# Patient Record
Sex: FEMALE | Race: WHITE | Employment: OTHER | URBAN - NONMETROPOLITAN AREA
[De-identification: names, ages, dates, MRNs, and addresses within clinical notes are randomized per-mention and may not be internally consistent; named-entity substitution may affect disease eponyms.]

---

## 2017-01-23 ENCOUNTER — COMMUNICATION - GICH (OUTPATIENT)
Dept: INTERNAL MEDICINE | Facility: OTHER | Age: 82
End: 2017-01-23

## 2017-01-23 DIAGNOSIS — E87.6 HYPOKALEMIA: ICD-10-CM

## 2017-03-08 ENCOUNTER — OFFICE VISIT - GICH (OUTPATIENT)
Dept: FAMILY MEDICINE | Facility: OTHER | Age: 82
End: 2017-03-08

## 2017-03-08 DIAGNOSIS — Z23 ENCOUNTER FOR IMMUNIZATION: ICD-10-CM

## 2017-03-08 DIAGNOSIS — R12 HEARTBURN: ICD-10-CM

## 2017-03-08 DIAGNOSIS — M70.72 OTHER BURSITIS OF HIP, LEFT HIP: ICD-10-CM

## 2017-03-08 DIAGNOSIS — E87.6 HYPOKALEMIA: ICD-10-CM

## 2017-03-08 DIAGNOSIS — E78.2 MIXED HYPERLIPIDEMIA: ICD-10-CM

## 2017-03-08 DIAGNOSIS — C91.10 CHRONIC LYMPHOCYTIC LEUKEMIA OF B-CELL TYPE NOT HAVING ACHIEVED REMISSION (H): ICD-10-CM

## 2017-03-08 DIAGNOSIS — E55.9 VITAMIN D DEFICIENCY: ICD-10-CM

## 2017-03-08 DIAGNOSIS — M70.62 TROCHANTERIC BURSITIS OF LEFT HIP: ICD-10-CM

## 2017-03-08 DIAGNOSIS — I10 ESSENTIAL (PRIMARY) HYPERTENSION: ICD-10-CM

## 2017-03-08 LAB
A/G RATIO - HISTORICAL: 1.6 (ref 1–2)
ABSOLUTE BASOPHILS - HISTORICAL: 0.2 THOU/CU MM
ABSOLUTE EOSINOPHILS - HISTORICAL: 0.1 THOU/CU MM
ABSOLUTE LYMPHOCYTES - HISTORICAL: 5.7 THOU/CU MM (ref 0.9–2.9)
ABSOLUTE MONOCYTES - HISTORICAL: 0.8 THOU/CU MM
ABSOLUTE NEUTROPHILS - HISTORICAL: 3.9 THOU/CU MM (ref 1.7–7)
ALBUMIN SERPL-MCNC: 4.3 G/DL (ref 3.5–5.7)
ALP SERPL-CCNC: 54 IU/L (ref 34–104)
ALT (SGPT) - HISTORICAL: 15 IU/L (ref 7–52)
ANION GAP - HISTORICAL: 9 (ref 5–18)
AST SERPL-CCNC: 20 IU/L (ref 13–39)
BASOPHILS # BLD AUTO: 1.7 %
BILIRUB SERPL-MCNC: 0.3 MG/DL (ref 0.3–1)
BUN SERPL-MCNC: 26 MG/DL (ref 7–25)
BUN/CREAT RATIO - HISTORICAL: 19
CALCIUM SERPL-MCNC: 9.5 MG/DL (ref 8.6–10.3)
CHLORIDE SERPLBLD-SCNC: 102 MMOL/L (ref 98–107)
CO2 SERPL-SCNC: 27 MMOL/L (ref 21–31)
CREAT SERPL-MCNC: 1.37 MG/DL (ref 0.7–1.3)
EOSINOPHIL NFR BLD AUTO: 1 %
ERYTHROCYTE [DISTWIDTH] IN BLOOD BY AUTOMATED COUNT: 12.2 % (ref 11.5–15.5)
GFR IF NOT AFRICAN AMERICAN - HISTORICAL: 37 ML/MIN/1.73M2
GLOBULIN - HISTORICAL: 2.7 G/DL (ref 2–3.7)
GLUCOSE SERPL-MCNC: 95 MG/DL (ref 70–105)
HCT VFR BLD AUTO: 36.2 % (ref 33–51)
HEMOGLOBIN: 11.8 G/DL (ref 12–16)
LYMPHOCYTES NFR BLD AUTO: 53.2 % (ref 20–44)
MCH RBC QN AUTO: 30.9 PG (ref 26–34)
MCHC RBC AUTO-ENTMCNC: 32.6 G/DL (ref 32–36)
MCV RBC AUTO: 95 FL (ref 80–100)
MONOCYTES NFR BLD AUTO: 7.3 %
NEUTROPHILS NFR BLD AUTO: 36.8 % (ref 42–72)
PLATELET # BLD AUTO: 241 THOU/CU MM (ref 140–440)
PMV BLD: 6 FL (ref 6.5–11)
POTASSIUM SERPL-SCNC: 4.6 MMOL/L (ref 3.5–5.1)
PROT SERPL-MCNC: 7 G/DL (ref 6.4–8.9)
RED BLOOD COUNT - HISTORICAL: 3.82 MIL/CU MM (ref 4–5.2)
SODIUM SERPL-SCNC: 138 MMOL/L (ref 133–143)
VIT B12 SERPL-MCNC: 568 PG/ML (ref 180–914)
VITAMIN D TOTAL - HISTORICAL: 49.6 NG/ML
WHITE BLOOD COUNT - HISTORICAL: 10.6 THOU/CU MM (ref 4.5–11)

## 2017-03-13 ENCOUNTER — COMMUNICATION - GICH (OUTPATIENT)
Dept: INTERNAL MEDICINE | Facility: OTHER | Age: 82
End: 2017-03-13

## 2017-03-13 ENCOUNTER — COMMUNICATION - GICH (OUTPATIENT)
Dept: FAMILY MEDICINE | Facility: OTHER | Age: 82
End: 2017-03-13

## 2017-03-13 DIAGNOSIS — I10 ESSENTIAL (PRIMARY) HYPERTENSION: ICD-10-CM

## 2017-03-15 ENCOUNTER — COMMUNICATION - GICH (OUTPATIENT)
Dept: FAMILY MEDICINE | Facility: OTHER | Age: 82
End: 2017-03-15

## 2017-03-15 DIAGNOSIS — E78.2 MIXED HYPERLIPIDEMIA: ICD-10-CM

## 2017-03-28 ENCOUNTER — OFFICE VISIT - GICH (OUTPATIENT)
Dept: FAMILY MEDICINE | Facility: OTHER | Age: 82
End: 2017-03-28

## 2017-03-28 DIAGNOSIS — N10 ACUTE TUBULO-INTERSTITIAL NEPHRITIS: ICD-10-CM

## 2017-03-28 DIAGNOSIS — R30.0 DYSURIA: ICD-10-CM

## 2017-03-28 LAB
BACTERIA URINE: ABNORMAL BACTERIA/HPF
BILIRUB UR QL: NEGATIVE
CLARITY, URINE: ABNORMAL CLARITY
COLOR UR: YELLOW COLOR
EPITHELIAL CELLS: ABNORMAL EPI/HPF
GLUCOSE URINE: NEGATIVE MG/DL
KETONES UR QL: ABNORMAL MG/DL
LEUKOCYTE ESTERASE URINE: ABNORMAL
NITRITE UR QL STRIP: NEGATIVE
OCCULT BLOOD,URINE - HISTORICAL: ABNORMAL
PH UR: 5 [PH]
PROTEIN QUALITATIVE,URINE - HISTORICAL: 30 MG/DL
RBC - HISTORICAL: ABNORMAL /HPF
SP GR UR STRIP: 1.01
UROBILINOGEN,QUALITATIVE - HISTORICAL: NORMAL EU/DL
WBC - HISTORICAL: ABNORMAL /HPF

## 2017-05-15 ENCOUNTER — OFFICE VISIT - GICH (OUTPATIENT)
Dept: FAMILY MEDICINE | Facility: OTHER | Age: 82
End: 2017-05-15

## 2017-05-15 ENCOUNTER — HISTORY (OUTPATIENT)
Dept: FAMILY MEDICINE | Facility: OTHER | Age: 82
End: 2017-05-15

## 2017-05-15 DIAGNOSIS — L81.9 DISORDER OF PIGMENTATION: ICD-10-CM

## 2017-05-15 ASSESSMENT — PATIENT HEALTH QUESTIONNAIRE - PHQ9: SUM OF ALL RESPONSES TO PHQ QUESTIONS 1-9: 0

## 2017-05-16 ENCOUNTER — COMMUNICATION - GICH (OUTPATIENT)
Dept: FAMILY MEDICINE | Facility: OTHER | Age: 82
End: 2017-05-16

## 2017-05-16 DIAGNOSIS — L81.9 DISORDER OF PIGMENTATION: ICD-10-CM

## 2017-05-18 ENCOUNTER — HISTORY (OUTPATIENT)
Dept: FAMILY MEDICINE | Facility: OTHER | Age: 82
End: 2017-05-18

## 2017-05-18 ENCOUNTER — OFFICE VISIT - GICH (OUTPATIENT)
Dept: FAMILY MEDICINE | Facility: OTHER | Age: 82
End: 2017-05-18

## 2017-05-18 DIAGNOSIS — L03.031 CELLULITIS OF TOE OF RIGHT FOOT: ICD-10-CM

## 2017-05-25 ENCOUNTER — HISTORY (OUTPATIENT)
Dept: SURGERY | Facility: OTHER | Age: 82
End: 2017-05-25

## 2017-05-25 ENCOUNTER — AMBULATORY - GICH (OUTPATIENT)
Dept: SURGERY | Facility: OTHER | Age: 82
End: 2017-05-25

## 2017-05-25 DIAGNOSIS — L81.9 DISORDER OF PIGMENTATION: ICD-10-CM

## 2017-05-30 ENCOUNTER — COMMUNICATION - GICH (OUTPATIENT)
Dept: FAMILY MEDICINE | Facility: OTHER | Age: 82
End: 2017-05-30

## 2017-05-30 ENCOUNTER — COMMUNICATION - GICH (OUTPATIENT)
Dept: SURGERY | Facility: OTHER | Age: 82
End: 2017-05-30

## 2017-06-01 ENCOUNTER — OFFICE VISIT - GICH (OUTPATIENT)
Dept: SURGERY | Facility: OTHER | Age: 82
End: 2017-06-01

## 2017-06-01 ENCOUNTER — HISTORY (OUTPATIENT)
Dept: SURGERY | Facility: OTHER | Age: 82
End: 2017-06-01

## 2017-06-01 DIAGNOSIS — C43.62 MALIGNANT MELANOMA OF LEFT UPPER EXTREMITY INCLUDING SHOULDER (H): ICD-10-CM

## 2017-06-01 DIAGNOSIS — I38 ENDOCARDITIS: ICD-10-CM

## 2017-06-02 ENCOUNTER — COMMUNICATION - GICH (OUTPATIENT)
Dept: SURGERY | Facility: OTHER | Age: 82
End: 2017-06-02

## 2017-06-05 ENCOUNTER — COMMUNICATION - GICH (OUTPATIENT)
Dept: SURGERY | Facility: OTHER | Age: 82
End: 2017-06-05

## 2017-06-08 ENCOUNTER — TRANSFERRED RECORDS (OUTPATIENT)
Dept: HEALTH INFORMATION MANAGEMENT | Facility: CLINIC | Age: 82
End: 2017-06-08

## 2017-06-08 ENCOUNTER — MEDICAL CORRESPONDENCE (OUTPATIENT)
Facility: CLINIC | Age: 82
End: 2017-06-08
Payer: MEDICARE

## 2017-06-08 ENCOUNTER — HOSPITAL ENCOUNTER (OUTPATIENT)
Dept: RADIOLOGY | Facility: OTHER | Age: 82
End: 2017-06-08
Attending: SURGERY

## 2017-06-08 DIAGNOSIS — I38 ENDOCARDITIS: ICD-10-CM

## 2017-06-08 DIAGNOSIS — C43.62 MALIGNANT MELANOMA OF LEFT UPPER EXTREMITY INCLUDING SHOULDER (H): ICD-10-CM

## 2017-06-08 PROCEDURE — 93306 TTE W/DOPPLER COMPLETE: CPT | Mod: 26 | Performed by: INTERNAL MEDICINE

## 2017-06-09 ENCOUNTER — COMMUNICATION - GICH (OUTPATIENT)
Dept: FAMILY MEDICINE | Facility: OTHER | Age: 82
End: 2017-06-09

## 2017-06-13 ENCOUNTER — COMMUNICATION - GICH (OUTPATIENT)
Dept: SURGERY | Facility: OTHER | Age: 82
End: 2017-06-13

## 2017-06-15 ENCOUNTER — SURGERY (OUTPATIENT)
Dept: SURGERY | Facility: OTHER | Age: 82
End: 2017-06-15

## 2017-06-15 ENCOUNTER — HISTORY (OUTPATIENT)
Dept: SURGERY | Facility: OTHER | Age: 82
End: 2017-06-15

## 2017-06-15 ENCOUNTER — HOSPITAL ENCOUNTER (OUTPATIENT)
Dept: SURGERY | Facility: OTHER | Age: 82
Discharge: HOME | End: 2017-06-15
Attending: SURGERY | Admitting: SURGERY

## 2017-06-15 DIAGNOSIS — C43.62 MALIGNANT MELANOMA OF LEFT UPPER EXTREMITY INCLUDING SHOULDER (H): ICD-10-CM

## 2017-06-16 ENCOUNTER — HISTORY (OUTPATIENT)
Dept: SURGERY | Facility: OTHER | Age: 82
End: 2017-06-16

## 2017-06-16 ENCOUNTER — OFFICE VISIT - GICH (OUTPATIENT)
Dept: SURGERY | Facility: OTHER | Age: 82
End: 2017-06-16

## 2017-06-16 DIAGNOSIS — C43.62 MALIGNANT MELANOMA OF LEFT UPPER EXTREMITY INCLUDING SHOULDER (H): ICD-10-CM

## 2017-06-19 ENCOUNTER — HISTORY (OUTPATIENT)
Dept: SURGERY | Facility: OTHER | Age: 82
End: 2017-06-19

## 2017-06-19 ENCOUNTER — OFFICE VISIT - GICH (OUTPATIENT)
Dept: SURGERY | Facility: OTHER | Age: 82
End: 2017-06-19

## 2017-06-19 DIAGNOSIS — C43.62 MALIGNANT MELANOMA OF LEFT UPPER EXTREMITY INCLUDING SHOULDER (H): ICD-10-CM

## 2017-06-23 ENCOUNTER — HISTORY (OUTPATIENT)
Dept: SURGERY | Facility: OTHER | Age: 82
End: 2017-06-23

## 2017-06-23 ENCOUNTER — OFFICE VISIT - GICH (OUTPATIENT)
Dept: SURGERY | Facility: OTHER | Age: 82
End: 2017-06-23

## 2017-06-23 DIAGNOSIS — C43.62 MALIGNANT MELANOMA OF LEFT UPPER EXTREMITY INCLUDING SHOULDER (H): ICD-10-CM

## 2017-06-26 ENCOUNTER — COMMUNICATION - GICH (OUTPATIENT)
Dept: INTERNAL MEDICINE | Facility: OTHER | Age: 82
End: 2017-06-26

## 2017-07-06 ENCOUNTER — OFFICE VISIT - GICH (OUTPATIENT)
Dept: SURGERY | Facility: OTHER | Age: 82
End: 2017-07-06

## 2017-07-06 ENCOUNTER — HISTORY (OUTPATIENT)
Dept: SURGERY | Facility: OTHER | Age: 82
End: 2017-07-06

## 2017-07-06 DIAGNOSIS — L82.1 OTHER SEBORRHEIC KERATOSIS: ICD-10-CM

## 2017-07-06 DIAGNOSIS — L57.0 ACTINIC KERATOSIS: ICD-10-CM

## 2017-07-18 ENCOUNTER — COMMUNICATION - GICH (OUTPATIENT)
Dept: FAMILY MEDICINE | Facility: OTHER | Age: 82
End: 2017-07-18

## 2017-07-21 ENCOUNTER — OFFICE VISIT - GICH (OUTPATIENT)
Dept: SURGERY | Facility: OTHER | Age: 82
End: 2017-07-21

## 2017-07-21 ENCOUNTER — HISTORY (OUTPATIENT)
Dept: SURGERY | Facility: OTHER | Age: 82
End: 2017-07-21

## 2017-07-21 DIAGNOSIS — C43.62 MALIGNANT MELANOMA OF LEFT UPPER EXTREMITY INCLUDING SHOULDER (H): ICD-10-CM

## 2017-07-21 DIAGNOSIS — L82.1 OTHER SEBORRHEIC KERATOSIS: ICD-10-CM

## 2017-07-21 DIAGNOSIS — L57.0 ACTINIC KERATOSIS: ICD-10-CM

## 2017-07-26 ENCOUNTER — COMMUNICATION - GICH (OUTPATIENT)
Dept: FAMILY MEDICINE | Facility: OTHER | Age: 82
End: 2017-07-26

## 2017-07-26 ENCOUNTER — OFFICE VISIT - GICH (OUTPATIENT)
Dept: FAMILY MEDICINE | Facility: OTHER | Age: 82
End: 2017-07-26

## 2017-07-26 ENCOUNTER — HISTORY (OUTPATIENT)
Dept: FAMILY MEDICINE | Facility: OTHER | Age: 82
End: 2017-07-26

## 2017-07-26 DIAGNOSIS — R10.9 ABDOMINAL PAIN: ICD-10-CM

## 2017-07-26 DIAGNOSIS — I95.9 HYPOTENSION: ICD-10-CM

## 2017-07-26 DIAGNOSIS — I10 ESSENTIAL (PRIMARY) HYPERTENSION: ICD-10-CM

## 2017-07-26 DIAGNOSIS — L57.0 ACTINIC KERATOSIS: ICD-10-CM

## 2017-07-26 DIAGNOSIS — M53.3 SACROCOCCYGEAL DISORDERS, NOT ELSEWHERE CLASSIFIED: ICD-10-CM

## 2017-07-26 DIAGNOSIS — M70.62 TROCHANTERIC BURSITIS OF LEFT HIP: ICD-10-CM

## 2017-07-26 LAB
ABSOLUTE BASOPHILS - HISTORICAL: 0.1 THOU/CU MM
ABSOLUTE EOSINOPHILS - HISTORICAL: 0.1 THOU/CU MM
ABSOLUTE IMMATURE GRANULOCYTES(METAS,MYELOS,PROS) - HISTORICAL: 0.1 THOU/CU MM
ABSOLUTE LYMPHOCYTES - HISTORICAL: 9.6 THOU/CU MM (ref 0.9–2.9)
ABSOLUTE MONOCYTES - HISTORICAL: 0.7 THOU/CU MM
ABSOLUTE NEUTROPHILS - HISTORICAL: 5.7 THOU/CU MM (ref 1.7–7)
ANION GAP - HISTORICAL: 9 (ref 5–18)
BASOPHILS # BLD AUTO: 0.3 %
BUN SERPL-MCNC: 32 MG/DL (ref 7–25)
BUN/CREAT RATIO - HISTORICAL: 20
CALCIUM SERPL-MCNC: 9.1 MG/DL (ref 8.6–10.3)
CHLORIDE SERPLBLD-SCNC: 96 MMOL/L (ref 98–107)
CO2 SERPL-SCNC: 25 MMOL/L (ref 21–31)
CREAT SERPL-MCNC: 1.64 MG/DL (ref 0.7–1.3)
EOSINOPHIL NFR BLD AUTO: 0.4 %
ERYTHROCYTE [DISTWIDTH] IN BLOOD BY AUTOMATED COUNT: 12.5 % (ref 11.5–15.5)
GFR IF NOT AFRICAN AMERICAN - HISTORICAL: 30 ML/MIN/1.73M2
GLUCOSE SERPL-MCNC: 127 MG/DL (ref 70–105)
HCT VFR BLD AUTO: 34.5 % (ref 33–51)
HEMOGLOBIN: 11 G/DL (ref 12–16)
IMMATURE GRANULOCYTES(METAS,MYELOS,PROS) - HISTORICAL: 0.3 %
LYMPHOCYTES NFR BLD AUTO: 59.3 % (ref 20–44)
MCH RBC QN AUTO: 29.3 PG (ref 26–34)
MCHC RBC AUTO-ENTMCNC: 31.9 G/DL (ref 32–36)
MCV RBC AUTO: 92 FL (ref 80–100)
MONOCYTES NFR BLD AUTO: 4.5 %
NEUTROPHILS NFR BLD AUTO: 35.2 % (ref 42–72)
PLATELET # BLD AUTO: 287 THOU/CU MM (ref 140–440)
PMV BLD: 9.6 FL (ref 6.5–11)
POTASSIUM SERPL-SCNC: 3.8 MMOL/L (ref 3.5–5.1)
RED BLOOD COUNT - HISTORICAL: 3.75 MIL/CU MM (ref 4–5.2)
SODIUM SERPL-SCNC: 130 MMOL/L (ref 133–143)
WHITE BLOOD COUNT - HISTORICAL: 16.2 THOU/CU MM (ref 4.5–11)

## 2017-07-27 ENCOUNTER — AMBULATORY - GICH (OUTPATIENT)
Dept: LAB | Facility: OTHER | Age: 82
End: 2017-07-27

## 2017-07-27 DIAGNOSIS — R10.9 ABDOMINAL PAIN: ICD-10-CM

## 2017-07-27 LAB
BACTERIA URINE: ABNORMAL BACTERIA/HPF
BILIRUB UR QL: NEGATIVE
CLARITY, URINE: ABNORMAL CLARITY
COLOR UR: YELLOW COLOR
EPITHELIAL CELLS: ABNORMAL EPI/HPF
GLUCOSE URINE: NEGATIVE MG/DL
KETONES UR QL: NEGATIVE MG/DL
LEUKOCYTE ESTERASE URINE: ABNORMAL
NITRITE UR QL STRIP: NEGATIVE
OCCULT BLOOD,URINE - HISTORICAL: ABNORMAL
PH UR: 6.5 [PH]
PROTEIN QUALITATIVE,URINE - HISTORICAL: NEGATIVE MG/DL
RBC - HISTORICAL: ABNORMAL /HPF
SP GR UR STRIP: 1.01
UROBILINOGEN,QUALITATIVE - HISTORICAL: NORMAL EU/DL
WBC - HISTORICAL: ABNORMAL /HPF

## 2017-08-01 ENCOUNTER — COMMUNICATION - GICH (OUTPATIENT)
Dept: FAMILY MEDICINE | Facility: OTHER | Age: 82
End: 2017-08-01

## 2017-08-07 ENCOUNTER — COMMUNICATION - GICH (OUTPATIENT)
Dept: INTERNAL MEDICINE | Facility: OTHER | Age: 82
End: 2017-08-07

## 2017-08-07 DIAGNOSIS — Z86.73 PERSONAL HISTORY OF TRANSIENT ISCHEMIC ATTACK (TIA), AND CEREBRAL INFARCTION WITHOUT RESIDUAL DEFICITS: ICD-10-CM

## 2017-08-08 ENCOUNTER — AMBULATORY - GICH (OUTPATIENT)
Dept: RADIOLOGY | Facility: OTHER | Age: 82
End: 2017-08-08

## 2017-08-08 ENCOUNTER — HISTORY (OUTPATIENT)
Dept: FAMILY MEDICINE | Facility: OTHER | Age: 82
End: 2017-08-08

## 2017-08-08 ENCOUNTER — OFFICE VISIT - GICH (OUTPATIENT)
Dept: FAMILY MEDICINE | Facility: OTHER | Age: 82
End: 2017-08-08

## 2017-08-08 DIAGNOSIS — M46.1 SACROILIITIS, NOT ELSEWHERE CLASSIFIED (H): ICD-10-CM

## 2017-08-08 DIAGNOSIS — I10 ESSENTIAL (PRIMARY) HYPERTENSION: ICD-10-CM

## 2017-08-09 ENCOUNTER — COMMUNICATION - GICH (OUTPATIENT)
Dept: FAMILY MEDICINE | Facility: OTHER | Age: 82
End: 2017-08-09

## 2017-08-14 ENCOUNTER — COMMUNICATION - GICH (OUTPATIENT)
Dept: FAMILY MEDICINE | Facility: OTHER | Age: 82
End: 2017-08-14

## 2017-08-15 ENCOUNTER — COMMUNICATION - GICH (OUTPATIENT)
Dept: FAMILY MEDICINE | Facility: OTHER | Age: 82
End: 2017-08-15

## 2017-08-22 ENCOUNTER — COMMUNICATION - GICH (OUTPATIENT)
Dept: FAMILY MEDICINE | Facility: OTHER | Age: 82
End: 2017-08-22

## 2017-08-22 ENCOUNTER — HOSPITAL ENCOUNTER (OUTPATIENT)
Dept: RADIOLOGY | Facility: OTHER | Age: 82
End: 2017-08-22
Attending: FAMILY MEDICINE

## 2017-08-22 DIAGNOSIS — Z86.73 PERSONAL HISTORY OF TRANSIENT ISCHEMIC ATTACK (TIA), AND CEREBRAL INFARCTION WITHOUT RESIDUAL DEFICITS: ICD-10-CM

## 2017-08-22 DIAGNOSIS — M46.1 SACROILIITIS, NOT ELSEWHERE CLASSIFIED (H): ICD-10-CM

## 2017-09-14 ENCOUNTER — HISTORY (OUTPATIENT)
Dept: FAMILY MEDICINE | Facility: OTHER | Age: 82
End: 2017-09-14

## 2017-09-14 ENCOUNTER — OFFICE VISIT - GICH (OUTPATIENT)
Dept: FAMILY MEDICINE | Facility: OTHER | Age: 82
End: 2017-09-14

## 2017-09-14 DIAGNOSIS — R68.2 DRY MOUTH: ICD-10-CM

## 2017-09-25 ENCOUNTER — COMMUNICATION - GICH (OUTPATIENT)
Dept: FAMILY MEDICINE | Facility: OTHER | Age: 82
End: 2017-09-25

## 2017-09-27 ENCOUNTER — AMBULATORY - GICH (OUTPATIENT)
Dept: SURGERY | Facility: OTHER | Age: 82
End: 2017-09-27

## 2017-09-27 ENCOUNTER — HISTORY (OUTPATIENT)
Dept: SURGERY | Facility: OTHER | Age: 82
End: 2017-09-27

## 2017-09-27 DIAGNOSIS — L98.9 DISORDER OF SKIN OR SUBCUTANEOUS TISSUE: ICD-10-CM

## 2017-10-03 ENCOUNTER — HISTORY (OUTPATIENT)
Dept: FAMILY MEDICINE | Facility: OTHER | Age: 82
End: 2017-10-03

## 2017-10-03 ENCOUNTER — HISTORY (OUTPATIENT)
Dept: SURGERY | Facility: OTHER | Age: 82
End: 2017-10-03

## 2017-10-03 ENCOUNTER — OFFICE VISIT - GICH (OUTPATIENT)
Dept: FAMILY MEDICINE | Facility: OTHER | Age: 82
End: 2017-10-03

## 2017-10-03 ENCOUNTER — OFFICE VISIT - GICH (OUTPATIENT)
Dept: SURGERY | Facility: OTHER | Age: 82
End: 2017-10-03

## 2017-10-03 DIAGNOSIS — M70.62 TROCHANTERIC BURSITIS OF LEFT HIP: ICD-10-CM

## 2017-10-03 DIAGNOSIS — R00.2 PALPITATIONS: ICD-10-CM

## 2017-10-03 DIAGNOSIS — Z48.817 ENCOUNTER FOR SURGICAL AFTERCARE FOLLOWING SURGERY OF SKIN OR SUBCUTANEOUS TISSUE: ICD-10-CM

## 2017-10-03 DIAGNOSIS — Z23 ENCOUNTER FOR IMMUNIZATION: ICD-10-CM

## 2017-10-09 ENCOUNTER — AMBULATORY - GICH (OUTPATIENT)
Dept: SURGERY | Facility: OTHER | Age: 82
End: 2017-10-09

## 2017-10-10 ENCOUNTER — OFFICE VISIT - GICH (OUTPATIENT)
Dept: FAMILY MEDICINE | Facility: OTHER | Age: 82
End: 2017-10-10

## 2017-10-10 ENCOUNTER — COMMUNICATION - GICH (OUTPATIENT)
Dept: FAMILY MEDICINE | Facility: OTHER | Age: 82
End: 2017-10-10

## 2017-10-10 ENCOUNTER — HISTORY (OUTPATIENT)
Dept: FAMILY MEDICINE | Facility: OTHER | Age: 82
End: 2017-10-10

## 2017-10-10 DIAGNOSIS — M46.99 INFLAMMATORY SPONDYLOPATHY OF MULTIPLE SITES IN SPINE (H): ICD-10-CM

## 2017-10-10 DIAGNOSIS — M47.815 SPONDYLOSIS OF THORACOLUMBAR REGION WITHOUT MYELOPATHY OR RADICULOPATHY: ICD-10-CM

## 2017-10-10 DIAGNOSIS — L57.0 ACTINIC KERATOSIS: ICD-10-CM

## 2017-10-10 DIAGNOSIS — L60.0 INGROWING NAIL: ICD-10-CM

## 2017-10-13 ENCOUNTER — COMMUNICATION - GICH (OUTPATIENT)
Dept: FAMILY MEDICINE | Facility: OTHER | Age: 82
End: 2017-10-13

## 2017-10-16 ENCOUNTER — COMMUNICATION - GICH (OUTPATIENT)
Dept: FAMILY MEDICINE | Facility: OTHER | Age: 82
End: 2017-10-16

## 2017-10-16 DIAGNOSIS — R00.2 PALPITATIONS: ICD-10-CM

## 2017-10-19 ENCOUNTER — AMBULATORY - GICH (OUTPATIENT)
Dept: FAMILY MEDICINE | Facility: OTHER | Age: 82
End: 2017-10-19

## 2017-10-24 ENCOUNTER — AMBULATORY - GICH (OUTPATIENT)
Dept: FAMILY MEDICINE | Facility: OTHER | Age: 82
End: 2017-10-24

## 2017-10-24 ENCOUNTER — HOSPITAL ENCOUNTER (OUTPATIENT)
Dept: RESPIRATORY THERAPY | Facility: OTHER | Age: 82
End: 2017-10-24
Attending: FAMILY MEDICINE

## 2017-10-24 DIAGNOSIS — R00.2 PALPITATIONS: ICD-10-CM

## 2017-11-01 ENCOUNTER — AMBULATORY - GICH (OUTPATIENT)
Dept: FAMILY MEDICINE | Facility: OTHER | Age: 82
End: 2017-11-01

## 2017-11-01 ENCOUNTER — COMMUNICATION - GICH (OUTPATIENT)
Dept: CARDIOLOGY | Facility: OTHER | Age: 82
End: 2017-11-01

## 2017-11-07 ENCOUNTER — AMBULATORY - GICH (OUTPATIENT)
Dept: FAMILY MEDICINE | Facility: OTHER | Age: 82
End: 2017-11-07

## 2017-11-07 ENCOUNTER — HOSPITAL ENCOUNTER (OUTPATIENT)
Dept: RADIOLOGY | Facility: OTHER | Age: 82
End: 2017-11-07
Attending: FAMILY MEDICINE

## 2017-11-07 DIAGNOSIS — M54.50 LOW BACK PAIN: ICD-10-CM

## 2017-11-07 DIAGNOSIS — M47.815 SPONDYLOSIS OF THORACOLUMBAR REGION WITHOUT MYELOPATHY OR RADICULOPATHY: ICD-10-CM

## 2017-11-07 DIAGNOSIS — M46.99 INFLAMMATORY SPONDYLOPATHY OF MULTIPLE SITES IN SPINE (H): ICD-10-CM

## 2017-12-27 NOTE — PROGRESS NOTES
Patient Information     Patient Name MRN Tenisha Hope [id number] Female 1933      Progress Notes by Juliette Sanchez R.T. (ARRT) at 2017  2:04 PM     Author:  Juliette Sanchez R.T. (ARRT) Service:  (none) Author Type:  [facility] - Registered Radiologic Technologist     Filed:  2017  2:04 PM Date of Service:  2017  2:04 PM Status:  Signed     :  Juliette Sanchez R.T. (ARRT) ([facility] - Registered Radiologic Technologist)            RECOVERY TIME  You may experience numbness and/or relief of your pain for up to 4-6 hours after the injection.  Your usual symptoms may return the night of the procedure and may possible be more severe than usual a day or two following.  Please keep track of your pain over the next several days and report how long the relief lasts to the doctor who referred you for this procedure.    The beneficial effects of the steroids usually require 2 to 3 days to take effect, buy may take as long as 5 to 7 days.  If there is no change in the pain, then investigation can be focused on other possible sources of your pain.  In either case, the information is useful to the doctor who referred you for this procedure.    POSSIBLE SIDE EFFECTS  Facial flushing (redness), occasional low grade fevers of 99.5F or less, hiccups, insomnia, headaches, increased heart rate, abdominal cramping, and/or a bloating feeling are side effects of the steroid medications and will go away 3 to 4 days after the injection.    Diabetic Patients  The steroids you have received may significantly increase your blood sugar levels.  Monitor your blood sugar level closely (4-6 times per day) for a period of 4 days or until your blood sugar level normalizes.  If your blood sugar level elevates significantly or you experience confusion, dizziness, sweating, please notify our primary physician and make him/her aware that you have received steroids.

## 2017-12-27 NOTE — PROGRESS NOTES
Patient Information     Patient Name MRN Sex Tenisha Pérez [id number] Female 1933      Progress Notes by Deepti Morelos at 2017 10:35 AM     Author:  Deepti Morelos Service:  (none) Author Type:  (none)     Filed:  2017 10:35 AM Date of Service:  2017 10:35 AM Status:  Signed     :  Deepti Morelos            Falls Risk Criteria:    Age 65 and older or under age 4        Sensory deficits    Poor vision    Use of ambulatory aides    Impaired judgment    Unable to walk independently    Meets High Risk criteria for falls:  Yes             1.  Do you have dizziness or vertigo?    no                    2.  Do you need help standing or walking?   yes             3.  Have you fallen within the last 6 months?    no           4.  Has the patient been fasting?      no       If any risks are marked Yes, the following interventions are utilized:    Do not leave patient unattended     Assist patient in the dressing room and bathroom    Have ambulatory aides available throughout procedure    Involve patient s family if available

## 2017-12-27 NOTE — PROGRESS NOTES
Patient Information     Patient Name MRN Sex Tenisha Pérez [id number] Female 1933      Progress Notes by Angelica Sheppard DO at 2017  7:00 AM     Author:  Angelica Sheppard DO Service:  (none) Author Type:  PHYS- Osteopathic     Filed:  2017  5:06 PM Encounter Date:  2017 Status:  Signed     :  Angelica Sheppard DO (PHYS- Osteopathic)            Patient is doing well post-op from there recent excision of melanoma and axillary node sampling.   She has been having no nausea or vomiting; no chest pain, shortness of breath or coughing up anything. Patient has been urinating without any difficulties and moving bowel w/ no straining or bleeding. Patient has no calf pain swelling, tenderness, or redness. Patient has been sleeping at night with no problems. Patient has been ambulating without difficulty. Patient has been able to tolerate a regular diet. Patient has had good relief with previously prescribed pain meds.  Patient c/o some pain in the arm..and in the axillae.  Patient forgot she had the lymph nodes sampled and that is why she has pan there.  No output out of drain and this is removed today.      /42  Pulse 64  Wt 59.6 kg (131 lb 6 oz)  BMI 24.49 kg/m2    HEENT: all negative  No jaundice.  No eye redness or pain.  No throat pain.  L: CTA b/l  Abdom: + BS. no distensions.  LOWER EXTREMITY: no swelling or calf pain  The incision(s) is clean dry and intact without redness, drainage or swelling or bleeding.  Some brusing   ?  Pathology: see Epic  ?   Incision(s): Clean/dry/intact and healing nicely.       Pt should keep the area clean and dry: wash with plain soap and water. Keep covered. Does not need to put anything on the lesions. May use abx ointment if desires. Avoid lifting Over 5 #'s x1 week. No strenuous activity or hot tubs/sauna's/pool/lake x1 week. Ice and NSAID's for pain. Monitor for redness/drainage/swelling/increase in pain/temp > 101. May have serous  drainage/should not be purulent. Call Clinic if these occur.  As far as heeling: may be red and firm for about 1-3 weeks. This is the normal heeling process and will smooth out to a silvery/white line.       Patient Active Problem List     Diagnosis  Code     LEUKEMIA, LYMPHOCYTIC, CHRONIC C91.10     HYPERTENSION I10     CEREBRAL ANEURYSM, NONRUPTURED I67.1     Diverticulosis of colon K57.30     Hiatal hernia K44.9     Pancreatic mass K86.9     Cystocele N81.10     ACP (advance care planning) Z71.89     Right knee DJD M17.11     Paresthesia of right leg R20.2     Baker's cyst of knee M71.20     Sacroiliac joint pain M53.3     Basal cell carcinoma of right cheek C44.319     Numbness and tingling of right leg R20.0, R20.2     Radicular leg pain M54.10     History of TIA (transient ischemic attack) Z86.73     Osteoarthritis of spine with radiculopathy, lumbar region - SEVERE - Noted on Xrays 2/2/2016 M47.26     Abnormal weight loss R63.4     Visual changes H53.9     Greater trochanteric bursitis of left hip M70.62     Anemia, unspecified D64.9     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  F/u with PCP for routine medical care.  does not require refill on pain medications   Will F/U :friday               Angelica Sheppard, DO

## 2017-12-27 NOTE — PROGRESS NOTES
"Patient Information     Patient Name MRN Sex Tenisha Pérez [id number] Female 1933      Progress Notes by Angelica Sheppard DO at 2017  1:20 PM     Author:  Angelica Sheppard DO Service:  (none) Author Type:  PHYS- Osteopathic     Filed:  2017  4:34 PM Encounter Date:  2017 Status:  Signed     :  Angelica Sheppard DO (PHYS- Osteopathic)            Patient is doing well post-op from there recent  Melanoma removal and sentinel lymph node.  .   She has been having no nausea or vomiting; no chest pain, shortness of breath or coughing up anything. Patient has been urinating without any difficulties and moving bowel w/ no straining or bleeding. Patient has no calf pain swelling, tenderness, or redness. Patient has been sleeping at night with no problems. Patient has been ambulating without difficulty. Patient has been able to tolerate a regular diet. Patient has had good relief with previously prescribed pain meds.  She says she has minimal pain. C/o it feels \"hot\"       /62  Pulse 68  Temp 97.3  F (36.3  C) (Tympanic)  Wt 59 kg (130 lb)  Breastfeeding? No  BMI 24.23 kg/m2    HEENT: all negative  No jaundice.  No eye redness or pain.  No throat pain.  L: CTA b/l  Abdom: + BS. no distensions.  LOWER EXTREMITY: no swelling or calf pain  The incision(s) is clean dry and intact without redness, drainage or swelling or bleeding.   + echymosis.  Small seroma at distal aspect of incision- does not require drainage.    ?  Pathology: see Epic  ?      Pt should keep the area clean and dry: wash with plain soap and water. Keep covered. Does not need to put anything on the lesions. May use abx ointment if desires. Avoid lifting Over 5 #'s x1 week. No strenuous activity or hot tubs/sauna's/pool/lake x1 week. Ice and NSAID's for pain. Monitor for redness/drainage/swelling/increase in pain/temp > 101. May have serous drainage/should not be purulent. Call Clinic if these occur.    The area we " cryo'ed are healing nicely.        Patient Active Problem List     Diagnosis  Code     LEUKEMIA, LYMPHOCYTIC, CHRONIC C91.10     HYPERTENSION I10     CEREBRAL ANEURYSM, NONRUPTURED I67.1     Diverticulosis of colon K57.30     Hiatal hernia K44.9     Pancreatic mass K86.9     Cystocele N81.10     ACP (advance care planning) Z71.89     Right knee DJD M17.11     Paresthesia of right leg R20.2     Baker's cyst of knee M71.20     Sacroiliac joint pain M53.3     Basal cell carcinoma of right cheek C44.319     Numbness and tingling of right leg R20.0, R20.2     Radicular leg pain M54.10     History of TIA (transient ischemic attack) Z86.73     Osteoarthritis of spine with radiculopathy, lumbar region - SEVERE - Noted on Xrays 2/2/2016 M47.26     Abnormal weight loss R63.4     Visual changes H53.9     Greater trochanteric bursitis of left hip M70.62     Anemia, unspecified D64.9     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  F/u with PCP for routine medical care.    Will F/U :July 6th and will take sutures out then   Wash w/ soap and water daily   OK to shower          Angelica Sheppard, DO

## 2017-12-27 NOTE — PROGRESS NOTES
"Patient Information     Patient Name MRN Sex Tenisha Pérez [id number] Female 1933      Progress Notes by Omer Steele MD at 2017 12:00 PM     Author:  Omer Steele MD  Service:  (none) Author Type:  Physician     Filed:  2017  2:34 PM  Encounter Date:  2017 Status:  Addendum     :  Omer Steele MD (Physician)        Related Notes: Original Note by Omer Steele MD (Physician) filed at 2017  2:30 PM            SUBJECTIVE:  Tenisha Cagle is a 83 y.o. Female.  She comes in today for evaluation of several issues. First issue is orthostatic hypotension. Reports feeling intermittently dizzy with standing. This dizziness is not vertiginous. She has not had any hearing issues. She is currently taking Norvasc, atenolol, Hyzaar. She has not had any palpitations, chest pain or shortness of breath.    She is also here for evaluation of left hip pain. She reports symptoms have been present for the past 3-4 weeks. Worse if she lays on that side. Also although has quite a bit of stiffness in her lower back that seems to better if she walks around. Has not had any bowel or bladder dysfunction beyond intermittent constipation and some urinary frequency. She reports when she has a bowel movement the lower abdominal pain resolves. She has been on hydrocodone intermittently over the past month due to the hip and low back pain.    She denies any dysuria. Has not had any fevers or chills.     New pink and scaled lesion on forehead, this is a new lesion occurring over the past two weeks.  They do not think this area has been treated with cryotherapy recently or remotely.      OBJECTIVE:  BP (!) 98/38  Pulse 58  Ht 1.549 m (5' 1\")  Wt 56.5 kg (124 lb 9.6 oz)  Breastfeeding? No  BMI 23.54 kg/m2  EXAM:  General Appearance: Pleasant, alert, appropriate appearance for age. No acute distress  Chest/Respiratory Exam: Normal chest wall and respirations. Clear to " auscultation.  Cardiovascular Exam: Regular rate and rhythm. S1, S2, no murmur, click, gallop, or rubs.  Musculoskeletal Exam: Patient is tender with palpation of left greater trochanter. This reproduces a significant amount of her pain. She also however has quite a bit of tenderness over the left SI joint and some bursal swelling. Range of motion of the hip is otherwise normal. Back range of motion slightly decreased with noted kyphosis.  Skin:  Patient has a 2mm AK on right forehead.  NO ulceration or vascular changes.    Results for orders placed or performed in visit on 07/26/17      BASIC METABOLIC PANEL      Result  Value Ref Range    SODIUM 130 (L) 133 - 143 mmol/L    POTASSIUM 3.8 3.5 - 5.1 mmol/L    CHLORIDE 96 (L) 98 - 107 mmol/L    CO2,TOTAL 25 21 - 31 mmol/L    ANION GAP 9 5 - 18                    GLUCOSE 127 (H) 70 - 105 mg/dL    CALCIUM 9.1 8.6 - 10.3 mg/dL    BUN 32 (H) 7 - 25 mg/dL    CREATININE 1.64 (H) 0.70 - 1.30 mg/dL    BUN/CREAT RATIO           20                    GFR if African American 36 (L) >60 ml/min/1.73m2    GFR if not African American 30 (L) >60 ml/min/1.73m2   CBC WITH AUTO DIFFERENTIAL      Result  Value Ref Range    WHITE BLOOD COUNT         16.2 (H) 4.5 - 11.0 thou/cu mm    RED BLOOD COUNT           3.75 (L) 4.00 - 5.20 mil/cu mm    HEMOGLOBIN                11.0 (L) 12.0 - 16.0 g/dL    HEMATOCRIT                34.5 33.0 - 51.0 %    MCV                       92 80 - 100 fL    MCH                       29.3 26.0 - 34.0 pg    MCHC                      31.9 (L) 32.0 - 36.0 g/dL    RDW                       12.5 11.5 - 15.5 %    PLATELET COUNT            287 140 - 440 thou/cu mm    MPV                       9.6 6.5 - 11.0 fL    NEUTROPHILS               35.2 (L) 42.0 - 72.0 %    LYMPHOCYTES               59.3 (H) 20.0 - 44.0 %    MONOCYTES                 4.5 <12.0 %    EOSINOPHILS               0.4 <8.0 %    BASOPHILS                 0.3 <3.0 %    IMMATURE  GRANULOCYTES(METAS,MYELOS,PROS) 0.3 %    ABSOLUTE NEUTROPHILS      5.7 1.7 - 7.0 thou/cu mm    ABSOLUTE LYMPHOCYTES      9.6 (H) 0.9 - 2.9 thou/cu mm    ABSOLUTE MONOCYTES        0.7 <0.9 thou/cu mm    ABSOLUTE EOSINOPHILS      0.1 <0.5 thou/cu mm    ABSOLUTE BASOPHILS        0.1 <0.3 thou/cu mm    ABSOLUTE IMMATURE GRANULOCYTES(METAS,MYELOS,PROS) 0.1 <=0.3 thou/cu mm       ASSESSMENT/Plan :      Tenisha was seen today for back pain.    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 We'll reduce her atenolol to 25 mg and also cut her Hyzaar down in half. Follow-up for blood pressure recheck in 1 week, sooner if symptoms do not resolve  -     BASIC METABOLIC PANEL; Future  -     BASIC METABOLIC PANEL    Trochanteric bursitis of left hip  Risks, benefits and alternatives discussed with patient.  They voiced good understanding and had the opportunity to ask questions.  Patient gave consent and proceed with procedure.     Informed consent obtained.  Area was sterilized with chlorhexadine prep and 60mg of kenalog mixed with 3ml of 1% lidocaine without epinephrine and 1ml of 0.25% bupivicaine was injected into the greater trochanteric bursa using sterile procedure.  Patient tolerated procedure well without any complications.  Patient will continue with icing.  Return if pain persists or new concerns    -     DE DRAIN/INJECT LARGE JOINT/BURSA (hip, knee,shoulder) - single joint  [58524.0]  -     triamcinolone acetonide 60 mg injection (KENALOG); Inject 1.5 mL intra-articular one time.    Sacroiliac joint dysfunction of left side  Risks, benefits and alternatives discussed with patient.  They voiced good understanding and had the opportunity to ask questions.  Patient gave consent and proceed with procedure.     Area was sterilezed with chlorhexadine prep.  Using sterile procedure 60mg of kenalog mixed with 3ml of 1% lidocaine without epinephrine and 1 ml of 0.25% bupivicane  was injected into the area overlying the SI joint.  Patient tolerated procedure well without complication and reported signficant improvement in symptoms following injection.      -     FL DRAIN/INJECT INTERMEDIATE JOINT/BURSA (wrist, elbow) - single joint [92972.0]  -     triamcinolone acetonide 60 mg injection (KENALOG); Inject 1.5 mL intra-articular one time.    HYPERTENSION  -     losartan-hydrochlorothiazide (HYZAAR) 100-25 mg tablet; Take 0.5 tablets by mouth once daily.    Abdominal pain, unspecified location  Patient's history is most suggestive of intermittent constipation. We'll have her discontinue narcotics. We did check urinalysis which was equivocal and subsequently CBC returned showing leukocytosis which was unexpected. We will culture urine and treat presumptively with antibiotic. Please see phone note. Discussed with patient that this could be UTI although could also be diverticulitis so she develops any worsening abdominal pain or fever seemed to come in urgently for additional workup and change in antibiotic regimen especially given the steroid injections she is given today.  -     URINALYSIS W REFLEX MICROSCOPIC IF POSITIVE; Future  -     CBC WITH DIFFERENTIAL; Future  -     CBC WITH DIFFERENTIAL  -     CBC WITH AUTO DIFFERENTIAL    AK (actinic keratosis)  Patient requests cryotherapy destruction.  Risks, benefits and alternatives discussed with patient.  They voiced good understanding and had the opportunity to ask questions.  Patient gave consent and proceed with procedure. Three cycles of cryotherapy performed on the single lesion.  Follow up for excisional biopsy if does not resolve, or if recurs.   -     FL DESTROY PREMALIGNANT 1ST LESION        Patient Instructions   Take 1/2 pill of the atenolol and 1/2 pill of the lozartan/HCTZ (Hyzaar).  We will do two shots to see if that helps.  I will also check your urine.      Omer Steele MD    This document was created using computer  generated templates and voice activated software.

## 2017-12-27 NOTE — PROGRESS NOTES
Patient Information     Patient Name MRN Sex Tenisha Pérez [id number] Female 1933      Progress Notes by Angelica Sheppard DO at 2017  3:20 PM     Author:  Angelica Sheppard DO Service:  (none) Author Type:  PHYS- Osteopathic     Filed:  2017  2:49 PM Encounter Date:  2017 Status:  Signed     :  Angelica Sheppard DO (PHYS- Osteopathic)            Patient is doing well post-op from there recent surgery yesterday.  Daughter was confused and concerned about how to do postOp care.  We reviewed dressing changes and how to do them ..  Also reviewed how to measure and empty JAVIER.  Only has had 20cc sang drainage since surgery.   Patient reports min pain.     She has been having no nausea or vomiting; no chest pain, shortness of breath or coughing up anything. Patient has been urinating without any difficulties and moving bowel w/ no straining or bleeding. Patient has no calf pain swelling, tenderness, or redness. Patient has been sleeping at night with no problems. Patient has been ambulating without difficulty. Patient has been able to tolerate a regular diet. Patient has had good relief with previously prescribed pain meds.      /60  Pulse 60  Wt 59.6 kg (131 lb 6.4 oz)  Breastfeeding? No  BMI 24.49 kg/m2    HEENT: all negative  No jaundice.  No eye redness or pain.  No throat pain.  L: CTA b/l  Abdom: + BS. no distensions.  LOWER EXTREMITY: no swelling or calf pain  The incision(s) is clean dry and intact without redness, drainage or swelling or bleeding.   + bruising   ?  Pathology:  pd  ?      Pt should keep the area clean and dry: wash with plain soap and water. Keep covered. Does not need to put anything on the lesions. May use abx ointment if desires. Avoid lifting Over 5 #'s x1 week. No strenuous activity or hot tubs/sauna's/pool/lake x1 week. Ice and NSAID's for pain. Monitor for redness/drainage/swelling/increase in pain/temp > 101. May have serous drainage/should not be  purulent. Call Clinic if these occur.      reviewed wound care and arranged for supplies    Will follow up Monday to remove drain.        Patient Active Problem List     Diagnosis  Code     LEUKEMIA, LYMPHOCYTIC, CHRONIC C91.10     HYPERTENSION I10     CEREBRAL ANEURYSM, NONRUPTURED I67.1     Diverticulosis of colon K57.30     Hiatal hernia K44.9     Pancreatic mass K86.9     Cystocele N81.10     ACP (advance care planning) Z71.89     Right knee DJD M17.11     Paresthesia of right leg R20.2     Baker's cyst of knee M71.20     Sacroiliac joint pain M53.3     Basal cell carcinoma of right cheek C44.319     Numbness and tingling of right leg R20.0, R20.2     Radicular leg pain M54.10     History of TIA (transient ischemic attack) Z86.73     Osteoarthritis of spine with radiculopathy, lumbar region - SEVERE - Noted on Xrays 2/2/2016 M47.26     Abnormal weight loss R63.4     Visual changes H53.9     Greater trochanteric bursitis of left hip M70.62     Anemia, unspecified D64.9     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    Will F/U :monday           Angelica Sheppard, DO

## 2017-12-27 NOTE — PROGRESS NOTES
Patient Information     Patient Name MRN Sex Tenisha Pérez [id number] Female 1933      Progress Notes by Angelica Sheppard DO at 2017  3:20 PM     Author:  Angelica Sheppard DO  Service:  (none) Author Type:  PHYS- Osteopathic     Filed:  2017  6:20 PM  Encounter Date:  2017 Status:  Addendum     :  Angelica Sheppard DO (PHYS- Osteopathic)        Related Notes: Original Note by Angelica Sheppard DO (PHYS- Osteopathic) filed at 2017  2:37 PM            CONSULTATION NOTE  Tenisha Cagle   [address] [facility] [address]  83 y.o.  female  Admission Date/Time: No admission date for patient encounter.  Primary Care Provider:  Omer Steele MD        HPI:     Patient is here today for follow up from recent biopsy.  Biopsy did show that it was a melanoma.    The incision today is c/d/i.   I did leave the sutures in place.  We do need to do a wide local incision and sentinel lymph node biopsy.   We need to coorridinate w/ radiology and pathology.  I discussed w/ th patient and her daughter the treatment for melanoma wide local excision w/ 2cm margins and lymph node testing.  If there nodes are + than will do axillary dissection.  If nodes are positive than na PET scan and we will discuss adjuvant testing.   We will need to do this under general endotracheal anesthesia.  Will be done as outpt.    Risks: Informed consent is obtained for the procedural (explained in simple layman's terms that the pt and/or family could understand) explaining risks vs benefits and alternatives to the procedure and consequences if we do not do the procedure.  Risks include but are not limited to:bleeding,infections, pneumonia, blood clots/DVT/PE, anesthesia(aspiration, damage to teeth/airway/MI/CVA/death/prolonged mechanical ventilation/PTX/IV infections). Wound infections requirng further surgery. Loss of function of limb/ext. (motor or sensory) . Scarring and disfigurement. possible ALND- full  and arm numbness and swelling.    The patient and daughter understand this will be a large incision to completely remove the lesion.     Patient also has heart murmer/valular heart disease in PMHx, so we will get echo preOp.     Patient Active Problem List       Diagnosis  Date Noted     Abnormal weight loss  04/18/2016     Visual changes  04/18/2016     Greater trochanteric bursitis of left hip  04/18/2016     Anemia, unspecified  04/18/2016     Osteoarthritis of spine with radiculopathy, lumbar region - SEVERE - Noted on Xrays 2/2/2016 02/03/2016     Numbness and tingling of right leg  02/02/2016     Radicular leg pain  02/02/2016     History of TIA (transient ischemic attack)  02/02/2016     Basal cell carcinoma of right cheek  11/18/2015     Sacroiliac joint pain  12/10/2014     Right knee DJD  02/20/2014     Paresthesia of right leg  02/20/2014     Baker's cyst of knee  02/20/2014     ACP (advance care planning)  01/02/2014     Cystocele  08/23/2013     Pancreatic mass  09/18/2012     Possible, abnormal CT         Hiatal hernia       large, mostly intrathoracic        CEREBRAL ANEURYSM, NONRUPTURED  12/20/2011     Diverticulosis of colon  05/13/2011     LEUKEMIA, LYMPHOCYTIC, CHRONIC  04/14/2011     WBC stable in the 30,000        HYPERTENSION         Past Medical History:     Diagnosis  Date     BASAL CELL CARCINOMA, NOSE 10/11/2011     Blood poisoning (HC) child    Hospitalized as a child for blood poisoning      BURSITIS, HIP, LEFT      CARDIAC MURMUR, SYSTOLIC 4/14/2011    TTE 11/21/11 mild MR and TR      CATARACTS 6/19/2012     Cerebral aneurysm, nonruptured 12/20/2011     CLL (chronic lymphoblastic leukemia)     CLL, stable WBC 30,000      DEPRESSION/ANXIETY, ACUTE SITUATIONAL      DIVERTICULOSIS, COLON 5/13/2011     Gastritis, Helicobacter pylori 5/9/03    Treated with PrevPac      H. pylori infection 05/09/2003     H. pylori gastritis treated with Prevpac      Hiatal hernia     large, mostly  intrathoracic      History of pregnancy     , vaginal deliveries      HYPERCHOLESTEROLEMIA      HYPERTENSION      LEUKEMIA, LYMPHOCYTIC, CHRONIC 2011     Loose stools     Recurrent loose stool      MELENA, HX OF 2011     Menopausal state     Menopausal age 56      NEOPLASM, MALIGNANT, SKIN, EAR, RIGHT 10/11/2011     OSTEOARTHRITIS      PEPTIC ULCER DISEASE      SKIN CANCER, RECURRENT      SLEEP DISORDER 2011     TRANSIENT ISCHEMIC ATTACK 2011       Past Surgical History:      Procedure  Laterality Date     BIOPSY BREAST  1986    Right       CHOLECYSTECTOMY       COLON EGD  11    Hiatal hernia, gastric gland hyperplasia in antrum           COLONOSCOPY DIAGNOSTIC      Sigmoid diverticulosis       PREMALIG/BENIGN SKIN LESION EXCISION      R side of nose, face and chest wall/Basal Cell Cancer Excision        TONSIL AND ADENOIDECTOMY       TUBAL LIGATION         Family History       Problem   Relation Age of Onset     Stroke  Mother      Had a CVA age 85       Cancer  Father      Brain tumor, age 56       Blood Disease  Sister      Leukemia and       Cancer  Sister       kidney cancer       Blood Disease  Sister      Chronic lymphocytic leukemia          Social History Narrative    Nonsmoker. .    Lives alone in a house.     Continues to drive.     2 grown kids, one in [place]        Social History     Social History Main Topics       Smoking status: Never Smoker     Smokeless tobacco: Never Used     Alcohol use No     Drug use: No     Sexual activity: Not Currently       Current Outpatient Prescriptions       Medication  Sig Dispense Refill     acetaminophen (TYLENOL EXTRA STRGTH) 500 mg tablet Take 2 tablets by mouth every 6 hours if needed for Pain. Max acetaminophen dose: 4000mg in 24 hrs.  0     amLODIPine (NORVASC) 10 mg tablet Take 1 tablet by mouth once daily. 90 tablet 4     aspirin-dipyridamole (AGGRENOX)  mg capsule Take 1 capsule by mouth 2 times daily. 180  capsule 4     atenolol (TENORMIN) 50 mg tablet Take 1 tablet by mouth once daily. 90 tablet 4     CALCIUM CARBONATE/VITAMIN D3 (CALCIUM 500 + D, D3, ORAL) Take 1 tablet by mouth once daily.       famotidine (PEPCID) 40 mg tablet Take 1 tablet by mouth at bedtime. 90 tablet 3     losartan-hydrochlorothiazide (HYZAAR) 100-25 mg tablet Take 1 tablet by mouth once daily. 90 tablet 4     multivitamin (MVI) tablet Take 1 tablet by mouth once daily.       simvastatin (ZOCOR) 20 mg tablet Take 0.5 tablets by mouth once daily with evening meal. 45 tablet 3     Vit C-Vit E-Lutein-Min-OM-3 (OCUVITE) 097-27-4-150 mg-unit-mg-mg cap Take 1 capsule by mouth once daily.  0     No current facility-administered medications for this visit.      Medications have been reviewed by me and are current to the best of my knowledge and ability.      ALLERGIES/SENSITIVITIES:   Allergies      Allergen   Reactions     Lisinopril  Cough     Prevacid [Lansoprazole]  *Unknown     Patient states that medication didn't work for her.          REVIEW OF SYSTEMS  GENERAL: No fevers or chills. Denies fatigue, recent weight loss.  HEENT: No sinus drainage. No changes with vision or hearing. No difficulty swallowing.   LYMPHATICS:  No   CARDIOVASCULAR: Denies chest pain, palpitations and dyspnea on exertion.  History of TIA  PULMONARY: No shortness of breath or cough. No increase in sputum production.  GI: Denies melena, bright red blood in stools. No hematemesis. No constipation or diarrhea.  : No dysuria or hematuria.  SKIN:  See HPI   Patient also c/o toe pain- no redness or drainage or swelling   HEMATOLOGY:  No history of easy bruising or bleeding.  + on blood thinners   ENDOCRINE:  No history of diabetes or thyroid problems.  Severe osteoporosis/'penia  NEUROLOGY:  No history of seizures or headaches. No motor or sensory changes.      PHYSICAL EXAM:         /48  Pulse 60  Wt 59.4 kg (131 lb)  Breastfeeding? No  BMI 24.42 kg/m2  Body mass  index is 24.42 kg/(m^2).                    PHYSICAL EXAM  GENERAL APPEARANCE: Alert, healthy appearance, oriented, in no acute distress  SKIN: skin lesion 4x4 cm.   Sutures in place.  Site c/d/i.  HYDRATION: Well hydrated  HEAD, EYES, EARS, NECK, AND THROAT: Head is normocephalic, pupils equal, round, reactive to light and accommodation, ocular movement intact, sclera clear and no jaundice. Dentition intact.  NECK: Supple, no lymphadenopathy. Trachea midline.  LUNGS: normal respiration, clear to auscultation  HEART: Regular rate and rhythm,   EXTREMITY: No edema or cyanosis  OA and varicose veins.  No redness or swelling on toe.   ABDOMEN:  non tender to palpation,   NEURO: no focal neuro deficits.               Prior to Admission medications          Medication Sig Start Date End Date Taking? Last Dose Authorizing Provider   acetaminophen (TYLENOL EXTRA STRGTH) 500 mg tablet Take 2 tablets by mouth every 6 hours if needed for Pain. Max acetaminophen dose: 4000mg in 24 hrs. 9/24/12  Yes  Oliver Vasquez MD   amLODIPine (NORVASC) 10 mg tablet Take 1 tablet by mouth once daily. 3/13/17  Yes  Omer Steele MD   aspirin-dipyridamole (AGGRENOX)  mg capsule Take 1 capsule by mouth 2 times daily. 7/19/16  Yes  Luis Ortez MD   atenolol (TENORMIN) 50 mg tablet Take 1 tablet by mouth once daily. 3/13/17  Yes  Omer Steele MD   CALCIUM CARBONATE/VITAMIN D3 (CALCIUM 500 + D, D3, ORAL) Take 1 tablet by mouth once daily.   Yes  Reported, Patient   famotidine (PEPCID) 40 mg tablet Take 1 tablet by mouth at bedtime. 3/13/17  Yes  Omer Steele MD   losartan-hydrochlorothiazide (HYZAAR) 100-25 mg tablet Take 1 tablet by mouth once daily. 3/13/17  Yes  Omer Steele MD   multivitamin (MVI) tablet Take 1 tablet by mouth once daily.   Yes  Reported, Patient   simvastatin (ZOCOR) 20 mg tablet Take 0.5 tablets by mouth once daily with evening meal. 3/17/17  Yes  Omer Steele  MD   Vit C-Vit E-Lutein-Min-OM-3 (OCUVITE) 875-06-2-150 mg-unit-mg-mg cap Take 1 capsule by mouth once daily. 3/1/13  Yes  Cheli Keita MD         No results found for this visit on 06/01/17.                CONSULTATION ASSESSMENT AND PLAN:      Patient Active Problem List     Diagnosis  Code     LEUKEMIA, LYMPHOCYTIC, CHRONIC C91.10     HYPERTENSION I10     CEREBRAL ANEURYSM, NONRUPTURED I67.1     Diverticulosis of colon K57.30     Hiatal hernia K44.9     Pancreatic mass K86.9     Cystocele N81.10     ACP (advance care planning) Z71.89     Right knee DJD M17.11     Paresthesia of right leg R20.2     Baker's cyst of knee M71.20     Sacroiliac joint pain M53.3     Basal cell carcinoma of right cheek C44.319     Numbness and tingling of right leg R20.0, R20.2     Radicular leg pain M54.10     History of TIA (transient ischemic attack) Z86.73     Osteoarthritis of spine with radiculopathy, lumbar region - SEVERE - Noted on Xrays 2/2/2016 M47.26     Abnormal weight loss R63.4     Visual changes H53.9     Greater trochanteric bursitis of left hip M70.62     Anemia, unspecified D64.9         Melanoma  -preOp echo  -stop aggrenox 10 days before surgery (does put her at higher risk for MI/CVA)  -will need nuclear tracer injected the day of surgery and lymphoscintigraphy scan.   Wide local incision and lymph testing.  Coordinated w/ pathology and w/ radiology.  She be able to go home same day.      SEBORRHEIC KERATOSIS/AK  Treated in office lesions on face and back   Repeat treatment of back lesions today.  Will probably require further treatment in future b/c of number/size and thickness.   Local wound care      We discussed cryo therapy of the lesion. We specifically discussed the risks of infection, discoloration and the possible need for further treatments. The patient expressed understanding and the patient wishes to proceed. Informed consent paperwork was completed.     Procedure:  The area of the skin lesion  "face and back was treated with liquid nitrogen for 2 freeze thaw cycles.   We treated about 10 lesions today ranging in in size from 0.5-2cm.  These are actinic keratosis on the abck and x2 seborrheic keratosis on the face.   The patient tolerated the procedure with no immediately apparent complications. We reviewed discharge instructions. The patient will call for any concerns. The patient will follow up in 3-4 weeks for a recheck of the area. The patient denies questions at this time.        R great  Toe pain  Apply bacitracin bid  Stop soaking  No redness/drainage/swelling  No open areas or \"hot spots\"    No sings of ischemia/insufficiency           30 Minutes is spent today in consultation with the patient.  > 50% of the time is spent in discussing the patient diagnosis, treatment plan, medications and or surgery.           "

## 2017-12-27 NOTE — PROGRESS NOTES
"Patient Information     Patient Name MRN Sex Tenisha Pérez [id number] Female 1933      Progress Notes by Godwin Kapoor MD at 10/3/2017  9:15 AM     Author:  Godwin Kapoor MD Service:  (none) Author Type:  Physician     Filed:  10/3/2017 11:34 AM Encounter Date:  10/3/2017 Status:  Signed     :  Godwin Kapoor MD (Physician)            SUBJECTIVE:    Tenisha Cagle is a 84 y.o. female who presents for left hip pain and palpitations     HPI    Here with her daughter.  She has been getting hip injections about every 4 months.  Last was in May.  Pain getting worse in the last few weeks.  Wants another today.      Also, says last week she had some heart \"bouncing around and pounding\".  This was her 2nd spell.  First was 4 weeks ago.  Lasted about 20 minutes.  When she was younger she was on medications for \"palpitations\".  Had no chest pain or shortness of breath with spell and not dizzy.  Really does not use caffeine.    Daughter says she had been on nexium and changed to pepcid.  Feels the pepcid caused her taste buds to change.  Cannot drink her own well water now.  They feel the palpitations were related to the pepcid.  She has no GERD at all.    Allergies      Allergen   Reactions     Lisinopril  Cough     Prevacid [Lansoprazole]  Other - Describe In Comment Field     Patient states that medication didn't work for her.    ,   Current Outpatient Prescriptions on File Prior to Visit       Medication  Sig Dispense Refill     acetaminophen (TYLENOL EXTRA STRGTH) 500 mg tablet Take 2 tablets by mouth every 6 hours if needed for Pain. Max acetaminophen dose: 4000mg in 24 hrs.  0     aspirin-dipyridamole (AGGRENOX)  mg capsule Take 1 capsule by mouth 2 times daily. 180 capsule 3     CALCIUM CARBONATE/VITAMIN D3 (CALCIUM 500 + D, D3, ORAL) Take 1 tablet by mouth once daily.       fluorouracil 5% topical (EFUDEX) 5 % cream Apply  topically to affected area(s) 2 times daily. For one month 40 g 0 "     losartan-hydrochlorothiazide (HYZAAR) 100-25 mg tablet Take 0.5 tablets by mouth once daily. 90 tablet 4     milk of magnesia (MILK OF MAGNESIA) 400 mg/5 mL suspension Take 15 mL by mouth at bedtime if needed.       multivitamin (MVI) tablet Take 1 tablet by mouth once daily.       simvastatin (ZOCOR) 20 mg tablet Take 0.5 tablets by mouth once daily with evening meal. 45 tablet 3     Vit C-Vit E-Lutein-Min-OM-3 (OCUVITE) 339-06-7-150 mg-unit-mg-mg cap Take 1 capsule by mouth once daily.  0     No current facility-administered medications on file prior to visit.    ,   Past Medical History:     Diagnosis  Date     BASAL CELL CARCINOMA, NOSE 10/11/2011     Blood poisoning (HC) child    Hospitalized as a child for blood poisoning      BURSITIS, HIP, LEFT      CARDIAC MURMUR, SYSTOLIC 2011    TTE 11 mild MR and TR      CATARACTS 2012     Cerebral aneurysm, nonruptured 2011     CLL (chronic lymphoblastic leukemia)     CLL, stable WBC 30,000      DEPRESSION/ANXIETY, ACUTE SITUATIONAL      DIVERTICULOSIS, COLON 2011     Gastritis, Helicobacter pylori 03    Treated with PrevPac      H. pylori infection 2003     H. pylori gastritis treated with Prevpac      Hiatal hernia     large, mostly intrathoracic      History of pregnancy     , vaginal deliveries      HYPERCHOLESTEROLEMIA      HYPERTENSION      LEUKEMIA, LYMPHOCYTIC, CHRONIC 2011     Loose stools     Recurrent loose stool      MELENA, HX OF 2011     Menopausal state     Menopausal age 56      NEOPLASM, MALIGNANT, SKIN, EAR, RIGHT 10/11/2011     OSTEOARTHRITIS      PEPTIC ULCER DISEASE      SKIN CANCER, RECURRENT      SLEEP DISORDER 2011     TRANSIENT ISCHEMIC ATTACK 2011    and   Past Surgical History:      Procedure  Laterality Date     BIOPSY BREAST  1986    Right       CHOLECYSTECTOMY       COLON EGD  11    Hiatal hernia, gastric gland hyperplasia in antrum           COLONOSCOPY DIAGNOSTIC       Sigmoid diverticulosis       PREMALIG/BENIGN SKIN LESION EXCISION      R side of nose, face and chest wall/Basal Cell Cancer Excision        TONSIL AND ADENOIDECTOMY  1954     TUBAL LIGATION         REVIEW OF SYSTEMS:  Review of Systems   Constitutional: Negative for chills and fever.   Respiratory: Negative for shortness of breath.    Cardiovascular: Positive for palpitations. Negative for chest pain.   Gastrointestinal: Negative for abdominal pain and heartburn.   Musculoskeletal: Positive for joint pain.   Neurological: Negative for dizziness.       OBJECTIVE:  /64  Resp 16  Wt 55.2 kg (121 lb 12.8 oz)  BMI 23.01 kg/m2    EXAM:   Physical Exam   Constitutional: She is oriented to person, place, and time and well-developed, well-nourished, and in no distress. No distress.   Cardiovascular: Normal rate, regular rhythm and normal heart sounds.    Pulmonary/Chest: Effort normal and breath sounds normal. No respiratory distress. She has no wheezes. She has no rales.   Musculoskeletal:   Moderate pain on palpation over left greater trochanter.  Discussed with her risks and she consented to an injection.  Prepped with chloraprep and infiltrated with 3 mL 1% lidocaine and 80 mg depotmedrol.   Neurological: She is alert and oriented to person, place, and time.   Skin: She is not diaphoretic.   Psychiatric: Memory, affect and judgment normal.       ASSESSMENT/PLAN:    ICD-10-CM    1. Intermittent palpitations R00.2 FLU VACCINE => 65 YRS HIGH DOSE TRIVALENT IIV3 IM   2. Trochanteric bursitis of left hip M70.62 DE DRAIN/INJECT LARGE JOINT/BURSA (hip, knee,shoulder) - single joint  [57369.0]      methylPREDNISolone acetate 80 mg injection (DEPO-MEDROL)   3. Encounter for immunization  Z23 FLU VACCINE => 65 YRS HIGH DOSE TRIVALENT IIV3 IM        Plan:  I suspect she had a run of atrial fibrillation.  Discussed with her the work up, including EKG, labs and a Zios patch. BMP was nearly normal in July.  For now she  wants to just observe, but can contact me if she changes her mind.    Regarding the hip, ice to the hip four times daily or so for 20 minutes.    Godwin Kapoor MD ....................  10/3/2017   9:52 AM

## 2017-12-27 NOTE — PROGRESS NOTES
Patient Information     Patient Name MRN Sex Tenisha Pérez [id number] Female 1933      Progress Notes by Juliet[facility] at 2017  2:04 PM     Author:  Juliette [facility] Service:  (none) Author Type:  [facility] - Registered Radiologic Technologist     Filed:  2017  2:04 PM Date of Service:  2017  2:04 PM Status:  Signed     :  Juliet[facility] ([facility] - Registered Radiologic Technologist)            O'Brien Protocol    A. Pre-procedure verification complete yes  1-relevant information / documentation available, reviewed and properly matched to the patient; 2-consent accurate and complete, 3-equipment and supplies available    B. Site marking complete Yes  Site marked if not in continuous attendance with patient    C. TIME OUT completed yes  Time Out was conducted just prior to starting procedure to verify the eight required elements: 1-patient identity, 2-consent accurate and complete, 3-position, 4-correct side/site marked (if applicable), 5-procedure, 6-relevant images / results properly labeled and displayed (if applicable), 7-antibiotics / irrigation fluids (if applicable), 8-safety precautions.

## 2017-12-27 NOTE — PROGRESS NOTES
Patient Information     Patient Name MRN Sex Tenisha Pérez [id number] Female 1933      Progress Notes by Omer Steele MD at 10/10/2017  3:30 PM     Author:  Omer Steele MD  Service:  (none) Author Type:  Physician     Filed:  10/23/2017 12:08 PM  Encounter Date:  10/10/2017 Status:  Addendum     :  Omer Steele MD (Physician)        Related Notes: Original Note by Omer Steele MD (Physician) filed at 10/23/2017  7:13 AM            Nursing Notes:   Lizeth Man  10/10/2017  4:36 PM  Signed  Patient presents to the clinic today to follow up after back injections and to evaluate a sore toe.      SUBJECTIVE:  Tenisha Cagle is a 84 y.o. Female.  She comes in today as a walk-in appointment at the request of her daughter. She was under the understanding that her daughter had scheduled an appointment but in any event she is here to discuss multiple chronic issues. She has had a sore right great toe. This has been present for months. Has been trying to cut her nails straight across. Has had some redness but no purulence. No fevers or chills.    Next issue is chronic back pain. Patient had back injection. She thinks this helped quite a bit but her daughter tells me that it did not help. She has been doing some exercises. She does report continuing to have some pain in her low back typically on the right side but is difficult for her to describe.    She has some skin lesions on her face that she would like looked at. They're pink and scaled.      Social History     Substance Use Topics       Smoking status: Never Smoker     Smokeless tobacco: Never Used     Alcohol use No       I have personally reviewed and updated above noted social, family and/or past medical history.    A comprehensive review of systems was negative except for items noted in HPI/Subjective.      OBJECTIVE:  /84  Pulse 64  Wt 55.8 kg (123 lb)  BMI 23.24 kg/m2  EXAM:  General Appearance:  Pleasant, alert, appropriate appearance for age. No acute distress  Chest/Respiratory Exam: Normal chest wall and respirations. Clear to auscultation.  Cardiovascular Exam: Regular rate and rhythm. S1, S2, no murmur, click, gallop, or rubs.  Musculoskeletal Exam: No synovitis.  Muscle strength age and body habitus appropriate as well as equal and even. Tender with palpation of lumbar spine. Minimal tenderness over SI joints. Today no tenderness over greater trochanter and hip. That has improved.  Skin: 2 mm AK, 3 mm AK, 4 mm AK on face.  She has ingrown lateral nail fold on right great toe  Neurologic Exam: Nonfocal; symmetric DTRs, normal gross motor movement, tone, and coordination. No tremor.    ASSESSMENT/Plan :          Tenisha was seen today for follow up and toe pain/problem.    Diagnoses and all orders for this visit:    Multilevel facet arthritis (HC)  daughter is really requesting that she go on pain medications. I explained her significant wrist pain medications specialty rage. Patient does not want them. I discussed potential benefit from Lidoderm patch. Could also try Aspercreme with lidocaine over-the-counter. I reviewed her MRI with her and she may benefit from facet injection. Did not have much benefit from epidural injection. That was placed today.  -     XR INJ FACET JOINT LUMBAR 2 LEVEL BILATERAL; Future  -     lidocaine 5% (LIDODERM) 5 % patch; Apply 1 patch to painful area of skin for up to 12 hours within a 24-hour period.    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    Ingrown right big toenail   discussed with patient soaking and elevating the lateral nail fold.  Currently no signs of infection      AK (actinic keratosis)   Patient requests cryotherapy destruction.  Risks, benefits  and alternatives discussed with patient.  They voiced good understanding and had the opportunity to ask questions.  Patient gave consent and proceed with procedure. Three cycles of cryotherapy performed.  Follow up for excisional biopsy if does not resolve, or if recurs.         Patient Instructions   Continue the effudex if possible.  For your toe soak it for 10-15 mins and try to elevate the ingrown toenail with dental floss.  Cut your nails straight across, do not cut into corner.      Omer Steele MD    This document was created using computer generated templates and voice activated software.

## 2017-12-28 NOTE — OR ANESTHESIA
Patient Information     Patient Name MRN Sex Tenisha Botello [id number] Female 1933      OR Anesthesia by Kev Warren DO at 6/15/2017 10:59 AM     Author:  Kev Warren DO Service:  (none) Author Type:  PHYS- Anesthesiologist     Filed:  6/15/2017 11:00 AM Date of Service:  6/15/2017 10:59 AM Status:  Signed     :  Kev Warren DO (PHYS- Anesthesiologist)            ANESTHESIAPREOP    PREANESTHETIC EXAM    Tenisha Cagle is a 83 y.o. female    /71  Pulse 50  Temp 97.7  F (36.5  C)  Resp 16  LMP Comment: s/p tubal  SpO2 97%  There is no height or weight on file to calculate BMI.    ALLERGIES    Lisinopril and Prevacid [lansoprazole]    PAST MEDICAL HISTORY    Past Medical History:     Diagnosis  Date     BASAL CELL CARCINOMA, NOSE 10/11/2011     Blood poisoning (HC) child    Hospitalized as a child for blood poisoning      BURSITIS, HIP, LEFT      CARDIAC MURMUR, SYSTOLIC 2011    TTE 11 mild MR and TR      CATARACTS 2012     Cerebral aneurysm, nonruptured 2011     CLL (chronic lymphoblastic leukemia)     CLL, stable WBC 30,000      DEPRESSION/ANXIETY, ACUTE SITUATIONAL      DIVERTICULOSIS, COLON 2011     Gastritis, Helicobacter pylori 03    Treated with PrevPac      H. pylori infection 2003     H. pylori gastritis treated with Prevpac      Hiatal hernia     large, mostly intrathoracic      History of pregnancy     , vaginal deliveries      HYPERCHOLESTEROLEMIA      HYPERTENSION      LEUKEMIA, LYMPHOCYTIC, CHRONIC 2011     Loose stools     Recurrent loose stool      MELENA, HX OF 2011     Menopausal state     Menopausal age 56      NEOPLASM, MALIGNANT, SKIN, EAR, RIGHT 10/11/2011     OSTEOARTHRITIS      PEPTIC ULCER DISEASE      SKIN CANCER, RECURRENT      SLEEP DISORDER 2011     TRANSIENT ISCHEMIC ATTACK 2011       Patient Active Problem List     Diagnosis  Code     LEUKEMIA, LYMPHOCYTIC, CHRONIC C91.10      HYPERTENSION I10     CEREBRAL ANEURYSM, NONRUPTURED I67.1     Diverticulosis of colon K57.30     Hiatal hernia K44.9     Pancreatic mass K86.9     Cystocele N81.10     ACP (advance care planning) Z71.89     Right knee DJD M17.11     Paresthesia of right leg R20.2     Baker's cyst of knee M71.20     Sacroiliac joint pain M53.3     Basal cell carcinoma of right cheek C44.319     Numbness and tingling of right leg R20.0, R20.2     Radicular leg pain M54.10     History of TIA (transient ischemic attack) Z86.73     Osteoarthritis of spine with radiculopathy, lumbar region - SEVERE - Noted on Xrays 2/2/2016 M47.26     Abnormal weight loss R63.4     Visual changes H53.9     Greater trochanteric bursitis of left hip M70.62     Anemia, unspecified D64.9       Family History       Problem   Relation Age of Onset     Stroke  Mother      Had a CVA age 85       Cancer  Father      Brain tumor, age 56       Blood Disease  Sister      Leukemia and       Cancer  Sister       kidney cancer       Blood Disease  Sister      Chronic lymphocytic leukemia          Past Surgical History:      Procedure  Laterality Date     BIOPSY BREAST  1986    Right       CHOLECYSTECTOMY       COLON EGD  5/5/11    Hiatal hernia, gastric gland hyperplasia in antrum           COLONOSCOPY DIAGNOSTIC  2007    Sigmoid diverticulosis       PREMALIG/BENIGN SKIN LESION EXCISION      R side of nose, face and chest wall/Basal Cell Cancer Excision        TONSIL AND ADENOIDECTOMY  1954     TUBAL LIGATION         Major Anesthetic Reactions: none    PMH/PSH Reviewed    History    Smoking Status      Never Smoker   Smokeless Tobacco      Never Used     History    Alcohol Use No       Medications have been reviewed in coordination with proposed intra-procedure medications.    Prescriptions Prior to Admission       Medication  Sig Dispense Refill     acetaminophen (TYLENOL EXTRA STRGTH) 500 mg tablet Take 2 tablets by mouth every 6 hours if needed for Pain. Max  acetaminophen dose: 4000mg in 24 hrs.  0     amLODIPine (NORVASC) 10 mg tablet Take 1 tablet by mouth once daily. 90 tablet 4     aspirin-dipyridamole (AGGRENOX)  mg capsule Take 1 capsule by mouth 2 times daily. 180 capsule 4     atenolol (TENORMIN) 50 mg tablet Take 1 tablet by mouth once daily. 90 tablet 4     CALCIUM CARBONATE/VITAMIN D3 (CALCIUM 500 + D, D3, ORAL) Take 1 tablet by mouth once daily.       famotidine (PEPCID) 40 mg tablet Take 1 tablet by mouth at bedtime. 90 tablet 3     losartan-hydrochlorothiazide (HYZAAR) 100-25 mg tablet Take 1 tablet by mouth once daily. 90 tablet 4     milk of magnesia (MILK OF MAGNESIA) 400 mg/5 mL suspension Take 15 mL by mouth at bedtime if needed.       multivitamin (MVI) tablet Take 1 tablet by mouth once daily.       simvastatin (ZOCOR) 20 mg tablet Take 0.5 tablets by mouth once daily with evening meal. 45 tablet 3     Vit C-Vit E-Lutein-Min-OM-3 (OCUVITE) 835-19-1-150 mg-unit-mg-mg cap Take 1 capsule by mouth once daily.  0       Recent Labs  No results found for this visit on 06/15/17.    NPO Status Noted:  Yes    Airway Class:  2    ASA Physical Status: 3    Anesthetic Plan: GA/ LMA    The risks, benefits, and alternatives of the procedure were discussed.    PHYSICIAN ELECTRONIC SIGNATURE  Familia Warren DO

## 2017-12-28 NOTE — TELEPHONE ENCOUNTER
Patient Information     Patient Name MRN Tenisha Hope [id number] Female 1933      Telephone Encounter by Adwoa Ross at 2017 10:53 AM     Author:  Adwoa Ross Service:  (none) Author Type:  (none)     Filed:  2017 10:56 AM Encounter Date:  2017 Status:  Signed     :  Adwoa Ross            Hand written note received from patient's daughter Nona. She says Tenisha is wondering if she should still be taking Aggrenox, stating it had been stopped by Dr Sheppard prior to surgery but never added back on to her list.  Dr Steele is out of office until 17 and Dr Ortez is the provider who prescribed it, so I will forward this question to him.  Adwoa Ross CMA(AAMA)  ..................2017   10:54 AM

## 2017-12-28 NOTE — TELEPHONE ENCOUNTER
Patient Information     Patient Name MRN Sex Tenisha Pérez [id number] Female 1933      Telephone Encounter by Yg Jimenez LPN at 2017  8:35 AM     Author:  Yg Jimenez LPN Service:  (none) Author Type:  NURS- Licensed Practical Nurse     Filed:  2017  8:35 AM Encounter Date:  2017 Status:  Signed     :  Yg Jimenez LPN (NURS- Licensed Practical Nurse)            Called and talked to patient's daughter and she stated patient has an appointment with Dr. Steele already.  Yg Jimenez LPN ..............2017 8:35 AM

## 2017-12-28 NOTE — PROGRESS NOTES
Patient Information     Patient Name MRN Tenisha Hope [id number] Female 1933      Progress Notes by Deepti Morelos at 2017 10:35 AM     Author:  Deepti Morelos Service:  (none) Author Type:  (none)     Filed:  2017 10:35 AM Date of Service:  2017 10:35 AM Status:  Signed     :  Deepti Morelos            RECOVERY TIME  You may experience numbness and/or relief of your pain for up to 4-6 hours after the injection.  Your usual symptoms may return the night of the procedure and may possible be more severe than usual a day or two following.  Please keep track of your pain over the next several days and report how long the relief lasts to the doctor who referred you for this procedure.    The beneficial effects of the steroids usually require 2 to 3 days to take effect, buy may take as long as 5 to 7 days.  If there is no change in the pain, then investigation can be focused on other possible sources of your pain.  In either case, the information is useful to the doctor who referred you for this procedure.    POSSIBLE SIDE EFFECTS  Facial flushing (redness), occasional low grade fevers of 99.5F or less, hiccups, insomnia, headaches, increased heart rate, abdominal cramping, and/or a bloating feeling are side effects of the steroid medications and will go away 3 to 4 days after the injection.    Diabetic Patients  The steroids you have received may significantly increase your blood sugar levels.  Monitor your blood sugar level closely (4-6 times per day) for a period of 4 days or until your blood sugar level normalizes.  If your blood sugar level elevates significantly or you experience confusion, dizziness, sweating, please notify our primary physician and make him/her aware that you have received steroids.

## 2017-12-28 NOTE — TELEPHONE ENCOUNTER
Patient Information     Patient Name MRN Sex Tenisha Pérez [id number] Female 1933      Telephone Encounter by Omer Steele MD at 10/19/2017  9:33 AM     Author:  Omer Steele MD Service:  (none) Author Type:  Physician     Filed:  10/19/2017  9:33 AM Encounter Date:  10/10/2017 Status:  Signed     :  Omer Steele MD (Physician)            I would do left L2/L3 and right L4/L5 but radiologist can adjust as they see fit.

## 2017-12-28 NOTE — TELEPHONE ENCOUNTER
"Patient Information     Patient Name MRN Tenisha Hope [id number] Female 1933      Telephone Encounter by Adwoa Ross at 8/15/2017  1:37 PM     Author:  Adwoa Ross Service:  (none) Author Type:  (none)     Filed:  8/15/2017  1:55 PM Encounter Date:  8/15/2017 Status:  Signed     :  Adwoa Ross            Nona stopped in today to show us the list where Aggrenox is indeed on Tenisha's medication regimen. I reviewed history and this medication was discontinued on her chart here on 6/15/17 before or during her surgery and then never put back on until last week. It was added back on 8/10/17 and sent in but Nona didn't know that is what was sent, and I didn't either until after she left. She had said that whatever was sent \"wasn't covered by insurance\" but that \"insurance was covering Aggrenox\". Upon checking the medication, it does appear to require a PA.   PA initiated.   I will let Nona know this.  Adwoa Ross CMA(AAMA)  ..................8/15/2017   1:41 PM           "

## 2017-12-28 NOTE — TELEPHONE ENCOUNTER
Patient Information     Patient Name MRN Tenisha Hope [id number] Female 1933      Telephone Encounter by Steve Garces LPN Student at 2017  2:28 PM     Author:  Steve Garces LPN Student Service:  (none) Author Type:  NURS- Student Practical Nurse     Filed:  2017  2:29 PM Encounter Date:  2017 Status:  Signed     :  Steve Garces LPN Student (NURS- Student Practical Nurse)            Pt states [facility] did not receive one of her medications that she asked Dr. Steele to refill for her at her appointment. Please call pt back.    Steve Garces, ... 2017  [id number]

## 2017-12-28 NOTE — TELEPHONE ENCOUNTER
Patient Information     Patient Name MRN Tenisha Hope [id number] Female 1933      Telephone Encounter by Adwoa Ross at 2017  3:37 PM     Author:  Adwoa Ross Service:  (none) Author Type:  (none)     Filed:  2017  3:45 PM Encounter Date:  2017 Status:  Signed     :  Adwoa Ross            Nona stopped in to ask about the Aggrenox prescription again. She did not check around with other pharmacies because she thought we were doing a PA for the medication that she believed was a different prescription, but I told her that it was the same medication.   Since Aggrenox was canceled at [facility] by Tenisha, a new prescription will need to be sent in.  I explained to Nona that the cost per [facility] would be $45 for a 90 day supply, but she says Tenisha thought she was told it would be around $300.  I spoke to [facility] and was told that cash price for this medication is very high (I was quoted over $600 for cash price for this quantity).  Please resend the prescription back to [facility]. Nona will explain to Tenisha about the cost.  Adwoa Ross CMA(AAMA)  ..................2017   3:40 PM

## 2017-12-28 NOTE — TELEPHONE ENCOUNTER
Patient Information     Patient Name MRN Sex Tenisha Pérez [id number] Female 1933      Telephone Encounter by Frankie Cooper at 2017  3:32 PM     Author:  Frankie Cooper Service:  (none) Author Type:  NURS- Student Nurse     Filed:  2017  3:34 PM Encounter Date:  2017 Status:  Signed     :  Frankie Cooper            Phone busy.  Frankie Cooper ....................  2017   3:33 PM

## 2017-12-28 NOTE — TELEPHONE ENCOUNTER
Patient Information     Patient Name MRN Sex Tenisha Pérez [id number] Female 1933      Telephone Encounter by Isamar Walter at 10/10/2017  5:57 PM     Author:  Isamar Walter Service:  (none) Author Type:  (none)     Filed:  10/10/2017  5:58 PM Encounter Date:  10/10/2017 Status:  Signed     :  Isamar Walter            What levels would you like for this facet inj?    Thank you   Isamar

## 2017-12-28 NOTE — TELEPHONE ENCOUNTER
Patient Information     Patient Name MRN Sex Tenisha Pérez [id number] Female 1933      Telephone Encounter by Yg Jimenez LPN at 2017  3:15 PM     Author:  Yg Jimenez LPN Service:  (none) Author Type:  NURS- Licensed Practical Nurse     Filed:  2017  3:16 PM Encounter Date:  2017 Status:  Signed     :  Yg Jimenez LPN (NURS- Licensed Practical Nurse)            Called patient with results after giving last name and date of birth. Patient stated she picked up Rx last week and has been taking it.  Yg Jimenez LPN ..............2017 3:15 PM

## 2017-12-28 NOTE — TELEPHONE ENCOUNTER
"Patient Information     Patient Name MRN Tenisha Hope [id number] Female 1933      Telephone Encounter by Adwoa Ross at 2017  9:28 AM     Author:  Adwoa Ross  Service:  (none) Author Type:  (none)     Filed:  2017  9:59 AM  Encounter Date:  8/15/2017 Status:  Addendum     :  Adwoa Ross        Related Notes: Original Note by Adwoa Ross filed at 2017  9:36 AM            PA returned with \"PA not required\" for this medication. When I called [facility] they said that the patient had canceled this prescription due to cost. It would have been $45 for a 90 day supply. See note from SOO Stanley on 17. Nona said she was going to call around to local pharmacies for a cost comparison.  Left message to call back.  Adwoa Ross CMA(AAMA) ....................  2017   9:59 AM         "

## 2017-12-28 NOTE — TELEPHONE ENCOUNTER
Patient Information     Patient Name MRN Tenisha Hope [id number] Female 1933      Telephone Encounter by Eliza Ha at 2017 12:20 PM     Author:  Eliza Ha Service:  (none) Author Type:  (none)     Filed:  2017 12:22 PM Encounter Date:  2017 Status:  Signed     :  Eliza Ha            Please call patient's daughter, Nona, regarding Pepcid RX issues.

## 2017-12-28 NOTE — TELEPHONE ENCOUNTER
Patient Information     Patient Name MRN Tenisha Hope [id number] Female 1933      Telephone Encounter by Luis Ortez MD at 2017  7:57 AM     Author:  Luis Ortez MD Service:  (none) Author Type:  Physician     Filed:  2017  7:59 AM Encounter Date:  2017 Status:  Signed     :  Luis Ortez MD (Physician)            Patient likely should restart Aggrenox, however I have not seen her in a great deal of time and I'm not certain what kind of ongoing health conditions she has at this time, or if any other surgical interventions are being planned.  I forwarded this note to Dr. Steele.    Luis Ortez MD

## 2017-12-28 NOTE — ADDENDUM NOTE
Patient Information     Patient Name MRN Tenisha Hope [id number] Female 1933      Addendum Note by Omer Damon MD at 10/23/2017 12:08 PM     Author:  Omer Damon MD Service:  (none) Author Type:  Physician     Filed:  10/23/2017 12:08 PM Encounter Date:  10/10/2017 Status:  Signed     :  Omer Damon MD (Physician)       Addended by: OMER DAMON on: 10/23/2017 12:08 PM        Modules accepted: Orders

## 2017-12-28 NOTE — TELEPHONE ENCOUNTER
Patient Information     Patient Name MRN Sex Tenisha Pérez [id number] Female 1933      Telephone Encounter by Connie Kaur DO at 10/13/2017  4:12 PM     Author:  Connie Kaur DO Service:  (none) Author Type:  PHYS- Osteopathic     Filed:  10/13/2017  4:13 PM Encounter Date:  10/13/2017 Status:  Signed     :  Connie Kaur DO (PHYS- Osteopathic)            Common reaction of the Efudex; continue as tolerated, but if too painful - would need to stop and consider alternative treatments to skin lesions with PCP/Surgery/Dermatology.  CONNIE KAUR DO

## 2017-12-28 NOTE — TELEPHONE ENCOUNTER
Patient Information     Patient Name MRN Sex Tenisha Pérez [id number] Female 1933      Telephone Encounter by Gail Griggs at 2017  2:47 PM     Author:  Gail Griggs Service:  (none) Author Type:  (none)     Filed:  2017  2:48 PM Encounter Date:  2017 Status:  Signed     :  Gail Griggs            Will stop her aggrenox 10 days prior to surgery.  Echo ordered and she will get a call to get this scheduled.  Gail Griggs LPN..........2017  2:48 PM

## 2017-12-28 NOTE — OR POSTOP
Patient Information     Patient Name MRN Sex Tenisha Pérez [id number] Female 1933      OR PostOp by Amalia Silva RN at 6/15/2017  3:23 PM     Author:  Amalia Silva RN Service:  (none) Author Type:  NURS- Registered Nurse     Filed:  6/15/2017  3:28 PM Date of Service:  6/15/2017  3:23 PM Status:  Signed     :  Amalia Silva RN (NURS- Registered Nurse)            PACU Respiratory Event Documentation     1) Episodes of Apnea greater than or equal to 10 seconds: no    2) Bradypnea - less than 8 breaths per minute: no    3) Pain score on 0 to 10 scale: 0    4) Pain-sedation mismatch (yes or no): no    5) Repeated 02 desaturation less than 90% (yes or no): no    Anesthesia notified? (yes or no): no    Any of the above events occuring repeatedly in separate 30 minute intervals may be considered recurrent PACU respiratory events.    PACU Transfer Note    Tenisha Cagle transferred to DSU via cart.  Equipment used for transport:  None.  Accompanied by:  Registered Nurse    Patient stable and meets phase 1 discharge criteria for transport from PACU.    Handoff report given to Lizeth RODRIGUEZ.    JENNIFER Murphy RN

## 2017-12-28 NOTE — TELEPHONE ENCOUNTER
"Patient Information     Patient Name MRN Tenisha Hope [id number] Female 1933      Telephone Encounter by Lizeth Man at 2017 11:30 AM     Author:  Lizeth Man Service:  (none) Author Type:  (none)     Filed:  2017 11:32 AM Encounter Date:  2017 Status:  Signed     :  Lizeth Man            Patient states she has been taking this prescription \"for years\" and needs a refill at this time. She will have her daughter call to schedule a follow up appointment.        "

## 2017-12-28 NOTE — PROGRESS NOTES
Patient Information     Patient Name MRN Sex Tenisha Pérez [id number] Female 1933      Progress Notes by Deepti Morelos at 2017 10:35 AM     Author:  Deepti Morelos Service:  (none) Author Type:  (none)     Filed:  2017 10:35 AM Date of Service:  2017 10:35 AM Status:  Signed     :  Deepti Morelos            [place] Protocol    A. Pre-procedure verification complete yes  1-relevant information / documentation available, reviewed and properly matched to the patient; 2-consent accurate and complete, 3-equipment and supplies available    B. Site marking complete Yes  Site marked if not in continuous attendance with patient    C. TIME OUT completed yes  Time Out was conducted just prior to starting procedure to verify the eight required elements: 1-patient identity, 2-consent accurate and complete, 3-position, 4-correct side/site marked (if applicable), 5-procedure, 6-relevant images / results properly labeled and displayed (if applicable), 7-antibiotics / irrigation fluids (if applicable), 8-safety precautions.

## 2017-12-28 NOTE — TELEPHONE ENCOUNTER
Patient Information     Patient Name MRN Tenisha Hope [id number] Female 1933      Telephone Encounter by Omer Steele MD at 2017  7:32 AM     Author:  Omer Steele MD Service:  (none) Author Type:  Physician     Filed:  2017  7:39 AM Encounter Date:  2017 Status:  Signed     :  Omer Steele MD (Physician)            I am not aware of her being on this medication.  Can you find out if she has had it filled recently and/or is currently taking it.  I will be seeing her back in a week or two and can discuss it with her at that point if she has not been taking it.

## 2017-12-28 NOTE — TELEPHONE ENCOUNTER
Patient Information     Patient Name MRN Sex Tenisha Pérez [id number] Female 1933      Telephone Encounter by Yg Jimenez LPN at 2017  3:12 PM     Author:  Yg Jimenez LPN Service:  (none) Author Type:  NURS- Licensed Practical Nurse     Filed:  2017  3:12 PM Encounter Date:  2017 Status:  Signed     :  Yg Jimenez LPN (NURS- Licensed Practical Nurse)            ----- Message from Omer Steele MD sent at 2017 11:20 AM CDT -----  Urine is equivocal.  Lets have her start on an antibiotic as he WBC is high and we can culture the urine.  Any fevers or worse symptoms and she needs to be reseen.  Onofre if increasing abdominal pain in her lower belly (potential for diverticulitis).

## 2017-12-28 NOTE — TELEPHONE ENCOUNTER
Patient Information     Patient Name MRN Sex Tenisha Pérez [id number] Female 1933      Telephone Encounter by Sabra Townsend RN at 2017  3:49 PM     Author:  Sabra Townsend RN Service:  (none) Author Type:  NURS- Registered Nurse     Filed:  2017  4:04 PM Encounter Date:  2017 Status:  Signed     :  Sabra Townsend RN (NURS- Registered Nurse)            [facility] is requesting a refill of Aggrenox which is not on current medication list per chart. Was last filled on 6-29-15 with no reason listed for discontinuation after.     Last office visit with Omer Steele MD was on 17. No Aggrenox orders mentioned in dictation. Routed to Omer Steele MD for consideration of medication request.   Unable to complete prescription refill per RN Medication Refill Policy.................... Sabra Townsend RN ....................  2017   4:02 PM      .

## 2017-12-28 NOTE — TELEPHONE ENCOUNTER
Patient Information     Patient Name MRN Tenisha Hope [id number] Female 1933      Telephone Encounter by Lizeth Man at 2017  4:21 PM     Author:  Lizeth Man Service:  (none) Author Type:  (none)     Filed:  2017  4:24 PM Encounter Date:  2017 Status:  Signed     :  Lizeth Man            Patient called to let us know that her mail order pharmacy has increased the price on her Aggrenox prescription. She is going to call both [facility] and [facility] to check their pricing on it.

## 2017-12-28 NOTE — TELEPHONE ENCOUNTER
Patient Information     Patient Name MRN Tenisha Hope [id number] Female 1933      Telephone Encounter by Coby Rodrigez at 10/13/2017  4:06 PM     Author:  Coby Rodrigez Service:  (none) Author Type:  (none)     Filed:  10/13/2017  4:08 PM Encounter Date:  10/13/2017 Status:  Signed     :  Coby Rodrigez            Patient calling regarding that the Fluorouracil cream is causing her face to be rough and painful. Wondering what to do.   Coby Rodrigez LPN ..........10/13/2017 4:08 PM

## 2017-12-28 NOTE — TELEPHONE ENCOUNTER
"Patient Information     Patient Name MRN Tenisha Hope [id number] Female 1933      Telephone Encounter by Lula Terry at 2017 10:28 AM     Author:  Lula Terry Service:  (none) Author Type:  (none)     Filed:  2017 10:30 AM Encounter Date:  2017 Status:  Signed     :  Lula Terry            Patient got a letter from [employer] stating her \"Lidocaine Patch\" is not covered, not her ZIO patch.    She knows that she can get the lidocaine OTC and will get it if she feels she needs it.    Lula Terry LPN....................2017 10:29 AM          "

## 2017-12-28 NOTE — TELEPHONE ENCOUNTER
Patient Information     Patient Name MRN Sex Tenisha Pérez [id number] Female 1933      Telephone Encounter by Donovan Vizcarra RN at 2017  8:45 AM     Author:  Donovan Vizcarra RN Service:  (none) Author Type:  NURS- Registered Nurse     Filed:  2017  8:46 AM Encounter Date:  2017 Status:  Signed     :  Donovan Vizcarra RN (NURS- Registered Nurse)            Pt called and informed she did not need the appointment  for an EKG , per Dr Sheppard only needs and ECHO which is ordered for 6/15/17. DONOVAN VIZCARRA RN ....................  2017   8:46 AM

## 2017-12-28 NOTE — TELEPHONE ENCOUNTER
Patient Information     Patient Name MRN Tenisha Hope [id number] Female 1933      Telephone Encounter by Adwoa Ross at 10/16/2017  1:36 PM     Author:  Adwoa Ross  Service:  (none) Author Type:  (none)     Filed:  10/16/2017  1:43 PM  Encounter Date:  10/13/2017 Status:  Addendum     :  Adwoa Ross        Related Notes: Original Note by Adwoa Ross filed at 10/16/2017  1:37 PM            Copied from Comment section of Reason for Call on 10/13/17:    Patient has an appointment for a pain injection and was told by Radiology scheduler that she would need to stop taking her Aggrenox medication. Please advise.     Patient was seen by Dr. Kapoor and they discussed the using a heart monitor and due to her recent heart palpitations she is interested in trying it out. Who sets this up and where does she go to pick one up?    Patient was prescribed a chemo cream for her face and it is causing cratoring of her face. Please advise. (See subsequent phone call - answered).

## 2017-12-28 NOTE — TELEPHONE ENCOUNTER
Patient Information     Patient Name MRN Tenisha Hope [id number] Female 1933      Telephone Encounter by Cindy Saha at 2017  8:06 AM     Author:  Cindy Saha Service:  (none) Author Type:  (none)     Filed:  2017  8:06 AM Encounter Date:  2017 Status:  Signed     :  Cindy Saha            Patient had surgery yesterday.     Cindy Saha LPN.......................... 2017  8:06 AM

## 2017-12-28 NOTE — TELEPHONE ENCOUNTER
Patient Information     Patient Name MRN Sex Tenisha Pérez [id number] Female 1933      Telephone Encounter by Lizeth Man at 2017  8:31 AM     Author:  Lizeth Man Service:  (none) Author Type:  (none)     Filed:  2017  8:32 AM Encounter Date:  2017 Status:  Signed     :  Lizeth Man            This message was copied from a medical message sent by patient's daughter on her behalf. It was sent in the wrong chart.

## 2017-12-28 NOTE — PATIENT INSTRUCTIONS
Patient Information     Patient Name MRN Sex Tenisha Pérez [id number] Female 1933      Patient Instructions by Angelica Sheppard DO at 2017  1:20 PM     Author:  Angelica Sheppard DO  Service:  (none) Author Type:  PHYS- Osteopathic     Filed:  2017  4:31 PM  Encounter Date:  2017 Status:  Addendum     :  Angelica Sheppard DO (PHYS- Osteopathic)        Related Notes: Original Note by Angelica Sheppard DO (PHYS- Osteopathic) filed at 2017  4:31 PM              Call the office if have any questions or concern's.  Pain pills as needed     Will F/U : and will take sutures out then   Wash w/ soap and water daily   OK to shower

## 2017-12-28 NOTE — TELEPHONE ENCOUNTER
Patient Information     Patient Name MRN Sex Tenisha Pérez [id number] Female 1933      Telephone Encounter by Adwoa Ross at 2017 11:26 AM     Author:  Adwoa Ross Service:  (none) Author Type:  (none)     Filed:  2017 11:26 AM Encounter Date:  2017 Status:  Signed     :  Adwoa Ross            Left message to call back.  Adwoa Ross [facility] ....................  2017   11:26 AM

## 2017-12-28 NOTE — TELEPHONE ENCOUNTER
Patient Information     Patient Name MRN Tenisha Hope [id number] Female 1933      Telephone Encounter by Lizeth Man at 2017  5:20 PM     Author:  Lizeth Man Service:  (none) Author Type:  (none)     Filed:  2017  5:25 PM Encounter Date:  2017 Status:  Signed     :  Lizeth Man            Patient notified that Dr Steele will be sending in a 30 day prescription for her Aggrenox but he would like her to make an appointment to follow up with him in the next week or two.

## 2017-12-28 NOTE — TELEPHONE ENCOUNTER
Patient Information     Patient Name MRN Tenisha Hope [id number] Female 1933      Telephone Encounter by Omer Steele MD at 2017  9:55 AM     Author:  Omer Steele MD Service:  (none) Author Type:  Physician     Filed:  2017  9:57 AM Encounter Date:  2017 Status:  Signed     :  Omer Steele MD (Physician)            Noted.  Please let her know I am not involved in the PAC information and really have not been involved at all in her care since her initial visit, as she has been seeing my partners and the surgery department.

## 2017-12-28 NOTE — TELEPHONE ENCOUNTER
Patient Information     Patient Name MRN Sex Tenisha Pérez [id number] Female 1933      Telephone Encounter by Sabra Warner RN at 2017  4:00 PM     Author:  Sabra Warner RN Service:  (none) Author Type:  NURS- Registered Nurse     Filed:  2017  4:07 PM Encounter Date:  2017 Status:  Signed     :  Sabra Warner RN (NURS- Registered Nurse)            Pt has questions about a letter she received stating the Zio patch she is wearing is not going to be covered.  Initial phone call was routed to cardiology, however we have not seen this pt.  Zio patch was ordered by Dr Kapoor.  Routed to [facility].    Sabra Warner RN 2017 4:05 PM

## 2017-12-28 NOTE — TELEPHONE ENCOUNTER
Patient Information     Patient Name MRN Sex Tenisha Pérez [id number] Female 1933      Telephone Encounter by Angelica Sheppard DO at 2017  1:43 PM     Author:  Angelica Sheppard DO Service:  (none) Author Type:  PHYS- Osteopathic     Filed:  2017  1:44 PM Encounter Date:  2017 Status:  Signed     :  Angelica Sheppard DO (PHYS- Osteopathic)            D/w case w/ Dr. Booth  Patient needs complete excision and sentinel node.  Will d/w daughter how she wishes to proceed

## 2017-12-28 NOTE — PROGRESS NOTES
Patient Information     Patient Name MRN Sex Tenisha Pérez [id number] Female 1933      Progress Notes by Angelica Sheppard DO at 2017  9:20 AM     Author:  Angelica Sheppard DO Service:  (none) Author Type:  PHYS- Osteopathic     Filed:  7/10/2017 12:54 PM Encounter Date:  2017 Status:  Signed     :  Angelica Sheppard DO (PHYS- Osteopathic)            Patient is doing well post-op from there recent  Surgery for melanoma.   She has been having no nausea or vomiting; no chest pain, shortness of breath or coughing up anything. Patient has been urinating without any difficulties and moving bowel w/ no straining or bleeding. Patient has no calf pain swelling, tenderness, or redness. Patient has been sleeping at night with no problems. Patient has been ambulating without difficulty. Patient has been able to tolerate a regular diet. Patient has had good relief with previously prescribed pain meds.  Her arm feels good- she is more sore where the lymph node was removed.      Still also has many actinic keratosis/sk of face.  We treated these again today w/ liquid N.        /60  Pulse 72  Wt 58.2 kg (128 lb 6.4 oz)  Breastfeeding? No  BMI 23.93 kg/m2    HEENT: all negative  No jaundice.  No eye redness or pain.  No throat pain.  L: CTA b/l  Abdom: + BS. no distensions.  LOWER EXTREMITY: no swelling or calf pain  The incision(s) is clean dry and intact without redness, drainage or swelling or bleeding.   ?  Pathology: see Epic  ?  Pt doing well; with no complaints. Reviewed path report. Incision(s): Clean/dry/intact and healing nicely. Removed sutures and placed steri's.      We discussed cryo therapy of the lesion. We specifically discussed the risks of infection, discoloration and the possible need for further treatments. The patient expressed understanding and the patient wishes to proceed. Informed consent paperwork was completed.     Procedure:  The area of the skin lesion face was  treated with liquid nitrogen for 2 freeze thaw cycles. The patient tolerated the procedure with no immediately apparent complications. We reviewed discharge instructions. The patient will call for any concerns. The patient will follow up in 2-3 weeks for a recheck of the area. The patient denies questions at this time.  Lesions range in size from 0.5-1cm.  We treated about 12 lesions.      Pt should keep the area clean and dry: wash with plain soap and water. Keep covered. Does not need to put anything on the lesions. May use abx ointment if desires. Avoid lifting Over 5 #'s x1 week. No strenuous activity or hot tubs/sauna's/pool/lake x1 week. Ice and NSAID's for pain. Monitor for redness/drainage/swelling/increase in pain/temp > 101. May have serous drainage/should not be purulent. Call Clinic if these occur.  As far as heeling: may be red and firm for about 1-3 weeks. This is the normal heeling process and will smooth out to a silvery/white line. Avoid sun exposure X1 year. May take months for redness to dissipate. If is sun burnt, will stay red. Can use vit E oil.  Today we discussed wound care, activities, return to work, warnings signs. Pt is doing well.    Patient is not a candidate for adjuvant chemotherapy.  She barely tolerated the surgery.        Patient Active Problem List     Diagnosis  Code     LEUKEMIA, LYMPHOCYTIC, CHRONIC C91.10     HYPERTENSION I10     CEREBRAL ANEURYSM, NONRUPTURED I67.1     Diverticulosis of colon K57.30     Hiatal hernia K44.9     Pancreatic mass K86.9     Cystocele N81.10     ACP (advance care planning) Z71.89     Right knee DJD M17.11     Paresthesia of right leg R20.2     Baker's cyst of knee M71.20     Sacroiliac joint pain M53.3     Basal cell carcinoma of right cheek C44.319     Numbness and tingling of right leg R20.0, R20.2     Radicular leg pain M54.10     History of TIA (transient ischemic attack) Z86.73     Osteoarthritis of spine with radiculopathy, lumbar region -  SEVERE - Noted on Xrays 2/2/2016 M47.26     Abnormal weight loss R63.4     Visual changes H53.9     Greater trochanteric bursitis of left hip M70.62     Anemia, unspecified D64.9     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  F/u with PCP for routine medical care.  Herrmann not require further pain med's.  Will F/U : 2-3 weeks           Angelica Sheppard DO

## 2017-12-28 NOTE — PROCEDURES
Patient Information     Patient Name MRN Sex Tenisha Pérez [id number] Female 1933      Procedures by Logan Tovar DO at 6/15/2017  2:53 PM     Author:  Logan Tovar DO  Service:  (none) Author Type:  PHYS- Osteopathic     Filed:  7/10/2017 12:34 PM  Date of Service:  6/15/2017  2:53 PM Status:  Addendum     :  Logan Tovar DO (PHYS- Osteopathic)        Related Notes: Original Note by Logan Tovar DO (PHYS- Osteopathic) filed at 6/15/2017  2:58 PM        Post-procedure Diagnoses:    1. 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C) [C43.62]           Procedures:    1. SENTINEL LYMPH NODE BIOPSY [LCX0671 (Custom)]    2. SKIN CANCER EXCISION [NWM567 (Custom)]               POSTOPERATIVE / POSTPROCEDURE NOTE - IMMEDIATE :    Surgeon(s)/Proceduralist(s) and Assistants (if any):  Surgeon(s):  Logan Tovar DO  Assistant: Yulisa Garsia  Circulator: Shell Odom, JENNIFER; Ken Weber RN  PACU Nurse: Amalia Silva RN  Pre Op Nurse: Sandee Gallegos RN  Phase II Nurse: Lizeth Gan RN  Scrub: Kalina Marquez, CST  Scrub Set Up: Edgard Romo    Pre-operative/Pre-procedural Diagnosis:  Melanoma  Left forearm     Procedure(s):  Procedure(s):  Brandon Lymph node injection & Brandon Lymph Node w/frozen section,  Axillary lymph node (Arm)  (Left)  Excision of Melanoma with w / 2cm Margins Left Arm (Left)    Procedure(s) findings:   Lymph nodes are negative  Negative margins     Specimen(s) removed: yes  Brandon lymph nodes- counts are 1500 at the site and 536 for the node  Specimen- clear margins       (EBL) Estimated blood loss (ml):  25cc    Postoperative/Postprocedure Diagnosis:   Same    Procedures      -  DATE OF SERVICE:  2017     SURGEON  LOGAN TOVAR DO     PREOPERATIVE DIAGNOSIS   Melanoma left forearm.      POSTOPERATIVE DIAGNOSIS  Melanoma left forearm.      PROCEDURE  Brandon lymph node injection and sentinel lymph node  with frozen section and excision of melanoma left forearm 2 cm wide local excision of melanoma with 2 cm margins left arm.       ANESTHESIA  MAC.      ESTIMATED BLOOD LOSS  25 cc.      PROCEDURAL FINDINGS  Lymph nodes are negative and margins are negative, per pathology. [place] lymph node counts are 1500 at the site and 536 for the node. Postop is the same. The patient tolerated the procedure well without complication.      DESCRIPTION OF PROCEDURE   Ms. Cagle is an 83-year-old female with a biopsy-proven melanoma and is here today for excision. She underwent radiotracer injection per anesthesia with preop lymphoscintigraphy that did show the lymph node was in the left axilla and not the groin. Informed consent was obtained, explaining the risks and benefits of the procedure including but not limited to bleeding, infection, pneumonia, blood clots, chronic pain, chronic numbness, loss of sensory or motor portions of the arm, recurrence, poor cosmesis, seroma, chronic numbness, lymphedema, and other unforetold complications. The patient was marked in preop. Again, she had lymphoscintigraphy that was done. She was brought back to the operative room suite. I did review the lymphoscintigraphy scan prior to coming back to the OR. [place] node was attended to first. Anesthesia was administered per the department of anesthesia. The arm was abducted with all bony surfaces padded.  The lesion site was injected w/ 5cc lymphozeren blue at the 12/3/6/9 oclock positions. and massage in for 2 minutes  The patient was prepped and draped in the usual sterile fashion using a Hibiclens scrub solution. The area was infiltrated with 10 cc of 0.25% Marcaine with epi prior to beginning the procedure. Again, a Neoprobe was used to obtain counts, they were 1500 at the injection site and 200 to 300 in the axilla. A 15 blade was used to make a 1-inch skin incision and blunt dissection was used to dissect down to the axillary node fat  packet. This was grasped with Allises. Blunt dissection was used to find the node. Hemoclips were placed on the lymphatic chains. The cutaneous nerve is identified and this is dissected off the packet. There were 1 or 2 nodes that were sent down for permanent, that will be called axillary contents. Again, there was one node with a count of 536, this was sent down to pathology and again pathology did not feel that it contained any tumor. Further results based on their final pathology report. The site is irrigated. There is no bleeding. Deep tissue was approximated with 4-0 Monocryl and skin was approximated with 4-0 Monocryl in a subcuticular fashion. Steri-Strips and sterile dressings were applied. Towels placed on the site, the arm was then placed in the supine position and it was reprepped and draped using a Hibiclens scrub solution. The area was marked giving 2 cm margins.  The original lesion is 4cm wide and 2cm long.  The specimen is 8cm wide and about 7cm long.  The original sutures from the biopsy site are left in situ. It is infiltrated with 20 cc of 0.25% Marcaine with epi. It is excised through the fat, down to and including the fascia for the muscle. A stitch is placed at the 12 o'clock position and marked. It is sent, again pathology concludes that we have clear margins. Electrocautery was used to provide hemostasis and to create skin flaps. A branch of the basilic vein is doubly clipped and ligated. Deep tissues approximated with 2-0 Vicryl in an interrupted fashion. Skin was closed with alternating 2-0 and 3-0 Prolene. JAVIER drain was brought out through separate stab incision and left in the inferior portion of the incision and is sewn with 3-0 Prolene. Sterile compression dressings were applied. The patient tolerated the procedure well without complication. Transferred to recovery room in stable condition. Family was apprised of findings.               DO JERRY WHITLOCK/penelope   D:  06/16/2017  17:52:22  T:  06/16/2017 18:28:57  Voice Job ID:  [id number]  Text Job ID:  [id number]  cc:SASHA DAMON MD, PRIMARY PHYSICIAN          Angelica Sheppard, DO

## 2017-12-28 NOTE — PROGRESS NOTES
"Patient Information     Patient Name MRN Sex Tenisha Pérez [id number] Female 1933      Progress Notes by Angelica Sheppard DO at 2017  2:00 PM     Author:  Angelica Sheppard DO Service:  (none) Author Type:  PHYS- Osteopathic     Filed:  2017 12:11 PM Encounter Date:  2017 Status:  Signed     :  Angelica Sheppard DO (PHYS- Osteopathic)            Patient is doing well post-op from there recent  Melanoma sx.   She has been having no nausea or vomiting; no chest pain, shortness of breath or coughing up anything. Patient has been urinating without any difficulties and moving bowel w/ no straining or bleeding. Patient has no calf pain swelling, tenderness, or redness. Patient has been sleeping at night with no problems. Patient has been ambulating without difficulty. Patient has been able to tolerate a regular diet. Patient has had good relief with previously prescribed pain meds.  Patient has very poor memory.  Has multiple actinic keratosis/sk.  We have treated in the past w/ liquid \"N\" these are kali haling up.  I will see her back in a months time  To see if these require any further treatment.  Patient does not require any adjuvant treatment and is too frail to tolerate his.        /70  Pulse 60  Temp 97.6  F (36.4  C) (Tympanic)  Ht 1.56 m (5' 1.4\")  Wt 57.2 kg (126 lb)  BMI 23.5 kg/m2    HEENT: all negative  No jaundice.  No eye redness or pain.  No throat pain.  L: CTA b/l  Abdom: + BS. no distensions.  LOWER EXTREMITY: no swelling or calf pain  The incision(s) is clean dry and intact without redness, drainage or swelling or bleeding.   Well healed.  No pulling at elbow.    ?  Pathology: see Epic  ?  Pt doing well; with no complaints.   Ice and NSAID's for pain.  As far as heeling: may be red and firm for about 1-3 weeks. This is the normal heeling process and will smooth out to a silvery/white line. Avoid sun exposure X1 year. May take months for redness to " dissipate. If is sun burnt, will stay red. Can use vit E oil.  Today we discussed wound care, activities, return to work, warnings signs. Pt is doing well.      Patient Active Problem List     Diagnosis  Code     LEUKEMIA, LYMPHOCYTIC, CHRONIC C91.10     HYPERTENSION I10     CEREBRAL ANEURYSM, NONRUPTURED I67.1     Diverticulosis of colon K57.30     Hiatal hernia K44.9     Pancreatic mass K86.9     Cystocele N81.10     ACP (advance care planning) Z71.89     Right knee DJD M17.11     Paresthesia of right leg R20.2     Baker's cyst of knee M71.20     Sacroiliac joint pain M53.3     Basal cell carcinoma of right cheek C44.319     Numbness and tingling of right leg R20.0, R20.2     Radicular leg pain M54.10     History of TIA (transient ischemic attack) Z86.73     Osteoarthritis of spine with radiculopathy, lumbar region - SEVERE - Noted on Xrays 2/2/2016 M47.26     Abnormal weight loss R63.4     Visual changes H53.9     Greater trochanteric bursitis of left hip M70.62     Anemia, unspecified D64.9     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C) C43.62     AK (actinic keratosis) L57.0     SK (seborrheic keratosis) L82.1         Call the office if have any questions or concern's.  F/u with PCP for routine medical care.  Herrmann not require further pain med's.  Will F/U :1 months eileen e          15  Minutes is spent today in consultation with the patient.  > 50% of the time is spent in discussing the patient diagnosis, treatment plan, medications and or surgery.       Angelica Sheppard, DO

## 2017-12-28 NOTE — PATIENT INSTRUCTIONS
Patient Information     Patient Name MRN Tenisha Hope [id number] Female 1933      Patient Instructions by Angelica Sheppard DO at 2017  3:20 PM     Author:  Angelica Sheppard DO  Service:  (none) Author Type:  PHYS- Osteopathic     Filed:  2017  2:37 PM  Encounter Date:  2017 Status:  Addendum     :  Angelica Sheppard DO (PHYS- Osteopathic)        Related Notes: Original Note by Angelica Sheppard DO (PHYS- Osteopathic) filed at 2017  2:36 PM             What to Expect Following Cryosurgery (Liquid Nitrogen)   What is Cryosurgery?   Cryosurgery is a technique for removing skin lesions that primarily involve the surface of the skin, such as warts, seborrheic keratosis, or actinic keratosis. It is a quick method of removing the lesion with minimal scarring.   The liquid nitrogen needs to be applied long enough to freeze the affected skin. By freezing the skin, a blister is created underneath the lesion. Ideally, as the new skin forms underneath the blister, the abnormal skin on the roof of the blister peels off. Occasionally, if the lesion is very thick (such as a large wart), only the surface is blistered off. The base or residual lesion may need to be frozen at another visit.   It takes about one to two weeks for the scab to fall off, which is when the new layer of skin has formed under the blister. Areas of thinner skin, such as the face, may heal a little faster.     What to Expect Over the Next Few Weeks     During Treatment - Area being treated will sting, burn, and then possibly itch.     Immediately After Treatment - Area will be red, sore, and swollen.     Next Day - Blister or blood blister has formed, tenderness starts to subside. Apply a Band-Aid if   necessary.     7 Days - Surface is dark red/brown and scab-like. Apply Vaseline  or an antibacterial ointment, such as Polysporin , if necessary.     2 to 4 Weeks - The surface starts to peel off. This may be encouraged  gently during bathing, when the scab is softened.     No makeup should be applied until area is fully healed.     How to Take Care of the Skin after Cryosurgery     A Band-Aid can be used for larger blisters or blisters in areas that are more likely to be traumatized -such as fingers and toes. If the area becomes dry or crusted, an ointment (Vaseline , Bacitracin , or Polysporin ) can also be applied.     Cleanse area with a mild cleanser  and cool water.     Pat the area dry with a lint-free cloth and apply an ointment (Vaseline , Bacitracin , or Polysporin ).     Avoid glycolic acids, Vitamin C, scrubs, Tretinoin (Retin-A), and Retinol creams for 7 to 10 days.     If approved by your Provider, you may bathe, swim, exercise, and otherwise follow all of your normal activities. How to Take Care of the Skin after Cryosurgery - Continued     The area may get wet while bathing, but swimming or hot tub use should be avoided for one week following a treatment or while the skin is open.     Within 24 hours, you can expect the area to be swollen and/or blistered. The blister may not be visible to the naked eye.     Within one week, the swelling goes down. The top becomes dark red and scab-like. The scab will loosen over the next weeks, and should fall off within one month.     Adverse Effects     The most common adverse effects are pain, swelling/blistering, potential for infection, and pale discoloration of the skin after it heals.         Blisters       Anytime a blister surfaces, whether from ill-fitting shoes, an oven burn, or liquid nitrogen cryosurgery, it will be a bit painful. For most patients, the pain is a temporary sting with some discomfort periodically over the next day as the blister forms.     The goal is to achieve a blister. This means, most commonly, patients will have a blister form following treatment. Sometimes, the blister is so thin that it can't be seen and may have minimal swelling. Occasionally, a  blood blister forms that can be quite dramatic but is harmless.     Rarely, the blister may become infected. When this happens, the blister becomes unusually tender, the fluid becomes cloudy, and the redness around it becomes more extensive (and may even form streaks). If this happens, contact our office.     Some lesions, especially those on the face, may leave a slight pale discoloration.     True scarring, involving deeper layers of the skin is unlikely.     If you have any questions or concerns, please contact our office at [phone]        -stop aggrenox 10 days before surgery  -echo preOp

## 2017-12-28 NOTE — PROGRESS NOTES
Patient Information     Patient Name MRN Sex     Tenisha Cagle [id number] Female 1933      Progress Notes by Franki Aldrich MD at 2017  2:45 PM     Author:  Franki Aldrich MD  Service:  (none) Author Type:  Physician     Filed:  2017  3:25 PM  Encounter Date:  2017 Status:  Addendum     :  Franki Aldrich MD (Physician)        Related Notes: Original Note by Franki Aldrich MD (Physician) filed at 2017  3:23 PM            Nursing Notes:   Lula Terry  2017  3:17 PM  Signed  Patient presents to the clinic with a dry mouth, she thinks her water tastes funny.  Lula Terry LPN....................2017 2:43 PM    Tenisha Cagle is a 84 y.o. female who presents for   Chief Complaint     Patient presents with       Mouth Problem      Dry mouth     HPI: Ms. Cagle has noted xerostomia over the past 2 weeks and she had just started pepcid prior to symptoms.  ehas noted a change in taste also; she had been on nexium but she felt it was too expensive  Past Medical History:     Diagnosis  Date     BASAL CELL CARCINOMA, NOSE 10/11/2011     Blood poisoning (HC) child    Hospitalized as a child for blood poisoning      BURSITIS, HIP, LEFT      CARDIAC MURMUR, SYSTOLIC 2011    TTE 11 mild MR and TR      CATARACTS 2012     Cerebral aneurysm, nonruptured 2011     CLL (chronic lymphoblastic leukemia)     CLL, stable WBC 30,000      DEPRESSION/ANXIETY, ACUTE SITUATIONAL      DIVERTICULOSIS, COLON 2011     Gastritis, Helicobacter pylori 03    Treated with PrevPac      H. pylori infection 2003     H. pylori gastritis treated with Prevpac      Hiatal hernia     large, mostly intrathoracic      History of pregnancy     , vaginal deliveries      HYPERCHOLESTEROLEMIA      HYPERTENSION      LEUKEMIA, LYMPHOCYTIC, CHRONIC 2011     Loose stools     Recurrent loose stool      MELENA, HX OF 2011     Menopausal state     Menopausal age  56      NEOPLASM, MALIGNANT, SKIN, EAR, RIGHT 10/11/2011     OSTEOARTHRITIS      PEPTIC ULCER DISEASE      SKIN CANCER, RECURRENT      SLEEP DISORDER 5/13/2011     TRANSIENT ISCHEMIC ATTACK 11/17/2011     Past Surgical History:      Procedure  Laterality Date     BIOPSY BREAST  1986    Right       CHOLECYSTECTOMY       COLON EGD  5/5/11    Hiatal hernia, gastric gland hyperplasia in antrum           COLONOSCOPY DIAGNOSTIC  2007    Sigmoid diverticulosis       PREMALIG/BENIGN SKIN LESION EXCISION      R side of nose, face and chest wall/Basal Cell Cancer Excision        TONSIL AND ADENOIDECTOMY  1954     TUBAL LIGATION       Family History       Problem   Relation Age of Onset     Stroke  Mother      Had a CVA age 85       Cancer  Father      Brain tumor, age 56       Blood Disease  Sister      Leukemia and       Cancer  Sister       kidney cancer       Blood Disease  Sister      Chronic lymphocytic leukemia        Current Outpatient Prescriptions       Medication  Sig Dispense Refill     acetaminophen (TYLENOL EXTRA STRGTH) 500 mg tablet Take 2 tablets by mouth every 6 hours if needed for Pain. Max acetaminophen dose: 4000mg in 24 hrs.  0     aspirin-dipyridamole (AGGRENOX)  mg capsule Take 1 capsule by mouth 2 times daily. 180 capsule 3     CALCIUM CARBONATE/VITAMIN D3 (CALCIUM 500 + D, D3, ORAL) Take 1 tablet by mouth once daily.       famotidine (PEPCID) 40 mg tablet Take 1 tablet by mouth at bedtime. 90 tablet 3     losartan-hydrochlorothiazide (HYZAAR) 100-25 mg tablet Take 0.5 tablets by mouth once daily. 90 tablet 4     milk of magnesia (MILK OF MAGNESIA) 400 mg/5 mL suspension Take 15 mL by mouth at bedtime if needed.       multivitamin (MVI) tablet Take 1 tablet by mouth once daily.       simvastatin (ZOCOR) 20 mg tablet Take 0.5 tablets by mouth once daily with evening meal. 45 tablet 3     Vit C-Vit E-Lutein-Min-OM-3 (OCUVITE) 684-99-8-150 mg-unit-mg-mg cap Take 1 capsule by mouth once daily.  0  "    No current facility-administered medications for this visit.      Medications have been reviewed by me and are current to the best of my knowledge and ability.    Allergies      Allergen   Reactions     Lisinopril  Cough     Prevacid [Lansoprazole]  Other - Describe In Comment Field     Patient states that medication didn't work for her.          EXAM:   Vitals:     09/14/17 1443   BP: 120/58   Temp: 98.6  F (37  C)   TempSrc: Temporal   Weight: 56.6 kg (124 lb 12.8 oz)   Height: 1.55 m (5' 1.02\")     General Appearance: Pleasant, alert, appropriate appearance for age. No acute distress  OroPharynx Exam: Mucosa / Tongue: normal tongue and buccal mucosa  ASSESSMENT AND PLAN:  1. Dry mouth  Stop pepcid/ if stomach symptoms recurr go back to nexium     2 her arm feels warm to her in area on melanoma excision; afeb and not red/ watch for now            "

## 2017-12-28 NOTE — TELEPHONE ENCOUNTER
Patient Information     Patient Name MRN Tenisha Hope [id number] Female 1933      Telephone Encounter by Prema Odom at 10/16/2017  2:43 PM     Author:  Prema Odom Service:  (none) Author Type:  (none)     Filed:  10/16/2017  2:45 PM Encounter Date:  10/16/2017 Status:  Signed     :  Prema Odom            Patient's daughter states that she saw Godwin Kapoor MD and that they discussed a heart monitor.  At the time patient had declined this testing but has now decided that she would like to do it.  Patient requesting order/referral for this.    Please advise    Prema Odom LPN........................10/16/2017  2:45 PM

## 2017-12-28 NOTE — TELEPHONE ENCOUNTER
Patient Information     Patient Name MRN Tenisha Hope [id number] Female 1933      Telephone Encounter by Omer Steele MD at 2017  8:14 AM     Author:  Omer Steele MD Service:  (none) Author Type:  Physician     Filed:  2017  8:15 AM Encounter Date:  2017 Status:  Signed     :  Omer Steele MD (Physician)            I think it would be best for me to evaluate her prior to ordering back shots as she has had some significant medical issues since I last saw her.

## 2017-12-28 NOTE — TELEPHONE ENCOUNTER
Patient Information     Patient Name MRN Tenisha Hope [id number] Female 1933      Telephone Encounter by Cindy Saha at 2017  2:28 PM     Author:  Cindy Saha Service:  (none) Author Type:  (none)     Filed:  2017  2:29 PM Encounter Date:  2017 Status:  Signed     :  Cindy Saha            Left message to call back  ....................  2017   2:28 PM  Cindy Saha LPN.......................... 2017  2:28 PM

## 2017-12-28 NOTE — TELEPHONE ENCOUNTER
Patient Information     Patient Name MRN Tenisha Hope [id number] Female 1933      Telephone Encounter by Coby Rodrigez at 10/13/2017  4:21 PM     Author:  Coby Rodrigez Service:  (none) Author Type:  (none)     Filed:  10/13/2017  4:22 PM Encounter Date:  10/13/2017 Status:  Signed     :  Coby Rodrigez            Patient notified   Coby Rodrigez LPN ..........10/13/2017 4:21 PM

## 2017-12-28 NOTE — TELEPHONE ENCOUNTER
Patient Information     Patient Name MRN Sex Tenisha Pérez [id number] Female 1933      Telephone Encounter by Gail Griggs at 2017  2:27 PM     Author:  Gail Griggs Service:  (none) Author Type:  (none)     Filed:  2017  2:29 PM Encounter Date:  2017 Status:  Signed     :  Gail Griggs            Left message to call back .  Patient needs to have Echo prior to surgery.  Gail Griggs LPN..........2017  2:28 PM

## 2017-12-28 NOTE — TELEPHONE ENCOUNTER
"Patient Information     Patient Name MRN Tenisha Hope [id number] Female 1933      Telephone Encounter by Destiny Contreras at 2017  1:54 PM     Author:  Destiny Contreras Service:  (none) Author Type:  (none)     Filed:  2017  1:57 PM Encounter Date:  2017 Status:  Signed     :  Destiny Contreras            Patient's daughter calling regarding her mom not being able to remember \"anything\". PAC nurses are supposed to call Monday to let her know what not to take prior to her surgery and daughter is concerned that she will mix it all up. Wanting them to call her instead at this number.  Destiny Contreras LPN..............................2017  1:57 PM         "

## 2017-12-28 NOTE — PROGRESS NOTES
Patient Information     Patient Name MRN Sex Tenisha Pérez [id number] Female 1933      Progress Notes by Deniz Donohue MD at 2017  8:50 AM     Author:  Deniz Donohue MD Service:  (none) Author Type:  Physician     Filed:  2017  9:34 AM Encounter Date:  2017 Status:  Signed     :  Deniz Donohue MD (Physician)            Procedure Note  Pre/Post Operative Diagnosis:   1 cm ulcerated skin lesion right cheek    Procedure:    Excision    Surgeon: Deniz Donohue MD FACS    Local Anesthesia: 1% lidocaine with 0.25%Marcaine    Indication for the procedure:    This is a 84 y.o. female patient referred by Omer Steele MD  With a 1 cm ulcerated skin lesion right cheek . Previous BCCA near there. Patient also has a flat lesion on nose that has not resolved with freezing.  After explaining the risks to include bleeding, infection, recurrence or need for reexcision,and scarring the patient wished to proceed.    Procedure:   The area was prepped and draped in usual sterile fashion with ChloraPrep . After, adequate local anesthesia,an elliptical skin incision was made to encompass the lesion.  The skin was closed with 5-0 Nylon.  A clean dry dressing was applied.    Plan:  The patient will followup in one week for suture removal and to review the pathology. Patient will follow up if there any problems with the wound including redness or drainage.  Efudex to nasal lesion

## 2017-12-28 NOTE — TELEPHONE ENCOUNTER
Patient Information     Patient Name MRN Tenisha Hope [id number] Female 1933      Telephone Encounter by Jennifer Simpson at 10/13/2017  9:53 AM     Author:  Jennifer Simpson Service:  (none) Author Type:  (none)     Filed:  10/13/2017  9:57 AM Encounter Date:  10/13/2017 Status:  Signed     :  Jennifer Simpson            Left message to call back.   Jennifer Simpson ....................  10/13/2017   9:57 AM

## 2017-12-28 NOTE — TELEPHONE ENCOUNTER
Patient Information     Patient Name MRN Tenisha Hope [id number] Female 1933      Telephone Encounter by Adwoa Ross at 2017 11:37 AM     Author:  Adwoa Ross Service:  (none) Author Type:  (none)     Filed:  2017 11:38 AM Encounter Date:  2017 Status:  Signed     :  Adwoa Rossine was called by PAC yesterday and she doesn't really understand why this message went to Dr Steele, either.  Adwoa Ross [facility]  ..................2017   11:38 AM

## 2017-12-28 NOTE — TELEPHONE ENCOUNTER
Patient Information     Patient Name MRN Tenisha Hope [id number] Female 1933      Telephone Encounter by Lizeth Man at 2017  3:53 PM     Author:  Lizeth Man Service:  (none) Author Type:  (none)     Filed:  2017  3:55 PM Encounter Date:  2017 Status:  Signed     :  Lizeth Man            Patient called to let Dr Steele know that she is already taking 1/2 tab of Atenolol. Per Dr Steele, I advised her to continue taking 1/2 tab of the Atenolol along with 1/2 tab of the Hyzaar.

## 2017-12-28 NOTE — PROGRESS NOTES
Patient Information     Patient Name MRN Sex Tenisha Pérez [id number] Female 1933      Progress Notes by Omer Steele MD at 2017 12:00 PM     Author:  Omer Steele MD  Service:  (none) Author Type:  Physician     Filed:  2017  9:05 AM  Encounter Date:  2017 Status:  Addendum     :  Omer Steele MD (Physician)        Related Notes: Original Note by Omer Steele MD (Physician) filed at 2017  2:54 PM            SUBJECTIVE:  Tenisha Cagle is a 83 y.o. Female.  Comes in today with daughter to follow up on HTN and low back pain.  She stopped her atenolol and has cut down lisinopril/HCTZ to 1/2 pill daily.  Felt a bit dizzy on and off the past week still with position changes but overall improved. Hip feels better but now pain in her left SI joint more prominent.        OBJECTIVE:  /56  Pulse 76  Wt 55.8 kg (123 lb)  BMI 23.24 kg/m2  EXAM:  General Appearance: Pleasant, alert, appropriate appearance for age. No acute distress  Chest/Respiratory Exam: Normal chest wall and respirations. Clear to auscultation.  Cardiovascular Exam: Regular rate and rhythm. S1, S2, no murmur, click, gallop, or rubs.  Musculoskeletal Exam: Left greater trochanter no longer tender.   over left SI joint.        ASSESSMENT/Plan :      Tenisha was seen today for follow up.    Diagnoses and all orders for this visit:    SI (sacroiliac) joint inflammation (HC)  -     AMB CONSULT TO INTERVENTIONAL RADIOLOGY; Future    HYPERTENSION      Patient Instructions   Stop the amlodipine.   Come back in a week for BP recheck.  I will put in a referral for the radiologists to look at your back.      Omer Steele MD    This document was created using computer generated templates and voice activated software.

## 2017-12-28 NOTE — ADDENDUM NOTE
Patient Information     Patient Name MRN Tenisha Hope [id number] Female 1933      Addendum Note by Franki Aldrich MD at 2017  3:25 PM     Author:  Franki Aldrich MD Service:  (none) Author Type:  Physician     Filed:  2017  3:25 PM Encounter Date:  2017 Status:  Signed     :  Franki Aldrich MD (Physician)       Addended by: FRANKI ALDRICH on: 2017 03:25 PM        Modules accepted: Orders

## 2017-12-28 NOTE — TELEPHONE ENCOUNTER
Patient Information     Patient Name MRN Sex Tenisha Pérez [id number] Female 1933      Telephone Encounter by Adwoa Ross at 2017  3:35 PM     Author:  Adwoa Ross Service:  (none) Author Type:  (none)     Filed:  2017  3:36 PM Encounter Date:  2017 Status:  Signed     :  Adwoa Ross            Left message to call back.  Adwoa Ross [facility] ....................  2017   3:36 PM

## 2017-12-28 NOTE — TELEPHONE ENCOUNTER
Patient Information     Patient Name MRN Tenisha Hope [id number] Female 1933      Telephone Encounter by Donovan Vizcarra RN at 2017 11:01 AM     Author:  Donovan Vizcarra RN Service:  (none) Author Type:  NURS- Registered Nurse     Filed:  2017 11:03 AM Encounter Date:  2017 Status:  Signed     :  Donovan Vizcarra RN (NURS- Registered Nurse)            Pt is in NP schedule for a EKG but see only Echo ordered . Please clarify . I will need order for and EKG if that is what is needed. Thank You. DONOVAN VIZCARRA RN ....................  2017   11:03 AM

## 2017-12-28 NOTE — PROCEDURES
Patient Information     Patient Name MRN Sex Tenisha Pérez [id number] Female 1933      Procedures signed by Angelica Tovar DO at 2017  3:54 PM      Author:  Angelica Tovar DO Service:  (none) Author Type:  PHYS- Osteopathic     Filed:  2017  3:54 PM Encounter Date:  2017 Status:  Signed     :  Angelica Tovar DO (PHYS- Osteopathic)            -  DATE OF SERVICE:  2017    SURGEON  ANGELICA TOVAR DO    PREOPERATIVE DIAGNOSIS   Melanoma left forearm.     POSTOPERATIVE DIAGNOSIS  Melanoma left forearm.     PROCEDURE  [place] lymph node injection and sentinel lymph node with frozen section and excision of melanoma left forearm 2 cm wide local excision of melanoma with 2 cm margins left arm.      ANESTHESIA  MAC.     ESTIMATED BLOOD LOSS  25 cc.     PROCEDURAL FINDINGS  Lymph nodes are negative and margins are negative, per pathology. [place] lymph node counts are 1500 at the site and 536 for the node. Postop is the same. The patient tolerated the procedure well without complication.     DESCRIPTION OF PROCEDURE   Ms. Cagle is an 83-year-old female with a biopsy-proven melanoma and is here today for excision. She underwent radiotracer injection per anesthesia with preop lymphoscintigraphy that did show the lymph node was in the left axilla and not the groin. Informed consent was obtained, explaining the risks and benefits of the procedure including but not limited to bleeding, infection, pneumonia, blood clots, chronic pain, chronic numbness, loss of sensory or motor portions of the arm, recurrence, poor cosmesis, seroma, chronic numbness, lymphedema, and other unforetold complications. The patient was marked in preop. Again, she had lymphoscintigraphy that was done. She was brought back to the operative room suite. I did review the lymphoscintigraphy scan prior to coming back to the OR. [place] node was attended to first. Anesthesia was administered per the department  of anesthesia. The arm was abducted with all bony surfaces padded. The patient was prepped and draped in the usual sterile fashion using a Hibiclens scrub solution. The area was infiltrated with 10 cc of 0.25% Marcaine with epi prior to beginning the procedure. Again, a Neoprobe was used to obtain counts, they were 1500 at the injection site and 200 to 300 in the axilla. A 15 blade was used to make a 1-inch skin incision and blunt dissection was used to dissect down to the axillary node fat packet. This was grasped with Allises. Blunt dissection was used to find the node. Hemoclips were placed on the lymphatic chains. The cutaneous nerve is identified and this is dissected off the packet. There were 1 or 2 nodes that were sent down for permanent, that will be called axillary contents. Again, there was one node with a count of 536, this was sent down to pathology and again pathology did not feel that it contained any tumor. Further results based on their final pathology report. The site is irrigated. There is no bleeding. Deep tissue was approximated with 4-0 Monocryl and skin was approximated with 4-0 Monocryl in a subcuticular fashion. Steri-Strips and sterile dressings were applied. Towels placed on the site, the arm was then placed in the supine position and it was reprepped and draped using a Hibiclens scrub solution. The area was marked giving 2 cm margins.  The original lesion is 4cm wide and 2cm long.  The specimen is 8cm wide and about 7cm long.  The original sutures from the biopsy site are left in situ. It is infiltrated with 20 cc of 0.25% Marcaine with epi. It is excised through the fat, down to and including the fascia for the muscle. A stitch is placed at the 12 o'clock position and marked. It is sent, again pathology concludes that we have clear margins. Electrocautery was used to provide hemostasis and to create skin flaps. A branch of the basilic vein is doubly clipped and ligated. Deep tissues  approximated with 2-0 Vicryl in an interrupted fashion. Skin was closed with alternating 2-0 and 3-0 Prolene. JAVIER drain was brought out through separate stab incision and left in the inferior portion of the incision and is sewn with 3-0 Prolene. Sterile compression dressings were applied. The patient tolerated the procedure well without complication. Transferred to recovery room in stable condition. Family was apprised of findings.           DO JERRY WHITLOCK/penelope   D:  2017 17:52:22  T:  2017 18:28:57  Voice Job ID:  [id number]  Text Job ID:  [id number]  cc:SASHA DAMON MD, PRIMARY PHYSICIAN         [facility] [place], [place]NAME:  MANJIT HERNANDES  MR#:  [id number]  :  1933  DATE:  2017  LOCATION:  [facility]  ROOM:    TYPE:  CLIOPERATIVE / PROCEDUREPage

## 2017-12-28 NOTE — INTERVAL H&P NOTE
Patient Information     Patient Name MRN Sex Tenisha Pérez [id number] Female 1933      Interval H&P Note by Angelica Sheppard DO at 6/15/2017 12:34 PM     Author:  Angelica Sheppard DO Service:  (none) Author Type:  PHYS- Osteopathic     Filed:  6/15/2017 12:35 PM Date of Service:  6/15/2017 12:34 PM Status:  Signed     :  Angelica Sheppard DO (PHYS- Osteopathic)            Patient marked in pre-op  Scan reviewed and the hot node is in the axillae    Patient stable for procedure today  Echo - stable     History and Physical Update    The history and physical has been reviewed and the patient has been examined.  There are no interim changes to the patient's history or physical condition.    Angelica Sheppard DO          Source Note     Author:  Angelica Sheppard DO Service:  (none) Author Type:  PHYS- Osteopathic    Filed:  2017  1:44 PM Date of Service:  2017  1:43 PM Status:  Signed    :  Angelica Sheppard DO (PHYS- Osteopathic)              CONSULTATION NOTE  Tenisha Cagle   [address] [facility] [address]  83 y.o.  female  Admission Date/Time: No admission date for patient encounter.  Primary Care Provider:  Omer Steele MD       HPI:    Patient is here today for follow up from recent biopsy.  Biopsy did show that it was a melanoma.    The incision today is c/d/i.   I did leave the sutures in place.  We do need to do a wide local incision and sentinel lymph node biopsy.   We need to coorridinate w/ radiology and pathology.  I discussed w/  patient and her daughter the treatment for melanoma wide local excision w/ 2cm margins and lymph node testing.  If there nodes are + than will do axillary dissection.  If nodes are positive than na PET scan and we will discuss adjuvant testing.   We will need to do this under general endotracheal anesthesia.  Will be done as outpt.    Risks: Informed consent is obtained for the procedural (explained in simple layman's terms that  the pt and/or family could understand) explaining risks vs benefits and alternatives to the procedure and consequences if we do not do the procedure.  Risks include but are not limited to:bleeding,infections, pneumonia, blood clots/DVT/PE, anesthesia(aspiration, damage to teeth/airway/MI/CVA/death/prolonged mechanical ventilation/PTX/IV infections). Wound infections requirng further surgery. Loss of function of limb/ext. (motor or sensory) . Scarring and disfigurement. possible ALND- full and arm numbness and swelling.    The patient and daughter understand this will be a large incision to completely remove the lesion.      Patient also has heart murmer/valular heart disease in PMHx, so we will get echo preOp.            Patient Active Problem List        Diagnosis  Date Noted     Abnormal weight loss  04/18/2016     Visual changes  04/18/2016     Greater trochanteric bursitis of left hip  04/18/2016     Anemia, unspecified  04/18/2016     Osteoarthritis of spine with radiculopathy, lumbar region - SEVERE - Noted on Xrays 2/2/2016 02/03/2016     Numbness and tingling of right leg  02/02/2016     Radicular leg pain  02/02/2016     History of TIA (transient ischemic attack)  02/02/2016     Basal cell carcinoma of right cheek  11/18/2015     Sacroiliac joint pain  12/10/2014     Right knee DJD  02/20/2014     Paresthesia of right leg  02/20/2014     Baker's cyst of knee  02/20/2014     ACP (advance care planning)  01/02/2014     Cystocele  08/23/2013     Pancreatic mass  09/18/2012       Possible, abnormal CT       Hiatal hernia          large, mostly intrathoracic      CEREBRAL ANEURYSM, NONRUPTURED  12/20/2011     Diverticulosis of colon  05/13/2011     LEUKEMIA, LYMPHOCYTIC, CHRONIC  04/14/2011       WBC stable in the 30,000      HYPERTENSION                 Past Medical History:     Diagnosis  Date     BASAL CELL CARCINOMA, NOSE 10/11/2011     Blood poisoning (HC) child     Hospitalized as a child for blood  poisoning      BURSITIS, HIP, LEFT       CARDIAC MURMUR, SYSTOLIC 2011     TTE 11 mild MR and TR      CATARACTS 2012     Cerebral aneurysm, nonruptured 2011     CLL (chronic lymphoblastic leukemia)       CLL, stable WBC 30,000      DEPRESSION/ANXIETY, ACUTE SITUATIONAL       DIVERTICULOSIS, COLON 2011     Gastritis, Helicobacter pylori 03     Treated with PrevPac      H. pylori infection 2003      H. pylori gastritis treated with Prevpac      Hiatal hernia       large, mostly intrathoracic      History of pregnancy       , vaginal deliveries      HYPERCHOLESTEROLEMIA       HYPERTENSION       LEUKEMIA, LYMPHOCYTIC, CHRONIC 2011     Loose stools       Recurrent loose stool      MELENA, HX OF 2011     Menopausal state       Menopausal age 56      NEOPLASM, MALIGNANT, SKIN, EAR, RIGHT 10/11/2011     OSTEOARTHRITIS       PEPTIC ULCER DISEASE       SKIN CANCER, RECURRENT       SLEEP DISORDER 2011     TRANSIENT ISCHEMIC ATTACK 2011               Past Surgical History:      Procedure  Laterality Date     BIOPSY BREAST   1986     Right       CHOLECYSTECTOMY         COLON EGD   11     Hiatal hernia, gastric gland hyperplasia in antrum           COLONOSCOPY DIAGNOSTIC        Sigmoid diverticulosis       PREMALIG/BENIGN SKIN LESION EXCISION         R side of nose, face and chest wall/Basal Cell Cancer Excision        TONSIL AND ADENOIDECTOMY        TUBAL LIGATION                    Family History       Problem   Relation Age of Onset     Stroke  Mother         Had a CVA age 85       Cancer  Father         Brain tumor, age 56       Blood Disease  Sister         Leukemia and       Cancer  Sister          kidney cancer       Blood Disease  Sister         Chronic lymphocytic leukemia            Social History Narrative    Nonsmoker. .    Lives alone in a house.     Continues to drive.     2 grown kids, one in [place]           Social History            Social History Main Topics       Smoking status: Never Smoker     Smokeless tobacco: Never Used     Alcohol use No     Drug use: No     Sexual activity: Not Currently                Current Outpatient Prescriptions       Medication  Sig Dispense Refill     acetaminophen (TYLENOL EXTRA STRGTH) 500 mg tablet Take 2 tablets by mouth every 6 hours if needed for Pain. Max acetaminophen dose: 4000mg in 24 hrs.   0     amLODIPine (NORVASC) 10 mg tablet Take 1 tablet by mouth once daily. 90 tablet 4     aspirin-dipyridamole (AGGRENOX)  mg capsule Take 1 capsule by mouth 2 times daily. 180 capsule 4     atenolol (TENORMIN) 50 mg tablet Take 1 tablet by mouth once daily. 90 tablet 4     CALCIUM CARBONATE/VITAMIN D3 (CALCIUM 500 + D, D3, ORAL) Take 1 tablet by mouth once daily.         famotidine (PEPCID) 40 mg tablet Take 1 tablet by mouth at bedtime. 90 tablet 3     losartan-hydrochlorothiazide (HYZAAR) 100-25 mg tablet Take 1 tablet by mouth once daily. 90 tablet 4     multivitamin (MVI) tablet Take 1 tablet by mouth once daily.         simvastatin (ZOCOR) 20 mg tablet Take 0.5 tablets by mouth once daily with evening meal. 45 tablet 3     Vit C-Vit E-Lutein-Min-OM-3 (OCUVITE) 286-40-2-150 mg-unit-mg-mg cap Take 1 capsule by mouth once daily.   0      No current facility-administered medications for this visit.       Medications have been reviewed by me and are current to the best of my knowledge and ability.        ALLERGIES/SENSITIVITIES:         Allergies      Allergen   Reactions     Lisinopril  Cough     Prevacid [Lansoprazole]  *Unknown       Patient states that medication didn't work for her.             REVIEW OF SYSTEMS  GENERAL: No fevers or chills. Denies fatigue, recent weight loss.  HEENT: No sinus drainage. No changes with vision or hearing. No difficulty swallowing.   LYMPHATICS:  No   CARDIOVASCULAR: Denies chest pain, palpitations and dyspnea on exertion.  History of TIA  PULMONARY: No shortness of  breath or cough. No increase in sputum production.  GI: Denies melena, bright red blood in stools. No hematemesis. No constipation or diarrhea.  : No dysuria or hematuria.  SKIN:  See HPI   Patient also c/o toe pain- no redness or drainage or swelling   HEMATOLOGY:  No history of easy bruising or bleeding.  + on blood thinners   ENDOCRINE:  No history of diabetes or thyroid problems.  Severe osteoporosis/'penia  NEUROLOGY:  No history of seizures or headaches. No motor or sensory changes.        PHYSICAL EXAM:         /48  Pulse 60  Wt 59.4 kg (131 lb)  Breastfeeding? No  BMI 24.42 kg/m2  Body mass index is 24.42 kg/(m^2).                              PHYSICAL EXAM  GENERAL APPEARANCE: Alert, healthy appearance, oriented, in no acute distress  SKIN: skin lesion 4x4 cm.   Sutures in place.  Site c/d/i.  HYDRATION: Well hydrated  HEAD, EYES, EARS, NECK, AND THROAT: Head is normocephalic, pupils equal, round, reactive to light and accommodation, ocular movement intact, sclera clear and no jaundice. Dentition intact.  NECK: Supple, no lymphadenopathy. Trachea midline.  LUNGS: normal respiration, clear to auscultation  HEART: Regular rate and rhythm,   EXTREMITY: No edema or cyanosis  OA and varicose veins.  No redness or swelling on toe.   ABDOMEN:  non tender to palpation,   NEURO: no focal neuro deficits.                             Prior to Admission medications          Medication Sig Start Date End Date Taking? Last Dose Authorizing Provider   acetaminophen (TYLENOL EXTRA STRGTH) 500 mg tablet Take 2 tablets by mouth every 6 hours if needed for Pain. Max acetaminophen dose: 4000mg in 24 hrs. 9/24/12   Yes   Oliver Vasquez MD   amLODIPine (NORVASC) 10 mg tablet Take 1 tablet by mouth once daily. 3/13/17   Yes   Omer Steele MD   aspirin-dipyridamole (AGGRENOX)  mg capsule Take 1 capsule by mouth 2 times daily. 7/19/16   Yes   Luis Ortez MD   atenolol (TENORMIN) 50 mg tablet  Take 1 tablet by mouth once daily. 3/13/17   Yes   Omer Steele MD   CALCIUM CARBONATE/VITAMIN D3 (CALCIUM 500 + D, D3, ORAL) Take 1 tablet by mouth once daily.     Yes   Reported, Patient   famotidine (PEPCID) 40 mg tablet Take 1 tablet by mouth at bedtime. 3/13/17   Yes   Omer Steele MD   losartan-hydrochlorothiazide (HYZAAR) 100-25 mg tablet Take 1 tablet by mouth once daily. 3/13/17   Yes   Omer Steele MD   multivitamin (MVI) tablet Take 1 tablet by mouth once daily.     Yes   Reported, Patient   simvastatin (ZOCOR) 20 mg tablet Take 0.5 tablets by mouth once daily with evening meal. 3/17/17   Yes   Omer Steele MD   Vit C-Vit E-Lutein-Min-OM-3 (OCUVITE) 525-06-3-150 mg-unit-mg-mg cap Take 1 capsule by mouth once daily. 3/1/13   Yes   Cheli Keita MD            No results found for this visit on 06/01/17.                       CONSULTATION ASSESSMENT AND PLAN:           Patient Active Problem List     Diagnosis  Code     LEUKEMIA, LYMPHOCYTIC, CHRONIC C91.10     HYPERTENSION I10     CEREBRAL ANEURYSM, NONRUPTURED I67.1     Diverticulosis of colon K57.30     Hiatal hernia K44.9     Pancreatic mass K86.9     Cystocele N81.10     ACP (advance care planning) Z71.89     Right knee DJD M17.11     Paresthesia of right leg R20.2     Baker's cyst of knee M71.20     Sacroiliac joint pain M53.3     Basal cell carcinoma of right cheek C44.319     Numbness and tingling of right leg R20.0, R20.2     Radicular leg pain M54.10     History of TIA (transient ischemic attack) Z86.73     Osteoarthritis of spine with radiculopathy, lumbar region - SEVERE - Noted on Xrays 2/2/2016 M47.26     Abnormal weight loss R63.4     Visual changes H53.9     Greater trochanteric bursitis of left hip M70.62     Anemia, unspecified D64.9            Melanoma  -preOp echo  -stop aggrenox 10 days before surgery (does put her at higher risk for MI/CVA)  -will need nuclear tracer injected the day of  "surgery and lymphoscintigraphy scan.   Wide local incision and lymph testing.  Coordinated w/ pathology and w/ radiology.  She be able to go home same day.       SEBORRHEIC KERATOSIS/AK  Treated in office lesions on face and back   Repeat treatment of back lesions today.  Will probably require further treatment in future b/c of number/size and thickness.   Local wound care     R great  Toe pain  Apply bacitracin bid  Stop soaking  No redness/drainage/swelling  No open areas or \"hot spots\"    No sings of ischemia/insufficiency           30 Minutes is spent today in consultation with the patient.  > 50% of the time is spent in discussing the patient diagnosis, treatment plan, medications and or surgery.                     "

## 2017-12-28 NOTE — PROGRESS NOTES
"Patient Information     Patient Name MRN Sex Tenisha Pérez [id number] Female 1933      Progress Notes by Yazmin Montemayor MD at 10/3/2017  8:40 AM     Author:  Yazmin Montemayor MD Service:  (none) Author Type:  Physician     Filed:  10/3/2017  9:21 AM Encounter Date:  10/3/2017 Status:  Signed     :  Yazmin Montemayor MD (Physician)            Patient presents for post procedure visit after excision of a squamous cell cancer from her right cheek on . Patient has done well. No problems with incision.    /60  Ht 1.549 m (5' 1\")  Wt 55.3 kg (122 lb)  BMI 23.05 kg/m2    General: NAD, pleasant and cooperative with exam and interview.  Skin: healing incision right cheek. No sign of infection. No pain with palpation. Sutures removed without difficulty.  Psychiatry: awake, alert and oriented. Appropriate affect.  Pathology results: invasive squamous cell cancer-clear margins  Assessment/Plan:  Discussed procedure and pathology results. Patient can return to normal activities. Continue to monitor for new or changing lesions. Encouraged sunscreen use, SPF 30 or greater. Patient will call with questions or concerns.            "

## 2017-12-28 NOTE — TELEPHONE ENCOUNTER
Patient Information     Patient Name MRN Tenisha Hope [id number] Female 1933      Telephone Encounter by Lizeth Man at 2017  4:23 PM     Author:  Lizeth Man Service:  (none) Author Type:  (none)     Filed:  2017  4:24 PM Encounter Date:  2017 Status:  Signed     :  Lizeth Man            Left message to call back.

## 2017-12-28 NOTE — TELEPHONE ENCOUNTER
Patient Information     Patient Name MRN Sex Tenisha Pérez [id number] Female 1933      Telephone Encounter by Angelica Sheppard DO at 2017 11:15 AM     Author:  Angelica Sheppard DO Service:  (none) Author Type:  PHYS- Osteopathic     Filed:  2017 11:15 AM Encounter Date:  2017 Status:  Signed     :  Angelica Sheppard DO (PHYS- Osteopathic)            echo

## 2017-12-28 NOTE — TELEPHONE ENCOUNTER
Patient Information     Patient Name MRN Tenisha Hope [id number] Female 1933      Telephone Encounter by Cindy Saha at 2017  2:28 PM     Author:  Cindy Saha Service:  (none) Author Type:  (none)     Filed:  2017  2:28 PM Encounter Date:  2017 Status:  Signed     :  Cindy Saha            ----- Message from Angelica Sheppard DO sent at 2017  1:19 PM CDT -----  Can we call and remind her daughter to stop the aggranox?

## 2017-12-28 NOTE — TELEPHONE ENCOUNTER
Patient Information     Patient Name MRN Tenisha Hope [id number] Female 1933      Telephone Encounter by Lizeth Man at 2017  8:29 AM     Author:  Lizeth Man Service:  (none) Author Type:  (none)     Filed:  2017  8:32 AM Encounter Date:  2017 Status:  Signed     :  Lizeth Man Maxine L   Female, 62 y.o., 1955  Weight:   82.6 kg (182 lb)  Phone:   [phone]  PCP:   Omer Steele MD FYI  Implanted Device  Registration-Scheduling Notes  MRN:   [id number]  MyChart:   Active  Next Appt:   2017    RE: Visit Follow-Up Question  Received: 4 days ago        Nona Cagle   [facility]         Phone Number: [phone]                         okay but will he get this if we leave here?              Previous Messages       ----- Message -----   From: Office of Omer Steele MD   Sent: 2017  1:27 PM CDT   To: Nona Cagle   Subject: RE: Visit Follow-Up Question     Dr Steele is out of the office until . If you want something addressed earlier please call. I will route this message to him for his return.   Florecita/ Dr Steele     ----- Message -----      From: Nona Cagle      Sent: 2017 12:32 PM CDT        To: Omer Steele MD   Subject: Visit Follow-Up Question     Hi Doctor Ivette, my mom Tenisha Cagle is having terrible back pain and she doesn't shop for groceries with me anymore or go for walks, I have gotten her an apt. for  at 12:00 but I was wondering can you get her in for back shots with radiology before that.  Then after that she will need a shot in her bursa on thigh like before.  I think because of the melanoma surgery she just didn't get to move around much.  please let me know.  Nonamarsha Cagle                           Visit Follow-Up Question              Nona Steele MD 4 days ago                        okay but will he get this if we  leave here?                                   Florecita Holloway routed conversation to Omer Steele MD 5 days ago                       Florecita Cagle 5 days ago                          Dr Steele is out of the office until July 18 th. If you want something addressed earlier please call. I will route this message to him for his return.   Florecita/ Dr Ivette Steele MD 5 days ago                          Hi Doctor Ivette, my mom Tenisha Cagle is having terrible back pain and she doesn't shop for groceries with me anymore or go for walks, I have gotten her an apt. for July 26th at 12:00 but I was wondering can you get her in for back shots with radiology before that.  Then after that she will need a shot in her bursa on thigh like before.  I think because of the melanoma surgery she just didn't get to move around much.  please let me know.  Nona Cagle

## 2017-12-28 NOTE — OR NURSING
Patient Information     Patient Name MRN Tenisha Hope [id number] Female 1933      OR Nursing by Ken Weber RN at 6/15/2017  1:00 PM     Author:  Ken Weber RN Service:  (none) Author Type:  NURS- Registered Nurse     Filed:  6/15/2017  1:39 PM Date of Service:  6/15/2017  1:00 PM Status:  Signed     :  Ken Weber RN (NURS- Registered Nurse)            Dr. Sheppard did a pause before injecting lymphazurin.

## 2017-12-28 NOTE — TELEPHONE ENCOUNTER
Patient Information     Patient Name MRN Tenisha Hope [id number] Female 1933      Telephone Encounter by Lizeth Man at 2017 10:59 AM     Author:  Lizeth Man Service:  (none) Author Type:  (none)     Filed:  2017 10:59 AM Encounter Date:  2017 Status:  Signed     :  Lizeth Man            Several messages left to call back. No return call from patient or her daughter.

## 2017-12-28 NOTE — OR POSTOP
Patient Information     Patient Name MRN Sex Tneisha Pérez [id number] Female 1933      OR PostOp by Lizeth Gan RN at 6/15/2017  4:52 PM     Author:  Lizeth Gan RN Service:  (none) Author Type:  NURS- Registered Nurse     Filed:  6/15/2017  4:55 PM Date of Service:  6/15/2017  4:52 PM Status:  Signed     :  Lizeth Gan RN (NURS- Registered Nurse)            Discharge Note    Data:  Tenisha Cagle has been discharged home at [facility] via wheelchair accompanied by Registered Nurse and Family.      Action:  Written discharge/follow-up instructions were provided to patient and Daughter(s). Prescriptions were filled and sent with patient.  Belongings sent with patient and Daughter(s). Medications from home sent with patient/family: Not Applicable  Equipment sling, applied to left arm..     Response:  Daughter(s) verbalized understanding of discharge instructions, reason for discharge, and necessary follow-up appointments.

## 2017-12-28 NOTE — TELEPHONE ENCOUNTER
Patient Information     Patient Name MRN Tenisha Hope [id number] Female 1933      Telephone Encounter by Angelica Sheppard DO at 2017 11:59 AM     Author:  Angelica Sheppard DO Service:  (none) Author Type:  PHYS- Osteopathic     Filed:  2017 11:59 AM Encounter Date:  2017 Status:  Signed     :  Angelica Sheppard DO (PHYS- Osteopathic)            She is/should be back on the aggrenox    lori

## 2017-12-28 NOTE — TELEPHONE ENCOUNTER
"Patient Information     Patient Name MRN Tenisha Hope [id number] Female 1933      Telephone Encounter by Adwoa Ross at 2017  8:42 AM     Author:  Adwoa Ross Service:  (none) Author Type:  (none)     Filed:  2017  8:49 AM Encounter Date:  2017 Status:  Signed     :  Adwoa Ross            She has been having close follow up with Dr Sheppard, and hasn't seen Dr Zarate since her \"establish care visit\" in March.     Patient states she has already restarted her Aggrenox, stating \"someone has already told me I could\". She believes she started taking it again , 17. She does have another follow up appointment with Dr Sheppard on 17, she will discuss this with her at that time.  Adwoa Ross [facility]  ..................2017   8:47 AM         "

## 2017-12-28 NOTE — PROGRESS NOTES
Patient Information     Patient Name MRN Sex Tenisha Pérez [id number] Female 1933      Progress Notes by Juliet[facility] at 2017  2:04 PM     Author:  Juliette [facility]. [facility] Service:  (none) Author Type:  [facility] - Registered Radiologic Technologist     Filed:  2017  2:05 PM Date of Service:  2017  2:04 PM Status:  Signed     :  Juliette Sanchez R.T. ([facility]) ([facility] - Registered Radiologic Technologist)            Falls Risk Criteria:    Age 65 and older or under age 4        Sensory deficits    Poor vision    Use of ambulatory aides    Impaired judgment    Unable to walk independently    Meets High Risk criteria for falls:  Yes             1.  Do you have dizziness or vertigo?    no                    2.  Do you need help standing or walking?   no                 3.  Have you fallen within the last 6 months?    no           4.  Has the patient been fasting?      no       If any risks are marked Yes, the following interventions are utilized:    Do not leave patient unattended     Assist patient in the dressing room and bathroom    Have ambulatory aides available throughout procedure    Involve patient s family if available

## 2017-12-28 NOTE — OR ANESTHESIA
Patient Information     Patient Name MRN Sex     Tenisha Cagle [id number] Female 1933      OR Anesthesia by Kev Warren DO at 6/15/2017  3:13 PM     Author:  Kev Warren DO Service:  (none) Author Type:  PHYS- Anesthesiologist     Filed:  6/15/2017  3:13 PM Date of Service:  6/15/2017  3:13 PM Status:  Signed     :  Kev Warren DO (PHYS- Anesthesiologist)            Anesthesia Post Operative Care Note    Name: Tenisha Cagle  MRN:   [id number]  :    1933       Procedure Done:  See Surgeon Note        Anesthesia Technique    Anesthetic Type:  General     Airway Management:  LMA     Oral Trauma:  No    Intraoperative Course   Hemodynamics:  Stable    Ventilation Normal:  Yes Lung Sounds:  Normal      PACU Course    Airway Status:  Extubated     Nondepolarizer Used:       Reversed: N/A   Hemodynamics:  Stable      Hydration: Euvolemic   Temperature:  36.1 - 38.3      Mental Status:  Awake, alert, follows commands   Pain Management:  Adequate   Regional Block:  No   Anesthesia Complications:  None      Vital Signs:  Temp: 97.3  F (36.3  C)  Pulse: 60  BP: 146/64  Resp: 16  SpO2: 100 %    O2 Device: Simple Face Mask         Level of Nausea: None        Active Lines:  Patient Lines/Drains/Airways Status    Active Line     Name: Placement date: Placement time: Site: Days:    PERIPHERAL VAD Right;Forearm 12   0025      1727    PERIPHERAL VAD Right;Hand 12   1843      1726    PERIPHERAL VAD Right Antecubital 20 06/15/17   1040   Antecubital   less than 1                Intake & Output:  Date  17 - 06/15/17 0659(Not Admitted)   06/15/17 0700 - 17 0659      Shift  3758-2971 0287-0145 24 Hour Total 1437-1772 4876-6013 5043-2648 24 Hour Total   I  N  T  A  K  E   Intravenous    1000   1000       +I/O+  Maint IV (lactated Ringers infusion)    1000   1000    Shift Total    1000   1000   O  U  T  P  U  T   Shift Total          NET     1000   1000   Weight (kg)                  Labs:  No results for input(s): QP4ASHNZKZL, BPK4XWQDPOAI, PHARTERIAL, RCJ1BDTIQGOQ, W8NEYJEJWOGH in the last 24 hours.    No results for input(s): MAGNESIUM in the last 24 hours.    No results for input(s): GLUCOSEMETER in the last 720 hours.        Kev Warren DO ....................  6/15/2017   3:13 PM

## 2017-12-29 NOTE — PATIENT INSTRUCTIONS
Patient Information     Patient Name MRN Sex Tenisha Pérez [id number] Female 1933      Patient Instructions by Omer Steele MD at 10/10/2017  3:30 PM     Author:  Omer Steele MD Service:  (none) Author Type:  Physician     Filed:  10/10/2017  4:30 PM Encounter Date:  10/10/2017 Status:  Signed     :  Omer Steele MD (Physician)            Continue the effudex if possible.  For your toe soak it for 10-15 mins and try to elevate the ingrown toenail with dental floss.  Cut your nails straight across, do not cut into corner.

## 2017-12-29 NOTE — PATIENT INSTRUCTIONS
Patient Information     Patient Name MRN Sex Tenisha Pérez [id number] Female 1933      Patient Instructions by Angelica Sheppard DO at 2017  3:20 PM     Author:  Angelica Sheppard DO  Service:  (none) Author Type:  PHYS- Osteopathic     Filed:  2017  4:20 PM  Encounter Date:  2017 Status:  Addendum     :  Angelica Sheppard DO (PHYS- Osteopathic)        Related Notes: Original Note by Angelica Sheppard DO (PHYS- Osteopathic) filed at 2017  4:20 PM            Caring for Your Incision      You ll need to help care for your incision after surgery and certain medical procedures. To close an incision, your healthcare provider used stitches (sutures), special strips of surgical tape called Steri-Strips, surgical staples, or surgical skin glue. Follow the tips on this sheet to help stop bleeding, speed healing, and prevent infection of your incision.      Pain Control    Use ice!  Ice keeps the swelling down and swelling is what causes pain.  Never apply ice directly to the skin.  Wrap it in a towel or cloth.  Apply ice 20 minutes on and 20 minutes off for pain control.  Use as needed.    Take tylenol 325 mg by mouth with food every 4 hours as needed for pain. Or ibuprofen 400 mg by mouth with food every 4 hours as needed for pain.  Do not take tylenol if you have a history of heavy drinking , hepatits C or liver problems.  Do not take ibuprofen if you have a history of stomach ulcers/problems, bleeding problem or kidney issues.     Types of incision closures    Surgical stitches (sutures) are placed by sewing the edges of an incision together with surgical thread. Sutures are either absorbable or non-absorbable. Absorbable sutures break down in the body over time. Non-absorbable sutures need to be removed.    Home care  Always wash your hands before touching your incision.  Keep the incision clean, dry, and out of water, keep the incision out of water.  Do not to pick at the scabs.  Scabs help protect the wound.  You can take a shower in 24 hours and wash the incision with soap and water. Pat dry/don t scrub. It s OK to wash around the incision. But don t spray water directly on it.  Pat stitches dry if they get wet. Don't rub.  Check the incision site daily for pain, redness, drainage, swelling, or separation of the incision edges.  If there is a bandage (dressing) over the incision, change this every 24 hours as instructed by your provider. Using clean hands change the dressing as directed by your healthcare provider. Always wash your hands before changing your dressing.  Make sure any clothing that touches the incision is loose-fitting. This will prevent rubbing. If the incision is on the head, keep your child from wearing caps or other head coverings. These may rub against the incision.  Try to avoid from rough play, contact sports, or physical activities for two weeks. This can put you at risk of opening the incision.  Make sure you avoid doing things that could cause dirt or sweat to get in or on the incision.  As your incision heals, the skin may appear pink or red. It may also feel slightly bumpy or raised. This is called a healing ridge. Over time, the color should fade and the raised skin will become less noticeable.    Care for specific closures  :  Sutures or staples. Once you no longer need to keep these dry, clean the incision or wound daily, generally after the first 24 hours.  First remove the bandage using clean hands. Then wash the area gently with soap and warm water. Use a wet cotton swab to loosen and remove any blood or crust that forms. After cleaning, put a thin layer of antibiotic ointment on. Then put on a new bandage.        When to seek medical care  Call your healthcare provider right away if you have any of these:  More pain, redness, swelling, bleeding, or foul-smelling discharge around the incision area  Fever of 101 F (38.3 C) or higher, or as directed by your  child's healthcare provider  Shaking chills  Vomiting or nausea that doesn t go away  Numbness, coldness, or tingling around the incision area, or changes in skin color  Opening of the sutures or wound  Stitches or staples come apart or fall out or surgical tape falls off before 7 days, or as directed by your healthcare provider

## 2017-12-29 NOTE — PATIENT INSTRUCTIONS
Patient Information     Patient Name MRN Tenisha Hope [id number] Female 1933      Patient Instructions by Angelica Sheppard DO at 2017  9:20 AM     Author:  Angelica Sheppard DO Service:  (none) Author Type:  PHYS- Osteopathic     Filed:  7/10/2017 12:54 PM Encounter Date:  2017 Status:  Signed     :  Angelica Sheppard DO (PHYS- Osteopathic)             What to Expect Following Cryosurgery (Liquid Nitrogen)   What is Cryosurgery?   Cryosurgery is a technique for removing skin lesions that primarily involve the surface of the skin, such as warts, seborrheic keratosis, or actinic keratosis. It is a quick method of removing the lesion with minimal scarring.   The liquid nitrogen needs to be applied long enough to freeze the affected skin. By freezing the skin, a blister is created underneath the lesion. Ideally, as the new skin forms underneath the blister, the abnormal skin on the roof of the blister peels off. Occasionally, if the lesion is very thick (such as a large wart), only the surface is blistered off. The base or residual lesion may need to be frozen at another visit.   It takes about one to two weeks for the scab to fall off, which is when the new layer of skin has formed under the blister. Areas of thinner skin, such as the face, may heal a little faster.     What to Expect Over the Next Few Weeks     During Treatment - Area being treated will sting, burn, and then possibly itch.     Immediately After Treatment - Area will be red, sore, and swollen.     Next Day - Blister or blood blister has formed, tenderness starts to subside. Apply a Band-Aid if   necessary.     7 Days - Surface is dark red/brown and scab-like. Apply Vaseline  or an antibacterial ointment, such as Polysporin , if necessary.     2 to 4 Weeks - The surface starts to peel off. This may be encouraged gently during bathing, when the scab is softened.     No makeup should be applied until area is fully healed.      How to Take Care of the Skin after Cryosurgery     A Band-Aid can be used for larger blisters or blisters in areas that are more likely to be traumatized -such as fingers and toes. If the area becomes dry or crusted, an ointment (Vaseline , Bacitracin , or Polysporin ) can also be applied.     Cleanse area with a mild cleanser  and cool water.     Pat the area dry with a lint-free cloth and apply an ointment (Vaseline , Bacitracin , or Polysporin ).     Avoid glycolic acids, Vitamin C, scrubs, Tretinoin (Retin-A), and Retinol creams for 7 to 10 days.     If approved by your Provider, you may bathe, swim, exercise, and otherwise follow all of your normal activities. How to Take Care of the Skin after Cryosurgery - Continued     The area may get wet while bathing, but swimming or hot tub use should be avoided for one week following a treatment or while the skin is open.     Within 24 hours, you can expect the area to be swollen and/or blistered. The blister may not be visible to the naked eye.     Within one week, the swelling goes down. The top becomes dark red and scab-like. The scab will loosen over the next weeks, and should fall off within one month.     Adverse Effects     The most common adverse effects are pain, swelling/blistering, potential for infection, and pale discoloration of the skin after it heals.         Blisters       Anytime a blister surfaces, whether from ill-fitting shoes, an oven burn, or liquid nitrogen cryosurgery, it will be a bit painful. For most patients, the pain is a temporary sting with some discomfort periodically over the next day as the blister forms.     The goal is to achieve a blister. This means, most commonly, patients will have a blister form following treatment. Sometimes, the blister is so thin that it can't be seen and may have minimal swelling. Occasionally, a blood blister forms that can be quite dramatic but is harmless.     Rarely, the blister may become infected.  When this happens, the blister becomes unusually tender, the fluid becomes cloudy, and the redness around it becomes more extensive (and may even form streaks). If this happens, contact our office.     Some lesions, especially those on the face, may leave a slight pale discoloration.     True scarring, involving deeper layers of the skin is unlikely.     If you have any questions or concerns, please contact our office at [phone]

## 2017-12-29 NOTE — PATIENT INSTRUCTIONS
Patient Information     Patient Name MRN Sex Tenisha Pérez [id number] Female 1933      Patient Instructions by Omer Steele MD at 2017 12:00 PM     Author:  Omer Steele MD Service:  (none) Author Type:  Physician     Filed:  2017 12:33 PM Encounter Date:  2017 Status:  Signed     :  Omer Steele MD (Physician)            Stop the amlodipine.   Come back in a week for BP recheck.  I will put in a referral for the radiologists to look at your back.

## 2017-12-29 NOTE — PATIENT INSTRUCTIONS
Patient Information     Patient Name MRN Sex Tenisha Pérez [id number] Female 1933      Patient Instructions by Angelica Sheppard DO at 2017  7:00 AM     Author:  Angelica Sheppard DO  Service:  (none) Author Type:  PHYS- Osteopathic     Filed:  2017  3:56 PM  Encounter Date:  2017 Status:  Addendum     :  Angelica Sheppard DO (PHYS- Osteopathic)        Related Notes: Original Note by Angelica Sheppard DO (PHYS- Osteopathic) filed at 2017  3:55 PM            Caring for Your Incision      You ll need to help care for your incision after surgery and certain medical procedures. To close an incision, your healthcare provider used stitches (sutures), special strips of surgical tape called Steri-Strips, surgical staples, or surgical skin glue. Follow the tips on this sheet to help stop bleeding, speed healing, and prevent infection of your incision.      Pain Control    Use ice!  Ice keeps the swelling down and swelling is what causes pain.  Never apply ice directly to the skin.  Wrap it in a towel or cloth.  Apply ice 20 minutes on and 20 minutes off for pain control.  Use as needed.    Take tylenol 325 mg by mouth with food every 4 hours as needed for pain. Or ibuprofen 400 mg by mouth with food every 4 hours as needed for pain.  Do not take tylenol if you have a history of heavy drinking , hepatits C or liver problems.  Do not take ibuprofen if you have a history of stomach ulcers/problems, bleeding problem or kidney issues.     Do not remove white steri strips    Home care  Always wash your hands before touching your incision.  Keep the incision clean, dry, and out of water, keep the incision out of water.  Do not to pick at the scabs. Scabs help protect the wound.  You can take a shower in 24 hours and wash the incision with soap and water. Pat dry/don t scrub. It s OK to wash around the incision. But don t spray water directly on it.  Pat stitches dry if they get wet. Don't  rub.  Check the incision site daily for pain, redness, drainage, swelling, or separation of the incision edges.  If there is a bandage (dressing) over the incision, change this every 24 hours as instructed by your provider. Using clean hands change the dressing as directed by your healthcare provider. Always wash your hands before changing your dressing.  Make sure any clothing that touches the incision is loose-fitting. This will prevent rubbing. If the incision is on the head, keep your child from wearing caps or other head coverings. These may rub against the incision.  Try to avoid from rough play, contact sports, or physical activities for two weeks. This can put you at risk of opening the incision.  Make sure you avoid doing things that could cause dirt or sweat to get in or on the incision.  As your incision heals, the skin may appear pink or red. It may also feel slightly bumpy or raised. This is called a healing ridge. Over time, the color should fade and the raised skin will become less noticeable.    Care for specific closures  :  Sutures or staples.    clean the incision or wound daily,   First remove the bandage using clean hands. Then wash the area gently with soap and warm water. Use a wet cotton swab to loosen and remove any blood or crust that forms. Then put on a new bandage.      Follow-up care  Follow up June 23 and July 6th     When to seek medical care  Call your healthcare provider right away if you have any of these:  More pain, redness, swelling, bleeding, or foul-smelling discharge around the incision area  Fever of 101 F (38.3 C) or higher, or as directed by your child's healthcare provider  Shaking chills  Vomiting or nausea that doesn t go away  Numbness, coldness, or tingling around the incision area, or changes in skin color  Opening of the sutures or wound  Stitches or staples come apart or fall out or surgical tape falls off before 7 days, or as directed by your healthcare  provider

## 2017-12-29 NOTE — PATIENT INSTRUCTIONS
Patient Information     Patient Name MRN Tenisha Hope [id number] Female 1933      Patient Instructions by Omer Steele MD at 2017 12:00 PM     Author:  Omer Steele MD Service:  (none) Author Type:  Physician     Filed:  2017 12:41 PM Encounter Date:  2017 Status:  Signed     :  Omer Steele MD (Physician)            Take 1/2 pill of the atenolol and 1/2 pill of the lozartan/HCTZ (Hyzaar).  We will do two shots to see if that helps.  I will also check your urine.

## 2017-12-29 NOTE — H&P
Patient Information     Patient Name MRN Sex Tenisha Pérez [id number] Female 1933      H&P by Angelica Sheppard DO at 2017  1:43 PM     Author:  Angelica Sheppard DO Service:  (none) Author Type:  PHYS- Osteopathic     Filed:  2017  1:44 PM Date of Service:  2017  1:43 PM Status:  Signed     :  Angelica Sheppard DO (PHYS- Osteopathic)            CONSULTATION NOTE  Tenisha Cagle   [address] [facility] [address]  83 y.o.  female  Admission Date/Time: No admission date for patient encounter.  Primary Care Provider:  Omer Steele MD       HPI:    Patient is here today for follow up from recent biopsy.  Biopsy did show that it was a melanoma.    The incision today is c/d/i.   I did leave the sutures in place.  We do need to do a wide local incision and sentinel lymph node biopsy.   We need to coorridinate w/ radiology and pathology.  I discussed w/  patient and her daughter the treatment for melanoma wide local excision w/ 2cm margins and lymph node testing.  If there nodes are + than will do axillary dissection.  If nodes are positive than na PET scan and we will discuss adjuvant testing.   We will need to do this under general endotracheal anesthesia.  Will be done as outpt.    Risks: Informed consent is obtained for the procedural (explained in simple layman's terms that the pt and/or family could understand) explaining risks vs benefits and alternatives to the procedure and consequences if we do not do the procedure.  Risks include but are not limited to:bleeding,infections, pneumonia, blood clots/DVT/PE, anesthesia(aspiration, damage to teeth/airway/MI/CVA/death/prolonged mechanical ventilation/PTX/IV infections). Wound infections requirng further surgery. Loss of function of limb/ext. (motor or sensory) . Scarring and disfigurement. possible ALND- full and arm numbness and swelling.    The patient and daughter understand this will be a large incision to completely  remove the lesion.      Patient also has heart murmer/valular heart disease in PMHx, so we will get echo preOp.            Patient Active Problem List        Diagnosis  Date Noted     Abnormal weight loss  2016     Visual changes  2016     Greater trochanteric bursitis of left hip  2016     Anemia, unspecified  2016     Osteoarthritis of spine with radiculopathy, lumbar region - SEVERE - Noted on Xrays 2016     Numbness and tingling of right leg  2016     Radicular leg pain  2016     History of TIA (transient ischemic attack)  2016     Basal cell carcinoma of right cheek  2015     Sacroiliac joint pain  12/10/2014     Right knee DJD  2014     Paresthesia of right leg  2014     Baker's cyst of knee  2014     ACP (advance care planning)  2014     Cystocele  2013     Pancreatic mass  2012       Possible, abnormal CT       Hiatal hernia          large, mostly intrathoracic      CEREBRAL ANEURYSM, NONRUPTURED  2011     Diverticulosis of colon  2011     LEUKEMIA, LYMPHOCYTIC, CHRONIC  2011       WBC stable in the 30,000      HYPERTENSION                 Past Medical History:     Diagnosis  Date     BASAL CELL CARCINOMA, NOSE 10/11/2011     Blood poisoning (HC) child     Hospitalized as a child for blood poisoning      BURSITIS, HIP, LEFT       CARDIAC MURMUR, SYSTOLIC 2011     TTE 11 mild MR and TR      CATARACTS 2012     Cerebral aneurysm, nonruptured 2011     CLL (chronic lymphoblastic leukemia)       CLL, stable WBC 30,000      DEPRESSION/ANXIETY, ACUTE SITUATIONAL       DIVERTICULOSIS, COLON 2011     Gastritis, Helicobacter pylori 03     Treated with PrevPac      H. pylori infection 2003      H. pylori gastritis treated with Prevpac      Hiatal hernia       large, mostly intrathoracic      History of pregnancy       , vaginal deliveries      HYPERCHOLESTEROLEMIA        HYPERTENSION       LEUKEMIA, LYMPHOCYTIC, CHRONIC 4/14/2011     Loose stools       Recurrent loose stool      MELENA, HX OF 4/14/2011     Menopausal state       Menopausal age 56      NEOPLASM, MALIGNANT, SKIN, EAR, RIGHT 10/11/2011     OSTEOARTHRITIS       PEPTIC ULCER DISEASE       SKIN CANCER, RECURRENT       SLEEP DISORDER 5/13/2011     TRANSIENT ISCHEMIC ATTACK 11/17/2011               Past Surgical History:      Procedure  Laterality Date     BIOPSY BREAST   1986     Right       CHOLECYSTECTOMY         COLON EGD   5/5/11     Hiatal hernia, gastric gland hyperplasia in antrum           COLONOSCOPY DIAGNOSTIC   2007     Sigmoid diverticulosis       PREMALIG/BENIGN SKIN LESION EXCISION         R side of nose, face and chest wall/Basal Cell Cancer Excision        TONSIL AND ADENOIDECTOMY   1954     TUBAL LIGATION                    Family History       Problem   Relation Age of Onset     Stroke  Mother         Had a CVA age 85       Cancer  Father         Brain tumor, age 56       Blood Disease  Sister         Leukemia and       Cancer  Sister          kidney cancer       Blood Disease  Sister         Chronic lymphocytic leukemia            Social History Narrative    Nonsmoker. .    Lives alone in a house.     Continues to drive.     2 grown kids, one in [place]           Social History           Social History Main Topics       Smoking status: Never Smoker     Smokeless tobacco: Never Used     Alcohol use No     Drug use: No     Sexual activity: Not Currently                Current Outpatient Prescriptions       Medication  Sig Dispense Refill     acetaminophen (TYLENOL EXTRA STRGTH) 500 mg tablet Take 2 tablets by mouth every 6 hours if needed for Pain. Max acetaminophen dose: 4000mg in 24 hrs.   0     amLODIPine (NORVASC) 10 mg tablet Take 1 tablet by mouth once daily. 90 tablet 4     aspirin-dipyridamole (AGGRENOX)  mg capsule Take 1 capsule by mouth 2 times daily. 180 capsule 4     atenolol  (TENORMIN) 50 mg tablet Take 1 tablet by mouth once daily. 90 tablet 4     CALCIUM CARBONATE/VITAMIN D3 (CALCIUM 500 + D, D3, ORAL) Take 1 tablet by mouth once daily.         famotidine (PEPCID) 40 mg tablet Take 1 tablet by mouth at bedtime. 90 tablet 3     losartan-hydrochlorothiazide (HYZAAR) 100-25 mg tablet Take 1 tablet by mouth once daily. 90 tablet 4     multivitamin (MVI) tablet Take 1 tablet by mouth once daily.         simvastatin (ZOCOR) 20 mg tablet Take 0.5 tablets by mouth once daily with evening meal. 45 tablet 3     Vit C-Vit E-Lutein-Min-OM-3 (OCUVITE) 560-59-1-150 mg-unit-mg-mg cap Take 1 capsule by mouth once daily.   0      No current facility-administered medications for this visit.       Medications have been reviewed by me and are current to the best of my knowledge and ability.        ALLERGIES/SENSITIVITIES:         Allergies      Allergen   Reactions     Lisinopril  Cough     Prevacid [Lansoprazole]  *Unknown       Patient states that medication didn't work for her.             REVIEW OF SYSTEMS  GENERAL: No fevers or chills. Denies fatigue, recent weight loss.  HEENT: No sinus drainage. No changes with vision or hearing. No difficulty swallowing.   LYMPHATICS:  No   CARDIOVASCULAR: Denies chest pain, palpitations and dyspnea on exertion.  History of TIA  PULMONARY: No shortness of breath or cough. No increase in sputum production.  GI: Denies melena, bright red blood in stools. No hematemesis. No constipation or diarrhea.  : No dysuria or hematuria.  SKIN:  See HPI   Patient also c/o toe pain- no redness or drainage or swelling   HEMATOLOGY:  No history of easy bruising or bleeding.  + on blood thinners   ENDOCRINE:  No history of diabetes or thyroid problems.  Severe osteoporosis/'penia  NEUROLOGY:  No history of seizures or headaches. No motor or sensory changes.        PHYSICAL EXAM:         /48  Pulse 60  Wt 59.4 kg (131 lb)  Breastfeeding? No  BMI 24.42 kg/m2  Body mass  index is 24.42 kg/(m^2).                              PHYSICAL EXAM  GENERAL APPEARANCE: Alert, healthy appearance, oriented, in no acute distress  SKIN: skin lesion 4x4 cm.   Sutures in place.  Site c/d/i.  HYDRATION: Well hydrated  HEAD, EYES, EARS, NECK, AND THROAT: Head is normocephalic, pupils equal, round, reactive to light and accommodation, ocular movement intact, sclera clear and no jaundice. Dentition intact.  NECK: Supple, no lymphadenopathy. Trachea midline.  LUNGS: normal respiration, clear to auscultation  HEART: Regular rate and rhythm,   EXTREMITY: No edema or cyanosis  OA and varicose veins.  No redness or swelling on toe.   ABDOMEN:  non tender to palpation,   NEURO: no focal neuro deficits.                             Prior to Admission medications          Medication Sig Start Date End Date Taking? Last Dose Authorizing Provider   acetaminophen (TYLENOL EXTRA STRGTH) 500 mg tablet Take 2 tablets by mouth every 6 hours if needed for Pain. Max acetaminophen dose: 4000mg in 24 hrs. 9/24/12   Yes   Oliver Vasquez MD   amLODIPine (NORVASC) 10 mg tablet Take 1 tablet by mouth once daily. 3/13/17   Yes   Omer Steele MD   aspirin-dipyridamole (AGGRENOX)  mg capsule Take 1 capsule by mouth 2 times daily. 7/19/16   Yes   Luis Ortez MD   atenolol (TENORMIN) 50 mg tablet Take 1 tablet by mouth once daily. 3/13/17   Yes   Omer Steele MD   CALCIUM CARBONATE/VITAMIN D3 (CALCIUM 500 + D, D3, ORAL) Take 1 tablet by mouth once daily.     Yes   Reported, Patient   famotidine (PEPCID) 40 mg tablet Take 1 tablet by mouth at bedtime. 3/13/17   Yes   Omer Steele MD   losartan-hydrochlorothiazide (HYZAAR) 100-25 mg tablet Take 1 tablet by mouth once daily. 3/13/17   Yes   Omer Steele MD   multivitamin (MVI) tablet Take 1 tablet by mouth once daily.     Yes   Reported, Patient   simvastatin (ZOCOR) 20 mg tablet Take 0.5 tablets by mouth once daily with evening  "meal. 3/17/17   Yes   Omer Steele MD   Vit C-Vit E-Lutein-Min-OM-3 (OCUVITE) 796-43-5-150 mg-unit-mg-mg cap Take 1 capsule by mouth once daily. 3/1/13   Yes   Cheli Keita MD            No results found for this visit on 06/01/17.                       CONSULTATION ASSESSMENT AND PLAN:           Patient Active Problem List     Diagnosis  Code     LEUKEMIA, LYMPHOCYTIC, CHRONIC C91.10     HYPERTENSION I10     CEREBRAL ANEURYSM, NONRUPTURED I67.1     Diverticulosis of colon K57.30     Hiatal hernia K44.9     Pancreatic mass K86.9     Cystocele N81.10     ACP (advance care planning) Z71.89     Right knee DJD M17.11     Paresthesia of right leg R20.2     Baker's cyst of knee M71.20     Sacroiliac joint pain M53.3     Basal cell carcinoma of right cheek C44.319     Numbness and tingling of right leg R20.0, R20.2     Radicular leg pain M54.10     History of TIA (transient ischemic attack) Z86.73     Osteoarthritis of spine with radiculopathy, lumbar region - SEVERE - Noted on Xrays 2/2/2016 M47.26     Abnormal weight loss R63.4     Visual changes H53.9     Greater trochanteric bursitis of left hip M70.62     Anemia, unspecified D64.9            Melanoma  -preOp echo  -stop aggrenox 10 days before surgery (does put her at higher risk for MI/CVA)  -will need nuclear tracer injected the day of surgery and lymphoscintigraphy scan.   Wide local incision and lymph testing.  Coordinated w/ pathology and w/ radiology.  She be able to go home same day.       SEBORRHEIC KERATOSIS/AK  Treated in office lesions on face and back   Repeat treatment of back lesions today.  Will probably require further treatment in future b/c of number/size and thickness.   Local wound care     R great  Toe pain  Apply bacitracin bid  Stop soaking  No redness/drainage/swelling  No open areas or \"hot spots\"    No sings of ischemia/insufficiency           30 Minutes is spent today in consultation with the patient.  > 50% of the time is " spent in discussing the patient diagnosis, treatment plan, medications and or surgery.

## 2017-12-30 NOTE — NURSING NOTE
Patient Information     Patient Name MRN Tenisha Hope [id number] Female 1933      Nursing Note by Monisha Gamboa at 10/3/2017  9:15 AM     Author:  Monisha Gamboa Service:  (none) Author Type:  (none)     Filed:  10/3/2017  9:35 AM Encounter Date:  10/3/2017 Status:  Signed     :  Monisha Gamboa            Coming in for an injection in the Lt hip, phlegm and heart skipping off and on  Monisha Gamboa ....................  10/3/2017   9:31 AM

## 2017-12-30 NOTE — NURSING NOTE
Patient Information     Patient Name MRN Tenisha Hope [id number] Female 1933      Nursing Note by Lula Terry at 2017  2:45 PM     Author:  Lula Terry Service:  (none) Author Type:  (none)     Filed:  2017  3:17 PM Encounter Date:  2017 Status:  Signed     :  Lula Terry            Patient presents to the clinic with a dry mouth, she thinks her water tastes funny.  Lula Terry LPN....................2017 2:43 PM

## 2017-12-30 NOTE — NURSING NOTE
Patient Information     Patient Name MRN Tenisha Hope [id number] Female 1933      Nursing Note by Zabrina Silvestre LPN at 2017  1:20 PM     Author:  Zabrina Silvestre LPN Service:  (none) Author Type:  NURS- Licensed Practical Nurse     Filed:  2017  3:30 PM Encounter Date:  2017 Status:  Signed     :  Zabrina Silvestre LPN (NURS- Licensed Practical Nurse)            The patient is here today to be seen for a post op visit.  Zabrina Silvestre LPN......2017  1:25 PM

## 2017-12-30 NOTE — NURSING NOTE
Patient Information     Patient Name MRN Tenisha Hope [id number] Female 1933      Nursing Note by Monisha Gamboa at 10/3/2017  9:15 AM     Author:  Monisha Gamboa Service:  (none) Author Type:  (none)     Filed:  10/3/2017 10:37 AM Encounter Date:  10/3/2017 Status:  Signed     :  Monisha Gamboa            Time out done with date of birth, what is being injected and where    Lidocaine 1% NDC 6823-7818-72 Lot number -DK    Monisha Gamboa ....................  10/3/2017   10:37 AM

## 2017-12-30 NOTE — NURSING NOTE
Patient Information     Patient Name MRN Tenisha Hope [id number] Female 1933      Nursing Note by Frankie Cooper at 2017 12:00 PM     Author:  Frankie Cooper Service:  (none) Author Type:  NURS- Student Nurse     Filed:  2017 12:33 PM Encounter Date:  2017 Status:  Signed     :  Frankie Cooper            Patient presents for back pain and for a hip injection today. Patient also states that she has not been feeling well since yesterday 17. She states that her stomach hurts, no fever or chills.   Frankie Cooper ....................  2017   12:08 PM

## 2017-12-30 NOTE — NURSING NOTE
Patient Information     Patient Name MRN Sex Tenisha Pérez [id number] Female 1933      Nursing Note by Gail Griggs at 10/3/2017  8:40 AM     Author:  Gail Griggs Service:  (none) Author Type:  (none)     Filed:  10/3/2017  8:43 AM Encounter Date:  10/3/2017 Status:  Signed     :  Gail Griggs            Here today to follow up for suture removal.  Gail Griggs LPN..........10/3/2017  8:40 AM

## 2017-12-30 NOTE — NURSING NOTE
Patient Information     Patient Name MRN Sex Tenisha Pérez [id number] Female 1933      Nursing Note by Gail Griggs at 2017  8:50 AM     Author:  Gail Griggs  Service:  (none) Author Type:  (none)     Filed:  2017  9:11 AM  Encounter Date:  2017 Status:  Addendum     :  Gail Griggs        Related Notes: Original Note by Gail Griggs filed at 2017  8:58 AM            Here today to follow up with some lesions.  Gail Griggs LPN..........2017  8:56 AM    Universal Protocol    A. Pre-procedure verification complete yes  1-relevant information / documentation available, reviewed and properly matched to the patient; 2-consent accurate and complete, 3-equipment and supplies available    B. Site marking complete Yes  Site marked if not in continuous attendance with patient    C. TIME OUT completed yes  Time Out was conducted just prior to starting procedure to verify the eight required elements: 1-patient identity, 2-consent accurate and complete, 3-position, 4-correct side/site marked (if applicable), 5-procedure, 6-relevant images / results properly labeled and displayed (if applicable), 7-antibiotics / irrigation fluids (if applicable), 8-safety precautions.    Gail Griggs LPN..........2017  9:11 AM

## 2017-12-30 NOTE — NURSING NOTE
Patient Information     Patient Name MRN Tenisha Hope [id number] Female 1933      Nursing Note by Cindy Saha at 2017  9:20 AM     Author:  Cindy Saha Service:  (none) Author Type:  (none)     Filed:  2017  9:28 AM Encounter Date:  2017 Status:  Signed     :  Cindy Saha            Patient is here today for a follow up.    Cindy Saha LPN.......................... 2017  9:25 AM

## 2017-12-30 NOTE — NURSING NOTE
Patient Information     Patient Name MRN Tenisha Hope [id number] Female 1933      Nursing Note by Cindy Saha at 2017  3:20 PM     Author:  Cindy Saha Service:  (none) Author Type:  (none)     Filed:  2017  3:20 PM Encounter Date:  2017 Status:  Signed     :  Cindy Saha            Patient's daughter states that it isn't bleeding anymore and they are wondering if the drain can be taken out then.    Cindy Saha LPN.......................... 2017  3:18 PM

## 2017-12-30 NOTE — NURSING NOTE
Patient Information     Patient Name MRN Tenisha Hope [id number] Female 1933      Nursing Note by Cindy Saha at 2017  3:20 PM     Author:  Cindy Saha Service:  (none) Author Type:  (none)     Filed:  2017  3:38 PM Encounter Date:  2017 Status:  Signed     :  Cindy Saha            Patient is here today for her suture removal. She is also wanting her big toe looked at today.    Cindy Saha LPN.......................... 2017  3:35 PM

## 2017-12-30 NOTE — NURSING NOTE
Patient Information     Patient Name MRN Sex Tenisha Pérez [id number] Female 1933      Nursing Note by Lizeth Man at 10/10/2017  3:30 PM     Author:  Lizeth Man Service:  (none) Author Type:  (none)     Filed:  10/10/2017  4:36 PM Encounter Date:  10/10/2017 Status:  Signed     :  Lizeth Man            Patient presents to the clinic today to follow up after back injections and to evaluate a sore toe.

## 2018-01-03 NOTE — TELEPHONE ENCOUNTER
Patient Information     Patient Name MRN Sex Tenisha Pérez [id number] Female 1933      Telephone Encounter by Nura Hoskins RN at 3/13/2017  3:45 PM     Author:  Nura Hoskins RN Service:  (none) Author Type:  NURS- Registered Nurse     Filed:  3/13/2017  3:52 PM Encounter Date:  3/13/2017 Status:  Signed     :  Nura Hoskins RN (NURS- Registered Nurse)            Redundant Refill Request for hyzaar refused;    Prescribing Provider: Omer Damon MD           Order Date: 2017  Ordered by: OMER DAMON  Medication:losartan-hydrochlorothiazide (HYZAAR) 100-25 mg tablet    Qty:90 tablet   Ref:4  Start:3/13/2017   End:              Route:Oral                  VADIM:No   Class:eRx    Sig:Take 1 tablet by mouth once daily.    Pharmacy:[facility] [place] [facility] - [address]    Unable to complete prescription refill per RN Medication Refill Policy.................... Nura Hoskins RN ....................  3/13/2017   3:46 PM

## 2018-01-03 NOTE — TELEPHONE ENCOUNTER
Patient Information     Patient Name MRN Tenisha Hope [id number] Female 1933      Telephone Encounter by Nura Hoskins RN at 3/13/2017  3:48 PM     Author:  Nura Hoskins RN Service:  (none) Author Type:  NURS- Registered Nurse     Filed:  3/13/2017  3:52 PM Encounter Date:  3/13/2017 Status:  Signed     :  Nura Hoskins RN (NURS- Registered Nurse)            Rx request received from [facility] for nexium for patient. Chart review shows that patient was most recently seen on 3/8/17 by PCP. Plan with regards to Heartburn as follows:    Heartburn  we'll again try to transition down to Pepcid.  - Discontinue: esomeprazole (NEXIUM) 40 mg capsule; Take 1 capsule by mouth once daily before a meal.  - famotidine (PEPCID) 40 mg tablet; Take 1 tablet by mouth at bedtime.  - famotidine (PEPCID) 40 mg tablet; Take 1 tablet by mouth at bedtime.    Writer will refuse rx request for nexium at this time.    Unable to complete prescription refill per RN Medication Refill Policy.................... Nura Hoskins RN ....................  3/13/2017   3:50 PM

## 2018-01-03 NOTE — TELEPHONE ENCOUNTER
Patient Information     Patient Name MRN Sex Tenisha Pérez [id number] Female 1933      Telephone Encounter by Nura Hoskins RN at 3/15/2017  4:37 PM     Author:  Nura Hoskins RN Service:  (none) Author Type:  NURS- Registered Nurse     Filed:  3/15/2017  4:44 PM Encounter Date:  3/15/2017 Status:  Signed     :  Nura Hoskins RN (NURS- Registered Nurse)            Faxed clarification request received from pharmacy with regards to rx for simvastatin sent in on 3/13/17. Rx with two sets of directions per pharmacy. Chart review shows that rx was indeed filled on 3/13/17 with two sets of directions as listed below.    Prescribing Provider: Omer Damon MD           Order Date: 2017  Ordered by: OMER DAMON  Medication:simvastatin (ZOCOR) 20 mg tablet    Qty:90 tablet   Ref:3  Start:3/13/2017   End:              Route:Oral                  VADIM:No   Class:eRx    Sig:Take 0.5 tablets by mouth once daily with evening meal. bid    Pharmacy:[facility] [address]    Writer unable to clarify directions as per review of office visit notes from 3/8/17 office visit with PCP. Did contact patient who states she takes half of a 20 mg tab of simvastatin daily. Will update sig of requested rx and pend rx request to PCP for his consideration/approval.    Unable to complete prescription refill per RN Medication Refill Policy.................... Nura Hoskins RN ....................  3/15/2017   4:40 PM

## 2018-01-03 NOTE — TELEPHONE ENCOUNTER
Patient Information     Patient Name MRN Tenisha Hope [id number] Female 1933      Telephone Encounter by Nura Hoskins RN at 3/13/2017  3:34 PM     Author:  Nura Hoskins RN Service:  (none) Author Type:  NURS- Registered Nurse     Filed:  3/13/2017  3:44 PM Encounter Date:  3/13/2017 Status:  Signed     :  Nura Hoskins RN (NURS- Registered Nurse)            Writer returned patient's call as requested. Patient is concerned as she was seen on the  by PCP. PCP advised her that he would call in new rxs for her medications to [facility] on that date. Patient reports that she had called [facility] this morning, and they hadn't received new rxs as of yet. Chart review shows that PCP did indeed call in rxs as per patient's request, but rxs were called in today. Rxs called in as follows: Simvastatin, Hyzaar, Pepcid, Norvasc, and atenolol. Writer advised patient of this, who stated understanding. Patient reports that these were the medications she was hoping to have filled. Is not in need of them at this time, but will need them. Patient happy with plan of care. No further concerns at this time.    Nura Hoskins RN ....................  3/13/2017   3:43 PM

## 2018-01-03 NOTE — PROGRESS NOTES
"Patient Information     Patient Name MRN Sex Tenisha Pérez [id number] Female 1933      Progress Notes by Omer Steele MD at 3/8/2017  2:00 PM     Author:  Omer Steele MD Service:  (none) Author Type:  Physician     Filed:  3/13/2017  2:10 PM Encounter Date:  3/8/2017 Status:  Signed     :  Omer Steele MD (Physician)            SUBJECTIVE:  Tenisha Cagle is a 83 y.o. Female.  Patient comes in with her daughter for evaluation of multiple chronic issues. She has a history of hypertension, hypokalemia, acid reflux, hyperlipidemia. She reports that she feels well and \"just needs refills\". She denies any side effects from medications. She reports blood pressure seems to well controlled. She is not had chest pain or shortness of breath. Denies any symptoms that would constitute anginal once.    She also has a history of CLL. It is unclear where she is at with workup. She did see the oncologist who suggested further laboratory study but she never seemed to follow up or get a follow-up result letter. Pathology suggests CLL. Patient has felt well. Does have a little fatigued. Her daughter seems to think that the patient has a lot more complaints than the patient voices.    Patient does report quite a bit of pain over the left hip. Also has some pain in the lower spine. The hip pain is much worse. She cannot sleep on the left side. Previously had trochanteric bursal injection which worked very well. This was about a year ago. SI injections also were helpful and she thinks that that pain is very mild compared with the lateral hip pain.    She is due for Prevnar.    Daughter reports the patient has had long-standing issues with acid reflux but symptoms have been well controlled with Nexium. Have not tried to step down to H2 blocker therapy per patient's daughter.    Patient's daughter also reports that she thinks her mother is vitamin D deficient.      Social History     Substance " "Use Topics       Smoking status: Never Smoker     Smokeless tobacco: Never Used     Alcohol use No       I have personally reviewed and updated above noted social, family and/or past medical history.    A comprehensive review of systems was negative except for items noted in HPI/Subjective.      OBJECTIVE:  /72  Pulse 76  Ht 1.562 m (5' 1.5\")  Wt 60.3 kg (133 lb)  BMI 24.72 kg/m2  EXAM:  General Appearance: Pleasant, alert, appropriate appearance for age. No acute distress  Thyroid Exam: No nodules or enlargement.  Chest/Respiratory Exam: Normal chest wall and respirations. Clear to auscultation.  Cardiovascular Exam: Regular rate and rhythm. S1, S2, no murmur, click, gallop, or rubs.  Gastrointestinal Exam: Soft, nontender, no abnormal masses or organomegaly.  Musculoskeletal Exam: No synovitis.  Muscle strength age and body habitus appropriate as well as equal and even. Tender with palpation of left trochanteric bursa. Minimal tenderness with palpation of SI joints bilaterally  Neurologic Exam: Nonfocal; symmetric DTRs, normal gross motor movement, tone, and coordination. No tremor.  Psychiatric Exam: Alert and oriented, appropriate affect.    ASSESSMENT/Plan :    I personally reviewed the patients' labs    Results for orders placed or performed in visit on 03/08/17      COMP METABOLIC PANEL      Result  Value Ref Range    SODIUM 138 133 - 143 mmol/L    POTASSIUM 4.6 3.5 - 5.1 mmol/L    CHLORIDE 102 98 - 107 mmol/L    CO2,TOTAL 27 21 - 31 mmol/L    ANION GAP 9 5 - 18                    GLUCOSE 95 70 - 105 mg/dL    CALCIUM 9.5 8.6 - 10.3 mg/dL    BUN 26 (H) 7 - 25 mg/dL    CREATININE 1.37 (H) 0.70 - 1.30 mg/dL    BUN/CREAT RATIO           19                    GFR if African American 45 (L) >60 ml/min/1.73m2    GFR if not African American 37 (L) >60 ml/min/1.73m2    ALBUMIN 4.3 3.5 - 5.7 g/dL    PROTEIN,TOTAL 7.0 6.4 - 8.9 g/dL    GLOBULIN                  2.7 2.0 - 3.7 g/dL    A/G RATIO 1.6 1.0 - 2.0         "            BILIRUBIN,TOTAL 0.3 0.3 - 1.0 mg/dL    ALK PHOSPHATASE 54 34 - 104 IU/L    ALT (SGPT) 15 7 - 52 IU/L    AST (SGOT) 20 13 - 39 IU/L   VITAMIN D 25 (DEFICIENCY)      Result  Value Ref Range    VITAMIN D TOTAL AFL 49.6   ng/mL   VITAMIN B12      Result  Value Ref Range    VITAMIN B12 568 180 - 914 pg/mL   CBC WITH AUTO DIFFERENTIAL      Result  Value Ref Range    WHITE BLOOD COUNT         10.6 4.5 - 11.0 thou/cu mm    RED BLOOD COUNT           3.82 (L) 4.00 - 5.20 mil/cu mm    HEMOGLOBIN                11.8 (L) 12.0 - 16.0 g/dL    HEMATOCRIT                36.2 33.0 - 51.0 %    MCV                       95 80 - 100 fL    MCH                       30.9 26.0 - 34.0 pg    MCHC                      32.6 32.0 - 36.0 g/dL    RDW                       12.2 11.5 - 15.5 %    PLATELET COUNT            241 140 - 440 thou/cu mm    MPV                       6.0 (L) 6.5 - 11.0 fL    NEUTROPHILS               36.8 (L) 42.0 - 72.0 %    LYMPHOCYTES               53.2 (H) 20.0 - 44.0 %    MONOCYTES                 7.3 <12.0 %    EOSINOPHILS               1.0 <8.0 %    BASOPHILS                 1.7 <3.0 %    ABSOLUTE NEUTROPHILS      3.9 1.7 - 7.0 thou/cu mm    ABSOLUTE LYMPHOCYTES      5.7 (H) 0.9 - 2.9 thou/cu mm    ABSOLUTE MONOCYTES        0.8 <0.9 thou/cu mm    ABSOLUTE EOSINOPHILS      0.1 <0.5 thou/cu mm    ABSOLUTE BASOPHILS        0.2 <0.3 thou/cu mm       Tenisha was seen today for medication management and establish care.    Diagnoses and all orders for this visit:    CLL (chronic lymphocytic leukemia) (HC)  Will recheck CBC with differential. Currently no symptoms concerning her CLL  -     CBC WITH DIFFERENTIAL; Future  -     CBC WITH DIFFERENTIAL  -     CBC WITH AUTO DIFFERENTIAL    HYPERTENSION  blood pressure well controlled. Continue current treatment regimen. BMP normal.  -     amLODIPine (NORVASC) 10 mg tablet; Take 1 tablet by mouth once daily.  -     atenolol (TENORMIN) 50 mg tablet; Take 1 tablet by mouth  once daily.  -     losartan-hydrochlorothiazide (HYZAAR) 100-25 mg tablet; Take 1 tablet by mouth once daily.  -     COMPLETE METABOLIC PANEL; Future  -     amLODIPine (NORVASC) 10 mg tablet; Take 1 tablet by mouth once daily.  -     atenolol (TENORMIN) 50 mg tablet; Take 1 tablet by mouth once daily.  -     losartan-hydrochlorothiazide (HYZAAR) 100-25 mg tablet; Take 1 tablet by mouth once daily.  -     COMPLETE METABOLIC PANEL    Heartburn  we'll again try to transition down to Pepcid.  -     Discontinue: esomeprazole (NEXIUM) 40 mg capsule; Take 1 capsule by mouth once daily before a meal.  -     famotidine (PEPCID) 40 mg tablet; Take 1 tablet by mouth at bedtime.  -     famotidine (PEPCID) 40 mg tablet; Take 1 tablet by mouth at bedtime.    Hypokalemia  potassium normal. May reduce or discontinue potassium and recheck  -     potassium chloride (KLOR-CON 10; K-TAB) 10 mEq Controlled-Release tablet; Take 1 tablet by mouth once daily with a meal.  -     VITAMIN B12; Future  -     potassium chloride (KLOR-CON 10; K-TAB) 10 mEq Controlled-Release tablet; Take 1 tablet by mouth once daily with a meal.  -     VITAMIN B12    Mixed hyperlipidemia  continue statin for now. Discussed risks benefits and alternatives. Patient like to continue  -     simvastatin (ZOCOR) 20 mg tablet; Take 0.5 tablets by mouth once daily with evening meal. bid  -     simvastatin (ZOCOR) 20 mg tablet; Take 0.5 tablets by mouth once daily with evening meal. bid    Vitamin D deficiency  vitamin D level normal  -     VITAMIN D 25 (DEFICIENCY); Future  -     VITAMIN D 25 (DEFICIENCY)    Trochanteric bursitis of left hip    Need for Streptococcus pneumoniae and influenza vaccination  -     OMNI PREVNAR 13 (AKA PNEUMOCOCCAL VACCINE 13-VALENT IM)  -     DC ADMIN VACC INITIAL    Bursitis, hip, left  Risks, benefits and alternatives discussed with patient.  They voiced good understanding and had the opportunity to ask questions.  Patient gave consent  and proceed with procedure.     Informed consent obtained.  Area was sterilized with chlorhexadine prep and 60mg of kenalog mixed with 3ml of 1% lidocaine without epinephrine and 1ml of 0.25% bupivicaine was injected into the L greater trochanteric bursa using sterile procedure.  Patient tolerated procedure well without any complications.  Patient will continue with icing.  Return if pain persists or new concerns    -     PA DRAIN/INJECT LARGE JOINT/BURSA (hip, knee,shoulder) - single joint  [17402.0]  -     triamcinolone acetonide 60 mg injection (KENALOG); Inject 1.5 mL intra-articular one time.          There are no Patient Instructions on file for this visit.    Omer Steele MD    This document was created using computer generated templates and voice activated software.

## 2018-01-03 NOTE — TELEPHONE ENCOUNTER
Patient Information     Patient Name MRN Tenisha Hope [id number] Female 1933      Telephone Encounter by Franklin Rosales RN at 2017 11:05 AM     Author:  Franklin Rosales RN Service:  (none) Author Type:  NURS- Registered Nurse     Filed:  2017 11:06 AM Encounter Date:  2017 Status:  Signed     :  Franklin Rosales RN (NURS- Registered Nurse)            Office visit in the past 12 months or per provider note.    Last visit with MUMTAZ CALL was on: 2016 in [facility]  Next visit with MUMTAZ CALL is on: No future appointment listed with this provider  Next visit with Internal Medicine is on: No future appointment listed in this department    Lab test requirements:  Annual potassium level.  POTASSIUM (mmol/L)    Date Value   2016 4.1       Max refill for 12 months from last office visit or per provider note.  Prescription refilled per RN Medication Refill Policy.................... FRANKLIN ROSALES RN ....................  2017   11:05 AM

## 2018-01-04 ENCOUNTER — OFFICE VISIT - GICH (OUTPATIENT)
Dept: FAMILY MEDICINE | Facility: OTHER | Age: 83
End: 2018-01-04

## 2018-01-04 ENCOUNTER — HISTORY (OUTPATIENT)
Dept: FAMILY MEDICINE | Facility: OTHER | Age: 83
End: 2018-01-04

## 2018-01-04 DIAGNOSIS — Z86.73 PERSONAL HISTORY OF TRANSIENT ISCHEMIC ATTACK (TIA), AND CEREBRAL INFARCTION WITHOUT RESIDUAL DEFICITS: ICD-10-CM

## 2018-01-04 DIAGNOSIS — Z23 ENCOUNTER FOR IMMUNIZATION: ICD-10-CM

## 2018-01-04 DIAGNOSIS — M54.50 LOW BACK PAIN: ICD-10-CM

## 2018-01-04 DIAGNOSIS — I10 ESSENTIAL (PRIMARY) HYPERTENSION: ICD-10-CM

## 2018-01-04 LAB
ABSOLUTE BASOPHILS - HISTORICAL: 0.1 THOU/CU MM
ABSOLUTE EOSINOPHILS - HISTORICAL: 0 THOU/CU MM
ABSOLUTE IMMATURE GRANULOCYTES(METAS,MYELOS,PROS) - HISTORICAL: 0 THOU/CU MM
ABSOLUTE LYMPHOCYTES - HISTORICAL: 4.5 THOU/CU MM (ref 0.9–2.9)
ABSOLUTE MONOCYTES - HISTORICAL: 0.8 THOU/CU MM
ABSOLUTE NEUTROPHILS - HISTORICAL: 4.7 THOU/CU MM (ref 1.7–7)
ANION GAP - HISTORICAL: 8 (ref 5–18)
BASOPHILS # BLD AUTO: 0.6 %
BUN SERPL-MCNC: 29 MG/DL (ref 7–25)
BUN/CREAT RATIO - HISTORICAL: 22
CALCIUM SERPL-MCNC: 8.7 MG/DL (ref 8.6–10.3)
CHLORIDE SERPLBLD-SCNC: 100 MMOL/L (ref 98–107)
CHOL/HDL RATIO - HISTORICAL: 3.29
CHOLESTEROL TOTAL: 191 MG/DL
CO2 SERPL-SCNC: 26 MMOL/L (ref 21–31)
CREAT SERPL-MCNC: 1.32 MG/DL (ref 0.7–1.3)
EOSINOPHIL NFR BLD AUTO: 0.4 %
ERYTHROCYTE [DISTWIDTH] IN BLOOD BY AUTOMATED COUNT: 12.8 % (ref 11.5–15.5)
GFR IF NOT AFRICAN AMERICAN - HISTORICAL: 38 ML/MIN/1.73M2
GLUCOSE SERPL-MCNC: 107 MG/DL (ref 70–105)
HCT VFR BLD AUTO: 33.9 % (ref 33–51)
HDLC SERPL-MCNC: 58 MG/DL (ref 23–92)
HEMOGLOBIN: 11.1 G/DL (ref 12–16)
IMMATURE GRANULOCYTES(METAS,MYELOS,PROS) - HISTORICAL: 0.4 %
LDLC SERPL CALC-MCNC: 111 MG/DL
LYMPHOCYTES NFR BLD AUTO: 44.2 % (ref 20–44)
MCH RBC QN AUTO: 30.8 PG (ref 26–34)
MCHC RBC AUTO-ENTMCNC: 32.7 G/DL (ref 32–36)
MCV RBC AUTO: 94 FL (ref 80–100)
MONOCYTES NFR BLD AUTO: 7.4 %
NEUTROPHILS NFR BLD AUTO: 47 % (ref 42–72)
NON-HDL CHOLESTEROL - HISTORICAL: 133 MG/DL
PLATELET # BLD AUTO: 341 THOU/CU MM (ref 140–440)
PMV BLD: 8.7 FL (ref 6.5–11)
POTASSIUM SERPL-SCNC: 4.2 MMOL/L (ref 3.5–5.1)
PROVIDER ORDERDED STATUS - HISTORICAL: ABNORMAL
RED BLOOD COUNT - HISTORICAL: 3.6 MIL/CU MM (ref 4–5.2)
SODIUM SERPL-SCNC: 134 MMOL/L (ref 133–143)
TRIGL SERPL-MCNC: 112 MG/DL
WHITE BLOOD COUNT - HISTORICAL: 10.1 THOU/CU MM (ref 4.5–11)

## 2018-01-04 NOTE — PROGRESS NOTES
Patient Information     Patient Name MRN Sex Tenisha Pérez [id number] Female 1933      Progress Notes by Thomas Adams MD at 3/28/2017 10:00 AM     Author:  Thomas Adams MD Service:  (none) Author Type:  Physician     Filed:  3/29/2017  9:55 AM Encounter Date:  3/28/2017 Status:  Signed     :  Thomas Adams MD (Physician)            SUBJECTIVE:    Tenisha Cagle is a 83 y.o. female who presents for dysuria     UTI    This is a new problem. Episode onset: 3 days  The problem has been gradually worsening. The quality of the pain is described as burning. The pain is mild. There has been no fever. She is not sexually active. Associated symptoms include frequency and urgency. Pertinent negatives include no chills or hematuria. Associated symptoms comments: Worse at night . Treatments tried: crannberry  The treatment provided no relief. There is no history of catheterization.       Allergies      Allergen   Reactions     Lisinopril  Cough     Prevacid [Lansoprazole]  *Unknown     Patient states that medication didn't work for her.    ,   Family History       Problem   Relation Age of Onset     Stroke  Mother      Had a CVA age 85       Cancer  Father      Brain tumor, age 56       Blood Disease  Sister      Leukemia and       Cancer  Sister       kidney cancer       Blood Disease  Sister      Chronic lymphocytic leukemia      ,   Current Outpatient Prescriptions on File Prior to Visit       Medication  Sig Dispense Refill     acetaminophen (TYLENOL EXTRA STRGTH) 500 mg tablet Take 2 tablets by mouth every 6 hours if needed for Pain. Max acetaminophen dose: 4000mg in 24 hrs.  0     amLODIPine (NORVASC) 10 mg tablet Take 1 tablet by mouth once daily. 90 tablet 4     aspirin-dipyridamole (AGGRENOX)  mg capsule Take 1 capsule by mouth 2 times daily. 180 capsule 4     atenolol (TENORMIN) 50 mg tablet Take 1 tablet by mouth once daily. 90 tablet 4     CALCIUM CARBONATE/VITAMIN D3 (CALCIUM 500  + D, D3, ORAL) Take 1 tablet by mouth once daily.       famotidine (PEPCID) 40 mg tablet Take 1 tablet by mouth at bedtime. 90 tablet 3     losartan-hydrochlorothiazide (HYZAAR) 100-25 mg tablet Take 1 tablet by mouth once daily. 90 tablet 4     multivitamin (MVI) tablet Take 1 tablet by mouth once daily.       simvastatin (ZOCOR) 20 mg tablet Take 0.5 tablets by mouth once daily with evening meal. 45 tablet 3     Vit C-Vit E-Lutein-Min-OM-3 (OCUVITE) 364-21-7-150 mg-unit-mg-mg cap Take 1 capsule by mouth once daily.  0     No current facility-administered medications on file prior to visit.    ,   Past Surgical History       Procedure   Laterality Date     Biopsy breast   1986     Right       Tonsil and adenoidectomy   1954     Premalig/benign skin lesion excision        R side of nose, face and chest wall/Basal Cell Cancer Excision        Colonoscopy diagnostic   2007     Sigmoid diverticulosis       Colon egd   5/5/11     Hiatal hernia, gastric gland hyperplasia in antrum           Tubal ligation        Cholecystectomy       and   Social History     Substance Use Topics       Smoking status: Never Smoker     Smokeless tobacco: Never Used     Alcohol use No       REVIEW OF SYSTEMS:  Review of Systems   Constitutional: Negative for chills.   Genitourinary: Positive for frequency and urgency. Negative for hematuria.       OBJECTIVE:  /60  Pulse 60  Temp 97  F (36.1  C) (Temporal)  Wt 57.2 kg (126 lb)  BMI 23.42 kg/m2    EXAM:   Physical Exam   Constitutional: She is well-developed, well-nourished, and in no distress.   Musculoskeletal: Normal range of motion. She exhibits tenderness (patient does have CVA tenderness on percussion to the right side).   Neurological: She is alert. Gait normal.     Results for orders placed or performed in visit on 03/28/17       URINALYSIS W REFLEX MICROSCOPIC IF POSITIVE       Result  Value Ref Range Status    COLOR                     Yellow Yellow Color Final    CLARITY                    Turbid (A) Clear Clarity Final    SPECIFIC GRAVITY,URINE    1.015 1.010, 1.015, 1.020, 1.025                 Final    PH,URINE                  5.0 (A) 6.0, 7.0, 8.0, 5.5, 6.5, 7.5, 8.5                 Final    UROBILINOGEN,QUALITATIVE  Normal Normal EU/dl Final    PROTEIN, URINE 30 (A) Negative mg/dL Final    GLUCOSE, URINE Negative Negative mg/dL Final    KETONES,URINE             Trace (A) Negative mg/dL Final    BILIRUBIN,URINE           Negative Negative                 Final    OCCULT BLOOD,URINE        Large (A) Negative                 Final    NITRITE                   Negative Negative                 Final    LEUKOCYTE ESTERASE        Moderate (A) Negative                 Final   URINALYSIS MICROSCOPIC       Result  Value Ref Range Status    RBC 6-10 (A) 0-2, None Seen /HPF Final    WBC  (A) 0-2, 3-5, None Seen /HPF Final    BACTERIA                  Few None Seen, Rare, Occasional, Few Bacteria/HPF Final    EPITHELIAL CELLS          Few None Seen, Few Epi/HPF Final       ASSESSMENT/PLAN:    ICD-10-CM    1. Acute pyelonephritis N10 trimethoprim-sulfamethoxazole, 160-800 mg, (BACTRIM DS, SEPTRA DS) tablet   2. Dysuria R30.0 URINALYSIS W REFLEX MICROSCOPIC IF POSITIVE      URINALYSIS W REFLEX MICROSCOPIC IF POSITIVE      URINALYSIS MICROSCOPIC      URINALYSIS MICROSCOPIC        Plan:  Ten-day course of Bactrim. Follow-up if not improving.

## 2018-01-04 NOTE — NURSING NOTE
Patient Information     Patient Name MRN Tenisha Hope [id number] Female 1933      Nursing Note by Kelli Mcgarry at 3/28/2017 10:00 AM     Author:  Kelli Mcgarry Service:  (none) Author Type:  (none)     Filed:  3/28/2017 10:19 AM Encounter Date:  3/28/2017 Status:  Signed     :  Kelli Mcgarry            Patient here with daughter for UTI complains of burning for the last 3 days. Kelli Mcgarry LPN .......................3/28/2017  10:16 AM

## 2018-01-05 NOTE — PATIENT INSTRUCTIONS
Patient Information     Patient Name MRN Sex Tenisha Pérez [id number] Female 1933      Patient Instructions by Angelica Sheppard DO at 2017 12:45 PM     Author:  Angelica Sheppard DO Service:  (none) Author Type:  PHYS- Osteopathic     Filed:  2017  1:11 PM Encounter Date:  2017 Status:  Signed     :  Angelica Sheppard DO (PHYS- Osteopathic)            Caring for Your Incision      You ll need to help care for your incision after surgery and certain medical procedures. To close an incision, your healthcare provider used stitches (sutures), special strips of surgical tape called Steri-Strips, surgical staples, or surgical skin glue. Follow the tips on this sheet to help stop bleeding, speed healing, and prevent infection of your incision.      Pain Control    Use ice!  Ice keeps the swelling down and swelling is what causes pain.  Never apply ice directly to the skin.  Wrap it in a towel or cloth.  Apply ice 20 minutes on and 20 minutes off for pain control.  Use as needed.    Take tylenol 325 mg by mouth with food every 4 hours as needed for pain. Or ibuprofen 400 mg by mouth with food every 4 hours as needed for pain.  Do not take tylenol if you have a history of heavy drinking , hepatits C or liver problems.  Do not take ibuprofen if you have a history of stomach ulcers/problems, bleeding problem or kidney issues.     Types of incision closures    Surgical stitches (sutures) are placed by sewing the edges of an incision together with surgical thread. Sutures are either absorbable or non-absorbable. Absorbable sutures break down in the body over time. Non-absorbable sutures need to be removed.    Home care  Always wash your hands before touching your incision.  Keep the incision clean, dry, and out of water, keep the incision out of water.  Do not to pick at the scabs. Scabs help protect the wound.  You can take a shower in 24 hours and wash the incision with soap and water. Pat  dry/don t scrub. It s OK to wash around the incision. But don t spray water directly on it.  Pat stitches dry if they get wet. Don't rub.  Check the incision site daily for pain, redness, drainage, swelling, or separation of the incision edges.  If there is a bandage (dressing) over the incision, change this every 24 hours as instructed by your provider. Using clean hands change the dressing as directed by your healthcare provider. Always wash your hands before changing your dressing.  Make sure any clothing that touches the incision is loose-fitting. This will prevent rubbing. If the incision is on the head, keep your child from wearing caps or other head coverings. These may rub against the incision.  Try to avoid from rough play, contact sports, or physical activities for two weeks. This can put you at risk of opening the incision.  Make sure you avoid doing things that could cause dirt or sweat to get in or on the incision.  As your incision heals, the skin may appear pink or red. It may also feel slightly bumpy or raised. This is called a healing ridge. Over time, the color should fade and the raised skin will become less noticeable.    Care for specific closures  :  Sutures or staples. Once you no longer need to keep these dry, clean the incision or wound daily, generally after the first 24 hours.  First remove the bandage using clean hands. Then wash the area gently with soap and warm water. Use a wet cotton swab to loosen and remove any blood or crust that forms. After cleaning, put a thin layer of antibiotic ointment on. Then put on a new bandage.      Follow-up care  Staples or sutures generally need to be removed in 7 days.     Be sure to return for suture or staple removal as directed. If dissolving stitches were used in your mouth, these will not need to be removed. They should fall out or dissolve on their own.  If tape closures were used, remove them yourself when your healthcare provider tells you to  if they have not fallen off on their own.     When to seek medical care  Call your healthcare provider right away if you have any of these:  More pain, redness, swelling, bleeding, or foul-smelling discharge around the incision area  Fever of 101 F (38.3 C) or higher, or as directed by your child's healthcare provider  Shaking chills  Vomiting or nausea that doesn t go away  Numbness, coldness, or tingling around the incision area, or changes in skin color  Opening of the sutures or wound  Stitches or staples come apart or fall out or surgical tape falls off before 7 days, or as directed by your healthcare provider         What to Expect Following Cryosurgery (Liquid Nitrogen)   What is Cryosurgery?   Cryosurgery is a technique for removing skin lesions that primarily involve the surface of the skin, such as warts, seborrheic keratosis, or actinic keratosis. It is a quick method of removing the lesion with minimal scarring.   The liquid nitrogen needs to be applied long enough to freeze the affected skin. By freezing the skin, a blister is created underneath the lesion. Ideally, as the new skin forms underneath the blister, the abnormal skin on the roof of the blister peels off. Occasionally, if the lesion is very thick (such as a large wart), only the surface is blistered off. The base or residual lesion may need to be frozen at another visit.   It takes about one to two weeks for the scab to fall off, which is when the new layer of skin has formed under the blister. Areas of thinner skin, such as the face, may heal a little faster.     What to Expect Over the Next Few Weeks     During Treatment - Area being treated will sting, burn, and then possibly itch.     Immediately After Treatment - Area will be red, sore, and swollen.     Next Day - Blister or blood blister has formed, tenderness starts to subside. Apply a Band-Aid if   necessary.     7 Days - Surface is dark red/brown and scab-like. Apply Vaseline  or an  antibacterial ointment, such as Polysporin , if necessary.     2 to 4 Weeks - The surface starts to peel off. This may be encouraged gently during bathing, when the scab is softened.     No makeup should be applied until area is fully healed.     How to Take Care of the Skin after Cryosurgery     A Band-Aid can be used for larger blisters or blisters in areas that are more likely to be traumatized -such as fingers and toes. If the area becomes dry or crusted, an ointment (Vaseline , Bacitracin , or Polysporin ) can also be applied.     Cleanse area with a mild cleanser  and cool water.     Pat the area dry with a lint-free cloth and apply an ointment (Vaseline , Bacitracin , or Polysporin ).     Avoid glycolic acids, Vitamin C, scrubs, Tretinoin (Retin-A), and Retinol creams for 7 to 10 days.     If approved by your Provider, you may bathe, swim, exercise, and otherwise follow all of your normal activities. How to Take Care of the Skin after Cryosurgery - Continued     The area may get wet while bathing, but swimming or hot tub use should be avoided for one week following a treatment or while the skin is open.     Within 24 hours, you can expect the area to be swollen and/or blistered. The blister may not be visible to the naked eye.     Within one week, the swelling goes down. The top becomes dark red and scab-like. The scab will loosen over the next weeks, and should fall off within one month.     Adverse Effects     The most common adverse effects are pain, swelling/blistering, potential for infection, and pale discoloration of the skin after it heals.         Blisters       Anytime a blister surfaces, whether from ill-fitting shoes, an oven burn, or liquid nitrogen cryosurgery, it will be a bit painful. For most patients, the pain is a temporary sting with some discomfort periodically over the next day as the blister forms.     The goal is to achieve a blister. This means, most commonly, patients will have a  blister form following treatment. Sometimes, the blister is so thin that it can't be seen and may have minimal swelling. Occasionally, a blood blister forms that can be quite dramatic but is harmless.     Rarely, the blister may become infected. When this happens, the blister becomes unusually tender, the fluid becomes cloudy, and the redness around it becomes more extensive (and may even form streaks). If this happens, contact our office.     Some lesions, especially those on the face, may leave a slight pale discoloration.     True scarring, involving deeper layers of the skin is unlikely.     If you have any questions or concerns, please contact our office at [phone]

## 2018-01-05 NOTE — PROGRESS NOTES
"Patient Information     Patient Name MRN Sex Tenisha Botello [id number] Female 1933      Progress Notes by Steffanie Lamb MD at 2017  2:30 PM     Author:  Steffanie Lamb MD Service:  (none) Author Type:  Physician     Filed:  2017  3:59 PM Encounter Date:  2017 Status:  Signed     :  Steffanie Lamb MD (Physician)            SUBJECTIVE:  Tenisha Cagle is a 83 y.o. female here with her daughter with complaint of painful right great toe for at least 2-3 days although the patient states it's been a little longer. She recalls no trauma to the toe but it sore to the touch and sometimes with her shoes on. Her shoes are getting to be fairly worn out and she can't recall how old they are. She's had no redness or warmth of the toe but it is tender. Nothing has been done to improve it.  Social History     Social History        Marital status:       Spouse name: N/A     Number of children:  N/A     Years of education:  N/A     Occupational History      Not on file.     Social History Main Topics       Smoking status: Never Smoker     Smokeless tobacco: Never Used     Alcohol use No     Drug use: No     Sexual activity: Not Currently     Other Topics  Concern     Not on file      Social History Narrative     Nonsmoker. .    Lives alone in a house.     Continues to drive.     2 grown kids, one in [place]       OBJECTIVE:  /62  Pulse 56  Ht 1.56 m (5' 1.42\")  Wt 58.5 kg (129 lb)  BMI 24.04 kg/m2    Appearance: in no apparent distress.  Right great toe with tenderness at lateral aspect of nail bed and no ingrown nail at corner but a small fissure in tissue next to the nail bed from and deeply curled edge of nail.No significant redness or purulent discharge.    ASSESSMENT:  1. Paronychia of great toe, right          PLAN:  Soak least once a day in warm water with Epsom salts.   Would recommend new shoes with a wider forefoot to avoid pressure on this area of the toes. " Trim toenails so they're a bit shorter next to the great toe.   Consider podiatry visit if not improving.   Return if this is getting more red and warm or looks infected any drainage.   Steffanie Lamb MD  3:57 PM 5/18/2017

## 2018-01-05 NOTE — NURSING NOTE
Patient Information     Patient Name MRN Tenisha Hope [id number] Female 1933      Nursing Note by Cindy Saha at 2017 12:45 PM     Author:  Cindy Saha Service:  (none) Author Type:  (none)     Filed:  2017 12:49 PM Encounter Date:  2017 Status:  Signed     :  Cindy Saha            Patient is here today for a skin lesion on her face and her right arm.    Cindy Saha LPN.......................... 2017  12:46 PM

## 2018-01-05 NOTE — PROGRESS NOTES
Patient Information     Patient Name MRN Sex Tenisha Pérez [id number] Female 1933      Progress Notes by Thomas Adams MD at 5/15/2017  9:30 AM     Author:  Thomas Adams MD Service:  (none) Author Type:  Physician     Filed:  5/15/2017  2:20 PM Encounter Date:  5/15/2017 Status:  Signed     :  Thomas Adams MD (Physician)            SUBJECTIVE:    Tenisha Cagle is a 83 y.o. female who presents for skin pigmentation    HPI Comments: Patient arrives here for 2 skin lesions. One on her right cheek. Another one that's changing on her left forearm. The left forearm one has gotten larger deeper in color. Also become slightly painful. She has had surgery on the one on the right cheek.      Allergies      Allergen   Reactions     Lisinopril  Cough     Prevacid [Lansoprazole]  *Unknown     Patient states that medication didn't work for her.    ,   Family History       Problem   Relation Age of Onset     Stroke  Mother      Had a CVA age 85       Cancer  Father      Brain tumor, age 56       Blood Disease  Sister      Leukemia and       Cancer  Sister       kidney cancer       Blood Disease  Sister      Chronic lymphocytic leukemia       and   Current Outpatient Prescriptions on File Prior to Visit       Medication  Sig Dispense Refill     acetaminophen (TYLENOL EXTRA STRGTH) 500 mg tablet Take 2 tablets by mouth every 6 hours if needed for Pain. Max acetaminophen dose: 4000mg in 24 hrs.  0     amLODIPine (NORVASC) 10 mg tablet Take 1 tablet by mouth once daily. 90 tablet 4     aspirin-dipyridamole (AGGRENOX)  mg capsule Take 1 capsule by mouth 2 times daily. 180 capsule 4     atenolol (TENORMIN) 50 mg tablet Take 1 tablet by mouth once daily. 90 tablet 4     CALCIUM CARBONATE/VITAMIN D3 (CALCIUM 500 + D, D3, ORAL) Take 1 tablet by mouth once daily.       famotidine (PEPCID) 40 mg tablet Take 1 tablet by mouth at bedtime. 90 tablet 3     losartan-hydrochlorothiazide (HYZAAR) 100-25 mg  tablet Take 1 tablet by mouth once daily. 90 tablet 4     multivitamin (MVI) tablet Take 1 tablet by mouth once daily.       simvastatin (ZOCOR) 20 mg tablet Take 0.5 tablets by mouth once daily with evening meal. 45 tablet 3     trimethoprim-sulfamethoxazole, 160-800 mg, (BACTRIM DS, SEPTRA DS) tablet Take 1 tablet by mouth 2 times daily. 20 tablet 0     Vit C-Vit E-Lutein-Min-OM-3 (OCUVITE) 775-50-3-150 mg-unit-mg-mg cap Take 1 capsule by mouth once daily.  0     No current facility-administered medications on file prior to visit.        REVIEW OF SYSTEMS:  ROS    OBJECTIVE:  /64  Pulse 56  Wt 58.9 kg (129 lb 12.8 oz)  BMI 24.13 kg/m2    EXAM:   Physical Exam   Skin:   The skin lesion on the right cheek. Possibly a squamous cell carcinoma or actinic. The left forea nodule is black. Tender. Measures approximate pigmentation peripherally.       ASSESSMENT/PLAN:    ICD-10-CM    1. Unusual skin lesion with pigment L81.9 AMB CONSULT TO GENERAL SURGEON        Plan:  I'm highly concerned about a nodular melanoma involving her right forearm lesion. General surgery consult.

## 2018-01-05 NOTE — TELEPHONE ENCOUNTER
Patient Information     Patient Name MRN Sex Tenisha Pérez [id number] Female 1933      Telephone Encounter by Dorothy Whitehead at 2017 11:51 AM     Author:  Dorothy Whitehead Service:  (none) Author Type:  (none)     Filed:  2017 11:59 AM Encounter Date:  2017 Status:  Signed     :  Dorothy Whitehead Dr. -the Pathologist from [facility]- called regarding patient. She could not be transferred to your voicemail as it is not set up. She wanted to alert you that the biopsy from left arm showed malignant melanoma. She stated the report should be coming soon and that they like to notify the surgeon when it comes to this so there isn't any communication issues. She is aware Dr. Sheppard is out til Thursday.   Dr. Booth's cellphone number is [phone] if you have any questions.   Dorothy BRAY CMA.......2017..11:57 AM

## 2018-01-05 NOTE — NURSING NOTE
Patient Information     Patient Name MRN Tenisha Hope [id number] Female 1933      Nursing Note by Kelli Mcgarry at 5/15/2017  9:30 AM     Author:  Kelli Mcgarry Service:  (none) Author Type:  (none)     Filed:  5/15/2017  9:37 AM Encounter Date:  5/15/2017 Status:  Signed     :  Kelli Mcgarry            Patient scheduled today for referral for derm spots on face and left arm, she went on requesting a cortisone injection and back pain. Kelli Mcgarry LPN .......................5/15/2017  9:33 AM

## 2018-01-05 NOTE — PROGRESS NOTES
Patient Information     Patient Name MRN Sex Tenisha Pérez [id number] Female 1933      Progress Notes by Angelica Sheppard DO at 2017 12:45 PM     Author:  Angelica Sheppard DO  Service:  (none) Author Type:  PHYS- Osteopathic     Filed:  2017 12:02 PM  Encounter Date:  2017 Status:  Addendum     :  Angelica Sheppard DO (PHYS- Osteopathic)        Related Notes: Original Note by Angelica Sheppard DO (PHYS- Osteopathic) filed at 2017 11:49 AM            Clinic Procedure Note      PMx, PSx, and social Hx are reviewed and updated in EPIC      Past Medical History:     Diagnosis  Date     BASAL CELL CARCINOMA, NOSE 10/11/2011     Blood poisoning (HC) child    Hospitalized as a child for blood poisoning      BURSITIS, HIP, LEFT      CARDIAC MURMUR, SYSTOLIC 2011    TTE 11 mild MR and TR      CATARACTS 2012     Cerebral aneurysm, nonruptured 2011     CLL (chronic lymphoblastic leukemia)     CLL, stable WBC 30,000      DEPRESSION/ANXIETY, ACUTE SITUATIONAL      DIVERTICULOSIS, COLON 2011     Gastritis, Helicobacter pylori 03    Treated with PrevPac      H. pylori infection 2003     H. pylori gastritis treated with Prevpac      Hiatal hernia     large, mostly intrathoracic      History of pregnancy     , vaginal deliveries      HYPERCHOLESTEROLEMIA      HYPERTENSION      LEUKEMIA, LYMPHOCYTIC, CHRONIC 2011     Loose stools     Recurrent loose stool      MELENA, HX OF 2011     Menopausal state     Menopausal age 56      NEOPLASM, MALIGNANT, SKIN, EAR, RIGHT 10/11/2011     OSTEOARTHRITIS      PEPTIC ULCER DISEASE      SKIN CANCER, RECURRENT      SLEEP DISORDER 2011     TRANSIENT ISCHEMIC ATTACK 2011         Current Outpatient Prescriptions on File Prior to Visit       Medication  Sig Dispense Refill     acetaminophen (TYLENOL EXTRA STRGTH) 500 mg tablet Take 2 tablets by mouth every 6 hours if needed for Pain. Max acetaminophen  dose: 4000mg in 24 hrs.  0     amLODIPine (NORVASC) 10 mg tablet Take 1 tablet by mouth once daily. 90 tablet 4     aspirin-dipyridamole (AGGRENOX)  mg capsule Take 1 capsule by mouth 2 times daily. 180 capsule 4     atenolol (TENORMIN) 50 mg tablet Take 1 tablet by mouth once daily. 90 tablet 4     CALCIUM CARBONATE/VITAMIN D3 (CALCIUM 500 + D, D3, ORAL) Take 1 tablet by mouth once daily.       famotidine (PEPCID) 40 mg tablet Take 1 tablet by mouth at bedtime. 90 tablet 3     losartan-hydrochlorothiazide (HYZAAR) 100-25 mg tablet Take 1 tablet by mouth once daily. 90 tablet 4     multivitamin (MVI) tablet Take 1 tablet by mouth once daily.       simvastatin (ZOCOR) 20 mg tablet Take 0.5 tablets by mouth once daily with evening meal. 45 tablet 3     Vit C-Vit E-Lutein-Min-OM-3 (OCUVITE) 131-28-6-150 mg-unit-mg-mg cap Take 1 capsule by mouth once daily.  0     No current facility-administered medications on file prior to visit.        Allergies      Allergen   Reactions     Lisinopril  Cough     Prevacid [Lansoprazole]  *Unknown     Patient states that medication didn't work for her.          Office Visit on 03/28/2017      Component  Date Value     COLOR                     03/28/2017 Yellow      CLARITY                   03/28/2017 Turbid*     SPECIFIC GRAVITY,URINE    03/28/2017 1.015      PH,URINE                  03/28/2017 5.0*     UROBILINOGEN,QUALITATIVE  03/28/2017 Normal      PROTEIN, URINE 03/28/2017 30*     GLUCOSE, URINE 03/28/2017 Negative      KETONES,URINE             03/28/2017 Trace*     BILIRUBIN,URINE           03/28/2017 Negative      OCCULT BLOOD,URINE        03/28/2017 Large*     NITRITE                   03/28/2017 Negative      LEUKOCYTE ESTERASE        03/28/2017 Moderate*     RBC 03/28/2017 6-10*     WBC 03/28/2017 *     BACTERIA                  03/28/2017 Few      EPITHELIAL CELLS          03/28/2017 Few    Office Visit on 03/08/2017      Component  Date Value     SODIUM  03/08/2017 138      POTASSIUM 03/08/2017 4.6      CHLORIDE 03/08/2017 102      CO2,TOTAL 03/08/2017 27      ANION GAP 03/08/2017 9      GLUCOSE 03/08/2017 95      CALCIUM 03/08/2017 9.5      BUN 03/08/2017 26*     CREATININE 03/08/2017 1.37*     BUN/CREAT RATIO           03/08/2017 19      GFR if  03/08/2017 45*     GFR if not  [employer]* 03/08/2017 37*     ALBUMIN 03/08/2017 4.3      PROTEIN,TOTAL 03/08/2017 7.0      GLOBULIN                  03/08/2017 2.7      A/G RATIO 03/08/2017 1.6      BILIRUBIN,TOTAL 03/08/2017 0.3      ALK PHOSPHATASE 03/08/2017 54      ALT (SGPT) 03/08/2017 15      AST (SGOT) 03/08/2017 20      VITAMIN D TOTAL AFL 03/08/2017 49.6      VITAMIN B12 03/08/2017 568      WHITE BLOOD COUNT         03/08/2017 10.6      RED BLOOD COUNT           03/08/2017 3.82*     HEMOGLOBIN                03/08/2017 11.8*     HEMATOCRIT                03/08/2017 36.2      MCV                       03/08/2017 95      MCH                       03/08/2017 30.9      MCHC                      03/08/2017 32.6      RDW                       03/08/2017 12.2      PLATELET COUNT            03/08/2017 241      MPV                       03/08/2017 6.0*     NEUTROPHILS               03/08/2017 36.8*     LYMPHOCYTES               03/08/2017 53.2*     MONOCYTES                 03/08/2017 7.3      EOSINOPHILS               03/08/2017 1.0      BASOPHILS                 03/08/2017 1.7      ABSOLUTE NEUTROPHILS      03/08/2017 3.9      ABSOLUTE LYMPHOCYTES      03/08/2017 5.7*     ABSOLUTE MONOCYTES        03/08/2017 0.8      ABSOLUTE EOSINOPHILS      03/08/2017 0.1      ABSOLUTE BASOPHILS        03/08/2017 0.2          Any imagining associated with this vist was reviewed.      Indication  Tenisha L Stone is seen at the request of the Omer Steele MD.  Tenisha L Stone  presents for lesion removal. We have discussed this procedure, including option  of not performing surgery, technique of surgery and  potential for  bleeding/infection/scarring/need for removal of more tissue and post-procedural care.  Patient is able to do post procedural dressing changes and understands what is  expected.    OBJECTIVE:    Tenisha Cagle appears well. Vitals are normal. The patient has no allergies to lidocaine or  suture material. The patient not on any blood thinners.  Informed consent was obtained prior to beginning the procedure. The area was marked  and doubled checked/ID ed with the pt. PAUSE for the CAUSE completed. The risks of  lesion removal include but are not limited to: bleeding, infection, scarring, reoccurrence,  and the need for removal of more tissue, and complications of anesthesia.    Location  ASSESSMENT: The lesion is 3x3Cm in size. Color:very irreg    Borders: brown and black   Differential diagnosis includes:skn cancer possible melanoma.  No bleeding.  orrig. lesion was the black nodule and present for some time.  Recent increase in growth of brown patchy area around the original nodule.  This spreads out to a radius of 2 cm from the original lesion.      Location: LEFT antecubital fossa     Procedural Sedation  1% lidocaine w/ Epinephrine  2cc    Technique  Patient appears well. Vitals are normal. The patient has no allergies to lidocaine or  suture material. The patient not on any blood thinners.  Tenisha Cagle has no history of keloids or problems with healing in the past.    Skin: see HPI    Informed consent was obtained prior to beginning the procedure. The area was marked  and doubled checked/ID ed with the pt. PAUSE for the CAUSE completed. The risks of  lesion removal include but are not limited to: bleeding, infection, scarring, reoccurrence,  and the need for removal of more tissue, and complications of anesthesia.  After informed consent was obtained, using Chloroprep for cleansing and 1%   Lidocaine w/ epi for anesthetic; using sterile technique, PROCEDURE:biopsy  was performed.    The lesion is  1cm in size. The incision was 1Cm long.  Closure:4-0 prolene of  simple single interrupted stitches.     An Antibiotic salve and steriledressing is applied, and wound care instructions provided. The procedure was well tolerated without complications.        We discussed cryo therapy of the lesion. We specifically discussed the risks of infection, discoloration and the possible need for further treatments. The patient expressed understanding and the patient wishes to proceed. Informed consent paperwork was completed.       We treated the area  On the nose which was 1cm wide and 0.25cm long.  This was an SEBORRHEIC KERATOSIS.     Procedure:  The area of the skin lesion nose was treated with liquid nitrogen for 2 freeze thaw cycles. The patient tolerated the procedure with no immediately apparent complications. We reviewed discharge instructions. The patient will call for any concerns. The patient will follow up in 1 weeks for a recheck of the area. The patient denies questions at this time.                Plan:  The patient will follow up in 7  days for suture removal and review of pathology. If thereis any bleeding/redness/drainage/swelling/pain or tenderness- call the clinic. Patient  was given instructions in wound care, acivity, warning signs, appointment for follow up.  If the patient has any questions or concerns, should call our clinic or go to ER/urgent  care after hours. Patient expressed understanding in directions for care, and was given a  written copy of instructions.      -pathology will determine the surgery required for this lesion.   Regardless, removal of the entire lesion will need to be done in the OR.  If it is a melanoma- than we need wider and deeper margins and possible lymph node sampling.      Rx=see EPIC    Pt tolerated the procedure well without complications  Patient was given instruction in activity restrictions, wound care and dressing changes. Medication usage, diet, warning signs to  look for (and what to do if they occur), and an appointment for follow-up.      Caring for Your Incision      You ll need to help care for your incision after surgery and certain medical procedures. To close an incision, your healthcare provider used stitches (sutures), special strips of surgical tape called Steri-Strips, surgical staples, or surgical skin glue. Follow the tips on this sheet to help stop bleeding, speed healing, and prevent infection of your incision.      Types of incision closures    Surgical stitches (sutures) are placed by sewing the edges of an incision together with surgical thread. Sutures are either absorbable or non-absorbable. Absorbable sutures break down in the body over time. Non-absorbable sutures need to be removed.    Home care  Always wash your hands before touching your incision.  Keep the incision clean, dry, and out of water, keep the incision out of water.  Do not to pick at the scabs. Scabs help protect the wound.  You can take a shower in 24 hours and wash the incision with soap and water. Pat dry/don t scrub. It s OK to wash around the incision. But don t spray water directly on it.  Pat stitches dry if they get wet. Don't rub.  Check the incision site daily for pain, redness, drainage, swelling, or separation of the incision edges.  If there is a bandage (dressing) over the incision, leave the dressing in place until you are told to remove it or change it. Using clean hands change the dressing as directed by your healthcare provider. Always wash your hands before changing your dressing.  Make sure any clothing that touches the incision is loose-fitting. This will prevent rubbing.   Try to avoid from rough play, contact sports, or physical activities. This can put you at risk of opening the incision.  Make sure you avoid doing things that could cause dirt or sweat to get in or on the incision.  As your incision heals, the skin may appear pink or red. It may also feel slightly  bumpy or raised. This is called a healing ridge. Over time, the color should fade and the raised skin will become less noticeable.    Care for specific closures  Follow these guidelines unless your healthcare provider tells you otherwise:  Sutures or staples. Once you no longer need to keep these dry, clean the incision or wound daily. First remove the bandage using clean hands. Then wash the area gently with soap and warm water. Use a wet cotton swab to loosen and remove any blood or crust that forms. After cleaning, put a thin layer of antibiotic ointment on. Then put on a new bandage.      Pain Control    Use ice!  Ice keeps the swelling down and swelling is what causes pain.  Never apply ice directly to the skin.  Wrap it in a towel or cloth.  Apply ice 20 minutes on and 20 minutes off for pain control.  Use as needed.    Take tylenol 325 mg by mouth with food every 4 hours as needed for pain. Or ibuprofen 400 mg by mouth with food every 4 hours as needed for pain.  Do not take tylenol if you have a history of heavy drinking , hepatits C or liver problems.  Do not take ibuprofen if you have a history of stomach ulcers/problems, bleeding problem or kidney issues.           Follow-up care      Follow up with your healthcare provider to ask how long sutures or staples should be left in place. Be sure to return for suture or staple removal as directed. If dissolving stitches were used in your mouth, these will not need to be removed. They should fall out or dissolve on their own.  If tape closures were used, remove them yourself when your healthcare provider tells you to if they have not fallen off on their own. If skin glue was used to close your incision, the glue will wear off by itself.    When to seek medical care  Call your healthcare provider right away if you has any of these:  More pain, redness, swelling, bleeding, or foul-smelling discharge around the incision area  Fever of 101 F (38.3 C) or higher, or  as directed by your  healthcare provider  Shaking chills  Vomiting or nausea that doesn t go away  Numbness, coldness, or tingling around the incision area, or changes in skin color  Opening of the sutures or wound  Stitches or staples come apart or fall out or surgical tape falls off before 7 days, or as directed by your healthcare provider

## 2018-01-05 NOTE — PATIENT INSTRUCTIONS
Patient Information     Patient Name MRN Tenisha Hope [id number] Female 1933      Patient Instructions by Steffanie Lamb MD at 2017  2:30 PM     Author:  Steffanie Lamb MD Service:  (none) Author Type:  Physician     Filed:  2017  2:59 PM Encounter Date:  2017 Status:  Signed     :  Steffanie Lamb MD (Physician)            Soak foot in epsom salt at least once a day.  Apply neosporin pain ointment to area twice a day.   Get new shoes that fit better.

## 2018-01-05 NOTE — TELEPHONE ENCOUNTER
Patient Information     Patient Name MRN Tenisah Hope [id number] Female 1933      Telephone Encounter by Coby Rodrigez at 2017  8:37 AM     Author:  Coby Rodrigez Service:  (none) Author Type:  (none)     Filed:  2017  8:41 AM Encounter Date:  2017 Status:  Signed     :  Wourms, Shannon            Calling regarding an appt with Dermatology for possible Melanoma wants to go to [place]. Please call Jocelynn Castellano regarding the appt.   Coby Rodrigez LPN ..........2017 8:41 AM

## 2018-01-05 NOTE — TELEPHONE ENCOUNTER
Patient Information     Patient Name MRN Sex Tenisha Pérez [id number] Female 1933      Telephone Encounter by Sabra Hale at 2017  3:12 PM     Author:  Sabra Hale Service:  (none) Author Type:  (none)     Filed:  2017  3:13 PM Encounter Date:  2017 Status:  Signed     :  Sabra Hale            Patient requesting refill request be disregarded.  She did find out this medication had been discontinued.  No further assistance needed at this time.  Sabra Hale LPN..................2017  3:12 PM

## 2018-01-05 NOTE — NURSING NOTE
Patient Information     Patient Name MRN Sex Tenisha Pérez [id number] Female 1933      Nursing Note by Cindy Saha at 2017 12:45 PM     Author:  Cindy Saha Service:  (none) Author Type:  (none)     Filed:  2017  1:03 PM Encounter Date:  2017 Status:  Signed     :  Cindy Saha            Universal Protocol    A. Pre-procedure verification complete yes  1-relevant information / documentation available, reviewed and properly matched to the patient; 2-consent accurate and complete, 3-equipment and supplies available    B. Site marking complete No  Site marked if not in continuous attendance with patient    C. TIME OUT completed yes  Time Out was conducted just prior to starting procedure to verify the eight required elements: 1-patient identity, 2-consent accurate and complete, 3-position, 4-correct side/site marked (if applicable), 5-procedure, 6-relevant images / results properly labeled and displayed (if applicable), 7-antibiotics / irrigation fluids (if applicable), 8-safety precautions.  Cindy Saha LPN.......................... 2017  1:01 PM

## 2018-01-05 NOTE — TELEPHONE ENCOUNTER
Patient Information     Patient Name MRN Sex Tenisha Pérez [id number] Female 1933      Telephone Encounter by Omer Steele MD at 2017  1:14 PM     Author:  Omer Steele MD Service:  (none) Author Type:  Physician     Filed:  2017  1:15 PM Encounter Date:  2017 Status:  Signed     :  Omer Steele MD (Physician)            At the establish care visit, we discussed stopping the potassium and rechecking labs as she may no longer need this.  Has she been off the potassium?

## 2018-01-05 NOTE — TELEPHONE ENCOUNTER
"Patient Information     Patient Name MRN Tenisha Hope [id number] Female 1933      Telephone Encounter by Betty Vega RN at 2017 12:08 PM     Author:  Betty Vega RN Service:  (none) Author Type:  NURS- Registered Nurse     Filed:  2017 12:29 PM Encounter Date:  2017 Status:  Signed     :  Betty Vega RN (NURS- Registered Nurse)            Patient calls requesting, \"a refill for my potassium\". Patient verified dose as 10 mEq daily. Medication was located in history with a discontinued date of 3/13/17. Unable to locate documentation supporting discontinuation of this medication. Patient states she has been taking this medication and has a few weeks before she is out.  Will route to physician for consideration.    3/8/17 -K+ level 4.6    Pt requests physician consideration and a callback today please    Betty Vega RN ....................  2017   12:29 PM          "

## 2018-01-08 ENCOUNTER — AMBULATORY - GICH (OUTPATIENT)
Dept: FAMILY MEDICINE | Facility: OTHER | Age: 83
End: 2018-01-08

## 2018-01-17 PROBLEM — K44.9 HIATAL HERNIA: Status: ACTIVE | Noted: 2018-01-17

## 2018-01-17 PROBLEM — C43.62 MALIGNANT MELANOMA OF LEFT UPPER EXTREMITY INCLUDING SHOULDER (H): Status: ACTIVE | Noted: 2017-06-15

## 2018-01-17 PROBLEM — I10 HYPERTENSION: Status: ACTIVE | Noted: 2018-01-17

## 2018-01-17 PROBLEM — L82.1 SK (SEBORRHEIC KERATOSIS): Status: ACTIVE | Noted: 2017-07-10

## 2018-01-17 PROBLEM — C44.320 SQUAMOUS CELL CARCINOMA OF FACE: Status: ACTIVE | Noted: 2017-09-27

## 2018-01-17 PROBLEM — L57.0 AK (ACTINIC KERATOSIS): Status: ACTIVE | Noted: 2017-07-10

## 2018-01-17 PROBLEM — L98.9 BENIGN SKIN LESION OF NOSE: Status: ACTIVE | Noted: 2017-09-27

## 2018-01-17 RX ORDER — LOSARTAN POTASSIUM AND HYDROCHLOROTHIAZIDE 25; 100 MG/1; MG/1
0.5 TABLET ORAL DAILY
COMMUNITY
Start: 2017-07-26 | End: 2018-09-10

## 2018-01-17 RX ORDER — ASPIRIN AND DIPYRIDAMOLE 25; 200 MG/1; MG/1
1 CAPSULE, EXTENDED RELEASE ORAL 2 TIMES DAILY
COMMUNITY
Start: 2018-01-04 | End: 2018-09-10

## 2018-01-17 RX ORDER — DIPHENOXYLATE HYDROCHLORIDE AND ATROPINE SULFATE 2.5; .025 MG/1; MG/1
1 TABLET ORAL DAILY
Status: ON HOLD | COMMUNITY
End: 2020-01-01

## 2018-01-17 RX ORDER — MV-MN/OM3/DHA/EPA/FISH/LUT/ZEA 250-5-1 MG
1 CAPSULE ORAL DAILY
COMMUNITY
Start: 2013-03-01 | End: 2019-01-16

## 2018-01-17 RX ORDER — ACETAMINOPHEN 500 MG
1000 TABLET ORAL EVERY 6 HOURS PRN
Status: ON HOLD | COMMUNITY
Start: 2012-09-24 | End: 2020-01-01

## 2018-01-25 ENCOUNTER — HISTORY (OUTPATIENT)
Dept: MEDSURG UNIT | Facility: OTHER | Age: 83
End: 2018-01-25

## 2018-01-27 VITALS
HEART RATE: 60 BPM | TEMPERATURE: 97.6 F | WEIGHT: 126 LBS | SYSTOLIC BLOOD PRESSURE: 122 MMHG | DIASTOLIC BLOOD PRESSURE: 70 MMHG | BODY MASS INDEX: 23.79 KG/M2 | HEIGHT: 61 IN

## 2018-01-27 VITALS
HEART RATE: 72 BPM | HEIGHT: 61 IN | DIASTOLIC BLOOD PRESSURE: 72 MMHG | BODY MASS INDEX: 23.53 KG/M2 | DIASTOLIC BLOOD PRESSURE: 48 MMHG | WEIGHT: 131 LBS | HEART RATE: 58 BPM | SYSTOLIC BLOOD PRESSURE: 128 MMHG | SYSTOLIC BLOOD PRESSURE: 98 MMHG | DIASTOLIC BLOOD PRESSURE: 62 MMHG | HEIGHT: 61 IN | WEIGHT: 121.8 LBS | SYSTOLIC BLOOD PRESSURE: 122 MMHG | DIASTOLIC BLOOD PRESSURE: 64 MMHG | HEART RATE: 60 BPM | HEART RATE: 60 BPM | BODY MASS INDEX: 22.86 KG/M2 | WEIGHT: 122.38 LBS | HEART RATE: 60 BPM | BODY MASS INDEX: 23.3 KG/M2 | SYSTOLIC BLOOD PRESSURE: 154 MMHG | WEIGHT: 124.6 LBS | TEMPERATURE: 97 F | SYSTOLIC BLOOD PRESSURE: 110 MMHG | BODY MASS INDEX: 23.77 KG/M2 | WEIGHT: 128.4 LBS | DIASTOLIC BLOOD PRESSURE: 60 MMHG | DIASTOLIC BLOOD PRESSURE: 38 MMHG | RESPIRATION RATE: 16 BRPM | BODY MASS INDEX: 23.01 KG/M2 | SYSTOLIC BLOOD PRESSURE: 130 MMHG | DIASTOLIC BLOOD PRESSURE: 60 MMHG | BODY MASS INDEX: 23.11 KG/M2 | WEIGHT: 126 LBS | SYSTOLIC BLOOD PRESSURE: 122 MMHG

## 2018-01-27 VITALS
SYSTOLIC BLOOD PRESSURE: 138 MMHG | HEART RATE: 64 BPM | DIASTOLIC BLOOD PRESSURE: 84 MMHG | WEIGHT: 123 LBS | BODY MASS INDEX: 23.24 KG/M2

## 2018-01-27 VITALS
SYSTOLIC BLOOD PRESSURE: 122 MMHG | DIASTOLIC BLOOD PRESSURE: 62 MMHG | WEIGHT: 129 LBS | BODY MASS INDEX: 24.35 KG/M2 | HEART RATE: 56 BPM | HEIGHT: 61 IN

## 2018-01-27 VITALS
BODY MASS INDEX: 24.13 KG/M2 | HEIGHT: 61 IN | SYSTOLIC BLOOD PRESSURE: 120 MMHG | WEIGHT: 129.8 LBS | TEMPERATURE: 98.6 F | BODY MASS INDEX: 23.56 KG/M2 | DIASTOLIC BLOOD PRESSURE: 64 MMHG | HEART RATE: 56 BPM | DIASTOLIC BLOOD PRESSURE: 58 MMHG | WEIGHT: 124.8 LBS | SYSTOLIC BLOOD PRESSURE: 102 MMHG

## 2018-01-27 VITALS
HEART RATE: 64 BPM | DIASTOLIC BLOOD PRESSURE: 62 MMHG | WEIGHT: 131.38 LBS | SYSTOLIC BLOOD PRESSURE: 116 MMHG | TEMPERATURE: 97.3 F | SYSTOLIC BLOOD PRESSURE: 118 MMHG | HEART RATE: 68 BPM | DIASTOLIC BLOOD PRESSURE: 42 MMHG | WEIGHT: 130 LBS

## 2018-01-27 VITALS
HEIGHT: 62 IN | HEART RATE: 76 BPM | DIASTOLIC BLOOD PRESSURE: 56 MMHG | SYSTOLIC BLOOD PRESSURE: 134 MMHG | BODY MASS INDEX: 23.24 KG/M2 | BODY MASS INDEX: 24.43 KG/M2 | BODY MASS INDEX: 24.48 KG/M2 | SYSTOLIC BLOOD PRESSURE: 130 MMHG | DIASTOLIC BLOOD PRESSURE: 72 MMHG | HEART RATE: 76 BPM | SYSTOLIC BLOOD PRESSURE: 100 MMHG | WEIGHT: 123 LBS | HEART RATE: 60 BPM | WEIGHT: 131.4 LBS | WEIGHT: 133 LBS | DIASTOLIC BLOOD PRESSURE: 60 MMHG

## 2018-01-27 VITALS
SYSTOLIC BLOOD PRESSURE: 144 MMHG | BODY MASS INDEX: 23.03 KG/M2 | WEIGHT: 122 LBS | DIASTOLIC BLOOD PRESSURE: 60 MMHG | HEIGHT: 61 IN

## 2018-01-30 ASSESSMENT — PATIENT HEALTH QUESTIONNAIRE - PHQ9: SUM OF ALL RESPONSES TO PHQ QUESTIONS 1-9: 0

## 2018-02-09 VITALS
DIASTOLIC BLOOD PRESSURE: 66 MMHG | HEART RATE: 72 BPM | HEIGHT: 62 IN | BODY MASS INDEX: 22.78 KG/M2 | WEIGHT: 123.8 LBS | SYSTOLIC BLOOD PRESSURE: 118 MMHG

## 2018-02-12 NOTE — PATIENT INSTRUCTIONS
"Patient Information     Patient Name MRN Tenisha Hope [id number] Female 1933      Patient Instructions by Danna Torres PA-C at 2018  1:00 PM     Author:  Danna Torres PA-C Service:  (none) Author Type:  PHYS- Physician Assistant     Filed:  2018  2:00 PM Encounter Date:  2018 Status:  Signed     :  Danna Torres PA-C (PHYS- Physician Assistant)            Toe:   Continue soaking toe daily.   Use vaseline daily for comfort.   Encouraged to get roomy shoes.     Healthy Strategies  1. Eat at least 3 meals a day and never skip breakfast.  2. Eat more slowly.  3. Decrease portion size.  4. Provide structure by using meal replacement bars or shakes, and/or low calorie frozen meals.  5. For good nutrition incorporate fruit, vegetables, whole grains, lean protein, and low-fat dairy.  6. Remove trigger foods from your environment to avoid impulse eating.  7. Increase physical activity: get a pedometer and aim for 10,000 steps a day or 30-35 minutes of activity 5 days per week.  8. Weigh yourself daily or at least weekly.  9. Keep a record of what you eat and your activity.  10. Establish a support system such as a friend, group or program.  11. Read Christopher Magaña's \"Eat to Live\". Remember it is important to have a minimum of 1200 calories a day, okay to use olive oil, 40 grams of fiber daily. No more than two servings ( the size of your palm) of red meat a week.     Please consider the following general health recommendations:    Eat a quality diet (generally, low in simple sugars, starches, cholesterol and saturated fat.)    Please get 1500 mg of calcium in divided doses with 1500 units vitamin D in your diet daily.     Stay physically active. Regular walking or other exercise is one of the best ways to minimize pain of arthritis; maintain independence and mobility; maintain bone strength; maintain conditioning of your heart. Find something you enjoy and a friend to do it with " you.    Maintain ideal weight. Your Body mass index is Body mass index is 22.64 kg/(m^2).. Generally a BMI of 20-25 is considered ideal. Overweight is defined as 25-30, Obese is 30-35 and markedly obese is greater than 35.    Apply sun block (SPF 25 or greater) on exposed skin anytime you are out in the sun to prevent skin cancer.     Wear a seatbelt whenever you are in a car.    Schedule a mammogram annually starting at the age of 40 years old unless recommended earlier by your primary care provider. Come for a general exam once yearly.    Consider a bone density study every 2-5 years to see if you are at increased risk for fracture. If you have osteoporosis, medicine to strengthen your bones can significantly reduce your risk of fracture, back pain, and loss of height.    You should have a tetanus booster at least once every 10 years.    Obtain a flu shot every fall.

## 2018-02-12 NOTE — PROGRESS NOTES
Patient Information     Patient Name MRN Sex Tenisha Pérez [id number] Female 1933      Progress Notes by Danna Torres PA-C at 2018  1:00 PM     Author:  Danna Torres PA-C Service:  (none) Author Type:  PHYS- Physician Assistant     Filed:  2018  4:08 PM Encounter Date:  2018 Status:  Signed     :  Danna Torres PA-C (PHYS- Physician Assistant)            Nursing Notes:   Prema Odom  2018  1:07 PM  Signed  Patient presents to the clinic for medication management.  She also has a toe on right foot that is bothering her.  Prema Odom LPN........................2018  1:07 PM        HPI: Tenisha Cagle is a 84 y.o. female who presents for a medication management exam.  Concerns include: She also has a toe on right foot that is bothering her.   Went to DR for toe trim.  Her toenails had fungus.  She had this done a few days ago. She states that the redness and pain are starting to calm down. She would like them rechecked today. She was told to put Vaseline on all of her toes.    Low back pain. No leg radiation. No back trauma. No injury. No bruising or swelling. No bowel or bladder incontinence.  Tx ice on back. Helps at night.  Tx tylenol.     No LMP recorded. Patient is postmenopausal.   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)  06/15/2017     Abnormal weight loss   2016     Visual changes  2016     Greater trochanteric bursitis of left hip  2016     Anemia, unspecified  2016     Osteoarthritis of spine with radiculopathy, lumbar region - SEVERE - Noted on Xrays 2016     Numbness and tingling of right leg  2016     Radicular leg pain  2016     History of TIA (transient ischemic attack)  2016     Basal cell carcinoma of right cheek  2015     Sacroiliac joint pain  12/10/2014     Right knee DJD  2014     Paresthesia of right leg  2014     Baker's cyst of knee  2014     ACP (advance care planning)  2014     Cystocele  2013     Pancreatic mass  2012     Possible, abnormal CT         Hiatal hernia       large, mostly intrathoracic        CEREBRAL ANEURYSM, NONRUPTURED  2011     Diverticulosis of colon  2011     LEUKEMIA, LYMPHOCYTIC, CHRONIC  2011     WBC stable in the 30,000        HYPERTENSION         Past Medical History:     Diagnosis  Date     BASAL CELL CARCINOMA, NOSE 10/11/2011     Blood poisoning (HC) child    Hospitalized as a child for blood poisoning      BURSITIS, HIP, LEFT      CARDIAC MURMUR, SYSTOLIC 2011    TTE 11 mild MR and TR      CATARACTS 2012     Cerebral aneurysm, nonruptured 2011     CLL (chronic lymphoblastic leukemia)     CLL, stable WBC 30,000      DEPRESSION/ANXIETY, ACUTE SITUATIONAL      DIVERTICULOSIS, COLON 2011     Gastritis, Helicobacter pylori 03    Treated with PrevPac      H. pylori infection 2003     H. pylori gastritis treated with Prevpac      Hiatal hernia     large, mostly intrathoracic      History of pregnancy     , vaginal deliveries      HYPERCHOLESTEROLEMIA      HYPERTENSION      LEUKEMIA, LYMPHOCYTIC, CHRONIC 2011     Loose stools     Recurrent loose stool      MELENA, HX OF 2011     Menopausal state     Menopausal age 56      NEOPLASM, MALIGNANT, SKIN, EAR, RIGHT  10/11/2011     OSTEOARTHRITIS      PEPTIC ULCER DISEASE      SKIN CANCER, RECURRENT      SLEEP DISORDER 5/13/2011     TRANSIENT ISCHEMIC ATTACK 11/17/2011       Past Surgical History:      Procedure  Laterality Date     BIOPSY BREAST  1986    Right       CHOLECYSTECTOMY       COLON EGD  5/5/11    Hiatal hernia, gastric gland hyperplasia in antrum           COLONOSCOPY DIAGNOSTIC  2007    Sigmoid diverticulosis       PREMALIG/BENIGN SKIN LESION EXCISION      R side of nose, face and chest wall/Basal Cell Cancer Excision        TONSIL AND ADENOIDECTOMY  1954     TUBAL LIGATION         Social History     Social History        Marital status:       Spouse name: N/A     Number of children:  N/A     Years of education:  N/A     Occupational History      Not on file.     Social History Main Topics       Smoking status: Never Smoker     Smokeless tobacco: Never Used     Alcohol use No     Drug use: No     Sexual activity: Not Currently     Other Topics  Concern     Not on file      Social History Narrative     Nonsmoker. .    Lives alone in a house.     Continues to drive.     2 grown kids, one in [place]       Family History       Problem   Relation Age of Onset     Stroke  Mother      Had a CVA age 85       Cancer  Father      Brain tumor, age 56       Blood Disease  Sister      Leukemia and       Cancer  Sister       kidney cancer       Blood Disease  Sister      Chronic lymphocytic leukemia          Current Outpatient Prescriptions       Medication  Sig Dispense Refill     acetaminophen (TYLENOL EXTRA STRGTH) 500 mg tablet Take 2 tablets by mouth every 6 hours if needed for Pain. Max acetaminophen dose: 4000mg in 24 hrs.  0     aspirin-dipyridamole (AGGRENOX)  mg capsule Take 1 capsule by mouth 2 times daily. 180 capsule 3     CALCIUM CARBONATE/VITAMIN D3 (CALCIUM 500 + D, D3, ORAL) Take 1 tablet by mouth once daily.       losartan-hydrochlorothiazide (HYZAAR) 100-25 mg tablet Take 0.5 tablets by mouth  "once daily. 90 tablet 4     milk of magnesia (MILK OF MAGNESIA) 400 mg/5 mL suspension Take 15 mL by mouth at bedtime if needed.       multivitamin (MVI) tablet Take 1 tablet by mouth once daily.       Vit C-Vit E-Lutein-Min-OM-3 (OCUVITE) 222-76-9-150 mg-unit-mg-mg cap Take 1 capsule by mouth once daily.  0     No current facility-administered medications for this visit.      Medications have been reviewed by me and are current to the best of my knowledge and ability.       REVIEW OF SYSTEMS:  Refer to HPI.    PHYSICAL EXAM:  /66 (Cuff Site: Right Arm, Position: Sitting, Cuff Size: Adult Regular)  Pulse 72  Ht 1.575 m (5' 2\")  Wt 56.2 kg (123 lb 12.8 oz)  Breastfeeding? No  BMI 22.64 kg/m2  CONSTITUTIONAL:  Alert, cooperative, NAD.  EYES: No scleral icterus.  PERRLA.  Conjunctiva clear.  ENT/MOUTH: External ears and nose normal.  TMs normal.  Moist mucous membranes. Oropharynx clear.    ENDO: No thyromegaly or thyroid nodules.  LYMPH:  No cervical or supraclavicular LA.    BREASTS: declined  CARDIOVASCULAR: Regular, S1, S2.  No S3 or S4.  No murmur/gallop/rub.  No peripheral edema.  RESPIRATORY: CTA bilaterally, no wheezes, rhonchi or rales.  GI: Bowel sounds wnl.  Soft, nontender, nondistended.  No masses or HSM.  No rebound or guarding.  :declined  Pap smear obtained: no  MSKEL: Grossly normal ROM.  No clubbing.  INTEGUMENTARY:  Warm, dry.  No rash noted on exposed skin. Right great toenail is trimmed. No increased erythema, open wound, pus, drainage. Minimal tenderness on the lateral nail border.  NEUROLOGIC: Facies symmetric.  Grossly normal movement and tone.  No tremor.  PSYCHIATRIC: Affect normal.  Speech fluent.      PHQ Depression Screen  Date of PHQ exam: 01/04/18  Over the last 2 weeks, how often have you been bothered by any of the following problems?  1. Little interest or pleasure in doing things: 0 - Not at all  2. Feeling down, depressed, or hopeless: 0 - Not at all      "     ASSESSMENT/PLAN:    ICD-10-CM    1. HYPERTENSION I10 CBC WITH DIFFERENTIAL      BASIC METABOLIC PANEL      LIPID PANEL      CBC WITH DIFFERENTIAL      BASIC METABOLIC PANEL      LIPID PANEL      CBC WITH AUTO DIFFERENTIAL   2. Need for Tdap vaccination Z23 diph,pertus,acel,,tet vac-PF (ADACEL, TDAP,) 2 Lf-(2.5-5-3-5 mcg)-5Lf/0.5 mL syrg   3. History of TIA (transient ischemic attack) Z86.73 LIPID PANEL      aspirin-dipyridamole (AGGRENOX)  mg capsule      LIPID PANEL   4. Acute bilateral low back pain without sciatica M54.5        Hypertension: Completed CBC and BMP for monitoring. No acute concerns at this time.  Completed lipid panel for cholesterol screening.    Gave paper prescription for tetanus vaccination that she can bring to the pharmacy of her choice.    No toenail infection concerns at this time.  Continue soaking toe daily.   Use vaseline daily for comfort.   Encouraged to get roomy shoes.     Return as needed for recheck.     Encouraged to take tylenol for relief up to 4 times per day.  Encouraged rest and elevation.  Encouraged to use ice or heat 15 minutes at a time several times per day to decrease pain. Return to clinic in 1-2 weeks as necessary for persistent pain. Return to clinic with any change or worsening of symptoms.      Patient Instructions   Toe:   Continue soaking toe daily.   Use vaseline daily for comfort.   Encouraged to get roomy shoes.     Healthy Strategies  1. Eat at least 3 meals a day and never skip breakfast.  2. Eat more slowly.  3. Decrease portion size.  4. Provide structure by using meal replacement bars or shakes, and/or low calorie frozen meals.  5. For good nutrition incorporate fruit, vegetables, whole grains, lean protein, and low-fat dairy.  6. Remove trigger foods from your environment to avoid impulse eating.  7. Increase physical activity: get a pedometer and aim for 10,000 steps a day or 30-35 minutes of activity 5 days per week.  8. Weigh yourself daily  "or at least weekly.  9. Keep a record of what you eat and your activity.  10. Establish a support system such as a friend, group or program.  11. Read Christopher Magaña's \"Eat to Live\". Remember it is important to have a minimum of 1200 calories a day, okay to use olive oil, 40 grams of fiber daily. No more than two servings ( the size of your palm) of red meat a week.     Please consider the following general health recommendations:    Eat a quality diet (generally, low in simple sugars, starches, cholesterol and saturated fat.)    Please get 1500 mg of calcium in divided doses with 1500 units vitamin D in your diet daily.     Stay physically active. Regular walking or other exercise is one of the best ways to minimize pain of arthritis; maintain independence and mobility; maintain bone strength; maintain conditioning of your heart. Find something you enjoy and a friend to do it with you.    Maintain ideal weight. Your Body mass index is Body mass index is 22.64 kg/(m^2).. Generally a BMI of 20-25 is considered ideal. Overweight is defined as 25-30, Obese is 30-35 and markedly obese is greater than 35.    Apply sun block (SPF 25 or greater) on exposed skin anytime you are out in the sun to prevent skin cancer.     Wear a seatbelt whenever you are in a car.    Schedule a mammogram annually starting at the age of 40 years old unless recommended earlier by your primary care provider. Come for a general exam once yearly.    Consider a bone density study every 2-5 years to see if you are at increased risk for fracture. If you have osteoporosis, medicine to strengthen your bones can significantly reduce your risk of fracture, back pain, and loss of height.    You should have a tetanus booster at least once every 10 years.    Obtain a flu shot every fall.            Relevant cancer screening discussed.    Counseled on healthy diet, Calcium and vitamin D intake, and exercise.    Danna Torres PA-C ....................  " 1/4/2018   1:19 PM

## 2018-02-12 NOTE — NURSING NOTE
Patient Information     Patient Name MRN Tenisha Hope [id number] Female 1933      Nursing Note by Prema Odom at 2018  1:00 PM     Author:  Prema Odom Service:  (none) Author Type:  (none)     Filed:  2018  1:24 PM Encounter Date:  2018 Status:  Signed     :  Danna Torres PA-C (PHYS- Physician Assistant)            Patient presents to the clinic for medication management.  She also has a toe on right foot that is bothering her.  Prema Odom LPN........................2018  1:07 PM

## 2018-02-13 NOTE — TELEPHONE ENCOUNTER
Patient Information     Patient Name MRN Sex Tenisha Pérez [id number] Female 1933      Telephone Encounter by Omer Steele MD at 2018 12:22 PM     Author:  Omer Steele MD Service:  (none) Author Type:  Physician     Filed:  2018 12:22 PM Encounter Date:  2018 Status:  Signed     :  Omer Steele MD (Physician)            Needs follow up appnt.

## 2018-02-20 ENCOUNTER — DOCUMENTATION ONLY (OUTPATIENT)
Dept: FAMILY MEDICINE | Facility: OTHER | Age: 83
End: 2018-02-20

## 2018-02-22 ENCOUNTER — OFFICE VISIT (OUTPATIENT)
Dept: SURGERY | Facility: OTHER | Age: 83
End: 2018-02-22
Attending: FAMILY MEDICINE
Payer: MEDICARE

## 2018-02-22 ENCOUNTER — OFFICE VISIT (OUTPATIENT)
Dept: FAMILY MEDICINE | Facility: OTHER | Age: 83
End: 2018-02-22
Attending: FAMILY MEDICINE
Payer: MEDICARE

## 2018-02-22 VITALS
WEIGHT: 123.2 LBS | HEIGHT: 62 IN | BODY MASS INDEX: 22.67 KG/M2 | DIASTOLIC BLOOD PRESSURE: 50 MMHG | TEMPERATURE: 96.8 F | SYSTOLIC BLOOD PRESSURE: 110 MMHG

## 2018-02-22 VITALS
WEIGHT: 123.2 LBS | HEART RATE: 64 BPM | TEMPERATURE: 96.8 F | HEIGHT: 62 IN | BODY MASS INDEX: 22.67 KG/M2 | DIASTOLIC BLOOD PRESSURE: 50 MMHG | SYSTOLIC BLOOD PRESSURE: 110 MMHG

## 2018-02-22 DIAGNOSIS — D63.8 ANEMIA IN OTHER CHRONIC DISEASES CLASSIFIED ELSEWHERE: ICD-10-CM

## 2018-02-22 DIAGNOSIS — M70.62 TROCHANTERIC BURSITIS OF LEFT HIP: ICD-10-CM

## 2018-02-22 DIAGNOSIS — M47.896 OTHER OSTEOARTHRITIS OF SPINE, LUMBAR REGION: Primary | ICD-10-CM

## 2018-02-22 DIAGNOSIS — Z53.9 DIAGNOSIS NOT YET DEFINED: ICD-10-CM

## 2018-02-22 DIAGNOSIS — H61.91 SKIN LESION OF RIGHT EAR: Primary | ICD-10-CM

## 2018-02-22 DIAGNOSIS — C44.212 BASAL CELL CARCINOMA, EAR, RIGHT: ICD-10-CM

## 2018-02-22 PROCEDURE — 88305 TISSUE EXAM BY PATHOLOGIST: CPT | Performed by: SURGERY

## 2018-02-22 PROCEDURE — 99215 OFFICE O/P EST HI 40 MIN: CPT | Performed by: FAMILY MEDICINE

## 2018-02-22 PROCEDURE — 99207 ZZC NO CHARGE LOS: CPT | Performed by: SURGERY

## 2018-02-22 PROCEDURE — G0463 HOSPITAL OUTPT CLINIC VISIT: HCPCS | Mod: 25

## 2018-02-22 PROCEDURE — 11441 EXC FACE-MM B9+MARG 0.6-1 CM: CPT | Performed by: SURGERY

## 2018-02-22 PROCEDURE — 11442 EXC FACE-MM B9+MARG 1.1-2 CM: CPT | Performed by: SURGERY

## 2018-02-22 PROCEDURE — G0463 HOSPITAL OUTPT CLINIC VISIT: HCPCS

## 2018-02-22 RX ORDER — FERROUS SULFATE 325(65) MG
325 TABLET ORAL
Qty: 90 TABLET | Refills: 3 | Status: SHIPPED | OUTPATIENT
Start: 2018-02-22 | End: 2018-06-26

## 2018-02-22 ASSESSMENT — ENCOUNTER SYMPTOMS
BACK PAIN: 1
DIZZINESS: 0
ABDOMINAL PAIN: 0
HEMATOCHEZIA: 0
WEAKNESS: 1
COLOR CHANGE: 0
BRUISES/BLEEDS EASILY: 0
COUGH: 0
FATIGUE: 1
SLEEP DISTURBANCE: 0
CONSTIPATION: 0

## 2018-02-22 ASSESSMENT — PAIN SCALES - GENERAL: PAINLEVEL: MILD PAIN (2)

## 2018-02-22 NOTE — NURSING NOTE
Patient presents in the clinic to establish care. Patient would also like to discuss her back pain, right ear pain, left hip pain,  and have her medications refilled.  Estrellita Jay LPN 2/22/2018 11:31 AM

## 2018-02-22 NOTE — NURSING NOTE
The following medication was given:     MEDICATION: lidocaine  ROUTE: intradermal  SITE: right  ear  DOSE:0.5   ml  LOT #: -EV  : [facility]  EXPIRATION DATE: 2/1/19   NDC#: 2380-2548-99    The following medication was given:     MEDICATION: {Bupivicaine  ROUTEintradermal  SITE: {right ear  DOSE:0.5 ml  LOT #:-[id number]  : [facility]  EXPIRATION DATE: 5/1/19  NDC#:81780554-55    Gail Griggs LPN..........2/22/2018  4:51 PM

## 2018-02-22 NOTE — PROGRESS NOTES
Subjective: Tenisha Cagle a 84 year old female who presents today for lesion removal. The lesion(s) is/are located on the face, number 1 and measures 0.5 cm.  She patient reports the lesion is painfull and denies other significant symptoms on ROS. Medications reviewed.    Pause for the cause has been completed prior to the prceedure.   1. Tenisha was identified by both name and date of birth   2. The correct site was identified   3. Site was marked by provider    4. Written informed consent correct and signed or verbal authorization  to proceed was obtained   5. Verifed necessary supplies, equipment, and diagnostics are available    6. Time out was performed immediately prior to procedure    Objective: The lesion(s) is/are of the above mentioned size and location and is/are typical. The area was prepped and appropriately anesthetized. Using the usual technique, a full  thickness 0.6 CM X 1.2 CM elliptical excision in total was performed. An appropriate dressing was applied. The procedure was well tolerated and witout complications.     Assessment: rule out basal cell carcinoma    Plan: Follow up: The specimen is labelled and sent to pathology for evaluation. Wound care instructions provided.  Patient was instructed to be alert for any signs of cutaneous infection.     Ignacio Cooper MD on 2/22/2018 at 4:49 PM

## 2018-02-22 NOTE — NURSING NOTE
TIMEOUT  Universal Protocol    A. Pre-procedure verification complete yes  1-relevant information / documentation available, reviewed and properly matched to the patient; 2-consent accurate and complete, 3-equipment and supplies available    B. Site marking complete yes  Site marked if not in continuous attendance with patient    C. TIME OUT completed yes  Time Out was conducted just prior to starting procedure to verify the eight required elements: 1-patient identity, 2-consent accurate and complete, 3-position, 4-correct side/site marked (if applicable), 5-procedure, 6-relevant images / results properly labeled and displayed (if applicable), 7-antibiotics / irrigation fluids (if applicable), 8-safety precautions.                                                                                                                                                                                                                                                                                                                                                                                                                                                                                                                                                                                                        TIMEOUT  Universal Protocol    A. Pre-procedure verification complete yes  1-relevant information / documentation available, reviewed and properly matched to the patient; 2-consent accurate and complete, 3-equipment and supplies available    B. Site marking complete yes  Site marked if not in continuous attendance with patient    C. TIME OUT completed yes    Gail Griggs LPN..........2/22/2018  4:23 PM    Time Out was conducted just prior to starting procedure to verify the eight required elements: 1-patient identity, 2-consent accurate and complete, 3-position, 4-correct side/site marked (if applicable), 5-procedure, 6-relevant images  / results properly labeled and displayed (if applicable), 7-antibiotics / irrigation fluids (if applicable), 8-safety precautions.

## 2018-02-22 NOTE — MR AVS SNAPSHOT
"              After Visit Summary   2018    Tenisha Cagle    MRN: [id number]           Patient Information     Date Of Birth          1933        Visit Information        Provider Department      2018 3:50 PM Ignacio Cooper MD; [facility] 536 SURGERY [facility]        Today's Diagnoses     Skin lesion of right ear    -  1    Basal cell carcinoma, ear, right        DIAGNOSIS NOT YET DEFINED           Follow-ups after your visit        Who to contact     If you have questions or need follow up information about today's clinic visit or your schedule please contact [facility] AND [facility] directly at [phone].  Normal or non-critical lab and imaging results will be communicated to you by Evermindhart, letter or phone within 4 business days after the clinic has received the results. If you do not hear from us within 7 days, please contact the clinic through Evermindhart or phone. If you have a critical or abnormal lab result, we will notify you by phone as soon as possible.  Submit refill requests through Traxer or call your pharmacy and they will forward the refill request to us. Please allow 3 business days for your refill to be completed.          Additional Information About Your Visit        MyChart Information     Traxer lets you send messages to your doctor, view your test results, renew your prescriptions, schedule appointments and more. To sign up, go to [email or web address] . Click on \"Log in\" on the left side of the screen, which will take you to the Welcome page. Then click on \"Sign up Now\" on the right side of the page.     You will be asked to enter the access code listed below, as well as some personal information. Please follow the directions to create your username and password.     Your access code is: [email or web address]  Expires: 2018 12:18 PM     Your access code will  in 90 days. If you need help or a new code, please call your [place] clinic or " "[phone].        Care EveryWhere ID     This is your Care EveryWhere ID. This could be used by other organizations to access your [place] medical records  [id number]        Your Vitals Were     Temperature Height BMI (Body Mass Index)             96.8  F (36  C) (Temporal) 5' 2\" (1.575 m) 22.53 kg/m2          Blood Pressure from Last 3 Encounters:   02/22/18 110/50   02/22/18 110/50   01/04/18 118/66    Weight from Last 3 Encounters:   02/22/18 123 lb 3.2 oz (55.9 kg)   02/22/18 123 lb 3.2 oz (55.9 kg)   01/04/18 123 lb 12.8 oz (56.2 kg)              We Performed the Following     0.6-1CM FACE,FACIAL     SURGICAL PATHOLOGY EXAM          Today's Medication Changes          These changes are accurate as of 2/22/18  5:06 PM.  If you have any questions, ask your nurse or doctor.               Start taking these medicines.        Dose/Directions    ferrous sulfate 325 (65 FE) MG tablet   Commonly known as:  IRON   Used for:  Anemia in other chronic diseases classified elsewhere   Started by:  Godwin Kapoor MD        Dose:  325 mg   Take 1 tablet (325 mg) by mouth daily (with breakfast)   Quantity:  90 tablet   Refills:  3            Where to get your medicines      These medications were sent to [id number] - [place], [place] - [address]  [address], [facility] [address]     Phone:  [phone]     ferrous sulfate 325 (65 FE) MG tablet                Primary Care Provider Office Phone # Fax #    Godwin Kapoor [phone] [address] [facility] [address]        Equal Access to Services     RODDY JOHNSON AH: Hadii sheila Smith, wameredithda lutrippadaha, qaybta kaalmada kevin, fabián bowman. So [facility] [phone].    ATENCIÓN: Si habla español, tiene a torres disposición servicios gratuitos de asistencia lingüística. Llame al [phone].    We comply with applicable federal civil rights laws and " [place] laws. We do not discriminate on the basis of race, color, national origin, age, disability, sex, sexual orientation, or gender identity.            Thank you!     Thank you for choosing [facility] AND [facility]  for your care. Our goal is always to provide you with excellent care. Hearing back from our patients is one way we can continue to improve our services. Please take a few minutes to complete the written survey that you may receive in the mail after your visit with us. Thank you!             Your Updated Medication List - Protect others around you: Learn how to safely use, store and throw away your medicines at www.disposemymeds.org.          This list is accurate as of 2/22/18  5:06 PM.  Always use your most recent med list.                   Brand Name Dispense Instructions for use Diagnosis    acetaminophen 500 MG tablet    TYLENOL     Take 1,000 mg by mouth every 6 hours as needed        aspirin-dipyridamole  MG per 12 hr capsule    AGGRENOX     Take 1 capsule by mouth 2 times daily        CALCIUM 500 + D 500-125 MG-UNIT Tabs   Generic drug:  Calcium Carbonate-Vitamin D      Take 1 tablet by mouth daily        ferrous sulfate 325 (65 FE) MG tablet    IRON    90 tablet    Take 1 tablet (325 mg) by mouth daily (with breakfast)    Anemia in other chronic diseases classified elsewhere       losartan-hydrochlorothiazide 100-25 MG per tablet    HYZAAR     Take 0.5 tablets by mouth daily        magnesium hydroxide 400 MG/5ML suspension    MILK OF MAGNESIA     Take 15 mLs by mouth At Bedtime        MULTI-VITAMINS Tabs      Take 1 tablet by mouth daily        OCUVITE ADULT 50+ Caps      Take 1 capsule by mouth daily

## 2018-02-22 NOTE — PROGRESS NOTES
SUBJECTIVE:   Tenisha Cagle is a 84 year old female who presents to clinic today for the following health issues:    HPI Comments: Here to establish care with me.  I had seen her in October.  She is with her daughter who helps her, but the patient lives alone and still shovels snow.  Stopped driving however.  Currently has pains in right ear, left hip area and left lumbar area.  No recent falls.  Pains improve with ice packs and regular tylenol.  Has had lumbar injections, no help.  Also has had trochanteric injections, seems to only help a few weeks.  In years past, would get much better results from them.  Cannot sleep on the left hip due to it.  Has not had recent physical therapy, and would be interested in trying it.  Lumbar MRI 9/16 showing significant degenerative changes.    Right ear pain for a few days to a week or so.  Hearing is not affected.  Has a tenderness in the external ear, worse with laying on the ear.  Has had basal cell cancer on her face in the past.  Had a left arm melanoma removed last summer.    Her daughter noted her hgb was low 1 month ago.  Has no known blood loss.  Is fatigued.  Daughter bought her B 12, but the patient would not take it.  She says eh has a history of leukemia, but is not sure what kind.          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) 06/15/2017     Priority: Medium     Abnormal weight loss 04/18/2016     Priority: Medium     Anemia 04/18/2016     Priority: Medium     Greater trochanteric bursitis  of left hip 04/18/2016     Priority: Medium     Visual changes 04/18/2016     Priority: Medium     Osteoarthritis of spine with radiculopathy, lumbar region 02/03/2016     Priority: Medium     History of TIA (transient ischemic attack) 02/02/2016     Priority: Medium     Numbness and tingling of right leg 02/02/2016     Priority: Medium     Radicular leg pain 02/02/2016     Priority: Medium     Basal cell carcinoma of right cheek 11/18/2015     Priority: Medium     Sacroiliac joint pain 12/10/2014     Priority: Medium     Alvarado's cyst of knee 02/20/2014     Priority: Medium     Paresthesia of right leg 02/20/2014     Priority: Medium     Right knee DJD 02/20/2014     Priority: Medium     ACP (advance care planning) 01/02/2014     Priority: Medium     Cystocele 08/23/2013     Priority: Medium     Pancreatic mass 09/18/2012     Priority: Medium     Overview:   Possible, abnormal CT        Cerebral aneurysm, nonruptured 12/20/2011     Priority: Medium     Diverticulosis of colon 05/13/2011     Priority: Medium     Chronic lymphocytic leukemia (H) 04/14/2011     Priority: Medium     Overview:   WBC stable in the 30,000       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)     4/14/2011     Diaphragmatic hernia without obstruction or gangrene     large, mostly intrathoracic     Diverticulosis of large intestine without perforation or abscess without bleeding     5/13/2011     Dysthymic disorder     No Comments Provided     Essential (primary) hypertension     No Comments Provided     Female climacteric state     Menopausal age 56     Gastritis without bleeding     5/9/03,Treated with PrevPac     Nonruptured cerebral aneurysm     12/20/2011     Osteoarthritis     No Comments Provided     Other fecal abnormalities     Recurrent loose stool     Other lymphoid  leukemia not having achieved remission (H)     CLL, stable WBC 30,000     Other specified bacterial intestinal infections     2003, H. pylori gastritis treated with Prevpac     Other specified enthesopathies of unspecified lower limb, excluding foot     No Comments Provided     Peptic ulcer without hemorrhage or perforation     No Comments Provided     Personal history of other diseases of the digestive system (CODE)     2011     Personal history of other medical treatment (CODE)     , vaginal deliveries     Pure hypercholesterolemia     No Comments Provided     Sepsis (H)     child,Hospitalized as a child for blood poisoning     Sleep disorder     2011     Transient cerebral ischemic attack     2011      Past Surgical History:   Procedure Laterality Date     CHOLECYSTECTOMY      No Comments Provided     COLONOSCOPY      ,Sigmoid diverticulosis     ENDOSCOPY UPPER, COLONOSCOPY, COMBINED      11,Hiatal hernia, gastric gland hyperplasia in antrum     LAPAROSCOPIC TUBAL LIGATION      No Comments Provided     OTHER SURGICAL HISTORY      ,2050,BIOPSY BREAST,Right     OTHER SURGICAL HISTORY      GRA698,PREMALIG/BENIGN SKIN LESION EXCISION,R side of nose, face and chest wall/Basal Cell Cancer Excision     TONSILLECTOMY, ADENOIDECTOMY, COMBINED           Current Outpatient Prescriptions   Medication Sig Dispense Refill     acetaminophen (TYLENOL) 500 MG tablet Take 1,000 mg by mouth every 6 hours as needed       aspirin-dipyridamole (AGGRENOX)  MG per 12 hr capsule Take 1 capsule by mouth 2 times daily       Calcium Carbonate-Vitamin D (CALCIUM 500 + D) 500-125 MG-UNIT TABS Take 1 tablet by mouth daily       losartan-hydrochlorothiazide (HYZAAR) 100-25 MG per tablet Take 0.5 tablets by mouth daily       magnesium hydroxide (MILK OF MAGNESIA) 400 MG/5ML suspension Take 15 mLs by mouth At Bedtime       Multiple Vitamin (MULTI-VITAMINS) TABS Take 1 tablet by mouth daily        "Multiple Vitamins-Minerals (OCUVITE ADULT 50+) CAPS Take 1 capsule by mouth daily       Allergies   Allergen Reactions     Lansoprazole      Other reaction(s): Other - Describe In Comment Field  Patient states that medication didn't work for her.     Lisinopril Cough       Review of Systems   Constitutional: Positive for fatigue.   HENT: Positive for ear pain.    Respiratory: Negative for cough.    Cardiovascular: Negative for chest pain.   Gastrointestinal: Negative for abdominal pain, constipation and hematochezia.   Endocrine: Negative for cold intolerance.   Musculoskeletal: Positive for back pain.   Skin: Negative for color change and pallor.   Allergic/Immunologic: Negative for immunocompromised state.   Neurological: Positive for weakness. Negative for dizziness.   Hematological: Does not bruise/bleed easily.   Psychiatric/Behavioral: Negative for sleep disturbance.        OBJECTIVE:     /50 (BP Location: Right arm, Patient Position: Sitting, Cuff Size: Adult Regular)  Pulse 64  Temp 96.8  F (36  C) (Temporal)  Ht 5' 2\" (1.575 m)  Wt 123 lb 3.2 oz (55.9 kg)  BMI 22.53 kg/m2  Body mass index is 22.53 kg/(m^2).  Physical Exam   Constitutional: She is oriented to person, place, and time. She appears well-developed. No distress.   HENT:   Head: Normocephalic and atraumatic.   Mouth/Throat: Oropharynx is clear and moist.   Neck: No thyromegaly present.   Cardiovascular: Normal rate, regular rhythm and normal heart sounds.  Exam reveals no gallop and no friction rub.    No murmur heard.  Pulmonary/Chest: No respiratory distress. She has no wheezes. She has no rales.   Abdominal: Soft. She exhibits no distension. There is no tenderness. There is no rebound and no guarding.   Musculoskeletal:   Kyphotic.  Moderate pains on palpation along the lumbar perispinous muscles.  No midline pain.  Left greater trochanter has moderate to significant pain when palpated.   Neurological: She is alert and oriented to " person, place, and time. Coordination normal.   Skin: Skin is warm and dry. No rash noted. She is not diaphoretic. No erythema.   Right pinna with a red ulcer, about 4-5 mm in diameter, raised and rolled borders, consistent with a basal call cancer.   Psychiatric: She has a normal mood and affect. Her behavior is normal. Thought content normal.       Diagnostic Test Results:  none     ASSESSMENT/PLAN:         1. Other osteoarthritis of spine, lumbar region  Moderate to severe.  She has basically failed oral meds and injections.  Is interested in a trial of therapy.  Will do this next.  - PHYSICAL THERAPY REFERRAL    2. Trochanteric bursitis of left hip  See above.  Was hoping for monthly injections, but this is not possible.  No closer than every 3 months.  - PHYSICAL THERAPY REFERRAL    3. Basal cell carcinoma, ear, right  Clinical diagnosis at this point.  We talked about how far to intervene and ultimately, given she has some pain, would like to talk to a surgeon next.  exesion would be challenging, however, quality of life is important to her.  - GENERAL SURG ADULT REFERRAL    4. Anemia in other chronic diseases classified elsewhere  Stable.  The MCV is normal, therefore I assume this is a chronic disease issue.  Daughter wants to trial Fe, so will do this and repeat labs, including a B 12 and iron studies, in about 2 months or so.    - ferrous sulfate (IRON) 325 (65 FE) MG tablet; Take 1 tablet (325 mg) by mouth daily (with breakfast)  Dispense: 90 tablet; Refill: 3      Godwin Kapoor MD  [facility] AND [facility]

## 2018-02-22 NOTE — MR AVS SNAPSHOT
After Visit Summary   2/22/2018    Tenisha Cagle    MRN: [id number]           Patient Information     Date Of Birth          9/7/1933        Visit Information        Provider Department      2/22/2018 12:15 PM Godwin Kapoor MD [facility]        Today's Diagnoses     Other osteoarthritis of spine, lumbar region    -  1    Trochanteric bursitis of left hip        Basal cell carcinoma, ear, right        Anemia in other chronic diseases classified elsewhere           Follow-ups after your visit        Additional Services     GENERAL SURG ADULT REFERRAL       Your provider has referred you to: Dr. Cooper    Please be aware that coverage of these services is subject to the terms and limitations of your health insurance plan.  Call member services at your health plan with any benefit or coverage questions.      Please bring the following with you to your appointment:    (1) Any X-Rays, CTs or MRIs which have been performed.  Contact the facility where they were done to arrange for  prior to your scheduled appointment.   (2) List of current medications   (3) This referral request   (4) Any documents/labs given to you for this referral            PHYSICAL THERAPY REFERRAL       *This therapy referral will be filtered to a centralized scheduling office at [facility] and the patient will receive a call to schedule an appointment at a [place] location most convenient for them. *     [facility] provides Physical Therapy evaluation and treatment and many specialty services across the [place] system.  If requesting a specialty program, please choose from the list below.    If you have not heard from the scheduling office within 2 business days, please call [phone] for all locations, with the exception of [place], please call [phone] and [facility], please call [phone]  Treatment: Evaluation & Treatment  Special  "Instructions/Modalities: none  Special Programs: None    Please be aware that coverage of these services is subject to the terms and limitations of your health insurance plan.  Call member services at your health plan with any benefit or coverage questions.      **Note to Provider:  If you are referring outside of [place] for the therapy appointment, please list the name of the location in the \"special instructions\" above, print the referral and give to the patient to schedule the appointment.                  Follow-up notes from your care team     Return in about 3 months (around 5/22/2018), or if symptoms worsen or fail to improve.      Who to contact     If you have questions or need follow up information about today's clinic visit or your schedule please contact [facility] AND [facility] directly at [phone].  Normal or non-critical lab and imaging results will be communicated to you by ZOCKOhart, letter or phone within 4 business days after the clinic has received the results. If you do not hear from us within 7 days, please contact the clinic through ZOCKOhart or phone. If you have a critical or abnormal lab result, we will notify you by phone as soon as possible.  Submit refill requests through happin! or call your pharmacy and they will forward the refill request to us. Please allow 3 business days for your refill to be completed.          Additional Information About Your Visit        happin! Information     happin! lets you send messages to your doctor, view your test results, renew your prescriptions, schedule appointments and more. To sign up, go to [email or web address] . Click on \"Log in\" on the left side of the screen, which will take you to the Welcome page. Then click on \"Sign up Now\" on the right side of the page.     You will be asked to enter the access code listed below, as well as some personal information. Please follow the directions to create your username and password.     Your " "access code is: [email or web address]  Expires: 2018 12:18 PM     Your access code will  in 90 days. If you need help or a new code, please call your [place] clinic or [phone].        Care EveryWhere ID     This is your Care EveryWhere ID. This could be used by other organizations to access your [place] medical records  [id number]        Your Vitals Were     Pulse Temperature Height BMI (Body Mass Index)          64 96.8  F (36  C) (Temporal) 5' 2\" (1.575 m) 22.53 kg/m2         Blood Pressure from Last 3 Encounters:   18 110/50   18 118/66   10/10/17 138/84    Weight from Last 3 Encounters:   18 123 lb 3.2 oz (55.9 kg)   18 123 lb 12.8 oz (56.2 kg)   10/10/17 123 lb (55.8 kg)              We Performed the Following     GENERAL SURG ADULT REFERRAL     PHYSICAL THERAPY REFERRAL          Today's Medication Changes          These changes are accurate as of 18 12:18 PM.  If you have any questions, ask your nurse or doctor.               Start taking these medicines.        Dose/Directions    ferrous sulfate 325 (65 FE) MG tablet   Commonly known as:  IRON   Used for:  Anemia in other chronic diseases classified elsewhere   Started by:  Godwin Kapoor MD        Dose:  325 mg   Take 1 tablet (325 mg) by mouth daily (with breakfast)   Quantity:  90 tablet   Refills:  3            Where to get your medicines      These medications were sent to [facility] [address] [place], [place] -  [address], [facility] [address]     Phone:  [phone]     ferrous sulfate 325 (65 FE) MG tablet                Primary Care Provider Office Phone # Fax #    Godwin Kapoor [phone] [address] [facility] [address]        Equal Access to Services     RENE JOHNSON AH: Hadii sheila palacios hadasho Soomaali, waaxda luqadaha, qaybta kaalmada adeegyada, fabián remy ah. So wa " [phone].    ATENCIÓN: Si boo munguia, tiene a torres disposición servicios gratuitos de asistencia lingüística. Hosea gutierres [phone].    We comply with applicable federal civil rights laws and [place] laws. We do not discriminate on the basis of race, color, national origin, age, disability, sex, sexual orientation, or gender identity.            Thank you!     Thank you for choosing [facility] AND [facility]  for your care. Our goal is always to provide you with excellent care. Hearing back from our patients is one way we can continue to improve our services. Please take a few minutes to complete the written survey that you may receive in the mail after your visit with us. Thank you!             Your Updated Medication List - Protect others around you: Learn how to safely use, store and throw away your medicines at www.disposemymeds.org.          This list is accurate as of 2/22/18 12:18 PM.  Always use your most recent med list.                   Brand Name Dispense Instructions for use Diagnosis    acetaminophen 500 MG tablet    TYLENOL     Take 1,000 mg by mouth every 6 hours as needed        aspirin-dipyridamole  MG per 12 hr capsule    AGGRENOX     Take 1 capsule by mouth 2 times daily        CALCIUM 500 + D 500-125 MG-UNIT Tabs   Generic drug:  Calcium Carbonate-Vitamin D      Take 1 tablet by mouth daily        ferrous sulfate 325 (65 FE) MG tablet    IRON    90 tablet    Take 1 tablet (325 mg) by mouth daily (with breakfast)    Anemia in other chronic diseases classified elsewhere       losartan-hydrochlorothiazide 100-25 MG per tablet    HYZAAR     Take 0.5 tablets by mouth daily        magnesium hydroxide 400 MG/5ML suspension    MILK OF MAGNESIA     Take 15 mLs by mouth At Bedtime        MULTI-VITAMINS Tabs      Take 1 tablet by mouth daily        OCUVITE ADULT 50+ Caps      Take 1 capsule by mouth daily

## 2018-04-26 ENCOUNTER — OFFICE VISIT (OUTPATIENT)
Dept: FAMILY MEDICINE | Facility: OTHER | Age: 83
End: 2018-04-26
Attending: FAMILY MEDICINE
Payer: MEDICARE

## 2018-04-26 VITALS
SYSTOLIC BLOOD PRESSURE: 136 MMHG | WEIGHT: 124.2 LBS | RESPIRATION RATE: 16 BRPM | DIASTOLIC BLOOD PRESSURE: 62 MMHG | BODY MASS INDEX: 22.72 KG/M2

## 2018-04-26 DIAGNOSIS — M70.62 GREATER TROCHANTERIC BURSITIS OF LEFT HIP: Primary | ICD-10-CM

## 2018-04-26 PROCEDURE — 99213 OFFICE O/P EST LOW 20 MIN: CPT | Mod: 25 | Performed by: FAMILY MEDICINE

## 2018-04-26 PROCEDURE — G0463 HOSPITAL OUTPT CLINIC VISIT: HCPCS

## 2018-04-26 PROCEDURE — 20610 DRAIN/INJ JOINT/BURSA W/O US: CPT | Performed by: FAMILY MEDICINE

## 2018-04-26 PROCEDURE — G0463 HOSPITAL OUTPT CLINIC VISIT: HCPCS | Mod: 25

## 2018-04-26 PROCEDURE — 25000128 H RX IP 250 OP 636: Performed by: FAMILY MEDICINE

## 2018-04-26 RX ORDER — METHYLPREDNISOLONE ACETATE 80 MG/ML
80 INJECTION, SUSPENSION INTRA-ARTICULAR; INTRALESIONAL; INTRAMUSCULAR; SOFT TISSUE ONCE
Status: COMPLETED | OUTPATIENT
Start: 2018-04-26 | End: 2018-04-26

## 2018-04-26 RX ADMIN — METHYLPREDNISOLONE ACETATE 80 MG: 80 INJECTION, SUSPENSION INTRA-ARTICULAR; INTRALESIONAL; INTRAMUSCULAR; SOFT TISSUE at 10:42

## 2018-04-26 ASSESSMENT — ANXIETY QUESTIONNAIRES
2. NOT BEING ABLE TO STOP OR CONTROL WORRYING: NOT AT ALL
1. FEELING NERVOUS, ANXIOUS, OR ON EDGE: NOT AT ALL
3. WORRYING TOO MUCH ABOUT DIFFERENT THINGS: NOT AT ALL
7. FEELING AFRAID AS IF SOMETHING AWFUL MIGHT HAPPEN: NOT AT ALL
IF YOU CHECKED OFF ANY PROBLEMS ON THIS QUESTIONNAIRE, HOW DIFFICULT HAVE THESE PROBLEMS MADE IT FOR YOU TO DO YOUR WORK, TAKE CARE OF THINGS AT HOME, OR GET ALONG WITH OTHER PEOPLE: NOT DIFFICULT AT ALL
5. BEING SO RESTLESS THAT IT IS HARD TO SIT STILL: NOT AT ALL
GAD7 TOTAL SCORE: 0
6. BECOMING EASILY ANNOYED OR IRRITABLE: NOT AT ALL

## 2018-04-26 ASSESSMENT — PAIN SCALES - GENERAL: PAINLEVEL: SEVERE PAIN (7)

## 2018-04-26 ASSESSMENT — ENCOUNTER SYMPTOMS
WEAKNESS: 0
ARTHRALGIAS: 1

## 2018-04-26 ASSESSMENT — PATIENT HEALTH QUESTIONNAIRE - PHQ9: 5. POOR APPETITE OR OVEREATING: NOT AT ALL

## 2018-04-26 NOTE — MR AVS SNAPSHOT
"              After Visit Summary   2018    Tenisha Cagle    MRN: [id number]           Patient Information     Date Of Birth          1933        Visit Information        Provider Department      2018 10:00 AM Godwin Kapoor MD [facility]        Today's Diagnoses     Greater trochanteric bursitis of left hip    -  1       Follow-ups after your visit        Who to contact     If you have questions or need follow up information about today's clinic visit or your schedule please contact [facility] directly at [phone].  Normal or non-critical lab and imaging results will be communicated to you by Questrahart, letter or phone within 4 business days after the clinic has received the results. If you do not hear from us within 7 days, please contact the clinic through FoodFant or phone. If you have a critical or abnormal lab result, we will notify you by phone as soon as possible.  Submit refill requests through ChannelAdvisor or call your pharmacy and they will forward the refill request to us. Please allow 3 business days for your refill to be completed.          Additional Information About Your Visit        MyChart Information     ChannelAdvisor lets you send messages to your doctor, view your test results, renew your prescriptions, schedule appointments and more. To sign up, go to [email or web address] . Click on \"Log in\" on the left side of the screen, which will take you to the Welcome page. Then click on \"Sign up Now\" on the right side of the page.     You will be asked to enter the access code listed below, as well as some personal information. Please follow the directions to create your username and password.     Your access code is: [email or web address]  Expires: 2018  1:18 PM     Your access code will  in 90 days. If you need help or a new code, please call your [place] clinic or [phone].        Care EveryWhere ID     This is your Care EveryWhere ID. This " could be used by other organizations to access your [place] medical records  [id number]        Your Vitals Were     Respirations BMI (Body Mass Index)                16 22.72 kg/m2           Blood Pressure from Last 3 Encounters:   04/26/18 136/62   02/22/18 110/50   02/22/18 110/50    Weight from Last 3 Encounters:   04/26/18 124 lb 3.2 oz (56.3 kg)   02/22/18 123 lb 3.2 oz (55.9 kg)   02/22/18 123 lb 3.2 oz (55.9 kg)              We Performed the Following     DRAIN/INJECT LARGE JOINT/BURSA [20610]        Primary Care Provider Office Phone # Fax #    Godwin Kapoor [phone]       [address] [facility] [address]        Equal Access to Services     [facility]: Hadii sheila hdez Soiris, waaxda luqadaha, qaybta kaalmada kevin, fabián remy . So [facility] [phone].    ATENCIÓN: Si habla español, tiene a torres disposición servicios gratuitos de asistencia lingüística. Hosea al [phone].    We comply with applicable federal civil rights laws and [place] laws. We do not discriminate on the basis of race, color, national origin, age, disability, sex, sexual orientation, or gender identity.            Thank you!     Thank you for choosing [facility]  for your care. Our goal is always to provide you with excellent care. Hearing back from our patients is one way we can continue to improve our services. Please take a few minutes to complete the written survey that you may receive in the mail after your visit with us. Thank you!             Your Updated Medication List - Protect others around you: Learn how to safely use, store and throw away your medicines at www.disposemymeds.org.          This list is accurate as of 4/26/18 10:39 AM.  Always use your most recent med list.                   Brand Name Dispense Instructions for use Diagnosis    acetaminophen 500 MG tablet    TYLENOL     Take 1,000 mg by mouth every 6 hours as  needed        aspirin-dipyridamole  MG per 12 hr capsule    AGGRENOX     Take 1 capsule by mouth 2 times daily        CALCIUM 500 + D 500-125 MG-UNIT Tabs   Generic drug:  Calcium Carbonate-Vitamin D      Take 1 tablet by mouth daily        ferrous sulfate 325 (65 Fe) MG tablet    IRON    90 tablet    Take 1 tablet (325 mg) by mouth daily (with breakfast)    Anemia in other chronic diseases classified elsewhere       losartan-hydrochlorothiazide 100-25 MG per tablet    HYZAAR     Take 0.5 tablets by mouth daily        magnesium hydroxide 400 MG/5ML suspension    MILK OF MAGNESIA     Take 15 mLs by mouth At Bedtime        MULTI-VITAMINS Tabs      Take 1 tablet by mouth daily        OCUVITE ADULT 50+ Caps      Take 1 capsule by mouth daily

## 2018-04-26 NOTE — PROGRESS NOTES
SUBJECTIVE:   Tenisha Cagle is a 84 year old female who presents to clinic today for the following health issues:    HPI  Here with daughter.  Has had pain in left lateral hip area.  Has had this before.  Has had injections in it before.  Last was about 9 months or more ago.  Seems to last 2-3 months.  She would like another today.  Has tried therapy, but does not like it and really cannot get in for it.  Lives alone,  No recent falls.  Shovels snow.  Was able to avoid getting on her roof this winter.  Has tried ice to the hip, makes her feel too cold.    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) 06/15/2017     Priority: Medium     Abnormal weight loss 04/18/2016     Priority: Medium     Anemia 04/18/2016     Priority: Medium     Greater trochanteric bursitis of left hip 04/18/2016     Priority: Medium     Visual changes 04/18/2016     Priority: Medium     Osteoarthritis of spine with radiculopathy, lumbar region 02/03/2016     Priority: Medium     History of TIA (transient ischemic attack) 02/02/2016     Priority: Medium     Numbness and tingling of right leg 02/02/2016     Priority: Medium     Radicular leg pain 02/02/2016     Priority: Medium     Basal cell carcinoma of right cheek 11/18/2015     Priority: Medium     Sacroiliac joint pain 12/10/2014     Priority: Medium     Alvarado's cyst of knee 02/20/2014     Priority: Medium     Paresthesia of right leg 02/20/2014     Priority: Medium     Right knee DJD 02/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     Priority: Medium     ACP  (advance care planning) 01/02/2014     Priority: Medium     Cystocele 08/23/2013     Priority: Medium     Pancreatic mass 09/18/2012     Priority: Medium     Overview:   Possible, abnormal CT        Cerebral aneurysm, nonruptured 12/20/2011     Priority: Medium     Diverticulosis of colon 05/13/2011     Priority: Medium     Chronic lymphocytic leukemia (H) 04/14/2011     Priority: Medium     Overview:   WBC stable in the 30,000       Past Surgical History:   Procedure Laterality Date     CHOLECYSTECTOMY      No Comments Provided     COLONOSCOPY      2007,Sigmoid diverticulosis     ENDOSCOPY UPPER, COLONOSCOPY, COMBINED      5/5/11,Hiatal hernia, gastric gland hyperplasia in antrum     LAPAROSCOPIC TUBAL LIGATION      No Comments Provided     OTHER SURGICAL HISTORY      1986,205093,BIOPSY BREAST,Right     OTHER SURGICAL HISTORY      DJI512,PREMALIG/BENIGN SKIN LESION EXCISION,R side of nose, face and chest wall/Basal Cell Cancer Excision     TONSILLECTOMY, ADENOIDECTOMY, COMBINED      1954     Social History   Substance Use Topics     Smoking status: Never Smoker     Smokeless tobacco: Never Used     Alcohol use No     Current Outpatient Prescriptions   Medication Sig Dispense Refill     acetaminophen (TYLENOL) 500 MG tablet Take 1,000 mg by mouth every 6 hours as needed       aspirin-dipyridamole (AGGRENOX)  MG per 12 hr capsule Take 1 capsule by mouth 2 times daily       Calcium Carbonate-Vitamin D (CALCIUM 500 + D) 500-125 MG-UNIT TABS Take 1 tablet by mouth daily       ferrous sulfate (IRON) 325 (65 FE) MG tablet Take 1 tablet (325 mg) by mouth daily (with breakfast) 90 tablet 3     losartan-hydrochlorothiazide (HYZAAR) 100-25 MG per tablet Take 0.5 tablets by mouth daily       magnesium hydroxide (MILK OF MAGNESIA) 400 MG/5ML suspension Take 15 mLs by mouth At Bedtime       Multiple Vitamin (MULTI-VITAMINS) TABS Take 1 tablet by mouth daily       Multiple Vitamins-Minerals (OCUVITE ADULT 50+) CAPS Take 1  capsule by mouth daily         Review of Systems   HENT: Positive for ear pain.    Musculoskeletal: Positive for arthralgias.   Neurological: Negative for weakness.        OBJECTIVE:     /62 (BP Location: Right arm, Patient Position: Sitting, Cuff Size: Adult Large)  Resp 16  Wt 124 lb 3.2 oz (56.3 kg)  BMI 22.72 kg/m2  Body mass index is 22.72 kg/(m^2).  Physical Exam   Constitutional: She is oriented to person, place, and time. She appears well-developed and well-nourished. No distress.   Musculoskeletal:   Left greater trochanter tender on palpation.  Discussed with her risks and she signed the consent.  Area prepped with chloraprep and infiltrated with 3 ml 1% lidocaine and 80 milligram deopt medrol.  Tolerated well.   Neurological: She is alert and oriented to person, place, and time.   Skin: She is not diaphoretic.       Diagnostic Test Results:  none     ASSESSMENT/PLAN:         (M70.62) Greater trochanteric bursitis of left hip  (primary encounter diagnosis)  Comment: recurrent  Plan: DRAIN/INJECT LARGE JOINT/BURSA [20610]        Ice qid for 20 minutes advised.  If she wants to start therapy, can call me.  Repeat injections are possible every 3 months or more.        Godwin Kapoor MD  [facility]

## 2018-04-26 NOTE — NURSING NOTE
Time out done for injection in the Lt Hip, Patient gave name, date of birth, what is being injected and where  Monisha Gamboa LPN on 4/26/2018 at 10:45 AM

## 2018-04-27 ASSESSMENT — ANXIETY QUESTIONNAIRES: GAD7 TOTAL SCORE: 0

## 2018-05-25 ENCOUNTER — OFFICE VISIT (OUTPATIENT)
Dept: FAMILY MEDICINE | Facility: OTHER | Age: 83
End: 2018-05-25
Attending: FAMILY MEDICINE
Payer: MEDICARE

## 2018-05-25 VITALS
DIASTOLIC BLOOD PRESSURE: 64 MMHG | SYSTOLIC BLOOD PRESSURE: 122 MMHG | WEIGHT: 122.6 LBS | BODY MASS INDEX: 22.42 KG/M2 | RESPIRATION RATE: 16 BRPM

## 2018-05-25 DIAGNOSIS — R41.3 MEMORY LOSS: ICD-10-CM

## 2018-05-25 DIAGNOSIS — M54.10 RADICULAR LEG PAIN: Primary | ICD-10-CM

## 2018-05-25 DIAGNOSIS — D50.9 IRON DEFICIENCY ANEMIA, UNSPECIFIED IRON DEFICIENCY ANEMIA TYPE: ICD-10-CM

## 2018-05-25 LAB
BASOPHILS # BLD AUTO: 0.1 10E9/L (ref 0–0.2)
BASOPHILS NFR BLD AUTO: 0.5 %
DIFFERENTIAL METHOD BLD: ABNORMAL
EOSINOPHIL # BLD AUTO: 0.1 10E9/L (ref 0–0.7)
EOSINOPHIL NFR BLD AUTO: 1.1 %
ERYTHROCYTE [DISTWIDTH] IN BLOOD BY AUTOMATED COUNT: 13.6 % (ref 10–15)
HCT VFR BLD AUTO: 34.5 % (ref 35–47)
HGB BLD-MCNC: 11.2 G/DL (ref 11.7–15.7)
IMM GRANULOCYTES # BLD: 0 10E9/L (ref 0–0.4)
IMM GRANULOCYTES NFR BLD: 0.3 %
IRON SERPL-MCNC: 89 UG/DL (ref 50–212)
LYMPHOCYTES # BLD AUTO: 5.8 10E9/L (ref 0.8–5.3)
LYMPHOCYTES NFR BLD AUTO: 51.5 %
MCH RBC QN AUTO: 30.4 PG (ref 26.5–33)
MCHC RBC AUTO-ENTMCNC: 32.5 G/DL (ref 31.5–36.5)
MCV RBC AUTO: 94 FL (ref 78–100)
MONOCYTES # BLD AUTO: 0.7 10E9/L (ref 0–1.3)
MONOCYTES NFR BLD AUTO: 6.4 %
NEUTROPHILS # BLD AUTO: 4.6 10E9/L (ref 1.6–8.3)
NEUTROPHILS NFR BLD AUTO: 40.2 %
PLATELET # BLD AUTO: 197 10E9/L (ref 150–450)
RBC # BLD AUTO: 3.68 10E12/L (ref 3.8–5.2)
WBC # BLD AUTO: 11.3 10E9/L (ref 4–11)

## 2018-05-25 PROCEDURE — 83540 ASSAY OF IRON: CPT | Performed by: FAMILY MEDICINE

## 2018-05-25 PROCEDURE — 85025 COMPLETE CBC W/AUTO DIFF WBC: CPT | Performed by: FAMILY MEDICINE

## 2018-05-25 PROCEDURE — 36415 COLL VENOUS BLD VENIPUNCTURE: CPT | Performed by: FAMILY MEDICINE

## 2018-05-25 PROCEDURE — G0463 HOSPITAL OUTPT CLINIC VISIT: HCPCS

## 2018-05-25 PROCEDURE — 99214 OFFICE O/P EST MOD 30 MIN: CPT | Performed by: FAMILY MEDICINE

## 2018-05-25 RX ORDER — PREDNISONE 10 MG/1
10 TABLET ORAL 2 TIMES DAILY
Qty: 20 TABLET | Refills: 1 | Status: SHIPPED | OUTPATIENT
Start: 2018-05-25 | End: 2019-02-05

## 2018-05-25 RX ORDER — SERTRALINE HYDROCHLORIDE 25 MG/1
25 TABLET, FILM COATED ORAL DAILY
Qty: 90 TABLET | Refills: 3 | Status: SHIPPED | OUTPATIENT
Start: 2018-05-25 | End: 2018-07-31

## 2018-05-25 ASSESSMENT — ENCOUNTER SYMPTOMS
SLEEP DISTURBANCE: 0
HALLUCINATIONS: 1
BACK PAIN: 1
CONFUSION: 1
WEAKNESS: 1
PARESTHESIAS: 1
FATIGUE: 1

## 2018-05-25 ASSESSMENT — PAIN SCALES - GENERAL: PAINLEVEL: SEVERE PAIN (6)

## 2018-05-25 NOTE — LETTER
May 29, 2018      Tenisha Cagle  [address]  [place] [address]        Dear Tenisha,     This is all good.  You can stop the iron replacement now.    Results for orders placed or performed in visit on 05/25/18   CBC and Differential   Result Value Ref Range    WBC 11.3 (H) 4.0 - 11.0 10e9/L    RBC Count 3.68 (L) 3.8 - 5.2 10e12/L    Hemoglobin 11.2 (L) 11.7 - 15.7 g/dL    Hematocrit 34.5 (L) 35.0 - 47.0 %    MCV 94 78 - 100 fl    MCH 30.4 26.5 - 33.0 pg    MCHC 32.5 31.5 - 36.5 g/dL    RDW 13.6 10.0 - 15.0 %    Platelet Count 197 150 - 450 10e9/L    Diff Method Automated Method     % Neutrophils 40.2 %    % Lymphocytes 51.5 %    % Monocytes 6.4 %    % Eosinophils 1.1 %    % Basophils 0.5 %    % Immature Granulocytes 0.3 %    Absolute Neutrophil 4.6 1.6 - 8.3 10e9/L    Absolute Lymphocytes 5.8 (H) 0.8 - 5.3 10e9/L    Absolute Monocytes 0.7 0.0 - 1.3 10e9/L    Absolute Eosinophils 0.1 0.0 - 0.7 10e9/L    Absolute Basophils 0.1 0.0 - 0.2 10e9/L    Abs Immature Granulocytes 0.0 0 - 0.4 10e9/L   Iron   Result Value Ref Range    Iron 89 50 - 212 ug/dL           Sincerely,        Godwin Kapoor MD

## 2018-05-25 NOTE — MR AVS SNAPSHOT
"              After Visit Summary   2018    Tenisha Cagle    MRN: [id number]           Patient Information     Date Of Birth          1933        Visit Information        Provider Department      2018 1:00 PM Godwin Kapoor MD [facility]        Today's Diagnoses     Radicular leg pain    -  1    Memory loss        Iron deficiency anemia, unspecified iron deficiency anemia type           Follow-ups after your visit        Follow-up notes from your care team     Return in about 4 weeks (around 2018).      Who to contact     If you have questions or need follow up information about today's clinic visit or your schedule please contact [facility] directly at [phone].  Normal or non-critical lab and imaging results will be communicated to you by Tegohart, letter or phone within 4 business days after the clinic has received the results. If you do not hear from us within 7 days, please contact the clinic through Tegohart or phone. If you have a critical or abnormal lab result, we will notify you by phone as soon as possible.  Submit refill requests through RotoPop or call your pharmacy and they will forward the refill request to us. Please allow 3 business days for your refill to be completed.          Additional Information About Your Visit        MyChart Information     RotoPop lets you send messages to your doctor, view your test results, renew your prescriptions, schedule appointments and more. To sign up, go to [email or web address] . Click on \"Log in\" on the left side of the screen, which will take you to the Welcome page. Then click on \"Sign up Now\" on the right side of the page.     You will be asked to enter the access code listed below, as well as some personal information. Please follow the directions to create your username and password.     Your access code is: [email or web address]  Expires: 2018  1:57 PM     Your access code will  in 90 " days. If you need help or a new code, please call your [place] clinic or [phone].        Care EveryWhere ID     This is your Care EveryWhere ID. This could be used by other organizations to access your [place] medical records  [id number]        Your Vitals Were     Respirations BMI (Body Mass Index)                16 22.42 kg/m2           Blood Pressure from Last 3 Encounters:   05/25/18 122/64   04/26/18 136/62   02/22/18 110/50    Weight from Last 3 Encounters:   05/25/18 122 lb 9.6 oz (55.6 kg)   04/26/18 124 lb 3.2 oz (56.3 kg)   02/22/18 123 lb 3.2 oz (55.9 kg)              We Performed the Following     CBC and Differential     Iron          Today's Medication Changes          These changes are accurate as of 5/25/18  1:57 PM.  If you have any questions, ask your nurse or doctor.               Start taking these medicines.        Dose/Directions    predniSONE 10 MG tablet   Commonly known as:  DELTASONE   Used for:  Radicular leg pain   Started by:  Godwin Kapoor MD        Dose:  10 mg   Take 1 tablet (10 mg) by mouth 2 times daily for 10 days   Quantity:  20 tablet   Refills:  1       sertraline 25 MG tablet   Commonly known as:  ZOLOFT   Used for:  Memory loss   Started by:  Godwin Kapoor MD        Dose:  25 mg   Take 1 tablet (25 mg) by mouth daily   Quantity:  90 tablet   Refills:  3            Where to get your medicines      These medications were sent to [id number] - [place], [place] - [address]  [address], [facility] [address]     Phone:  [phone]     predniSONE 10 MG tablet    sertraline 25 MG tablet                Primary Care Provider Office Phone # Fax #    Godwin Kapoor [phone]       [address] [facility] [address]        Equal Access to Services     RENE JOHNSON AH: Jj hdez Soiris, waaxda luqadaha, qaybta kaalmada kevin, fabián bowman. So [facility]  [phone].    ATENCIÓN: Si boo munguia, tiene a torres disposición servicios gratuitos de asistencia lingüística. Hosea gutierres [phone].    We comply with applicable federal civil rights laws and [place] laws. We do not discriminate on the basis of race, color, national origin, age, disability, sex, sexual orientation, or gender identity.            Thank you!     Thank you for choosing [facility] AND [facility]  for your care. Our goal is always to provide you with excellent care. Hearing back from our patients is one way we can continue to improve our services. Please take a few minutes to complete the written survey that you may receive in the mail after your visit with us. Thank you!             Your Updated Medication List - Protect others around you: Learn how to safely use, store and throw away your medicines at www.disposemymeds.org.          This list is accurate as of 5/25/18  1:57 PM.  Always use your most recent med list.                   Brand Name Dispense Instructions for use Diagnosis    acetaminophen 500 MG tablet    TYLENOL     Take 1,000 mg by mouth every 6 hours as needed        aspirin-dipyridamole  MG per 12 hr capsule    AGGRENOX     Take 1 capsule by mouth 2 times daily        CALCIUM 500 + D 500-125 MG-UNIT Tabs   Generic drug:  Calcium Carbonate-Vitamin D      Take 1 tablet by mouth daily        ferrous sulfate 325 (65 Fe) MG tablet    IRON    90 tablet    Take 1 tablet (325 mg) by mouth daily (with breakfast)    Anemia in other chronic diseases classified elsewhere       losartan-hydrochlorothiazide 100-25 MG per tablet    HYZAAR     Take 0.5 tablets by mouth daily        magnesium hydroxide 400 MG/5ML suspension    MILK OF MAGNESIA     Take 15 mLs by mouth At Bedtime        MULTI-VITAMINS Tabs      Take 1 tablet by mouth daily        OCUVITE ADULT 50+ Caps      Take 1 capsule by mouth daily        predniSONE 10 MG tablet    DELTASONE    20 tablet    Take 1 tablet (10 mg)  by mouth 2 times daily for 10 days    Radicular leg pain       sertraline 25 MG tablet    ZOLOFT    90 tablet    Take 1 tablet (25 mg) by mouth daily    Memory loss

## 2018-05-25 NOTE — PROGRESS NOTES
"  SUBJECTIVE:   Tenisha Cagle is a 84 year old female who presents to clinic today for the following health issues:    HPI  Here with daughter.  Has significant back pain, nearly all the time.  Daughter recently had a course of prednisone, worked well and wonders if mom can try it.  Pain is so severe, she asks to die often.  No worse with weather changes.  Aspercreme helps a little.  Has no interest in therapy at all.  MRI was last done 9/16:    IMPRESSION:     Diffuse advanced degenerative changes the lumbar spine, reflecting chronic moderate dextroscoliosis as described in detail above.     Electronically Signed By: Chris Meek on 9/13/2016 3:58 PM    Has been on iron for about 4 weeks.  hgb was low on 1/25/18.  She remains very tired.  Pt says she is not down or depressed, but daughter says she often complains of pain and wanting to die.  Is worried she is indeed depressed.    Memory is getting worse, which makes her mad.  Feels it is not Alzheimer's, no fh of this.  No EtOH, has had a TIA before.  Daughter says she used to be a \"worrier\" but now with memory loss no longer worries.  Daughter says she sometimes hallucinates.  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) 06/15/2017     Priority: Medium     Abnormal weight loss 04/18/2016     Priority: Medium     Anemia 04/18/2016     Priority: Medium     Greater trochanteric bursitis of left hip 04/18/2016     Priority: Medium     " Visual changes 04/18/2016     Priority: Medium     Osteoarthritis of spine with radiculopathy, lumbar region 02/03/2016     Priority: Medium     History of TIA (transient ischemic attack) 02/02/2016     Priority: Medium     Numbness and tingling of right leg 02/02/2016     Priority: Medium     Radicular leg pain 02/02/2016     Priority: Medium     Basal cell carcinoma of right cheek 11/18/2015     Priority: Medium     Sacroiliac joint pain 12/10/2014     Priority: Medium     Alvarado's cyst of knee 02/20/2014     Priority: Medium     Paresthesia of right leg 02/20/2014     Priority: Medium     Right knee DJD 02/20/2014     Priority: Medium     ACP (advance care planning) 01/02/2014     Priority: Medium     Cystocele 08/23/2013     Priority: Medium     Pancreatic mass 09/18/2012     Priority: Medium     Overview:   Possible, abnormal CT        Cerebral aneurysm, nonruptured 12/20/2011     Priority: Medium     Diverticulosis of colon 05/13/2011     Priority: Medium     Chronic lymphocytic leukemia (H) 04/14/2011     Priority: Medium     Overview:   WBC stable in the 30,000       Past Surgical History:   Procedure Laterality Date     CHOLECYSTECTOMY      No Comments Provided     COLONOSCOPY      2007,Sigmoid diverticulosis     ENDOSCOPY UPPER, COLONOSCOPY, COMBINED      5/5/11,Hiatal hernia, gastric gland hyperplasia in antrum     LAPAROSCOPIC TUBAL LIGATION      No Comments Provided     OTHER SURGICAL HISTORY      1986,205093,BIOPSY BREAST,Right     OTHER SURGICAL HISTORY      MMB222,PREMALIG/BENIGN SKIN LESION EXCISION,R side of nose, face and chest wall/Basal Cell Cancer Excision     TONSILLECTOMY, ADENOIDECTOMY, COMBINED      1954     Social History   Substance Use Topics     Smoking status: Never Smoker     Smokeless tobacco: Never Used     Alcohol use No     Current Outpatient Prescriptions   Medication Sig Dispense Refill     acetaminophen (TYLENOL) 500 MG tablet Take 1,000 mg by mouth every 6 hours as needed        aspirin-dipyridamole (AGGRENOX)  MG per 12 hr capsule Take 1 capsule by mouth 2 times daily       Calcium Carbonate-Vitamin D (CALCIUM 500 + D) 500-125 MG-UNIT TABS Take 1 tablet by mouth daily       ferrous sulfate (IRON) 325 (65 FE) MG tablet Take 1 tablet (325 mg) by mouth daily (with breakfast) 90 tablet 3     losartan-hydrochlorothiazide (HYZAAR) 100-25 MG per tablet Take 0.5 tablets by mouth daily       magnesium hydroxide (MILK OF MAGNESIA) 400 MG/5ML suspension Take 15 mLs by mouth At Bedtime       Multiple Vitamin (MULTI-VITAMINS) TABS Take 1 tablet by mouth daily       Multiple Vitamins-Minerals (OCUVITE ADULT 50+) CAPS Take 1 capsule by mouth daily       predniSONE (DELTASONE) 10 MG tablet Take 1 tablet (10 mg) by mouth 2 times daily for 10 days 20 tablet 1     sertraline (ZOLOFT) 25 MG tablet Take 1 tablet (25 mg) by mouth daily 90 tablet 3     Allergies   Allergen Reactions     Lansoprazole      Other reaction(s): Other - Describe In Comment Field  Patient states that medication didn't work for her.     Lisinopril Cough       Review of Systems   Constitutional: Positive for fatigue.   Musculoskeletal: Positive for back pain.   Neurological: Positive for weakness and paresthesias.   Psychiatric/Behavioral: Positive for confusion and hallucinations. Negative for behavioral problems and sleep disturbance.        OBJECTIVE:     /64 (BP Location: Right arm, Patient Position: Sitting, Cuff Size: Adult Regular)  Resp 16  Wt 122 lb 9.6 oz (55.6 kg)  BMI 22.42 kg/m2  Body mass index is 22.42 kg/(m^2).  Physical Exam   Constitutional: She appears well-developed. No distress.   HENT:   Head: Normocephalic and atraumatic.   Musculoskeletal:   Significant kyphosis, no pain in spine on palpation    Neurological: She is alert.   Skin: She is not diaphoretic.   Psychiatric:   Seems to not want to talk about most of the psych issues today.       Diagnostic Test Results:  Results for orders placed or  performed in visit on 05/25/18   CBC and Differential   Result Value Ref Range    WBC 11.3 (H) 4.0 - 11.0 10e9/L    RBC Count 3.68 (L) 3.8 - 5.2 10e12/L    Hemoglobin 11.2 (L) 11.7 - 15.7 g/dL    Hematocrit 34.5 (L) 35.0 - 47.0 %    MCV 94 78 - 100 fl    MCH 30.4 26.5 - 33.0 pg    MCHC 32.5 31.5 - 36.5 g/dL    RDW 13.6 10.0 - 15.0 %    Platelet Count 197 150 - 450 10e9/L    Diff Method Automated Method     % Neutrophils 40.2 %    % Lymphocytes 51.5 %    % Monocytes 6.4 %    % Eosinophils 1.1 %    % Basophils 0.5 %    % Immature Granulocytes 0.3 %    Absolute Neutrophil 4.6 1.6 - 8.3 10e9/L    Absolute Lymphocytes 5.8 (H) 0.8 - 5.3 10e9/L    Absolute Monocytes 0.7 0.0 - 1.3 10e9/L    Absolute Eosinophils 0.1 0.0 - 0.7 10e9/L    Absolute Basophils 0.1 0.0 - 0.2 10e9/L    Abs Immature Granulocytes 0.0 0 - 0.4 10e9/L   Iron   Result Value Ref Range    Iron 89 50 - 212 ug/dL         ASSESSMENT/PLAN:         (M54.10) Radicular leg pain  (primary encounter diagnosis)  Comment: progressive  Plan: predniSONE (DELTASONE) 10 MG tablet        Has end stage djd.  Options are quite limited and has no interest at all in physical therapy.  I did agree to a 10 day course of prednisone. With 1 refill.  No more phone refills as I would need to monitor the use.    (R41.3) Memory loss  Comment: progressive  Plan: sertraline (ZOLOFT) 25 MG tablet        I suspect Alzheimer's, but she is really not willing to talk or entertain the possibility.  Daughter is driving the discussion mostly.  I was able to get her to agree to very low dose zoloft for now, follow up in 4 weeks.          (D50.9) Iron deficiency anemia, unspecified iron deficiency anemia type  Comment: resilved  Plan: CBC and Differential, Iron        Stop iron         Godwin Kapoor MD  [facility]

## 2018-05-25 NOTE — NURSING NOTE
Coming in for a f/u on her iron, back pain and memory trouble  Monisha Gamboa LPN on 5/25/2018 at 1:14 PM

## 2018-06-26 ENCOUNTER — OFFICE VISIT (OUTPATIENT)
Dept: FAMILY MEDICINE | Facility: OTHER | Age: 83
End: 2018-06-26
Attending: FAMILY MEDICINE
Payer: MEDICARE

## 2018-06-26 VITALS
DIASTOLIC BLOOD PRESSURE: 54 MMHG | WEIGHT: 121 LBS | BODY MASS INDEX: 22.84 KG/M2 | SYSTOLIC BLOOD PRESSURE: 138 MMHG | HEIGHT: 61 IN

## 2018-06-26 DIAGNOSIS — M47.26 OSTEOARTHRITIS OF SPINE WITH RADICULOPATHY, LUMBAR REGION: ICD-10-CM

## 2018-06-26 DIAGNOSIS — M70.62 GREATER TROCHANTERIC BURSITIS OF LEFT HIP: Primary | ICD-10-CM

## 2018-06-26 PROCEDURE — 25000128 H RX IP 250 OP 636: Performed by: FAMILY MEDICINE

## 2018-06-26 PROCEDURE — 20610 DRAIN/INJ JOINT/BURSA W/O US: CPT | Performed by: FAMILY MEDICINE

## 2018-06-26 PROCEDURE — G0463 HOSPITAL OUTPT CLINIC VISIT: HCPCS | Mod: 25

## 2018-06-26 PROCEDURE — G0463 HOSPITAL OUTPT CLINIC VISIT: HCPCS

## 2018-06-26 PROCEDURE — 99213 OFFICE O/P EST LOW 20 MIN: CPT | Mod: 25 | Performed by: FAMILY MEDICINE

## 2018-06-26 RX ORDER — METHYLPREDNISOLONE ACETATE 80 MG/ML
80 INJECTION, SUSPENSION INTRA-ARTICULAR; INTRALESIONAL; INTRAMUSCULAR; SOFT TISSUE ONCE
Status: COMPLETED | OUTPATIENT
Start: 2018-06-26 | End: 2018-06-26

## 2018-06-26 RX ADMIN — METHYLPREDNISOLONE ACETATE 80 MG: 80 INJECTION, SUSPENSION INTRA-ARTICULAR; INTRALESIONAL; INTRAMUSCULAR; SOFT TISSUE at 16:53

## 2018-06-26 ASSESSMENT — ENCOUNTER SYMPTOMS
FEVER: 0
BACK PAIN: 1
WEAKNESS: 0
ARTHRALGIAS: 1

## 2018-06-26 ASSESSMENT — PAIN SCALES - GENERAL: PAINLEVEL: SEVERE PAIN (6)

## 2018-06-26 NOTE — MR AVS SNAPSHOT
"              After Visit Summary   6/26/2018    Tenisha Cagle    MRN: [id number]           Patient Information     Date Of Birth          9/7/1933        Visit Information        Provider Department      6/26/2018 4:00 PM Godwin Kapoor MD [facility]        Today's Diagnoses     Greater trochanteric bursitis of left hip    -  1    Osteoarthritis of spine with radiculopathy, lumbar region           Follow-ups after your visit        Additional Services     PHYSICAL THERAPY REFERRAL       *This therapy referral will be filtered to a centralized scheduling office at [facility] and the patient will receive a call to schedule an appointment at a [facility] location most convenient for them. *     [facility] provides Physical Therapy evaluation and treatment and many specialty services across the [facility] system.  If requesting a specialty program, please choose from the list below.    If you have not heard from the scheduling office within 2 business days, please call [phone] for all locations, with the exception of [place], please call [phone] and [facility], please call [phone]  Treatment: Evaluation & Treatment  Special Instructions/Modalities:    Special Programs:      Please be aware that coverage of these services is subject to the terms and limitations of your health insurance plan.  Call member services at your health plan with any benefit or coverage questions.      **Note to Provider:  If you are referring outside of [facility] for the therapy appointment, please list the name of the location in the \"special instructions\" above, print the referral and give to the patient to schedule the appointment.                  Future tests that were ordered for you today     Open Future Orders        Priority Expected Expires Ordered    XR Lumbar Epidural Injection Incl Imaging Routine 6/26/2018 6/26/2019 6/26/2018            Who to contact     " "If you have questions or need follow up information about today's clinic visit or your schedule please contact [facility] directly at [phone].  Normal or non-critical lab and imaging results will be communicated to you by Bidstalkhart, letter or phone within 4 business days after the clinic has received the results. If you do not hear from us within 7 days, please contact the clinic through Bidstalkhart or phone. If you have a critical or abnormal lab result, we will notify you by phone as soon as possible.  Submit refill requests through [employer] or call your pharmacy and they will forward the refill request to us. Please allow 3 business days for your refill to be completed.          Additional Information About Your Visit        BidstalkharDFMSim Information     [employer] lets you send messages to your doctor, view your test results, renew your prescriptions, schedule appointments and more. To sign up, go to [email or web address] . Click on \"Log in\" on the left side of the screen, which will take you to the Welcome page. Then click on \"Sign up Now\" on the right side of the page.     You will be asked to enter the access code listed below, as well as some personal information. Please follow the directions to create your username and password.     Your access code is: [email or web address]  Expires: 2018  1:57 PM     Your access code will  in 90 days. If you need help or a new code, please call your [facility] clinic or [phone].        Care EveryWhere ID     This is your Care EveryWhere ID. This could be used by other organizations to access your [facility] medical records  [id number]        Your Vitals Were     Height BMI (Body Mass Index)                5' 0.5\" (1.537 m) 23.24 kg/m2           Blood Pressure from Last 3 Encounters:   18 138/54   18 122/64   18 136/62    Weight from Last 3 Encounters:   18 121 lb (54.9 kg)   18 122 lb 9.6 oz (55.6 kg)   18 124 lb 3.2 " oz (56.3 kg)              We Performed the Following     DRAIN/INJECT LARGE JOINT/BURSA [[id number]]     PHYSICAL THERAPY REFERRAL        Primary Care Provider Office Phone # Fax #    Godwin Kapoor [phone] [address]  [place] [address]        Equal Access to Services     [facility] Hospital[facility] : Hadii aad ku hadasho Soomaali, waaxda luqadaha, qaybta kaalmada adeegyada, waxemeka pandyaliboriosav bowman. So [facility] [phone].    ATENCIÓN: Si habla español, tiene a torres disposición servicios gratuitos de asistencia lingüística. Ll[facility] [phone].    We comply with applicable federal civil rights laws and [place] laws. We do not discriminate on the basis of race, color, national origin, age, disability, sex, sexual orientation, or gender identity.            Thank you!     Thank you for choosing [facility] AND hospitals  for your care. Our goal is always to provide you with excellent care. Hearing back from our patients is one way we can continue to improve our services. Please take a few minutes to complete the written survey that you may receive in the mail after your visit with us. Thank you!             Your Updated Medication List - Protect others around you: Learn how to safely use, store and throw away your medicines at www.disposemymeds.org.          This list is accurate as of 6/26/18  4:51 PM.  Always use your most recent med list.                   Brand Name Dispense Instructions for use Diagnosis    acetaminophen 500 MG tablet    TYLENOL     Take 1,000 mg by mouth every 6 hours as needed        aspirin-dipyridamole  MG per 12 hr capsule    AGGRENOX     Take 1 capsule by mouth 2 times daily        CALCIUM 500 + D 500-125 MG-UNIT Tabs   Generic drug:  Calcium Carbonate-Vitamin D      Take 1 tablet by mouth daily        losartan-hydrochlorothiazide 100-25 MG per tablet    HYZAAR     Take 0.5 tablets by mouth daily        magnesium hydroxide 400 MG/5ML  suspension    MILK OF MAGNESIA     Take 15 mLs by mouth At Bedtime        MULTI-VITAMINS Tabs      Take 1 tablet by mouth daily        OCUVITE ADULT 50+ Caps      Take 1 capsule by mouth daily        sertraline 25 MG tablet    ZOLOFT    90 tablet    Take 1 tablet (25 mg) by mouth daily    Memory loss

## 2018-06-26 NOTE — NURSING NOTE
Patient presents for back pain wanting to get a referral for injections again.  Also states she has left hip pain and would like to see if she can get a joint injection.  Cathy Harvey CMA ([facility])................ 6/26/2018 4:15 PM

## 2018-06-26 NOTE — PROGRESS NOTES
SUBJECTIVE:   Tenisha Cagle is a 84 year old female who presents to clinic today for the following health issues:    HPI  Back problems.  Wants to get spine injections.  Last had them 2 years ago.  Lasted about 1 year or so.  Pains worse lately.  In the lumbar area, band like.  Sometimes in the t-spine but less so.  Rates it at 6/10.  Had an MRI of the lumbar spine on 9/16 showing diffuse degenerative changes.      Has significant right trochanter pain as well.  Wants an injection.  Getting pains even with walking.  Injections help a moderate amount.  Ice helps a little.  Cannot sleep on the left hip.     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) 06/15/2017     Priority: Medium     Abnormal weight loss 04/18/2016     Priority: Medium     Anemia 04/18/2016     Priority: Medium     Greater trochanteric bursitis of left hip 04/18/2016     Priority: Medium     Visual changes 04/18/2016     Priority: Medium     Osteoarthritis of spine with radiculopathy, lumbar region 02/03/2016     Priority: Medium     History of TIA (transient ischemic attack) 02/02/2016     Priority: Medium     Numbness and tingling of right leg 02/02/2016     Priority: Medium     Radicular leg pain 02/02/2016     Priority: Medium     Basal cell carcinoma of right cheek 11/18/2015     Priority: Medium     Sacroiliac joint pain 12/10/2014     Priority: Medium     Alvarado's cyst of knee 02/20/2014     Priority: Medium     Paresthesia of  right leg 02/20/2014     Priority: Medium     Right knee DJD 02/20/2014     Priority: Medium     ACP (advance care planning) 01/02/2014     Priority: Medium     Cystocele 08/23/2013     Priority: Medium     Pancreatic mass 09/18/2012     Priority: Medium     Overview:   Possible, abnormal CT        Cerebral aneurysm, nonruptured 12/20/2011     Priority: Medium     Diverticulosis of colon 05/13/2011     Priority: Medium     Chronic lymphocytic leukemia (H) 04/14/2011     Priority: Medium     Overview:   WBC stable in the 30,000       Past Surgical History:   Procedure Laterality Date     CHOLECYSTECTOMY      No Comments Provided     COLONOSCOPY      2007,Sigmoid diverticulosis     ENDOSCOPY UPPER, COLONOSCOPY, COMBINED      5/5/11,Hiatal hernia, gastric gland hyperplasia in antrum     LAPAROSCOPIC TUBAL LIGATION      No Comments Provided     OTHER SURGICAL HISTORY      1986,205093,BIOPSY BREAST,Right     OTHER SURGICAL HISTORY      LDD011,PREMALIG/BENIGN SKIN LESION EXCISION,R side of nose, face and chest wall/Basal Cell Cancer Excision     TONSILLECTOMY, ADENOIDECTOMY, COMBINED      1954     Social History   Substance Use Topics     Smoking status: Never Smoker     Smokeless tobacco: Never Used     Alcohol use No     Current Outpatient Prescriptions   Medication Sig Dispense Refill     acetaminophen (TYLENOL) 500 MG tablet Take 1,000 mg by mouth every 6 hours as needed       aspirin-dipyridamole (AGGRENOX)  MG per 12 hr capsule Take 1 capsule by mouth 2 times daily       Calcium Carbonate-Vitamin D (CALCIUM 500 + D) 500-125 MG-UNIT TABS Take 1 tablet by mouth daily       losartan-hydrochlorothiazide (HYZAAR) 100-25 MG per tablet Take 0.5 tablets by mouth daily       magnesium hydroxide (MILK OF MAGNESIA) 400 MG/5ML suspension Take 15 mLs by mouth At Bedtime       Multiple Vitamin (MULTI-VITAMINS) TABS Take 1 tablet by mouth daily       Multiple Vitamins-Minerals (OCUVITE ADULT 50+) CAPS Take 1 capsule by mouth  "daily       sertraline (ZOLOFT) 25 MG tablet Take 1 tablet (25 mg) by mouth daily 90 tablet 3     Allergies   Allergen Reactions     Lansoprazole      Other reaction(s): Other - Describe In Comment Field  Patient states that medication didn't work for her.     Lisinopril Cough       Review of Systems   Constitutional: Negative for fever.   Musculoskeletal: Positive for arthralgias and back pain.   Neurological: Negative for weakness.        OBJECTIVE:     /54  Ht 5' 0.5\" (1.537 m)  Wt 121 lb (54.9 kg)  BMI 23.24 kg/m2  Body mass index is 23.24 kg/(m^2).  Physical Exam   Constitutional: She is oriented to person, place, and time. She appears well-developed. No distress.   Musculoskeletal:   No lumbar pain on palpation.  Significant t spine kyphosis.  Has a cane with her today.  Negative straight leg raise.  Left greater trochanter has severe pain with palpation. Discussed with her risks of injections and she consented.  Prepped with chloraprep and infiltrated with 3 ml 1% lidocaine and 80 milligram depotmedrol     Neurological: She is alert and oriented to person, place, and time.   Skin: She is not diaphoretic.          Diagnostic Test Results:  none     ASSESSMENT/PLAN:         (M70.62) Greater trochanteric bursitis of left hip  (primary encounter diagnosis)  Comment: recurrent  Plan: PHYSICAL THERAPY REFERRAL, methylPREDNISolone         acetate (DEPO-MEDROL) injection 80 mg,         DRAIN/INJECT LARGE JOINT/BURSA [[id number]]        Injection every 3 or more months.  Advised ice more often for longer as well.  Up to 20 minutes qid or so.    (M47.26) Osteoarthritis of spine with radiculopathy, lumbar region  Comment: progressive  Plan: XR Lumbar Epidural Injection Incl Imaging,         PHYSICAL THERAPY REFERRAL        The therapy will be more helpful than the injection, but she wants to proceed with both.        Godwin Kapoor MD  [facility] [place] [facility]\""    "

## 2018-07-03 ENCOUNTER — HOSPITAL ENCOUNTER (OUTPATIENT)
Dept: PHYSICAL THERAPY | Facility: OTHER | Age: 83
Setting detail: THERAPIES SERIES
End: 2018-07-03
Attending: FAMILY MEDICINE
Payer: MEDICARE

## 2018-07-03 PROCEDURE — 97110 THERAPEUTIC EXERCISES: CPT | Mod: GP

## 2018-07-03 PROCEDURE — G8979 MOBILITY GOAL STATUS: HCPCS | Mod: GP,CJ

## 2018-07-03 PROCEDURE — 40000185 ZZHC STATISTIC PT OUTPT VISIT

## 2018-07-03 PROCEDURE — 97161 PT EVAL LOW COMPLEX 20 MIN: CPT | Mod: GP

## 2018-07-03 PROCEDURE — G8978 MOBILITY CURRENT STATUS: HCPCS | Mod: GP,CK

## 2018-07-03 NOTE — PROGRESS NOTES
[facility]          OUTPATIENT PHYSICAL THERAPY ORTHOPEDIC EVALUATION  PLAN OF TREATMENT FOR OUTPATIENT REHABILITATION  (COMPLETE FOR INITIAL CLAIMS ONLY)  Patient's Last Name, First Name, M.I.  YOB: 1933  Tenisha Cagle    Provider s Name:  [facility]   Medical Record No.  [id number]   Start of Care Date:  07/03/18   Onset Date:      Type:     _X__PT   ___OT   ___SLP Medical Diagnosis:  Greater trochanteric bursitis of left hip, Osteoarthritis of spine with radiculopathy, lumbar region     PT Diagnosis:  impaired mobility   Visits from SOC:  1      _________________________________________________________________________________  Plan of Treatment/Functional Goals:  balance training, bed mobility training, gait training, joint mobilization, manual therapy, neuromuscular re-education, ROM, strengthening, stretching, transfer training     Biofeedback, Cryotherapy, Electrical stimulation, Hot packs, TENS, Traction, Ultrasound     Goals  Goal Identifier: posture  Goal Description: Pt will have improved spinal mobility and core strength to promote upright standing with ambulation in clinic at least 50% of the time observed  Target Date: 08/28/18    Goal Identifier: strength  Goal Description: Pt will have improved hip strength by 1 muscle grade to allow walking 15 minutes with less than 4/10 pain in the hip.  Target Date: 08/28/18    Goal Identifier: HEP  Goal Description: Pt will be independent and consistent with a customized home exercise program for self management of symptoms.  Target Date: 09/11/18        Therapy Frequency:  2 times/Week  Predicted Duration of Therapy Intervention:  10 weeks    Regina Chávez PT                 I CERTIFY THE NEED FOR THESE SERVICES FURNISHED UNDER        THIS PLAN OF TREATMENT AND WHILE UNDER MY CARE     (Physician co-signature of this document indicates review and certification of the therapy plan).                        Certification Date From:  07/03/18   Certification Date To:  09/11/18    Referring Provider:  Dr. Kapoor    Initial Assessment        See Epic Evaluation Start of Care Date: 07/03/18                                                                            Godwin Kapoor MD on 7/5/2018 at 7:41 AM

## 2018-07-03 NOTE — PROGRESS NOTES
07/03/18 1400   General Information   Type of Visit Initial OP Ortho PT Evaluation   Start of Care Date 07/03/18   Referring Physician Dr. Kapoor   Patient/Family Goals Statement decrease pain   Orders Evaluate and Treat   Insurance Type Medicare;[employer]   Medical Diagnosis Greater trochanteric bursitis of left hip, Osteoarthritis of spine with radiculopathy, lumbar region   Surgical/Medical history reviewed Yes   Body Part(s)   Body Part(s) Lumbar Spine/SI   Presentation and Etiology   Pertinent history of current problem (include personal factors and/or comorbidities that impact the POC) chronic low back pain across both sides; into the L butt at times but mostly right on the L greater trochanter. Worse today than usual for some reason. Had injection in the hip last week.   Impairments A. Pain;E. Decreased flexibility;L. Tingling   Functional Limitations perform activities of daily living;perform desired leisure / sports activities   Symptom Location B low back and L hip   How/Where did it occur From Degenerative Joint Disease   Chronicity Chronic   Pain rating (0-10 point scale) Best (/10);Worst (/10);Other   Best (/10) 3/10   Worst (/10) 7/10   Pain rating comment currently 8/10   Pain quality A. Sharp;C. Aching   Frequency of pain/symptoms A. Constant   Pain/symptoms exacerbated by A. Sitting;B. Walking;C. Lifting;D. Carrying   Pain/symptoms eased by A. Sitting;E. Changing positions;I. OTC medication(s)   Progression of symptoms since onset: Worsened   Current / Previous Interventions   Diagnostic Tests: MRI   MRI Results Results   MRI results advanced degenerative changes, scoliosis, stenosis L3   Prior Level of Function   Prior Level of Function-Mobility SPC   Current Level of Function   Current Community Support Family/friend caregiver   Patient role/employment history F. Retired   [place]   Current equipment-Gait/Locomotion Standard cane   Fall Risk Screen   Fall screen completed  by PT   Have you fallen 2 or more times in the past year? No   Have you fallen and had an injury in the past year? No   System Outcome Measures   Outcome Measures Low Back Pain (see Oswestry and Kenny)   Lumbar Spine/SI Objective Findings   Observation S-shaped scoliosis; L iliac crest high in standing; Leg length equal in supine   Posture flattened lumbar curve; increased thoracic kyphosis w/ forward head and shoulders   Flexion ROM 25% limited   Extension ROM approx 5 degrees   Pelvic Screen equal leg and ASIS symmetry   Hip Screen pos MYNOR B   Hip Flexion (L2) Strength 4   Hip Abduction Strength Left 3+   Hamstring Flexibility good   Obers Flexibility impaired   Piriformis Flexibility impaired   SLR negative for back or leg pain, but L hip pain   Crossover SLR negative   Segmental Mobility limited due to scoliotic changes   Palpation hypersensitive throughout spine and hip; particularly at SI joint and L greater trochanter   Planned Therapy Interventions   Planned Therapy Interventions balance training;bed mobility training;gait training;joint mobilization;manual therapy;neuromuscular re-education;ROM;strengthening;stretching;transfer training   Planned Modality Interventions   Planned Modality Interventions Biofeedback;Cryotherapy;Electrical stimulation;Hot packs;TENS;Traction;Ultrasound   Clinical Impression   Criteria for Skilled Therapeutic Interventions Met yes, treatment indicated   PT Diagnosis impaired mobility   Influenced by the following impairments scoliosis; joint and soft tissue restrictions. weakness   Functional limitations due to impairments gait, posture with ADLs, bed mobility, transfers   Clinical Presentation Stable/Uncomplicated   Clinical Decision Making (Complexity) Low complexity   Therapy Frequency 2 times/Week   Predicted Duration of Therapy Intervention (days/wks) 10 weeks   Risk & Benefits of therapy have been explained Yes   Patient, Family & other staff in agreement with plan of  care Yes   Education Assessment   Preferred Learning Style Pictures/video   Barriers to Learning No barriers   ORTHO GOALS   PT Ortho Eval Goals 1;2;3   Ortho Goal 1   Goal Identifier posture   Goal Description Pt will have improved spinal mobility and core strength to promote upright standing with ambulation in clinic at least 50% of the time observed   Target Date 08/28/18   Ortho Goal 2   Goal Identifier strength   Goal Description Pt will have improved hip strength by 1 muscle grade to allow walking 15 minutes with less than 4/10 pain in the hip.   Target Date 08/28/18   Ortho Goal 3   Goal Identifier HEP   Goal Description Pt will be independent and consistent with a customized home exercise program for self management of symptoms.   Target Date 09/11/18   Total Evaluation Time   Total Evaluation Time 40   Therapy Certification   Certification date from 07/03/18   Certification date to 09/11/18   Medical Diagnosis Greater trochanteric bursitis of left hip, Osteoarthritis of spine with radiculopathy, lumbar region

## 2018-07-09 ENCOUNTER — HOSPITAL ENCOUNTER (OUTPATIENT)
Dept: GENERAL RADIOLOGY | Facility: OTHER | Age: 83
Discharge: HOME | End: 2018-07-09
Attending: FAMILY MEDICINE | Admitting: FAMILY MEDICINE
Payer: MEDICARE

## 2018-07-09 ENCOUNTER — HOSPITAL ENCOUNTER (OUTPATIENT)
Dept: GENERAL RADIOLOGY | Facility: OTHER | Age: 83
End: 2018-07-09
Attending: FAMILY MEDICINE
Payer: MEDICARE

## 2018-07-09 DIAGNOSIS — M12.9 ARTHROPATHY: ICD-10-CM

## 2018-07-09 DIAGNOSIS — M47.26 OSTEOARTHRITIS OF SPINE WITH RADICULOPATHY, LUMBAR REGION: ICD-10-CM

## 2018-07-09 DIAGNOSIS — M19.90 OA (OSTEOARTHRITIS): ICD-10-CM

## 2018-07-09 DIAGNOSIS — M47.818 ARTHROPATHY OF LEFT SACROILIAC JOINT: ICD-10-CM

## 2018-07-09 PROCEDURE — 20610 DRAIN/INJ JOINT/BURSA W/O US: CPT | Mod: LT

## 2018-07-09 PROCEDURE — 27096 INJECT SACROILIAC JOINT: CPT | Mod: LT

## 2018-07-09 PROCEDURE — 25500064 ZZH RX 255 OP 636: Performed by: RADIOLOGY

## 2018-07-09 PROCEDURE — 25000128 H RX IP 250 OP 636: Performed by: RADIOLOGY

## 2018-07-09 PROCEDURE — 25000125 ZZHC RX 250: Performed by: RADIOLOGY

## 2018-07-09 RX ORDER — LIDOCAINE HYDROCHLORIDE 10 MG/ML
10 INJECTION, SOLUTION INFILTRATION; PERINEURAL ONCE
Status: COMPLETED | OUTPATIENT
Start: 2018-07-09 | End: 2018-07-09

## 2018-07-09 RX ORDER — LIDOCAINE HYDROCHLORIDE 10 MG/ML
2 INJECTION, SOLUTION EPIDURAL; INFILTRATION; INTRACAUDAL; PERINEURAL ONCE
Status: COMPLETED | OUTPATIENT
Start: 2018-07-09 | End: 2018-07-09

## 2018-07-09 RX ORDER — BETAMETHASONE SODIUM PHOSPHATE AND BETAMETHASONE ACETATE 3; 3 MG/ML; MG/ML
12 INJECTION, SUSPENSION INTRA-ARTICULAR; INTRALESIONAL; INTRAMUSCULAR; SOFT TISSUE ONCE
Status: COMPLETED | OUTPATIENT
Start: 2018-07-09 | End: 2018-07-09

## 2018-07-09 RX ADMIN — IOHEXOL 1 ML: 240 INJECTION, SOLUTION INTRATHECAL; INTRAVASCULAR; INTRAVENOUS; ORAL at 11:30

## 2018-07-09 RX ADMIN — LIDOCAINE HYDROCHLORIDE 10 ML: 10 INJECTION, SOLUTION EPIDURAL; INFILTRATION; INTRACAUDAL; PERINEURAL at 11:30

## 2018-07-09 RX ADMIN — BETAMETHASONE ACETATE AND BETAMETHASONE SODIUM PHOSPHATE 12 MG: 3; 3 INJECTION, SUSPENSION INTRA-ARTICULAR; INTRALESIONAL; INTRAMUSCULAR; SOFT TISSUE at 11:30

## 2018-07-09 RX ADMIN — LIDOCAINE HYDROCHLORIDE 2 ML: 10 INJECTION, SOLUTION EPIDURAL; INFILTRATION; INTRACAUDAL; PERINEURAL at 11:30

## 2018-07-24 NOTE — PROGRESS NOTES
Patient Information     Patient Name  Tenisha Cagle MRN  [id number] Sex  Female   1933      Letter by Omer Stelee MD at      Author:  Omer Steele MD Service:  (none) Author Type:  (none)    Filed:   Encounter Date:  3/8/2017 Status:  (Other)       Tenisha Cagle  [address] [facility] [address]    3/8/2017      Dear Ms. Cagle,    Your lab results are listed below and are acceptable/stable.  Please continue on your current medications.    Contact us with any questions.    I'm sending along your actual numbers so that you will have them for your general interest and your records.       Take Care,   Omer Steele MD      Resulted Orders      COMP METABOLIC PANEL (Collected: 3/8/2017  3:31 PM)      Result  Value Ref Range    SODIUM 138 133 - 143 mmol/L    POTASSIUM 4.6 3.5 - 5.1 mmol/L    CHLORIDE 102 98 - 107 mmol/L    CO2,TOTAL 27 21 - 31 mmol/L    ANION GAP 9 5 - 18                    GLUCOSE 95 70 - 105 mg/dL    CALCIUM 9.5 8.6 - 10.3 mg/dL    BUN 26 (H) 7 - 25 mg/dL    CREATININE 1.37 (H) 0.70 - 1.30 mg/dL    BUN/CREAT RATIO           19                    GFR if African American 45 (L) >60 ml/min/1.73m2    GFR if not African American 37 (L) >60 ml/min/1.73m2    ALBUMIN 4.3 3.5 - 5.7 g/dL    PROTEIN,TOTAL 7.0 6.4 - 8.9 g/dL    GLOBULIN                  2.7 2.0 - 3.7 g/dL    A/G RATIO 1.6 1.0 - 2.0                    BILIRUBIN,TOTAL 0.3 0.3 - 1.0 mg/dL    ALK PHOSPHATASE 54 34 - 104 IU/L    ALT (SGPT) 15 7 - 52 IU/L    AST (SGOT) 20 13 - 39 IU/L   VITAMIN D 25 (DEFICIENCY) (Collected: 3/8/2017  3:31 PM)      Result  Value Ref Range    VITAMIN D TOTAL AFL 49.6   ng/mL    Narrative      Deficiency:           <10 ng/mL  Insufficiency:      10-29 ng/mL    Sufficiency:        ng/mL    Possible Toxicity:   >100 ng/mL       VITAMIN B12 (Collected: 3/8/2017  3:31 PM)      Result  Value Ref Range    VITAMIN B12 568 180 - 914 pg/mL   CBC WITH AUTO DIFFERENTIAL (Collected:  3/8/2017  3:31 PM)      Result  Value Ref Range    WHITE BLOOD COUNT         10.6 4.5 - 11.0 thou/cu mm    RED BLOOD COUNT           3.82 (L) 4.00 - 5.20 mil/cu mm    HEMOGLOBIN                11.8 (L) 12.0 - 16.0 g/dL    HEMATOCRIT                36.2 33.0 - 51.0 %    MCV                       95 80 - 100 fL    MCH                       30.9 26.0 - 34.0 pg    MCHC                      32.6 32.0 - 36.0 g/dL    RDW                       12.2 11.5 - 15.5 %    PLATELET COUNT            241 140 - 440 thou/cu mm    MPV                       6.0 (L) 6.5 - 11.0 fL    NEUTROPHILS               36.8 (L) 42.0 - 72.0 %    LYMPHOCYTES               53.2 (H) 20.0 - 44.0 %    MONOCYTES                 7.3 <12.0 %    EOSINOPHILS               1.0 <8.0 %    BASOPHILS                 1.7 <3.0 %    ABSOLUTE NEUTROPHILS      3.9 1.7 - 7.0 thou/cu mm    ABSOLUTE LYMPHOCYTES      5.7 (H) 0.9 - 2.9 thou/cu mm    ABSOLUTE MONOCYTES        0.8 <0.9 thou/cu mm    ABSOLUTE EOSINOPHILS      0.1 <0.5 thou/cu mm    ABSOLUTE BASOPHILS        0.2 <0.3 thou/cu mm

## 2018-07-31 ENCOUNTER — TELEPHONE (OUTPATIENT)
Dept: FAMILY MEDICINE | Facility: OTHER | Age: 83
End: 2018-07-31

## 2018-07-31 DIAGNOSIS — R41.3 MEMORY LOSS: ICD-10-CM

## 2018-07-31 RX ORDER — SERTRALINE HYDROCHLORIDE 25 MG/1
25 TABLET, FILM COATED ORAL DAILY
Qty: 90 TABLET | Refills: 3 | Status: SHIPPED | OUTPATIENT
Start: 2018-07-31 | End: 2018-08-10

## 2018-07-31 NOTE — TELEPHONE ENCOUNTER
TJP:  Patient needs RX for sertraline 25 mg submitted to [facility], [facility] would not fill it.  Call if needed    Thank you

## 2018-08-10 DIAGNOSIS — R41.3 MEMORY LOSS: ICD-10-CM

## 2018-08-10 RX ORDER — SERTRALINE HYDROCHLORIDE 25 MG/1
25 TABLET, FILM COATED ORAL DAILY
Qty: 90 TABLET | Refills: 3 | Status: SHIPPED | OUTPATIENT
Start: 2018-08-10 | End: 2019-01-16

## 2018-08-10 NOTE — TELEPHONE ENCOUNTER
GINGER Castellano called and stated that patient's Zoloft prescription needs [facility].  Please call with any questions.    Destiny Wahl

## 2018-08-30 ENCOUNTER — TELEPHONE (OUTPATIENT)
Dept: FAMILY MEDICINE | Facility: OTHER | Age: 83
End: 2018-08-30

## 2018-08-30 ENCOUNTER — HOSPITAL ENCOUNTER (OUTPATIENT)
Dept: GENERAL RADIOLOGY | Facility: OTHER | Age: 83
End: 2018-08-30
Attending: NURSE PRACTITIONER
Payer: MEDICARE

## 2018-08-30 ENCOUNTER — OFFICE VISIT (OUTPATIENT)
Dept: FAMILY MEDICINE | Facility: OTHER | Age: 83
End: 2018-08-30
Attending: NURSE PRACTITIONER
Payer: MEDICARE

## 2018-08-30 ENCOUNTER — HOSPITAL ENCOUNTER (OUTPATIENT)
Dept: GENERAL RADIOLOGY | Facility: OTHER | Age: 83
Discharge: HOME | End: 2018-08-30
Attending: NURSE PRACTITIONER | Admitting: NURSE PRACTITIONER
Payer: MEDICARE

## 2018-08-30 VITALS
DIASTOLIC BLOOD PRESSURE: 60 MMHG | BODY MASS INDEX: 23.34 KG/M2 | HEART RATE: 66 BPM | SYSTOLIC BLOOD PRESSURE: 134 MMHG | WEIGHT: 121.5 LBS

## 2018-08-30 DIAGNOSIS — M25.512 ACUTE PAIN OF LEFT SHOULDER: ICD-10-CM

## 2018-08-30 DIAGNOSIS — S60.511A HAND ABRASION, RIGHT, INITIAL ENCOUNTER: ICD-10-CM
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
## 2018-08-30 DIAGNOSIS — M25.552 HIP PAIN, LEFT: ICD-10-CM

## 2018-08-30 DIAGNOSIS — M25.522 LEFT ELBOW PAIN: ICD-10-CM
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
## 2018-08-30 DIAGNOSIS — M25.562 ACUTE PAIN OF LEFT KNEE: ICD-10-CM

## 2018-08-30 DIAGNOSIS — S42.035A CLOSED NONDISPLACED FRACTURE OF ACROMIAL END OF LEFT CLAVICLE, INITIAL ENCOUNTER: ICD-10-CM

## 2018-08-30 DIAGNOSIS — S50.312A ELBOW ABRASION, LEFT, INITIAL ENCOUNTER: ICD-10-CM

## 2018-08-30 PROCEDURE — 99213 OFFICE O/P EST LOW 20 MIN: CPT | Performed by: NURSE PRACTITIONER

## 2018-08-30 PROCEDURE — G0463 HOSPITAL OUTPT CLINIC VISIT: HCPCS | Mod: 25

## 2018-08-30 PROCEDURE — 73030 X-RAY EXAM OF SHOULDER: CPT | Mod: LT

## 2018-08-30 PROCEDURE — 73562 X-RAY EXAM OF KNEE 3: CPT | Mod: LT

## 2018-08-30 PROCEDURE — 73502 X-RAY EXAM HIP UNI 2-3 VIEWS: CPT

## 2018-08-30 PROCEDURE — 73080 X-RAY EXAM OF ELBOW: CPT | Mod: LT

## 2018-08-30 PROCEDURE — G0463 HOSPITAL OUTPT CLINIC VISIT: HCPCS

## 2018-08-30 RX ORDER — TRAMADOL HYDROCHLORIDE 50 MG/1
TABLET ORAL
Qty: 6 TABLET | Refills: 0 | Status: SHIPPED | OUTPATIENT
Start: 2018-08-30 | End: 2018-08-31

## 2018-08-30 ASSESSMENT — PAIN SCALES - GENERAL: PAINLEVEL: EXTREME PAIN (8)

## 2018-08-30 NOTE — PATIENT INSTRUCTIONS
May use ultram for severe pain  Use tylenol extra strength 2 tablets 3 times daily  Heat or ice as needed  Will call with xray reports if any concerns

## 2018-08-30 NOTE — NURSING NOTE
Patient presents to clinic today for a fall that occurred earlier this morning. She states she injured her left shoulder, left knee, and right hand.     Shonda Montez LPN...................8/30/2018  2:30 PM

## 2018-08-30 NOTE — PROGRESS NOTES
HPI:    Tenisha Cagle is a 84 year old female who presents to clinic today for injury for fall from this morning. She has had pain in left elbow, left hip, left knee and left shoulder. She went out to help get her neighbors dog and she stepped over a gait, fell and landed on left side. Most of her pain in left shoulder/hip. Taking tylenol for pain. Able to walk without difficulty.     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)     2011     Diaphragmatic hernia without obstruction or gangrene     large, mostly intrathoracic     Diverticulosis of large intestine without perforation or abscess without bleeding     2011     Dysthymic disorder     No Comments Provided     Essential (primary) hypertension     No Comments Provided     Female climacteric state     Menopausal age 56     Gastritis without bleeding     03,Treated with PrevPac     Nonruptured cerebral aneurysm     2011     Osteoarthritis     No Comments Provided     Other fecal abnormalities     Recurrent loose stool     Other lymphoid leukemia not having achieved remission (H)     CLL, stable WBC 30,000     Other specified bacterial intestinal infections     2003, H. pylori gastritis treated with Prevpac     Other specified enthesopathies of unspecified lower limb, excluding foot     No Comments Provided     Peptic ulcer without hemorrhage or perforation     No Comments Provided     Personal history of other diseases of the digestive system (CODE)     2011     Personal history of other medical treatment (CODE)     , vaginal deliveries     Pure hypercholesterolemia     No Comments Provided     Sepsis (H)     child,Hospitalized as a child for blood poisoning     Sleep disorder     2011     Transient cerebral ischemic attack     2011       Past Surgical  History:   Procedure Laterality Date     CHOLECYSTECTOMY      No Comments Provided     COLONOSCOPY      2007,Sigmoid diverticulosis     ENDOSCOPY UPPER, COLONOSCOPY, COMBINED      5/5/11,Hiatal hernia, gastric gland hyperplasia in antrum     LAPAROSCOPIC TUBAL LIGATION      No Comments Provided     OTHER SURGICAL HISTORY      1986,205093,BIOPSY BREAST,Right     OTHER SURGICAL HISTORY      BSW222,PREMALIG/BENIGN SKIN LESION EXCISION,R side of nose, face and chest wall/Basal Cell Cancer Excision     TONSILLECTOMY, ADENOIDECTOMY, COMBINED      1954       Current Outpatient Prescriptions   Medication Sig Dispense Refill     acetaminophen (TYLENOL) 500 MG tablet Take 1,000 mg by mouth every 6 hours as needed       aspirin-dipyridamole (AGGRENOX)  MG per 12 hr capsule Take 1 capsule by mouth 2 times daily       Calcium Carbonate-Vitamin D (CALCIUM 500 + D) 500-125 MG-UNIT TABS Take 1 tablet by mouth daily       losartan-hydrochlorothiazide (HYZAAR) 100-25 MG per tablet Take 0.5 tablets by mouth daily       magnesium hydroxide (MILK OF MAGNESIA) 400 MG/5ML suspension Take 15 mLs by mouth At Bedtime       Multiple Vitamin (MULTI-VITAMINS) TABS Take 1 tablet by mouth daily       Multiple Vitamins-Minerals (OCUVITE ADULT 50+) CAPS Take 1 capsule by mouth daily       sertraline (ZOLOFT) 25 MG tablet Take 1 tablet (25 mg) by mouth daily 90 tablet 3     traMADol (ULTRAM) 50 MG tablet TAKE 1/2 TO 1 TABLET BY MOUTH EVERY 6 HOURS AS NEEDED FOR SEVERE PAIN 20 tablet 0       Allergies   Allergen Reactions     Lansoprazole Other (See Comments) and Unknown     Patient states that medication didn't work for her.  Daughter states she got delusional, water did not taste right  Other reaction(s): Other - Describe In Comment Field  Patient states that medication didn't work for her.     Lisinopril Cough       ROS:  Pertinent positives and negatives are noted in HPI.    EXAM:  General appearance: well appearing female, in no acute  distress  Respiratory: clear to auscultation bilaterally  Cardiac: RRR with no murmurs  Musculoskeletal: limited ROM of left shoulder/arm. Tender with palpation over clavicle/shoulder as well as left elbow, hip and knee. No swelling. Some abrasions noted. walkign slowly  Dermatological: no rashes or lesions  Psychological: normal affect, alert and pleasant      ASSESSMENT AND PLAN:    1. Fall, initial encounter    2. Hip pain, left    3. Acute pain of left shoulder    4. Acute pain of left knee    5. Left elbow pain    6. Hand abrasion, right, initial encounter    7. Elbow abrasion, left, initial encounter    8. Closed nondisplaced fracture of acromial end of left clavicle, initial encounter      Reviewed xrays with radiology read of left clavicle fx. She is placed in an immobilizer. Tx with tylenol, ultram, ice, rest. Will f/u with PCP in 1 week for further evaluation.       Delma Cooper..................8/30/2018 2:32 PM

## 2018-08-30 NOTE — MR AVS SNAPSHOT
After Visit Summary   8/30/2018    Tenisha Cagle    MRN: [id number]           Patient Information     Date Of Birth          9/7/1933        Visit Information        Provider Department      8/30/2018 2:30 PM Delma Cooper APRN CNP [facility]        Today's Diagnoses     Fall, initial encounter    -  1    Hip pain, left        Acute pain of left shoulder        Acute pain of left knee        Left elbow pain        Hand abrasion, right, initial encounter        Elbow abrasion, left, initial encounter          Care Instructions    May use ultram for severe pain  Use tylenol extra strength 2 tablets 3 times daily  Heat or ice as needed  Will call with xray reports if any concerns          Follow-ups after your visit        Your next 10 appointments already scheduled     Sep 06, 2018  1:30 PM CDT   EREN Chiropractor with Alicia Botello DC   [facility] ([facility])    [address] [facility] [id number] [phone]              Future tests that were ordered for you today     Open Future Orders        Priority Expected Expires Ordered    XR Knee Left 3 Views Routine 8/30/2018 8/30/2019 8/30/2018    XR Hip Left 2-3 Views Routine 8/30/2018 8/30/2019 8/30/2018    XR Elbow Left G/E 3 Views Routine 8/30/2018 8/30/2019 8/30/2018    XR Shoulder Left G/E 3 Views Routine 8/30/2018 8/30/2019 8/30/2018            Who to contact     If you have questions or need follow up information about today's clinic visit or your schedule please contact [facility] directly at [phone].  Normal or non-critical lab and imaging results will be communicated to you by MyChart, letter or phone within 4 business days after the clinic has received the results. If you do not hear from us within 7 days, please contact the clinic through MyChart or phone. If you have a critical or abnormal lab result, we will notify you by phone as  "soon as possible.  Submit refill requests through [employer] or call your pharmacy and they will forward the refill request to us. Please allow 3 business days for your refill to be completed.          Additional Information About Your Visit        [employer] Information     [employer] lets you send messages to your doctor, view your test results, renew your prescriptions, schedule appointments and more. To sign up, go to [email or web address] . Click on \"Log in\" on the left side of the screen, which will take you to the Welcome page. Then click on \"Sign up Now\" on the right side of the page.     You will be asked to enter the access code listed below, as well as some personal information. Please follow the directions to create your username and password.     Your access code is: [email or web address]  Expires: 2018  3:20 PM     Your access code will  in 90 days. If you need help or a new code, please call your [facility] clinic or [phone].        Care EveryWhere ID     This is your Care EveryWhere ID. This could be used by other organizations to access your [facility] medical records  [id number]        Your Vitals Were     Pulse BMI (Body Mass Index)                66 23.34 kg/m2           Blood Pressure from Last 3 Encounters:   18 134/60   18 138/54   18 122/64    Weight from Last 3 Encounters:   18 121 lb 8 oz (55.1 kg)   18 121 lb (54.9 kg)   18 122 lb 9.6 oz (55.6 kg)               Primary Care Provider Office Phone # Fax #    Godwin Kapoor [phone] [address] [facility] [address]        Equal Access to Services     [facility] : Jj Smith, esther engle, fabián hanson. So [facility] [phone].    ATENCIÓN: Si habla español, tiene a torres disposición servicios gratuitos de asistencia lingüística. Llame al [phone].    We comply with applicable federal civil " rights laws and [place] laws. We do not discriminate on the basis of race, color, national origin, age, disability, sex, sexual orientation, or gender identity.            Thank you!     Thank you for choosing [facility]  for your care. Our goal is always to provide you with excellent care. Hearing back from our patients is one way we can continue to improve our services. Please take a few minutes to complete the written survey that you may receive in the mail after your visit with us. Thank you!             Your Updated Medication List - Protect others around you: Learn how to safely use, store and throw away your medicines at www.disposemymeds.org.          This list is accurate as of 8/30/18  3:20 PM.  Always use your most recent med list.                   Brand Name Dispense Instructions for use Diagnosis    acetaminophen 500 MG tablet    TYLENOL     Take 1,000 mg by mouth every 6 hours as needed        aspirin-dipyridamole  MG per 12 hr capsule    AGGRENOX     Take 1 capsule by mouth 2 times daily        CALCIUM 500 + D 500-125 MG-UNIT Tabs   Generic drug:  Calcium Carbonate-Vitamin D      Take 1 tablet by mouth daily        losartan-hydrochlorothiazide 100-25 MG per tablet    HYZAAR     Take 0.5 tablets by mouth daily        magnesium hydroxide 400 MG/5ML suspension    MILK OF MAGNESIA     Take 15 mLs by mouth At Bedtime        MULTI-VITAMINS Tabs      Take 1 tablet by mouth daily        OCUVITE ADULT 50+ Caps      Take 1 capsule by mouth daily        sertraline 25 MG tablet    ZOLOFT    90 tablet    Take 1 tablet (25 mg) by mouth daily    Memory loss

## 2018-08-31 ENCOUNTER — TELEPHONE (OUTPATIENT)
Dept: FAMILY MEDICINE | Facility: OTHER | Age: 83
End: 2018-08-31

## 2018-08-31 DIAGNOSIS — M25.552 HIP PAIN, LEFT: ICD-10-CM

## 2018-08-31 DIAGNOSIS — M25.522 LEFT ELBOW PAIN: ICD-10-CM
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
## 2018-08-31 DIAGNOSIS — S60.511A HAND ABRASION, RIGHT, INITIAL ENCOUNTER: ICD-10-CM

## 2018-08-31 DIAGNOSIS — M25.562 ACUTE PAIN OF LEFT KNEE: ICD-10-CM

## 2018-08-31 DIAGNOSIS — S50.312A ELBOW ABRASION, LEFT, INITIAL ENCOUNTER: ICD-10-CM

## 2018-08-31 DIAGNOSIS — M25.512 ACUTE PAIN OF LEFT SHOULDER: ICD-10-CM

## 2018-08-31 RX ORDER — TRAMADOL HYDROCHLORIDE 50 MG/1
TABLET ORAL
Qty: 20 TABLET | Refills: 0 | Status: SHIPPED | OUTPATIENT
Start: 2018-08-31 | End: 2018-09-10

## 2018-08-31 ASSESSMENT — PATIENT HEALTH QUESTIONNAIRE - PHQ9: SUM OF ALL RESPONSES TO PHQ QUESTIONS 1-9: 0

## 2018-08-31 NOTE — TELEPHONE ENCOUNTER
Patient's daughter called requesting a refill on her mothers RX for Tramadol. Patient's daughter was instructed to call pharmacy for refills, she stated that there weren't any refills. I again told her that she needed to call the pharmacy for refills. She also wanted me to also send a note.     Danna Maynard on 8/31/2018 at 10:10 AM

## 2018-08-31 NOTE — TELEPHONE ENCOUNTER
Refill of tramadol was faxed to pharmacy this morning.  Shonda Montez LPN...................8/31/2018  11:06 AM

## 2018-09-10 ENCOUNTER — HOSPITAL ENCOUNTER (OUTPATIENT)
Dept: GENERAL RADIOLOGY | Facility: OTHER | Age: 83
Discharge: HOME | End: 2018-09-10
Attending: NURSE PRACTITIONER | Admitting: NURSE PRACTITIONER
Payer: MEDICARE

## 2018-09-10 ENCOUNTER — OFFICE VISIT (OUTPATIENT)
Dept: FAMILY MEDICINE | Facility: OTHER | Age: 83
End: 2018-09-10
Attending: NURSE PRACTITIONER
Payer: MEDICARE

## 2018-09-10 VITALS
BODY MASS INDEX: 23.24 KG/M2 | HEART RATE: 76 BPM | WEIGHT: 121 LBS | SYSTOLIC BLOOD PRESSURE: 152 MMHG | DIASTOLIC BLOOD PRESSURE: 64 MMHG

## 2018-09-10 DIAGNOSIS — S42.035D CLOSED NONDISPLACED FRACTURE OF ACROMIAL END OF LEFT CLAVICLE WITH ROUTINE HEALING, SUBSEQUENT ENCOUNTER: ICD-10-CM

## 2018-09-10 DIAGNOSIS — S42.035D CLOSED NONDISPLACED FRACTURE OF ACROMIAL END OF LEFT CLAVICLE WITH ROUTINE HEALING, SUBSEQUENT ENCOUNTER: Primary | ICD-10-CM

## 2018-09-10 DIAGNOSIS — I10 ESSENTIAL HYPERTENSION: ICD-10-CM

## 2018-09-10 DIAGNOSIS — Z86.73 HISTORY OF TIA (TRANSIENT ISCHEMIC ATTACK): ICD-10-CM
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
## 2018-09-10 DIAGNOSIS — M25.512 ACUTE PAIN OF LEFT SHOULDER: ICD-10-CM

## 2018-09-10 PROCEDURE — G0463 HOSPITAL OUTPT CLINIC VISIT: HCPCS | Mod: 25

## 2018-09-10 PROCEDURE — 73000 X-RAY EXAM OF COLLAR BONE: CPT | Mod: LT

## 2018-09-10 PROCEDURE — 99213 OFFICE O/P EST LOW 20 MIN: CPT | Performed by: NURSE PRACTITIONER

## 2018-09-10 PROCEDURE — G0463 HOSPITAL OUTPT CLINIC VISIT: HCPCS

## 2018-09-10 RX ORDER — ASPIRIN AND DIPYRIDAMOLE 25; 200 MG/1; MG/1
1 CAPSULE, EXTENDED RELEASE ORAL 2 TIMES DAILY
Qty: 60 CAPSULE | Status: CANCELLED | OUTPATIENT
Start: 2018-09-10

## 2018-09-10 RX ORDER — TRAMADOL HYDROCHLORIDE 50 MG/1
50 TABLET ORAL 2 TIMES DAILY PRN
Qty: 30 TABLET | Refills: 0 | Status: SHIPPED | OUTPATIENT
Start: 2018-09-10 | End: 2018-09-25

## 2018-09-10 RX ORDER — LOSARTAN POTASSIUM AND HYDROCHLOROTHIAZIDE 25; 100 MG/1; MG/1
0.5 TABLET ORAL DAILY
Qty: 30 TABLET | Status: CANCELLED | OUTPATIENT
Start: 2018-09-10

## 2018-09-10 RX ORDER — LOSARTAN POTASSIUM AND HYDROCHLOROTHIAZIDE 12.5; 5 MG/1; MG/1
1 TABLET ORAL DAILY
Qty: 90 TABLET | Refills: 3 | Status: SHIPPED | OUTPATIENT
Start: 2018-09-10 | End: 2019-01-01

## 2018-09-10 RX ORDER — ASPIRIN AND DIPYRIDAMOLE 25; 200 MG/1; MG/1
1 CAPSULE, EXTENDED RELEASE ORAL 2 TIMES DAILY
Qty: 180 CAPSULE | Refills: 0 | Status: SHIPPED | OUTPATIENT
Start: 2018-09-10 | End: 2019-01-07

## 2018-09-10 ASSESSMENT — PAIN SCALES - GENERAL: PAINLEVEL: SEVERE PAIN (6)

## 2018-09-10 NOTE — PROGRESS NOTES
HPI:    Tenisha Cagle is a 85 year old female who presents to clinic today for f/u on left clavicle fracture. She had a fall 2 weeks ago landing on her left side. She was noted to have lateral clavicle fx. Was placed in an immobilizer. Pain treated with ultram, tylenol. Using this twice daily. This seems to be helping some with the pain, taking the edge off. Has been using immobilizer.         Social History     Social History     Marital status:      Spouse name: N/A     Number of children: N/A     Years of education: N/A     Occupational History     Not on file.     Social History Main Topics     Smoking status: Never Smoker     Smokeless tobacco: Never Used     Alcohol use No     Drug use: No     Sexual activity: Not Currently     Other Topics Concern     Not on file     Social History Narrative    Nonsmoker. .  Lives alone in a house.   Continues to drive.   2 grown kids, one in [place]       Current Outpatient Prescriptions   Medication Sig Dispense Refill     acetaminophen (TYLENOL) 500 MG tablet Take 1,000 mg by mouth every 6 hours as needed       aspirin-dipyridamole (AGGRENOX)  MG per 12 hr capsule Take 1 capsule by mouth 2 times daily 180 capsule 0     Calcium Carbonate-Vitamin D (CALCIUM 500 + D) 500-125 MG-UNIT TABS Take 1 tablet by mouth daily       losartan-hydrochlorothiazide (HYZAAR) 50-12.5 MG per tablet Take 1 tablet by mouth daily 90 tablet 3     magnesium hydroxide (MILK OF MAGNESIA) 400 MG/5ML suspension Take 15 mLs by mouth At Bedtime       Multiple Vitamin (MULTI-VITAMINS) TABS Take 1 tablet by mouth daily       Multiple Vitamins-Minerals (OCUVITE ADULT 50+) CAPS Take 1 capsule by mouth daily       sertraline (ZOLOFT) 25 MG tablet Take 1 tablet (25 mg) by mouth daily 90 tablet 3     traMADol (ULTRAM) 50 MG tablet Take 1 tablet (50 mg) by mouth 2 times daily as needed for pain or severe pain 30 tablet 0     [DISCONTINUED] aspirin-dipyridamole (AGGRENOX)  MG per 12 hr  capsule Take 1 capsule by mouth 2 times daily       [DISCONTINUED] losartan-hydrochlorothiazide (HYZAAR) 100-25 MG per tablet Take 0.5 tablets by mouth daily         Allergies   Allergen Reactions     Lansoprazole Other (See Comments) and Unknown     Patient states that medication didn't work for her.  Daughter states she got delusional, water did not taste right  Other reaction(s): Other - Describe In Comment Field  Patient states that medication didn't work for her.     Lisinopril Cough       ROS:  Pertinent positives and negatives are noted in HPI.    EXAM:  General appearance: well appearing elderly female, in no acute distress  Musculoskeletal: left arm in immobilizer, this is cut and not fitting as well as it should, she c/o it being too tight so daughter altered it. She has pain with palpation to lateral clavicle with palpation. No swelling or bruising  Dermatological: no rashes or lesions  Psychological: normal affect, alert and pleasant  Xray: xray independently reviewed and healing clavicle fx appreciated; pending radiology over-read      ASSESSMENT AND PLAN:    1. Closed nondisplaced fracture of acromial end of left clavicle with routine healing, subsequent encounter    2. Essential hypertension    3. History of TIA (transient ischemic attack)    4. Fall, initial encounter    5. Acute pain of left shoulder      Clavicle fx if healing. Recommend continued use of immobilizer for 2 more weeks. Refilled tramadol for 2 more weeks, recommend use of tylenol during the day as well. Will f/u with PCP in 2 weeks for ongoing management.     Refilled aggrenox and hyzaar per request today as well.       Delma Cooper..................9/10/2018 10:00 AM

## 2018-09-10 NOTE — PATIENT INSTRUCTIONS
May use tramadol twice daily as needed  Take tylenol with this    Recommend a third dose of tylenol mid-afternoon to help with pain as well    Continue using immobilizer until next follow up    Please follow up with Dr Kapoor in 2 weeks

## 2018-09-10 NOTE — NURSING NOTE
Patient presents to clinic today for a follow up on clavicle. Patient states the brace has helped, however pain is still not under control.     Shonda Montez LPN...................9/10/2018  9:59 AM

## 2018-09-10 NOTE — MR AVS SNAPSHOT
After Visit Summary   9/10/2018    Tenisha Cagle    MRN: [id number]           Patient Information     Date Of Birth          9/7/1933        Visit Information        Provider Department      9/10/2018 10:00 AM Delma Cooper APRN CNP [facility]        Today's Diagnoses     Closed nondisplaced fracture of acromial end of left clavicle with routine healing, subsequent encounter    -  1    Essential hypertension        History of TIA (transient ischemic attack)        Fall, initial encounter        Hip pain, left        Acute pain of left shoulder        Acute pain of left knee        Left elbow pain        Hand abrasion, right, initial encounter        Elbow abrasion, left, initial encounter          Care Instructions    May use tramadol twice daily as needed  Take tylenol with this    Recommend a third dose of tylenol mid-afternoon to help with pain as well    Continue using immobilizer until next follow up    Please follow up with Dr Kapoor in 2 weeks          Follow-ups after your visit        Future tests that were ordered for you today     Open Future Orders        Priority Expected Expires Ordered    XR Clavicle Left 2 Views Routine 9/10/2018 9/10/2019 9/10/2018            Who to contact     If you have questions or need follow up information about today's clinic visit or your schedule please contact [facility] AND [facility] directly at [phone].  Normal or non-critical lab and imaging results will be communicated to you by MyChart, letter or phone within 4 business days after the clinic has received the results. If you do not hear from us within 7 days, please contact the clinic through MyChart or phone. If you have a critical or abnormal lab result, we will notify you by phone as soon as possible.  Submit refill requests through [email or web address] or call your pharmacy and they will forward the refill request to us. Please allow 3 business days for your refill to be  "completed.          Additional Information About Your Visit        [employer] Information     [facility] lets you send messages to your doctor, view your test results, renew your prescriptions, schedule appointments and more. To sign up, go to [email or web address] . Click on \"Log in\" on the left side of the screen, which will take you to the Welcome page. Then click on \"Sign up Now\" on the right side of the page.     You will be asked to enter the access code listed below, as well as some personal information. Please follow the directions to create your username and password.     Your access code is: [email or web address]  Expires: 2018  3:20 PM     Your access code will  in 90 days. If you need help or a new code, please call your [place] clinic or [phone].        Care EveryWhere ID     This is your Care EveryWhere ID. This could be used by other organizations to access your [place] medical records  [id number]        Your Vitals Were     Pulse BMI (Body Mass Index)                76 23.24 kg/m2           Blood Pressure from Last 3 Encounters:   09/10/18 152/64   18 134/60   18 138/54    Weight from Last 3 Encounters:   09/10/18 121 lb (54.9 kg)   18 121 lb 8 oz (55.1 kg)   18 121 lb (54.9 kg)                 Today's Medication Changes          These changes are accurate as of 9/10/18 10:21 AM.  If you have any questions, ask your nurse or doctor.               Start taking these medicines.        Dose/Directions    losartan-hydrochlorothiazide 50-12.5 MG per tablet   Commonly known as:  HYZAAR   Used for:  Essential hypertension   Replaces:  losartan-hydrochlorothiazide 100-25 MG per tablet   Started by:  Delma Cooper APRN CNP        Dose:  1 tablet   Take 1 tablet by mouth daily   Quantity:  90 tablet   Refills:  3         These medicines have changed or have updated prescriptions.        Dose/Directions    traMADol 50 MG tablet   Commonly known as:  ULTRAM   This may have " changed:  See the new instructions.   Used for:  Fall, initial encounter, Hip pain, left, Acute pain of left shoulder, Acute pain of left knee, Left elbow pain, Hand abrasion, right, initial encounter, Elbow abrasion, left, initial encounter   Changed by:  Delma Cooper APRN CNP        Dose:  50 mg   Take 1 tablet (50 mg) by mouth 2 times daily as needed for pain or severe pain   Quantity:  30 tablet   Refills:  0         Stop taking these medicines if you haven't already. Please contact your care team if you have questions.     losartan-hydrochlorothiazide 100-25 MG per tablet   Commonly known as:  HYZAAR   Replaced by:  losartan-hydrochlorothiazide 50-12.5 MG per tablet   Stopped by:  Delma Cooper APRN CNP                Where to get your medicines      These medications were sent to [facility] [address] [address]     Phone:  [phone]     aspirin-dipyridamole  MG per 12 hr capsule    losartan-hydrochlorothiazide 50-12.5 MG per tablet         Some of these will need a paper prescription and others can be bought over the counter.  Ask your nurse if you have questions.     Bring a paper prescription for each of these medications     traMADol 50 MG tablet               Information about OPIOIDS     PRESCRIPTION OPIOIDS: WHAT YOU NEED TO KNOW   We gave you an opioid (narcotic) pain medicine. It is important to manage your pain, but opioids are not always the best choice. You should first try all the other options your care team gave you. Take this medicine for as short a time (and as few doses) as possible.    Some activities can increase your pain, such as bandage changes or therapy sessions. It may help to take your pain medicine 30 to 60 minutes before these activities. Reduce your stress by getting enough sleep, working on hobbies you enjoy and practicing relaxation or meditation. Talk to your care team about  ways to manage your pain beyond prescription opioids.    These medicines have risks:    DO NOT drive when on new or higher doses of pain medicine. These medicines can affect your alertness and reaction times, and you could be arrested for driving under the influence (DUI). If you need to use opioids long-term, talk to your care team about driving.    DO NOT operate heavy machinery    DO NOT do any other dangerous activities while taking these medicines.    DO NOT drink any alcohol while taking these medicines.     If the opioid prescribed includes acetaminophen, DO NOT take with any other medicines that contain acetaminophen. Read all labels carefully. Look for the word  acetaminophen  or  Tylenol.  Ask your pharmacist if you have questions or are unsure.    You can get addicted to pain medicines, especially if you have a history of addiction (chemical, alcohol or substance dependence). Talk to your care team about ways to reduce this risk.    All opioids tend to cause constipation. Drink plenty of water and eat foods that have a lot of fiber, such as fruits, vegetables, prune juice, apple juice and high-fiber cereal. Take a laxative (Miralax, milk of magnesia, Colace, Senna) if you don t move your bowels at least every other day. Other side effects include upset stomach, sleepiness, dizziness, throwing up, tolerance (needing more of the medicine to have the same effect), physical dependence and slowed breathing.    Store your pills in a secure place, locked if possible. We will not replace any lost or stolen medicine. If you don t finish your medicine, please throw away (dispose) as directed by your pharmacist. The [place] [employer]ution [employer] has more information about safe disposal: https://www.pca.[facility].[place].us/living-green/managing-unwanted-medications         Primary Care Provider Office Phone # Fax #    Godwin Kapoor [phone] [address] [facility] [address]         Equal Access to Services     [facility] : Hadii aad ku hadmayraclay Danielali, wameredithda luqadaha, qaybta kajeanfabián brown. So [facility] [phone].    ATENCIÓN: Si habla español, tiene a torres disposición servicios gratuitos de asistencia lingüística. Lioname al [phone].    We comply with applicable federal civil rights laws and [place] laws. We do not discriminate on the basis of race, color, national origin, age, disability, sex, sexual orientation, or gender identity.            Thank you!     Thank you for choosing [facility] AND [facility]  for your care. Our goal is always to provide you with excellent care. Hearing back from our patients is one way we can continue to improve our services. Please take a few minutes to complete the written survey that you may receive in the mail after your visit with us. Thank you!             Your Updated Medication List - Protect others around you: Learn how to safely use, store and throw away your medicines at www.disposemymeds.org.          This list is accurate as of 9/10/18 10:21 AM.  Always use your most recent med list.                   Brand Name Dispense Instructions for use Diagnosis    acetaminophen 500 MG tablet    TYLENOL     Take 1,000 mg by mouth every 6 hours as needed        aspirin-dipyridamole  MG per 12 hr capsule    AGGRENOX    180 capsule    Take 1 capsule by mouth 2 times daily    History of TIA (transient ischemic attack)       CALCIUM 500 + D 500-125 MG-UNIT Tabs   Generic drug:  Calcium Carbonate-Vitamin D      Take 1 tablet by mouth daily        losartan-hydrochlorothiazide 50-12.5 MG per tablet    HYZAAR    90 tablet    Take 1 tablet by mouth daily    Essential hypertension       magnesium hydroxide 400 MG/5ML suspension    MILK OF MAGNESIA     Take 15 mLs by mouth At Bedtime        MULTI-VITAMINS Tabs      Take 1 tablet by mouth daily        OCUVITE ADULT 50+ Caps      Take 1 capsule by mouth  daily        sertraline 25 MG tablet    ZOLOFT    90 tablet    Take 1 tablet (25 mg) by mouth daily    Memory loss       traMADol 50 MG tablet    ULTRAM    30 tablet    Take 1 tablet (50 mg) by mouth 2 times daily as needed for pain or severe pain    Fall, initial encounter, Hip pain, left, Acute pain of left shoulder, Acute pain of left knee, Left elbow pain, Hand abrasion, right, initial encounter, Elbow abrasion, left, initial encounter

## 2018-09-12 ENCOUNTER — OFFICE VISIT (OUTPATIENT)
Dept: FAMILY MEDICINE | Facility: OTHER | Age: 83
End: 2018-09-12
Attending: FAMILY MEDICINE
Payer: MEDICARE

## 2018-09-12 VITALS
SYSTOLIC BLOOD PRESSURE: 136 MMHG | WEIGHT: 120 LBS | DIASTOLIC BLOOD PRESSURE: 72 MMHG | BODY MASS INDEX: 23.05 KG/M2 | RESPIRATION RATE: 16 BRPM

## 2018-09-12 DIAGNOSIS — M70.62 TROCHANTERIC BURSITIS OF LEFT HIP: ICD-10-CM

## 2018-09-12 DIAGNOSIS — S42.035D CLOSED NONDISPLACED FRACTURE OF ACROMIAL END OF LEFT CLAVICLE WITH ROUTINE HEALING, SUBSEQUENT ENCOUNTER: Primary | ICD-10-CM

## 2018-09-12 PROBLEM — I48.91 ATRIAL FIBRILLATION WITH RVR (H): Status: ACTIVE | Noted: 2018-01-25

## 2018-09-12 PROBLEM — K52.9 GASTROENTERITIS: Status: ACTIVE | Noted: 2018-01-25

## 2018-09-12 PROCEDURE — 99214 OFFICE O/P EST MOD 30 MIN: CPT | Performed by: FAMILY MEDICINE

## 2018-09-12 PROCEDURE — G0463 HOSPITAL OUTPT CLINIC VISIT: HCPCS

## 2018-09-12 ASSESSMENT — ENCOUNTER SYMPTOMS
FATIGUE: 0
FEVER: 0
ARTHRALGIAS: 1
SLEEP DISTURBANCE: 0
WEAKNESS: 0

## 2018-09-12 ASSESSMENT — PAIN SCALES - GENERAL: PAINLEVEL: MODERATE PAIN (4)

## 2018-09-12 NOTE — PROGRESS NOTES
SUBJECTIVE:   Tenisha Cagle is a 85 year old female who presents to clinic today for the following health issues:    HPI  Follow up on fall and hip pain.  Here with daughter, who would like to be her PCA.  Shoulder pains are slowly improving.  This was her first fall.  Was walking a dog, and tripped over a gate while the dog was on her leash.  Having significant less shoulder pain, does not like the shoulder immobilizer.    She has been getting trochanter injections, daughter feels they help for about 6 weeks or so.  Pains on left.  Last injection was less that 3 months ago.  Had an x-ray of this on 8/30 with her above fall, no fracture noted.    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) 06/15/2017     Priority: Medium     Abnormal weight loss 04/18/2016     Priority: Medium     Anemia 04/18/2016     Priority: Medium     Greater trochanteric bursitis of left hip 04/18/2016     Priority: Medium     Visual changes 04/18/2016     Priority: Medium     Osteoarthritis of spine with radiculopathy, lumbar region 02/03/2016     Priority: Medium     History of TIA (transient ischemic attack) 02/02/2016     Priority: Medium     Numbness and tingling of right leg 02/02/2016     Priority: Medium     Radicular leg pain 02/02/2016     Priority: Medium     Basal cell carcinoma of right cheek 11/18/2015     Priority: Medium     Sacroiliac joint pain 12/10/2014     Priority: Medium     Alvarado's cyst of knee 02/20/2014      Priority: Medium     Paresthesia of right leg 02/20/2014     Priority: Medium     Right knee DJD 02/20/2014     Priority: Medium     ACP (advance care planning) 01/02/2014     Priority: Medium     Cystocele 08/23/2013     Priority: Medium     Pancreatic mass 09/18/2012     Priority: Medium     Overview:   Possible, abnormal CT        Cerebral aneurysm, nonruptured 12/20/2011     Priority: Medium     Diverticulosis of colon 05/13/2011     Priority: Medium     Chronic lymphocytic leukemia (H) 04/14/2011     Priority: Medium     Overview:   WBC stable in the 30,000       Past Surgical History:   Procedure Laterality Date     CHOLECYSTECTOMY      No Comments Provided     COLONOSCOPY      2007,Sigmoid diverticulosis     ENDOSCOPY UPPER, COLONOSCOPY, COMBINED      5/5/11,Hiatal hernia, gastric gland hyperplasia in antrum     LAPAROSCOPIC TUBAL LIGATION      No Comments Provided     OTHER SURGICAL HISTORY      1986,205093,BIOPSY BREAST,Right     OTHER SURGICAL HISTORY      QIY226,PREMALIG/BENIGN SKIN LESION EXCISION,R side of nose, face and chest wall/Basal Cell Cancer Excision     TONSILLECTOMY, ADENOIDECTOMY, COMBINED      1954     Social History   Substance Use Topics     Smoking status: Never Smoker     Smokeless tobacco: Never Used     Alcohol use No     Current Outpatient Prescriptions   Medication Sig Dispense Refill     acetaminophen (TYLENOL) 500 MG tablet Take 1,000 mg by mouth every 6 hours as needed       aspirin-dipyridamole (AGGRENOX)  MG per 12 hr capsule Take 1 capsule by mouth 2 times daily 180 capsule 0     Calcium Carbonate-Vitamin D (CALCIUM 500 + D) 500-125 MG-UNIT TABS Take 1 tablet by mouth daily       losartan-hydrochlorothiazide (HYZAAR) 50-12.5 MG per tablet Take 1 tablet by mouth daily 90 tablet 3     magnesium hydroxide (MILK OF MAGNESIA) 400 MG/5ML suspension Take 15 mLs by mouth At Bedtime       Multiple Vitamin (MULTI-VITAMINS) TABS Take 1 tablet by mouth daily       Multiple  Vitamins-Minerals (OCUVITE ADULT 50+) CAPS Take 1 capsule by mouth daily       sertraline (ZOLOFT) 25 MG tablet Take 1 tablet (25 mg) by mouth daily 90 tablet 3     traMADol (ULTRAM) 50 MG tablet Take 1 tablet (50 mg) by mouth 2 times daily as needed for pain or severe pain 30 tablet 0     Allergies   Allergen Reactions     Lansoprazole Other (See Comments) and Unknown     Patient states that medication didn't work for her.  Daughter states she got delusional, water did not taste right  Other reaction(s): Other - Describe In Comment Field  Patient states that medication didn't work for her.     Lisinopril Cough       Review of Systems   Constitutional: Negative for fatigue and fever.   Cardiovascular: Negative for chest pain.   Musculoskeletal: Positive for arthralgias.   Neurological: Negative for syncope and weakness.   Psychiatric/Behavioral: Negative for sleep disturbance.        OBJECTIVE:     /72 (BP Location: Right arm, Patient Position: Sitting, Cuff Size: Adult Regular)  Resp 16  Wt 120 lb (54.4 kg)  BMI 23.05 kg/m2  Body mass index is 23.05 kg/(m^2).  Physical Exam   Constitutional: She is oriented to person, place, and time. She appears well-developed. No distress.   Cardiovascular: Normal rate, regular rhythm and normal heart sounds.    Pulmonary/Chest: Effort normal and breath sounds normal. No respiratory distress. She has no wheezes. She has no rales.   Musculoskeletal:   No pain on palpation over left clavicle.  Moderate pain over left trochanter, but able to bear weight.   Neurological: She is alert and oriented to person, place, and time.   Skin: Skin is warm and dry. She is not diaphoretic.   Left lower extremity with bruising   Psychiatric: She has a normal mood and affect. Her behavior is normal.       Diagnostic Test Results:  none     ASSESSMENT/PLAN:         (W81.955D) Closed nondisplaced fracture of acromial end of left clavicle with routine healing, subsequent encounter  (primary  encounter diagnosis)  Comment: improving  Plan: immobilizer now only for comfort.  Since she does not like it, can stop it.  Follow up in 2-3 weeks.    (M70.62) Trochanteric bursitis of left hip  Comment: chronic  Plan: too early for injection.  Can continue with ice and topicals.  Is not homebound and does not qualify for home care or PCA services.      Godwin Kapoor MD  [facility] AND \[facility]\""

## 2018-09-12 NOTE — NURSING NOTE
Coming in to ? Injection in her hip and to now if daughter can be her PCA  Monisha Gamboa LPN on 9/12/2018 at 8:36 AM

## 2018-09-12 NOTE — MR AVS SNAPSHOT
After Visit Summary   9/12/2018    Tenisha Cagle    MRN: [id number]           Patient Information     Date Of Birth          9/7/1933        Visit Information        Provider Department      9/12/2018 8:30 AM Godwin Kapoor MD [facility]        Today's Diagnoses     Closed nondisplaced fracture of acromial end of left clavicle with routine healing, subsequent encounter    -  1    Trochanteric bursitis of left hip           Follow-ups after your visit        Follow-up notes from your care team     Return in about 4 weeks (around 10/10/2018).      Your next 10 appointments already scheduled     Sep 25, 2018 11:15 AM CDT   Office Visit with Godwin Kapoor MD   [facility] ([facility])    [address]  [place] [address] [phone]           Bring a current list of meds and any records pertaining to this visit. For Physicals, please bring immunization records and any forms needing to be filled out. Please arrive 10 minutes early to complete paperwork.              Who to contact     If you have questions or need follow up information about today's clinic visit or your schedule please contact [facility] directly at [phone].  Normal or non-critical lab and imaging results will be communicated to you by Last.fmhart, letter or phone within 4 business days after the clinic has received the results. If you do not hear from us within 7 days, please contact the clinic through Last.fmhart or phone. If you have a critical or abnormal lab result, we will notify you by phone as soon as possible.  Submit refill requests through [employer] or call your pharmacy and they will forward the refill request to us. Please allow 3 business days for your refill to be completed.          Additional Information About Your Visit        Last.fmhart Information     [employer] lets you send messages to your doctor, view your test results, renew  "your prescriptions, schedule appointments and more. To sign up, go to [email or web address] . Click on \"Log in\" on the left side of the screen, which will take you to the Welcome page. Then click on \"Sign up Now\" on the right side of the page.     You will be asked to enter the access code listed below, as well as some personal information. Please follow the directions to create your username and password.     Your access code is: [email or web address]  Expires: 2018  3:20 PM     Your access code will  in 90 days. If you need help or a new code, please call your [place] clinic or [phone].        Care EveryWhere ID     This is your Care EveryWhere ID. This could be used by other organizations to access your [place] medical records  [id number]        Your Vitals Were     Respirations BMI (Body Mass Index)                16 23.05 kg/m2           Blood Pressure from Last 3 Encounters:   18 136/72   09/10/18 152/64   18 134/60    Weight from Last 3 Encounters:   18 120 lb (54.4 kg)   09/10/18 121 lb (54.9 kg)   18 121 lb 8 oz (55.1 kg)              Today, you had the following     No orders found for display       Primary Care Provider Office Phone # Fax #    Godwin Kapoor [phone]       [address] [facility] [address]        Equal Access to Services     [facility] AH: Hadii sheila palacios hadasho Soiris, waaxda luqadaha, qaybta kaalmada kevin, fabián remy . So [facility] [phone].    ATENCIÓN: Si habla ezra, tiene a torres disposición servicios gratuitos de asistencia lingüística. Llame al [phone].    We comply with applicable federal civil rights laws and [place] laws. We do not discriminate on the basis of race, color, national origin, age, disability, sex, sexual orientation, or gender identity.            Thank you!     Thank you for choosing [facility]  for your care. Our goal is always to " provide you with excellent care. Hearing back from our patients is one way we can continue to improve our services. Please take a few minutes to complete the written survey that you may receive in the mail after your visit with us. Thank you!             Your Updated Medication List - Protect others around you: Learn how to safely use, store and throw away your medicines at www.disposemymeds.org.          This list is accurate as of 9/12/18  8:54 AM.  Always use your most recent med list.                   Brand Name Dispense Instructions for use Diagnosis    acetaminophen 500 MG tablet    TYLENOL     Take 1,000 mg by mouth every 6 hours as needed        aspirin-dipyridamole  MG per 12 hr capsule    AGGRENOX    180 capsule    Take 1 capsule by mouth 2 times daily    History of TIA (transient ischemic attack)       CALCIUM 500 + D 500-125 MG-UNIT Tabs   Generic drug:  Calcium Carbonate-Vitamin D      Take 1 tablet by mouth daily        losartan-hydrochlorothiazide 50-12.5 MG per tablet    HYZAAR    90 tablet    Take 1 tablet by mouth daily    Essential hypertension       magnesium hydroxide 400 MG/5ML suspension    MILK OF MAGNESIA     Take 15 mLs by mouth At Bedtime        MULTI-VITAMINS Tabs      Take 1 tablet by mouth daily        OCUVITE ADULT 50+ Caps      Take 1 capsule by mouth daily        sertraline 25 MG tablet    ZOLOFT    90 tablet    Take 1 tablet (25 mg) by mouth daily    Memory loss       traMADol 50 MG tablet    ULTRAM    30 tablet    Take 1 tablet (50 mg) by mouth 2 times daily as needed for pain or severe pain    Fall, initial encounter, Acute pain of left shoulder

## 2018-09-25 ENCOUNTER — OFFICE VISIT (OUTPATIENT)
Dept: FAMILY MEDICINE | Facility: OTHER | Age: 83
End: 2018-09-25
Attending: FAMILY MEDICINE
Payer: MEDICARE

## 2018-09-25 VITALS
WEIGHT: 117.38 LBS | DIASTOLIC BLOOD PRESSURE: 66 MMHG | TEMPERATURE: 97.4 F | HEART RATE: 84 BPM | SYSTOLIC BLOOD PRESSURE: 136 MMHG | BODY MASS INDEX: 22.55 KG/M2

## 2018-09-25 DIAGNOSIS — S42.002D CLOSED NONDISPLACED FRACTURE OF LEFT CLAVICLE WITH ROUTINE HEALING, UNSPECIFIED PART OF CLAVICLE, SUBSEQUENT ENCOUNTER: Primary | ICD-10-CM

## 2018-09-25 DIAGNOSIS — M25.512 ACUTE PAIN OF LEFT SHOULDER: ICD-10-CM
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
## 2018-09-25 DIAGNOSIS — M70.62 GREATER TROCHANTERIC BURSITIS OF LEFT HIP: ICD-10-CM

## 2018-09-25 DIAGNOSIS — H61.001: ICD-10-CM

## 2018-09-25 DIAGNOSIS — L60.0 INGROWING TOENAIL WITHOUT INFECTION: ICD-10-CM

## 2018-09-25 PROCEDURE — G0463 HOSPITAL OUTPT CLINIC VISIT: HCPCS | Mod: 25

## 2018-09-25 PROCEDURE — 25000128 H RX IP 250 OP 636: Performed by: FAMILY MEDICINE

## 2018-09-25 PROCEDURE — G0463 HOSPITAL OUTPT CLINIC VISIT: HCPCS

## 2018-09-25 PROCEDURE — 20610 DRAIN/INJ JOINT/BURSA W/O US: CPT | Performed by: FAMILY MEDICINE

## 2018-09-25 PROCEDURE — 99214 OFFICE O/P EST MOD 30 MIN: CPT | Mod: 25 | Performed by: FAMILY MEDICINE

## 2018-09-25 RX ORDER — TRAMADOL HYDROCHLORIDE 50 MG/1
50 TABLET ORAL 2 TIMES DAILY PRN
Qty: 30 TABLET | Refills: 0 | Status: SHIPPED | OUTPATIENT
Start: 2018-09-25 | End: 2018-10-01

## 2018-09-25 RX ORDER — METHYLPREDNISOLONE ACETATE 80 MG/ML
80 INJECTION, SUSPENSION INTRA-ARTICULAR; INTRALESIONAL; INTRAMUSCULAR; SOFT TISSUE ONCE
Status: COMPLETED | OUTPATIENT
Start: 2018-09-25 | End: 2018-09-25

## 2018-09-25 RX ADMIN — METHYLPREDNISOLONE ACETATE 80 MG: 80 INJECTION, SUSPENSION INTRA-ARTICULAR; INTRALESIONAL; INTRAMUSCULAR; SOFT TISSUE at 12:14

## 2018-09-25 ASSESSMENT — ENCOUNTER SYMPTOMS
CONFUSION: 1
ARTHRALGIAS: 1
DIAPHORESIS: 0
SHORTNESS OF BREATH: 0
BACK PAIN: 1
SLEEP DISTURBANCE: 0
BRUISES/BLEEDS EASILY: 0
WOUND: 0
CHILLS: 0

## 2018-09-25 ASSESSMENT — PAIN SCALES - GENERAL: PAINLEVEL: WORST PAIN (10)

## 2018-09-25 NOTE — MR AVS SNAPSHOT
"              After Visit Summary   9/25/2018    Tenisha Cagle    MRN: [id number]           Patient Information     Date Of Birth          9/7/1933        Visit Information        Provider Department      9/25/2018 11:15 AM Godwin Kapoor MD [facility]        Today's Diagnoses     Closed nondisplaced fracture of left clavicle with routine healing, unspecified part of clavicle, subsequent encounter    -  1    Ingrowing toenail without infection        Chondrodermatitis nodularis helicis, right        Greater trochanteric bursitis of left hip        Fall, initial encounter        Acute pain of left shoulder           Follow-ups after your visit        Follow-up notes from your care team     Return in about 4 weeks (around 10/23/2018).      Who to contact     If you have questions or need follow up information about today's clinic visit or your schedule please contact [facility] directly at [phone].  Normal or non-critical lab and imaging results will be communicated to you by Reverthart, letter or phone within 4 business days after the clinic has received the results. If you do not hear from us within 7 days, please contact the clinic through Reverthart or phone. If you have a critical or abnormal lab result, we will notify you by phone as soon as possible.  Submit refill requests through [email or web address] or call your pharmacy and they will forward the refill request to us. Please allow 3 business days for your refill to be completed.          Additional Information About Your Visit        MyChart Information     [email or web address] lets you send messages to your doctor, view your test results, renew your prescriptions, schedule appointments and more. To sign up, go to [email or web address] . Click on \"Log in\" on the left side of the screen, which will take you to the Welcome page. Then click on \"Sign up Now\" on the right side of the page.     You will be asked to enter the access code listed " below, as well as some personal information. Please follow the directions to create your username and password.     Your access code is: [email or web address]  Expires: 2018  3:20 PM     Your access code will  in 90 days. If you need help or a new code, please call your [facility] clinic or [phone].        Care EveryWhere ID     This is your Care EveryWhere ID. This could be used by other organizations to access your [facility] medical records  [id number]        Your Vitals Were     Pulse Temperature Breastfeeding? BMI (Body Mass Index)          84 97.4  F (36.3  C) (Temporal) No 22.55 kg/m2         Blood Pressure from Last 3 Encounters:   18 136/66   18 136/72   09/10/18 152/64    Weight from Last 3 Encounters:   18 117 lb 6 oz (53.2 kg)   18 120 lb (54.4 kg)   09/10/18 121 lb (54.9 kg)              We Performed the Following     DRAIN/INJECT LARGE JOINT/BURSA []          Where to get your medicines      Some of these will need a paper prescription and others can be bought over the counter.  Ask your nurse if you have questions.     Bring a paper prescription for each of these medications     traMADol 50 MG tablet         Information about OPIOIDS     PRESCRIPTION OPIOIDS: WHAT YOU NEED TO KNOW   We gave you an opioid (narcotic) pain medicine. It is important to manage your pain, but opioids are not always the best choice. You should first try all the other options your care team gave you. Take this medicine for as short a time (and as few doses) as possible.    Some activities can increase your pain, such as bandage changes or therapy sessions. It may help to take your pain medicine 30 to 60 minutes before these activities. Reduce your stress by getting enough sleep, working on hobbies you enjoy and practicing relaxation or meditation. Talk to your care team about ways to manage your pain beyond prescription opioids.    These medicines have risks:    DO NOT drive when on new or  higher doses of pain medicine. These medicines can affect your alertness and reaction times, and you could be arrested for driving under the influence (DUI). If you need to use opioids long-term, talk to your care team about driving.    DO NOT operate heavy machinery    DO NOT do any other dangerous activities while taking these medicines.    DO NOT drink any alcohol while taking these medicines.     If the opioid prescribed includes acetaminophen, DO NOT take with any other medicines that contain acetaminophen. Read all labels carefully. Look for the word  acetaminophen  or  Tylenol.  Ask your pharmacist if you have questions or are unsure.    You can get addicted to pain medicines, especially if you have a history of addiction (chemical, alcohol or substance dependence). Talk to your care team about ways to reduce this risk.    All opioids tend to cause constipation. Drink plenty of water and eat foods that have a lot of fiber, such as fruits, vegetables, prune juice, apple juice and high-fiber cereal. Take a laxative (Miralax, milk of magnesia, Colace, Senna) if you don t move your bowels at least every other day. Other side effects include upset stomach, sleepiness, dizziness, throwing up, tolerance (needing more of the medicine to have the same effect), physical dependence and slowed breathing.    Store your pills in a secure place, locked if possible. We will not replace any lost or stolen medicine. If you don t finish your medicine, please throw away (dispose) as directed by your pharmacist. The [place] [employer]ution [employer] has more information about safe disposal: https://www.pca.state.[place]/living-green/managing-unwanted-medications         Primary Care Provider Office Phone # Fax #    Godwin Kapoor [phone] [address] [facility] [address]        Equal Access to Services     RENE JOHNSON AH: esther Sexton qaybta kaalmada adeegyada,  fabián zacariasjil mccormick'aan ah. So [facility] [phone].    ATENCIÓN: Si boo munguia, tiene a torres disposición servicios gratuitos de asistencia lingüística. Hosea gutierres [phone].    We comply with applicable federal civil rights laws and [place] laws. We do not discriminate on the basis of race, color, national origin, age, disability, sex, sexual orientation, or gender identity.            Thank you!     Thank you for choosing [facility]  for your care. Our goal is always to provide you with excellent care. Hearing back from our patients is one way we can continue to improve our services. Please take a few minutes to complete the written survey that you may receive in the mail after your visit with us. Thank you!             Your Updated Medication List - Protect others around you: Learn how to safely use, store and throw away your medicines at www.disposemymeds.org.          This list is accurate as of 9/25/18 11:56 AM.  Always use your most recent med list.                   Brand Name Dispense Instructions for use Diagnosis    acetaminophen 500 MG tablet    TYLENOL     Take 1,000 mg by mouth every 6 hours as needed        aspirin-dipyridamole  MG per 12 hr capsule    AGGRENOX    180 capsule    Take 1 capsule by mouth 2 times daily    History of TIA (transient ischemic attack)       CALCIUM 500 + D 500-125 MG-UNIT Tabs   Generic drug:  Calcium Carbonate-Vitamin D      Take 1 tablet by mouth daily        losartan-hydrochlorothiazide 50-12.5 MG per tablet    HYZAAR    90 tablet    Take 1 tablet by mouth daily    Essential hypertension       magnesium hydroxide 400 MG/5ML suspension    MILK OF MAGNESIA     Take 15 mLs by mouth At Bedtime        MULTI-VITAMINS Tabs      Take 1 tablet by mouth daily        OCUVITE ADULT 50+ Caps      Take 1 capsule by mouth daily        sertraline 25 MG tablet    ZOLOFT    90 tablet    Take 1 tablet (25 mg) by mouth daily    Memory loss        traMADol 50 MG tablet    ULTRAM    30 tablet    Take 1 tablet (50 mg) by mouth 2 times daily as needed for pain or severe pain    Fall, initial encounter, Acute pain of left shoulder

## 2018-09-25 NOTE — NURSING NOTE
Patient presents to the clinic for follow up on clavicle. Patient would also like to be seen for eye irritation x 1 week and right outer ear check. Patient's daughter states patient's ear does not seem to be healing as well. She has used antibiotic treatment for relief.  Dorothy PEÑALOZA CMA...9/25/2018 11:11 AM

## 2018-09-25 NOTE — PROGRESS NOTES
SUBJECTIVE:   Tenisha Cagle is a 85 year old female who presents to clinic today for the following health issues:    HPI  Here with daughter for follow up on left clavicle fracture.  She feel on Aug 29.  Was seen here.  Pt reports minimal pain in clavicle, some in left elbow and hip.  Had a follow up x-ray on this on 9/10.  Has been cutting down on ultram, but gets tremors and daughter feels she has had some withdrawls.  Has had confusion at times.    Some pains in right ear pinna.  Will crust over at times.  They want me to look it over.  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      Also some pains in the right great toenail.  No discharge or redness.      Would like a left trochanter injection as well.  Last one was over 3 months ago.  Patient Active Problem List    Diagnosis Date Noted     Memory loss 05/25/2018     Priority: Medium     Atrial fibrillation with RVR (H) 01/25/2018     Priority: Medium     Gastroenteritis 01/25/2018     Priority: Medium     Hiatal hernia 01/17/2018     Priority: Medium     Overview:   large, mostly intrathoracic       Essential hypertension 01/17/2018     Priority: Medium     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/27/2017     Priority: Medium     Benign skin lesion of nose 09/27/2017     Priority: Medium     AK (actinic keratosis) 07/10/2017     Priority: Medium     SK (seborrheic keratosis) 07/10/2017     Priority: Medium     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) 06/15/2017     Priority: Medium     Abnormal weight loss 04/18/2016     Priority: Medium     Anemia 04/18/2016     Priority: Medium     Greater trochanteric bursitis of left hip 04/18/2016     Priority: Medium     Visual changes 04/18/2016     Priority: Medium     Osteoarthritis of spine with radiculopathy, lumbar region 02/03/2016     Priority: Medium     History of TIA (transient ischemic attack) 02/02/2016     Priority: Medium     Numbness and tingling of  right leg 02/02/2016     Priority: Medium     Radicular leg pain 02/02/2016     Priority: Medium     Basal cell carcinoma of right cheek 11/18/2015     Priority: Medium     Sacroiliac joint pain 12/10/2014     Priority: Medium     Alvarado's cyst of knee 02/20/2014     Priority: Medium     Paresthesia of right leg 02/20/2014     Priority: Medium     Right knee DJD 02/20/2014     Priority: Medium     ACP (advance care planning) 01/02/2014     Priority: Medium     Cystocele 08/23/2013     Priority: Medium     Pancreatic mass 09/18/2012     Priority: Medium     Overview:   Possible, abnormal CT        Cerebral aneurysm, nonruptured 12/20/2011     Priority: Medium     Diverticulosis of colon 05/13/2011     Priority: Medium     Chronic lymphocytic leukemia (H) 04/14/2011     Priority: Medium     Overview:   WBC stable in the 30,000       Past Surgical History:   Procedure Laterality Date     CHOLECYSTECTOMY      No Comments Provided     COLONOSCOPY      2007,Sigmoid diverticulosis     ENDOSCOPY UPPER, COLONOSCOPY, COMBINED      5/5/11,Hiatal hernia, gastric gland hyperplasia in antrum     LAPAROSCOPIC TUBAL LIGATION      No Comments Provided     OTHER SURGICAL HISTORY      1986,205093,BIOPSY BREAST,Right     OTHER SURGICAL HISTORY      [id number],PREMALIG/BENIGN SKIN LESION EXCISION,R side of nose, face and chest wall/Basal Cell Cancer Excision     TONSILLECTOMY, ADENOIDECTOMY, COMBINED      1954     Social History   Substance Use Topics     Smoking status: Never Smoker     Smokeless tobacco: Never Used     Alcohol use No     Current Outpatient Prescriptions   Medication Sig Dispense Refill     acetaminophen (TYLENOL) 500 MG tablet Take 1,000 mg by mouth every 6 hours as needed       aspirin-dipyridamole (AGGRENOX)  MG per 12 hr capsule Take 1 capsule by mouth 2 times daily 180 capsule 0     Calcium Carbonate-Vitamin D (CALCIUM 500 + D) 500-125 MG-UNIT TABS Take 1 tablet by mouth daily       losartan-hydrochlorothiazide  (HYZAAR) 50-12.5 MG per tablet Take 1 tablet by mouth daily 90 tablet 3     magnesium hydroxide (MILK OF MAGNESIA) 400 MG/5ML suspension Take 15 mLs by mouth At Bedtime       Multiple Vitamin (MULTI-VITAMINS) TABS Take 1 tablet by mouth daily       Multiple Vitamins-Minerals (OCUVITE ADULT 50+) CAPS Take 1 capsule by mouth daily       sertraline (ZOLOFT) 25 MG tablet Take 1 tablet (25 mg) by mouth daily 90 tablet 3     traMADol (ULTRAM) 50 MG tablet Take 1 tablet (50 mg) by mouth 2 times daily as needed for pain or severe pain 30 tablet 0     Allergies   Allergen Reactions     Lansoprazole Other (See Comments) and Unknown     Patient states that medication didn't work for her.  Daughter states she got delusional, water did not taste right  Other reaction(s): Other - Describe In Comment Field  Patient states that medication didn't work for her.     Lisinopril Cough       Review of Systems   Constitutional: Negative for chills and diaphoresis.   Respiratory: Negative for shortness of breath.    Musculoskeletal: Positive for arthralgias and back pain.   Skin: Negative for wound.   Hematological: Does not bruise/bleed easily.   Psychiatric/Behavioral: Positive for confusion. Negative for sleep disturbance.        OBJECTIVE:     /66 (BP Location: Right arm, Patient Position: Sitting, Cuff Size: Adult Regular)  Pulse 84  Temp 97.4  F (36.3  C) (Temporal)  Wt 117 lb 6 oz (53.2 kg)  Breastfeeding? No  BMI 22.55 kg/m2  Body mass index is 22.55 kg/(m^2).  Physical Exam   Constitutional: She is oriented to person, place, and time. She appears well-developed. No distress.   HENT:   Posterior right pinna has some mild erythema, no crusting and no scaling currently.  Mild pain on palpation.   Musculoskeletal:   Left clavicle with moderate pain on palpation.  No significant deformity.   Neurological: She is alert and oriented to person, place, and time.   Skin: She is not diaphoretic.   Right 1st toenail with mild  erythema medially and laterally, without significant pain nor discharge    Psychiatric: She has a normal mood and affect. Her behavior is normal. Thought content normal.     Discussed with her risks of injection.  Consent signed.  Left trochanter prepped and infiltrated with 3 ml 1% lidocaine and 80 milligram depotmedrol, without any complications.    Diagnostic Test Results:  none     ASSESSMENT/PLAN:       (S42.002D) Closed nondisplaced fracture of left clavicle with routine healing, unspecified part of clavicle, subsequent encounter  (primary encounter diagnosis)  Comment: improving, less pain.  Next x-ray can be in 4-6 weeks.    Plan:      (L60.0) Ingrowing toenail without infection  Comment: new  Plan: is mild, so soaks for now.      (H61.001) Chondrodermatitis nodularis helicis, right  Comment: new  Plan: can continue to treat with otc ointments, no cancer currently (was their primary worry)        (M70.62) Greater trochanteric bursitis of left hip  Comment: chronic  Plan: DRAIN/INJECT LARGE JOINT/BURSA [63925]        Injections every 3 months or more.        Godwin Kapoor MD  [facility]

## 2018-09-26 NOTE — PROGRESS NOTES
Outpatient Physical Therapy Discharge Note     Patient: Tenisha Cagle  : 1933    Beginning/End Dates of Reporting Period:  7/3/18 to 2018    Referring Provider: Godwin Sanders Diagnosis: impaired mobility     Client Self Report:  See evaluation    Goals:  Goal Identifier posture   Goal Description Pt will have improved spinal mobility and core strength to promote upright standing with ambulation in clinic at least 50% of the time observed   Target Date 18   Date Met      Progress:     Goal Identifier strength   Goal Description Pt will have improved hip strength by 1 muscle grade to allow walking 15 minutes with less than 4/10 pain in the hip.   Target Date 18   Date Met      Progress:     Goal Identifier HEP   Goal Description Pt will be independent and consistent with a customized home exercise program for self management of symptoms.   Target Date 18   Date Met      Progress:       Progress Toward Goals:   Not assessed this period.  Pt only seen for evaluation. Follow-ups were cancelled per pt's request.          Plan:  Discharge from therapy.    Discharge:    Reason for Discharge: Patient chooses to discontinue therapy.  Medicare G-code: Patient did not attend a final scheduled session prior to discharge. Unable to determine discharge functional status.    Equipment Issued: n/a    Discharge Plan: Patient to continue home program.

## 2018-09-26 NOTE — ADDENDUM NOTE
Encounter addended by: Regina Chávez, PT on: 9/26/2018  2:34 PM<BR>     Actions taken: Episode resolved, Sign clinical note

## 2018-10-29 ENCOUNTER — OFFICE VISIT (OUTPATIENT)
Dept: FAMILY MEDICINE | Facility: OTHER | Age: 83
End: 2018-10-29
Attending: FAMILY MEDICINE
Payer: MEDICARE

## 2018-10-29 VITALS
WEIGHT: 116 LBS | BODY MASS INDEX: 22.28 KG/M2 | SYSTOLIC BLOOD PRESSURE: 138 MMHG | RESPIRATION RATE: 16 BRPM | DIASTOLIC BLOOD PRESSURE: 68 MMHG

## 2018-10-29 DIAGNOSIS — S42.035D CLOSED NONDISPLACED FRACTURE OF ACROMIAL END OF LEFT CLAVICLE WITH ROUTINE HEALING, SUBSEQUENT ENCOUNTER: ICD-10-CM

## 2018-10-29 DIAGNOSIS — M19.012 PRIMARY OSTEOARTHRITIS OF LEFT SHOULDER: Primary | ICD-10-CM

## 2018-10-29 DIAGNOSIS — R19.5 LOOSE STOOLS: ICD-10-CM

## 2018-10-29 DIAGNOSIS — Z23 NEED FOR IMMUNIZATION AGAINST INFLUENZA: ICD-10-CM

## 2018-10-29 PROBLEM — K52.9 GASTROENTERITIS: Status: RESOLVED | Noted: 2018-01-25 | Resolved: 2018-10-29

## 2018-10-29 PROCEDURE — 90471 IMMUNIZATION ADMIN: CPT

## 2018-10-29 PROCEDURE — 90662 IIV NO PRSV INCREASED AG IM: CPT | Performed by: FAMILY MEDICINE

## 2018-10-29 PROCEDURE — G0463 HOSPITAL OUTPT CLINIC VISIT: HCPCS

## 2018-10-29 PROCEDURE — G0463 HOSPITAL OUTPT CLINIC VISIT: HCPCS | Mod: 25

## 2018-10-29 PROCEDURE — G0008 ADMIN INFLUENZA VIRUS VAC: HCPCS

## 2018-10-29 PROCEDURE — 99214 OFFICE O/P EST MOD 30 MIN: CPT | Performed by: FAMILY MEDICINE

## 2018-10-29 RX ORDER — TRAMADOL HYDROCHLORIDE 50 MG/1
50 TABLET ORAL EVERY 6 HOURS PRN
Qty: 90 TABLET | Refills: 3 | Status: SHIPPED | OUTPATIENT
Start: 2018-10-29 | End: 2018-11-06

## 2018-10-29 ASSESSMENT — ANXIETY QUESTIONNAIRES
IF YOU CHECKED OFF ANY PROBLEMS ON THIS QUESTIONNAIRE, HOW DIFFICULT HAVE THESE PROBLEMS MADE IT FOR YOU TO DO YOUR WORK, TAKE CARE OF THINGS AT HOME, OR GET ALONG WITH OTHER PEOPLE: NOT DIFFICULT AT ALL
GAD7 TOTAL SCORE: 0
6. BECOMING EASILY ANNOYED OR IRRITABLE: NOT AT ALL
2. NOT BEING ABLE TO STOP OR CONTROL WORRYING: NOT AT ALL
5. BEING SO RESTLESS THAT IT IS HARD TO SIT STILL: NOT AT ALL
7. FEELING AFRAID AS IF SOMETHING AWFUL MIGHT HAPPEN: NOT AT ALL
1. FEELING NERVOUS, ANXIOUS, OR ON EDGE: NOT AT ALL
3. WORRYING TOO MUCH ABOUT DIFFERENT THINGS: NOT AT ALL

## 2018-10-29 ASSESSMENT — ENCOUNTER SYMPTOMS
ARTHRALGIAS: 1
UNEXPECTED WEIGHT CHANGE: 0
BACK PAIN: 1
SLEEP DISTURBANCE: 0
COUGH: 0
DIARRHEA: 1
CONSTIPATION: 1
FEVER: 0
ABDOMINAL PAIN: 1

## 2018-10-29 ASSESSMENT — PATIENT HEALTH QUESTIONNAIRE - PHQ9: 5. POOR APPETITE OR OVEREATING: NOT AT ALL

## 2018-10-29 ASSESSMENT — PAIN SCALES - GENERAL: PAINLEVEL: MODERATE PAIN (5)

## 2018-10-29 NOTE — PROGRESS NOTES
Injectable Influenza Immunization Documentation    1.  Is the person to be vaccinated sick today?   No    2. Does the person to be vaccinated have an allergy to a component   of the vaccine?   No  Egg Allergy Algorithm Link    3. Has the person to be vaccinated ever had a serious reaction   to influenza vaccine in the past?   No    4. Has the person to be vaccinated ever had Guillain-Barré syndrome?   No    Form completed by Monisha Gamboa LPN on 10/29/2018 at 10:35 AM  VERIFIED

## 2018-10-29 NOTE — MR AVS SNAPSHOT
"              After Visit Summary   10/29/2018    Tenisha Cagle    MRN: [id number]           Patient Information     Date Of Birth          1933        Visit Information        Provider Department      10/29/2018 9:30 AM Godwin Kapoor MD [facility]        Today's Diagnoses     Primary osteoarthritis of left shoulder    -  1    Closed nondisplaced fracture of acromial end of left clavicle with routine healing, subsequent encounter        Loose stools           Follow-ups after your visit        Who to contact     If you have questions or need follow up information about today's clinic visit or your schedule please contact [facility] directly at [phone].  Normal or non-critical lab and imaging results will be communicated to you by Medication Reviewhart, letter or phone within 4 business days after the clinic has received the results. If you do not hear from us within 7 days, please contact the clinic through Medication Reviewhart or phone. If you have a critical or abnormal lab result, we will notify you by phone as soon as possible.  Submit refill requests through RiverOne or call your pharmacy and they will forward the refill request to us. Please allow 3 business days for your refill to be completed.          Additional Information About Your Visit        MyChart Information     RiverOne lets you send messages to your doctor, view your test results, renew your prescriptions, schedule appointments and more. To sign up, go to [email or web address] . Click on \"Log in\" on the left side of the screen, which will take you to the Welcome page. Then click on \"Sign up Now\" on the right side of the page.     You will be asked to enter the access code listed below, as well as some personal information. Please follow the directions to create your username and password.     Your access code is: [email or web address]  Expires: 2018  3:20 PM     Your access code will  in 90 days. If you need help or " a new code, please call your [facility] or [phone].        Care EveryWhere ID     This is your Care EveryWhere ID. This could be used by other organizations to access your [place] medical records  [id number]        Your Vitals Were     Respirations BMI (Body Mass Index)                16 22.28 kg/m2           Blood Pressure from Last 3 Encounters:   10/29/18 138/68   09/25/18 136/66   09/12/18 136/72    Weight from Last 3 Encounters:   10/29/18 116 lb (52.6 kg)   09/25/18 117 lb 6 oz (53.2 kg)   09/12/18 120 lb (54.4 kg)              Today, you had the following     No orders found for display         Today's Medication Changes          These changes are accurate as of 10/29/18 10:26 AM.  If you have any questions, ask your nurse or doctor.               These medicines have changed or have updated prescriptions.        Dose/Directions    traMADol 50 MG tablet   Commonly known as:  ULTRAM   This may have changed:  See the new instructions.   Used for:  Primary osteoarthritis of left shoulder   Changed by:  Godwin Kapoor MD        Dose:  50 mg   Take 1 tablet (50 mg) by mouth every 6 hours as needed for severe pain   Quantity:  90 tablet   Refills:  3            Where to get your medicines      Some of these will need a paper prescription and others can be bought over the counter.  Ask your nurse if you have questions.     Bring a paper prescription for each of these medications     traMADol 50 MG tablet               Information about OPIOIDS     PRESCRIPTION OPIOIDS: WHAT YOU NEED TO KNOW   We gave you an opioid (narcotic) pain medicine. It is important to manage your pain, but opioids are not always the best choice. You should first try all the other options your care team gave you. Take this medicine for as short a time (and as few doses) as possible.    Some activities can increase your pain, such as bandage changes or therapy sessions. It may help to take your pain medicine 30 to 60 minutes before  these activities. Reduce your stress by getting enough sleep, working on hobbies you enjoy and practicing relaxation or meditation. Talk to your care team about ways to manage your pain beyond prescription opioids.    These medicines have risks:    DO NOT drive when on new or higher doses of pain medicine. These medicines can affect your alertness and reaction times, and you could be arrested for driving under the influence (DUI). If you need to use opioids long-term, talk to your care team about driving.    DO NOT operate heavy machinery    DO NOT do any other dangerous activities while taking these medicines.    DO NOT drink any alcohol while taking these medicines.     If the opioid prescribed includes acetaminophen, DO NOT take with any other medicines that contain acetaminophen. Read all labels carefully. Look for the word  acetaminophen  or  Tylenol.  Ask your pharmacist if you have questions or are unsure.    You can get addicted to pain medicines, especially if you have a history of addiction (chemical, alcohol or substance dependence). Talk to your care team about ways to reduce this risk.    All opioids tend to cause constipation. Drink plenty of water and eat foods that have a lot of fiber, such as fruits, vegetables, prune juice, apple juice and high-fiber cereal. Take a laxative (Miralax, milk of magnesia, Colace, Senna) if you don t move your bowels at least every other day. Other side effects include upset stomach, sleepiness, dizziness, throwing up, tolerance (needing more of the medicine to have the same effect), physical dependence and slowed breathing.    Store your pills in a secure place, locked if possible. We will not replace any lost or stolen medicine. If you don t finish your medicine, please throw away (dispose) as directed by your pharmacist. The [place] Pollution Control [employer] has more information about safe disposal:  https://[email or web address].[email or web address].us/living-green/managing-unwanted-medications         Primary Care Provider Office Phone # Fax #    Godwin Kapoor [phone] [address]  [place] [address]        Equal Access to Services     [facility] ALEX : Hadkatie palacios hadmayrao Soomaali, waaxda luqadaha, qaybta kaalmada adeegyada, fabián pandyaliboriosav bowman. So [facility] [phone].    ATENCIÓN: Si habla español, tiene a torres disposición servicios gratuitos de asistencia lingüística. Hosea al [phone].    We comply with applicable federal civil rights laws and [place] laws. We do not discriminate on the basis of race, color, national origin, age, disability, sex, sexual orientation, or gender identity.            Thank you!     Thank you for choosing [facility] AND \[facility]\""  for your care. Our goal is always to provide you with excellent care. Hearing back from our patients is one way we can continue to improve our services. Please take a few minutes to complete the written survey that you may receive in the mail after your visit with us. Thank you!             Your Updated Medication List - Protect others around you: Learn how to safely use, store and throw away your medicines at www.disposemymeds.org.          This list is accurate as of 10/29/18 10:26 AM.  Always use your most recent med list.                   Brand Name Dispense Instructions for use Diagnosis    acetaminophen 500 MG tablet    TYLENOL     Take 1,000 mg by mouth every 6 hours as needed        aspirin-dipyridamole  MG per 12 hr capsule    AGGRENOX    180 capsule    Take 1 capsule by mouth 2 times daily    History of TIA (transient ischemic attack)       CALCIUM 500 + D 500-125 MG-UNIT Tabs   Generic drug:  Calcium Carbonate-Vitamin D      Take 1 tablet by mouth daily        losartan-hydrochlorothiazide 50-12.5 MG per tablet    HYZAAR    90 tablet    Take 1 tablet by mouth daily    Essential hypertension        magnesium hydroxide 400 MG/5ML suspension    MILK OF MAGNESIA     Take 15 mLs by mouth At Bedtime        MULTI-VITAMINS Tabs      Take 1 tablet by mouth daily        OCUVITE ADULT 50+ Caps      Take 1 capsule by mouth daily        sertraline 25 MG tablet    ZOLOFT    90 tablet    Take 1 tablet (25 mg) by mouth daily    Memory loss       traMADol 50 MG tablet    ULTRAM    90 tablet    Take 1 tablet (50 mg) by mouth every 6 hours as needed for severe pain    Primary osteoarthritis of left shoulder

## 2018-10-29 NOTE — NURSING NOTE
Coming ion for a f/u on her Left collar bone, arms and check both knees, swollen  Monisha Gamboa LPN on 10/29/2018 at 9:55 AM

## 2018-10-29 NOTE — PROGRESS NOTES
SUBJECTIVE:   Tenisha Cagle is a 85 year old female who presents to clinic today for the following health issues:    HPI  Follow up on clavicle fracture.  Says she has significant pains in both shoulders, back and knees.  Daughter adds on hips.  tylenol seems to help a little.  Has tramadol, can get relief all day with just one of these.  Gets loose stools often.  Felt to be IBS in the past.      Had an x-ray of her shoulder in August showing severe djd.  Knees then showed moderate djd changes.    Patient Active Problem List    Diagnosis Date Noted     Memory loss 05/25/2018     Priority: Medium     Atrial fibrillation with RVR (H) 01/25/2018     Priority: Medium     Gastroenteritis 01/25/2018     Priority: Medium     Hiatal hernia 01/17/2018     Priority: Medium     Overview:   large, mostly intrathoracic       Essential hypertension 01/17/2018     Priority: Medium     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/27/2017     Priority: Medium     Benign skin lesion of nose 09/27/2017     Priority: Medium     AK (actinic keratosis) 07/10/2017     Priority: Medium     SK (seborrheic keratosis) 07/10/2017     Priority: Medium     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) 06/15/2017     Priority: Medium     Abnormal weight loss 04/18/2016     Priority: Medium     Anemia 04/18/2016     Priority: Medium     Greater trochanteric bursitis of left hip 04/18/2016     Priority: Medium     Visual changes 04/18/2016     Priority: Medium     Osteoarthritis of spine with radiculopathy, lumbar region 02/03/2016     Priority: Medium     History of TIA (transient ischemic attack) 02/02/2016     Priority: Medium     Numbness and tingling of right leg 02/02/2016     Priority: Medium     Radicular leg pain 02/02/2016     Priority: Medium     Basal cell carcinoma of right cheek 11/18/2015     Priority: Medium     Sacroiliac joint pain 12/10/2014     Priority: Medium     Alvarado's cyst of knee 02/20/2014     Priority: Medium      Paresthesia of right leg 02/20/2014     Priority: Medium     Right knee DJD 02/20/2014     Priority: Medium     ACP (advance care planning) 01/02/2014     Priority: Medium     Cystocele 08/23/2013     Priority: Medium     Pancreatic mass 09/18/2012     Priority: Medium     Overview:   Possible, abnormal CT        Cerebral aneurysm, nonruptured 12/20/2011     Priority: Medium     Diverticulosis of colon 05/13/2011     Priority: Medium     Chronic lymphocytic leukemia (H) 04/14/2011     Priority: Medium     Overview:   WBC stable in the 30,000       Past Surgical History:   Procedure Laterality Date     CHOLECYSTECTOMY      No Comments Provided     COLONOSCOPY      2007,Sigmoid diverticulosis     ENDOSCOPY UPPER, COLONOSCOPY, COMBINED      5/5/11,Hiatal hernia, gastric gland hyperplasia in antrum     LAPAROSCOPIC TUBAL LIGATION      No Comments Provided     OTHER SURGICAL HISTORY      1986,205093,BIOPSY BREAST,Right     OTHER SURGICAL HISTORY      TAS737,PREMALIG/BENIGN SKIN LESION EXCISION,R side of nose, face and chest wall/Basal Cell Cancer Excision     TONSILLECTOMY, ADENOIDECTOMY, COMBINED      1954     Social History   Substance Use Topics     Smoking status: Never Smoker     Smokeless tobacco: Never Used     Alcohol use No     Current Outpatient Prescriptions   Medication Sig Dispense Refill     acetaminophen (TYLENOL) 500 MG tablet Take 1,000 mg by mouth every 6 hours as needed       aspirin-dipyridamole (AGGRENOX)  MG per 12 hr capsule Take 1 capsule by mouth 2 times daily 180 capsule 0     Calcium Carbonate-Vitamin D (CALCIUM 500 + D) 500-125 MG-UNIT TABS Take 1 tablet by mouth daily       losartan-hydrochlorothiazide (HYZAAR) 50-12.5 MG per tablet Take 1 tablet by mouth daily 90 tablet 3     magnesium hydroxide (MILK OF MAGNESIA) 400 MG/5ML suspension Take 15 mLs by mouth At Bedtime       Multiple Vitamin (MULTI-VITAMINS) TABS Take 1 tablet by mouth daily       Multiple Vitamins-Minerals (OCUVITE  ADULT 50+) CAPS Take 1 capsule by mouth daily       sertraline (ZOLOFT) 25 MG tablet Take 1 tablet (25 mg) by mouth daily 90 tablet 3     traMADol (ULTRAM) 50 MG tablet TAKE 1 TABLET BY MOUTH 2 TIMES DAILY AS NEEDED FOR PAIN OR SEVERE PAIN. 30 tablet 0     Allergies   Allergen Reactions     Lansoprazole Other (See Comments) and Unknown     Patient states that medication didn't work for her.  Daughter states she got delusional, water did not taste right  Other reaction(s): Other - Describe In Comment Field  Patient states that medication didn't work for her.     Lisinopril Cough       Review of Systems   Constitutional: Negative for fever and unexpected weight change.   Respiratory: Negative for cough.    Cardiovascular: Negative for chest pain.   Gastrointestinal: Positive for abdominal pain, constipation and diarrhea.   Musculoskeletal: Positive for arthralgias and back pain.   Psychiatric/Behavioral: Negative for sleep disturbance.        OBJECTIVE:     /68 (BP Location: Right arm, Patient Position: Sitting, Cuff Size: Adult Regular)  Resp 16  Wt 116 lb (52.6 kg)  BMI 22.28 kg/m2  Body mass index is 22.28 kg/(m^2).  Physical Exam   Constitutional: She is oriented to person, place, and time. She appears well-developed. No distress.   Cardiovascular: Normal rate, regular rhythm and normal heart sounds.  Exam reveals no gallop and no friction rub.    No murmur heard.  Pulmonary/Chest: Effort normal. No respiratory distress. She has no wheezes. She has no rales.   Abdominal: Soft. She exhibits no distension. There is no tenderness. There is no rebound and no guarding.   Musculoskeletal:   No pain on palpation of left clavicle.  Knees are without effusions.  Moderate genu valgus.   Neurological: She is alert and oriented to person, place, and time.   Skin: Skin is warm. No rash noted. She is not diaphoretic. No erythema.       Diagnostic Test Results:  none     ASSESSMENT/PLAN:         (M19.012) Primary  osteoarthritis of left shoulder  (primary encounter diagnosis)  Comment: severe.   Plan: given advanced age, and memory problems, will focus on supportive cares, with daily tramadol.  Daughter is spending most days with her and we talked about some better diet changes.    (S42.035D) Closed nondisplaced fracture of acromial end of left clavicle with routine healing, subsequent encounter  Comment: stable on exam.    Plan: follow up as needed on this.      (R19.5) Loose stools  Comment: intermittent.  Discussed with daughter vairous possible causes.  She then adds she eats Dinty Bell daily, uses prune juice and milk of mag daily.  I suspect this combination is triggering the stool changes.  Plan: follow up in 3 months or so,        Godwin Kapoor MD  [facility] AND [facility]

## 2018-10-30 ASSESSMENT — ANXIETY QUESTIONNAIRES: GAD7 TOTAL SCORE: 0

## 2018-11-06 ENCOUNTER — OFFICE VISIT (OUTPATIENT)
Dept: OBGYN | Facility: OTHER | Age: 83
End: 2018-11-06
Attending: OBSTETRICS & GYNECOLOGY
Payer: MEDICARE

## 2018-11-06 ENCOUNTER — TELEPHONE (OUTPATIENT)
Dept: FAMILY MEDICINE | Facility: OTHER | Age: 83
End: 2018-11-06

## 2018-11-06 VITALS
BODY MASS INDEX: 22.28 KG/M2 | DIASTOLIC BLOOD PRESSURE: 62 MMHG | WEIGHT: 116 LBS | SYSTOLIC BLOOD PRESSURE: 130 MMHG | HEART RATE: 80 BPM

## 2018-11-06 DIAGNOSIS — N81.11 CYSTOCELE, MIDLINE: ICD-10-CM

## 2018-11-06 DIAGNOSIS — M19.012 PRIMARY OSTEOARTHRITIS OF LEFT SHOULDER: ICD-10-CM

## 2018-11-06 DIAGNOSIS — R39.198 DIFFICULTY VOIDING: Primary | ICD-10-CM

## 2018-11-06 PROCEDURE — G0463 HOSPITAL OUTPT CLINIC VISIT: HCPCS | Mod: 25

## 2018-11-06 PROCEDURE — 57160 INSERT PESSARY/OTHER DEVICE: CPT | Performed by: OBSTETRICS & GYNECOLOGY

## 2018-11-06 PROCEDURE — G0463 HOSPITAL OUTPT CLINIC VISIT: HCPCS

## 2018-11-06 PROCEDURE — 99202 OFFICE O/P NEW SF 15 MIN: CPT | Mod: 25 | Performed by: OBSTETRICS & GYNECOLOGY

## 2018-11-06 RX ORDER — TRAMADOL HYDROCHLORIDE 50 MG/1
50 TABLET ORAL EVERY 6 HOURS PRN
Qty: 90 TABLET | Refills: 3 | Status: SHIPPED | OUTPATIENT
Start: 2018-11-06 | End: 2019-01-15

## 2018-11-06 ASSESSMENT — PAIN SCALES - GENERAL: PAINLEVEL: SEVERE PAIN (6)

## 2018-11-06 NOTE — PROGRESS NOTES
Tenisha is a very pleasant 85-year-old referred from Dr. Westfall because of some difficulty with urination.  States over the last 2-3 weeks on occasion she has had to place her finger vaginally to initiate the stream of urine.  When she begins to urinate she has no issues.  This does not happen all the time.  Notes a little bit of bulging vaginally when her symptoms are present.  Denies any bleeding or any pelvic pain.  Incontinence is not a big issue for her.  No previous gynecologic surgery other than tubal ligation.  New    Problem list is extensive and is reviewed today on epic    Medications reviewed on epic    Allergies reviewed on epic    Social history , daughter accompanies her    GYN  and GI review of systems otherwise negative    Physical exam  Patient is alert orientated x3 very appropriate.  Blood pressure 130/62 pulse 80 weight 116  Abdomen is soft thin benign  Pelvic:  External negative mons labia majora minora perineal areas.  Vaginal opening is fairly narrowed, than 2 finger breaths.  Valsalva there is grade 1-2 small cystocele and rectocele.  Bimanual is unremarkable    Discussed the above situation and did recommend a trial of a pessary.  Patient agreed.  She was sized and fitted for a #0 diaphragm pessary.    Assessment: Mild to moderate cystocele and rectocele occasional difficulty initiating micturition she is responded by splinting    Plan: We will try the pessary to see if this improves her ability to avoid.  We will plan to see her back in 2 weeks for a follow-up

## 2018-11-06 NOTE — MR AVS SNAPSHOT
"              After Visit Summary   11/6/2018    Tenisha Cagle    MRN: [id number]           Patient Information     Date Of Birth          9/7/1933        Visit Information        Provider Department      11/6/2018 3:00 PM Franki Garibay MD [facility]        Today's Diagnoses     Difficulty voiding    -  1    Cystocele, midline           Follow-ups after your visit        Your next 10 appointments already scheduled     Nov 21, 2018 11:00 AM CST   SHORT with Franki Garibay MD   [facility] ([facility])    [address]  [place] [address] [phone]              Who to contact     If you have questions or need follow up information about today's clinic visit or your schedule please contact [facility] directly at [phone].  Normal or non-critical lab and imaging results will be communicated to you by Octopus Deployhart, letter or phone within 4 business days after the clinic has received the results. If you do not hear from us within 7 days, please contact the clinic through Octopus Deployhart or phone. If you have a critical or abnormal lab result, we will notify you by phone as soon as possible.  Submit refill requests through GoodClic or call your pharmacy and they will forward the refill request to us. Please allow 3 business days for your refill to be completed.          Additional Information About Your Visit        Octopus Deployhart Information     GoodClic lets you send messages to your doctor, view your test results, renew your prescriptions, schedule appointments and more. To sign up, go to [email or web address] . Click on \"Log in\" on the left side of the screen, which will take you to the Welcome page. Then click on \"Sign up Now\" on the right side of the page.     You will be asked to enter the access code listed below, as well as some personal information. Please follow the directions to create your username and password.   "   Your access code is: [email or web address]  Expires: 2018  2:20 PM     Your access code will  in 90 days. If you need help or a new code, please call your [facility] or [phone].        Care EveryWhere ID     This is your Care EveryWhere ID. This could be used by other organizations to access your [place] medical records  [id number]        Your Vitals Were     Pulse Breastfeeding? BMI (Body Mass Index)             80 No 22.28 kg/m2          Blood Pressure from Last 3 Encounters:   18 130/62   10/29/18 138/68   18 136/66    Weight from Last 3 Encounters:   18 52.6 kg (116 lb)   10/29/18 52.6 kg (116 lb)   18 53.2 kg (117 lb 6 oz)              We Performed the Following     FIT/INSERT INTRAVAG SUPPORT DEVICE/PESSARY          Where to get your medicines      Some of these will need a paper prescription and others can be bought over the counter.  Ask your nurse if you have questions.     Bring a paper prescription for each of these medications     traMADol 50 MG tablet          Primary Care Provider Office Phone # Fax #    Godwin Kapoor -[phone]       [address] [facility] [address]        Equal Access to Services     RODDY JOHNSON : Hadii sheila yapo Sokalaniali, waaxda luqadaha, qaybta kaalmada adeegyada, fabián bowman. So [facility] [phone].    ATENCIÓN: Si habla español, tiene a torres disposición servicios gratuitos de asistencia lingüística. Llame al [phone].    We comply with applicable federal civil rights laws and [place] laws. We do not discriminate on the basis of race, color, national origin, age, disability, sex, sexual orientation, or gender identity.            Thank you!     Thank you for choosing [facility] AND [facility]  for your care. Our goal is always to provide you with excellent care. Hearing back from our patients is one way we can continue to improve our services. Please take a few  minutes to complete the written survey that you may receive in the mail after your visit with us. Thank you!             Your Updated Medication List - Protect others around you: Learn how to safely use, store and throw away your medicines at www.disposemymeds.org.          This list is accurate as of 11/6/18  3:44 PM.  Always use your most recent med list.                   Brand Name Dispense Instructions for use Diagnosis    acetaminophen 500 MG tablet    TYLENOL     Take 1,000 mg by mouth every 6 hours as needed        aspirin-dipyridamole  MG per 12 hr capsule    AGGRENOX    180 capsule    Take 1 capsule by mouth 2 times daily    History of TIA (transient ischemic attack)       CALCIUM 500 + D 500-125 MG-UNIT Tabs   Generic drug:  Calcium Carbonate-Vitamin D      Take 1 tablet by mouth daily        losartan-hydrochlorothiazide 50-12.5 MG per tablet    HYZAAR    90 tablet    Take 1 tablet by mouth daily    Essential hypertension       magnesium hydroxide 400 MG/5ML suspension    MILK OF MAGNESIA     Take 15 mLs by mouth At Bedtime        MULTI-VITAMINS Tabs      Take 1 tablet by mouth daily        OCUVITE ADULT 50+ Caps      Take 1 capsule by mouth daily        sertraline 25 MG tablet    ZOLOFT    90 tablet    Take 1 tablet (25 mg) by mouth daily    Memory loss       traMADol 50 MG tablet    ULTRAM    90 tablet    Take 1 tablet (50 mg) by mouth every 6 hours as needed for severe pain    Primary osteoarthritis of left shoulder

## 2018-11-06 NOTE — TELEPHONE ENCOUNTER
Daughter states mom was in and stated needed her Tramadol filled and now [facility] sent for some to be filled-states her mom is in pain and would like to get these for her today. Would like a call today,please.

## 2018-11-06 NOTE — TELEPHONE ENCOUNTER
The original was sent to [facility], the daughter called them and she was told that the Rx well be sent to her by 11/9. Tenisha is wanting a small amount to hold her over until the Rx comes from Evelyn Gamboa LPN on 11/6/2018 at 1:38 PM

## 2018-11-06 NOTE — TELEPHONE ENCOUNTER
On 10/29 she got #90, per our records.  Find out if this went through?  Has she used this up already?  Pt has early dementia, so daughter coordinates her care.    Godwin Kapoor MD on 11/6/2018 at 1:33 PM

## 2018-11-06 NOTE — NURSING NOTE
Chief Complaint   Patient presents with     Consult     Urinary issues      States is having troubles urinating unless she sticks her finger in the vaginal area.   Coby Bocanegra LPN........................11/6/2018  3:06 PM     Medication Reconciliation: completed   Coby Bocanegra LPN  11/6/2018 3:04 PM

## 2018-11-19 ENCOUNTER — HOSPITAL ENCOUNTER (OUTPATIENT)
Dept: GENERAL RADIOLOGY | Facility: OTHER | Age: 83
Discharge: HOME | End: 2018-11-19
Attending: PHYSICIAN ASSISTANT | Admitting: PHYSICIAN ASSISTANT
Payer: MEDICARE

## 2018-11-19 ENCOUNTER — OFFICE VISIT (OUTPATIENT)
Dept: FAMILY MEDICINE | Facility: OTHER | Age: 83
End: 2018-11-19
Attending: PHYSICIAN ASSISTANT
Payer: MEDICARE

## 2018-11-19 ENCOUNTER — HOSPITAL ENCOUNTER (OUTPATIENT)
Dept: GENERAL RADIOLOGY | Facility: OTHER | Age: 83
End: 2018-11-19
Attending: PHYSICIAN ASSISTANT
Payer: MEDICARE

## 2018-11-19 VITALS
SYSTOLIC BLOOD PRESSURE: 132 MMHG | WEIGHT: 116 LBS | HEART RATE: 72 BPM | TEMPERATURE: 98 F | BODY MASS INDEX: 22.28 KG/M2 | RESPIRATION RATE: 16 BRPM | DIASTOLIC BLOOD PRESSURE: 70 MMHG

## 2018-11-19 DIAGNOSIS — R10.84 ABDOMINAL PAIN, GENERALIZED: ICD-10-CM

## 2018-11-19 DIAGNOSIS — K59.01 SLOW TRANSIT CONSTIPATION: Primary | ICD-10-CM

## 2018-11-19 DIAGNOSIS — M25.512 ACUTE PAIN OF LEFT SHOULDER: ICD-10-CM

## 2018-11-19 DIAGNOSIS — S42.035D CLOSED NONDISPLACED FRACTURE OF ACROMIAL END OF LEFT CLAVICLE WITH ROUTINE HEALING, SUBSEQUENT ENCOUNTER: ICD-10-CM

## 2018-11-19 LAB
ALBUMIN SERPL-MCNC: 3.8 G/DL (ref 3.5–5.7)
ALP SERPL-CCNC: 43 U/L (ref 34–104)
ALT SERPL W P-5'-P-CCNC: 8 U/L (ref 7–52)
AMYLASE SERPL-CCNC: 26 U/L (ref 29–103)
ANION GAP SERPL CALCULATED.3IONS-SCNC: 6 MMOL/L (ref 3–14)
AST SERPL W P-5'-P-CCNC: 16 U/L (ref 13–39)
BASOPHILS # BLD AUTO: 0.1 10E9/L (ref 0–0.2)
BASOPHILS NFR BLD AUTO: 0.6 %
BILIRUB SERPL-MCNC: 0.3 MG/DL (ref 0.3–1)
BUN SERPL-MCNC: 36 MG/DL (ref 7–25)
CALCIUM SERPL-MCNC: 9.2 MG/DL (ref 8.6–10.3)
CHLORIDE SERPL-SCNC: 101 MMOL/L (ref 98–107)
CO2 SERPL-SCNC: 30 MMOL/L (ref 21–31)
CREAT SERPL-MCNC: 1.34 MG/DL (ref 0.6–1.2)
DIFFERENTIAL METHOD BLD: ABNORMAL
EOSINOPHIL # BLD AUTO: 0.7 10E9/L (ref 0–0.7)
EOSINOPHIL NFR BLD AUTO: 7.2 %
ERYTHROCYTE [DISTWIDTH] IN BLOOD BY AUTOMATED COUNT: 12.9 % (ref 10–15)
GFR SERPL CREATININE-BSD FRML MDRD: 38 ML/MIN/1.7M2
GLUCOSE SERPL-MCNC: 99 MG/DL (ref 70–105)
HCT VFR BLD AUTO: 31.3 % (ref 35–47)
HGB BLD-MCNC: 9.9 G/DL (ref 11.7–15.7)
IMM GRANULOCYTES # BLD: 0 10E9/L (ref 0–0.4)
IMM GRANULOCYTES NFR BLD: 0.4 %
LIPASE SERPL-CCNC: 10 U/L (ref 11–82)
LYMPHOCYTES # BLD AUTO: 2.9 10E9/L (ref 0.8–5.3)
LYMPHOCYTES NFR BLD AUTO: 29.2 %
MCH RBC QN AUTO: 28.8 PG (ref 26.5–33)
MCHC RBC AUTO-ENTMCNC: 31.6 G/DL (ref 31.5–36.5)
MCV RBC AUTO: 91 FL (ref 78–100)
MONOCYTES # BLD AUTO: 0.9 10E9/L (ref 0–1.3)
MONOCYTES NFR BLD AUTO: 8.7 %
NEUTROPHILS # BLD AUTO: 5.3 10E9/L (ref 1.6–8.3)
NEUTROPHILS NFR BLD AUTO: 53.9 %
PLATELET # BLD AUTO: 341 10E9/L (ref 150–450)
POTASSIUM SERPL-SCNC: 4.2 MMOL/L (ref 3.5–5.1)
PROT SERPL-MCNC: 6.4 G/DL (ref 6.4–8.9)
RBC # BLD AUTO: 3.44 10E12/L (ref 3.8–5.2)
SODIUM SERPL-SCNC: 137 MMOL/L (ref 134–144)
WBC # BLD AUTO: 9.8 10E9/L (ref 4–11)

## 2018-11-19 PROCEDURE — 99214 OFFICE O/P EST MOD 30 MIN: CPT | Performed by: PHYSICIAN ASSISTANT

## 2018-11-19 PROCEDURE — G0463 HOSPITAL OUTPT CLINIC VISIT: HCPCS | Mod: 25

## 2018-11-19 PROCEDURE — 74019 RADEX ABDOMEN 2 VIEWS: CPT

## 2018-11-19 PROCEDURE — G0463 HOSPITAL OUTPT CLINIC VISIT: HCPCS

## 2018-11-19 PROCEDURE — 36415 COLL VENOUS BLD VENIPUNCTURE: CPT | Performed by: PHYSICIAN ASSISTANT

## 2018-11-19 PROCEDURE — 82150 ASSAY OF AMYLASE: CPT | Performed by: PHYSICIAN ASSISTANT

## 2018-11-19 PROCEDURE — 83690 ASSAY OF LIPASE: CPT | Performed by: PHYSICIAN ASSISTANT

## 2018-11-19 PROCEDURE — 73000 X-RAY EXAM OF COLLAR BONE: CPT | Mod: LT

## 2018-11-19 PROCEDURE — 85025 COMPLETE CBC W/AUTO DIFF WBC: CPT | Performed by: PHYSICIAN ASSISTANT

## 2018-11-19 PROCEDURE — 80053 COMPREHEN METABOLIC PANEL: CPT | Performed by: PHYSICIAN ASSISTANT

## 2018-11-19 RX ORDER — LACTULOSE 10 G/15ML
20 SOLUTION ORAL 2 TIMES DAILY PRN
Qty: 1000 ML | Refills: 11 | Status: SHIPPED | OUTPATIENT
Start: 2018-11-19 | End: 2019-01-01

## 2018-11-19 NOTE — PROGRESS NOTES
"Nursing Notes:   Sandy Dean LPN  11/19/2018 10:31 AM  Signed  Patient presents to clinic with c/o stomach pain, states thinks has worsened with tramadol. Right knee swollen, left hip pain  . Fell August 29. No appetite.  Susi Jermaine VANN ...... 11/19/2018 10:24 AM    Chief Complaint   Patient presents with     GI Problem       Initial /70 (BP Location: Right arm, Patient Position: Sitting, Cuff Size: Adult Regular)  Pulse 72  Temp 98  F (36.7  C)  Resp 16  Wt 116 lb (52.6 kg)  BMI 22.28 kg/m2 Estimated body mass index is 22.28 kg/(m^2) as calculated from the following:    Height as of 6/26/18: 5' 0.5\" (1.537 m).    Weight as of this encounter: 116 lb (52.6 kg).  Medication Reconciliation: complete    Sandy Dean LPN      HPI:    Tenisha Cagle is a 85 year old female who presents for stomach pain, states thinks has worsened with tramadol. Right knee swollen, left hip pain. Fell August 29.  During the fall she fractured her left clavicle.  Her left shoulder has been painful.  Wondering about getting a repeat x-ray for monitoring.    No appetite.  Diarrhea after using tramadol. Then used 1/2 imodium.  She is getting a bad stomach ache in the afternoon. Softer stools. Decreased appetite. Last took 2 tramadol yesterday.  Stomach hurt worse last night.  Back pain stable.  Whole belly hurts. Tx milk of magnesia at night. Prune juice in the morning. Tx 1/2 pill imodium daily.   Heartburn. Tx rolaids yesterday.  No recent fevers, chills, cough or cold symptoms, headaches, problems walking or talking.  No dysuria, frequency, urgency, blood in stool or urine.    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)     4/14/2011     Diaphragmatic hernia without obstruction or gangrene     large, mostly intrathoracic     " Diverticulosis of large intestine without perforation or abscess without bleeding     2011     Dysthymic disorder     No Comments Provided     Essential (primary) hypertension     No Comments Provided     Female climacteric state     Menopausal age 56     Gastritis without bleeding     03,Treated with PrevPac     Nonruptured cerebral aneurysm     2011     Osteoarthritis     No Comments Provided     Other fecal abnormalities     Recurrent loose stool     Other lymphoid leukemia not having achieved remission (H)     CLL, stable WBC 30,000     Other specified bacterial intestinal infections     2003, H. pylori gastritis treated with Prevpac     Other specified enthesopathies of unspecified lower limb, excluding foot     No Comments Provided     Peptic ulcer without hemorrhage or perforation     No Comments Provided     Personal history of other diseases of the digestive system (CODE)     2011     Personal history of other medical treatment (CODE)     , vaginal deliveries     Pure hypercholesterolemia     No Comments Provided     Sepsis (H)     child,Hospitalized as a child for blood poisoning     Sleep disorder     2011     Transient cerebral ischemic attack     2011       Past Surgical History:   Procedure Laterality Date     CHOLECYSTECTOMY      No Comments Provided     COLONOSCOPY      ,Sigmoid diverticulosis     ENDOSCOPY UPPER, COLONOSCOPY, COMBINED      11,Hiatal hernia, gastric gland hyperplasia in antrum     LAPAROSCOPIC TUBAL LIGATION      No Comments Provided     OTHER SURGICAL HISTORY      ,2050,BIOPSY BREAST,Right     OTHER SURGICAL HISTORY      [id number],PREMALIG/BENIGN SKIN LESION EXCISION,R side of nose, face and chest wall/Basal Cell Cancer Excision     TONSILLECTOMY, ADENOIDECTOMY, COMBINED             Family History   Problem Relation Age of Onset     Other - See Comments Mother      Stroke,Had a CVA age 85     Cancer Father      Cancer,Brain  tumor, age 56     Blood Disease Sister      Blood Disease,Leukemia and     Cancer Sister      Cancer, kidney cancer     Blood Disease Sister      Blood Disease,Chronic lymphocytic leukemia       Social History     Social History     Marital status:      Spouse name: N/A     Number of children: N/A     Years of education: N/A     Occupational History     Not on file.     Social History Main Topics     Smoking status: Never Smoker     Smokeless tobacco: Never Used     Alcohol use No     Drug use: No     Sexual activity: Not Currently     Other Topics Concern     Not on file     Social History Narrative    Nonsmoker. .  Lives alone in a house.   Continues to drive.   2 grown kids, one in [place]       Current Outpatient Prescriptions   Medication Sig Dispense Refill     acetaminophen (TYLENOL) 500 MG tablet Take 1,000 mg by mouth every 6 hours as needed       aspirin-dipyridamole (AGGRENOX)  MG per 12 hr capsule Take 1 capsule by mouth 2 times daily 180 capsule 0     Calcium Carbonate-Vitamin D (CALCIUM 500 + D) 500-125 MG-UNIT TABS Take 1 tablet by mouth daily       lactulose (CHRONULAC) 10 GM/15ML solution Take 30 mLs (20 g) by mouth 2 times daily as needed for constipation 1000 mL 11     losartan-hydrochlorothiazide (HYZAAR) 50-12.5 MG per tablet Take 1 tablet by mouth daily 90 tablet 3     magnesium hydroxide (MILK OF MAGNESIA) 400 MG/5ML suspension Take 15 mLs by mouth At Bedtime       Multiple Vitamin (MULTI-VITAMINS) TABS Take 1 tablet by mouth daily       Multiple Vitamins-Minerals (OCUVITE ADULT 50+) CAPS Take 1 capsule by mouth daily       sertraline (ZOLOFT) 25 MG tablet Take 1 tablet (25 mg) by mouth daily 90 tablet 3     traMADol (ULTRAM) 50 MG tablet Take 1 tablet (50 mg) by mouth every 6 hours as needed for severe pain 90 tablet 3       Allergies   Allergen Reactions     Lansoprazole Other (See Comments) and Unknown     Patient states that medication didn't work for her.  Daughter  states she got delusional, water did not taste right  Other reaction(s): Other - Describe In Comment Field  Patient states that medication didn't work for her.     Lisinopril Cough       REVIEW OF SYSTEMS:  Refer to HPI.    EXAM:   Vitals:    /70 (BP Location: Right arm, Patient Position: Sitting, Cuff Size: Adult Regular)  Pulse 72  Temp 98  F (36.7  C)  Resp 16  Wt 116 lb (52.6 kg)  BMI 22.28 kg/m2    General Appearance: Pleasant, alert, appropriate appearance for age. No acute distress  Ear Exam: Normal TM's bilaterally, normal grey, and translucent. Normal auditory canals and external ears. Non-tender.   OroPharynx Exam: Dental hygiene adequate. Normal buccal mucosa. Normal pharynx.  Neck Exam:  Supple, no masses or nodes.  Chest/Respiratory Exam: Normal chest wall and respirations. Clear to auscultation.  Cardiovascular Exam: Regular rate and rhythm. S1, S2, no murmur, click, gallop, or rubs.  Gastrointestinal Exam: Soft, no masses or organomegaly. Normal BS x 4.  Mild abdominal pain with palpation throughout the abdomen.  No rebound tenderness or guarding appreciated.  No CVA tenderness to palpation.  Musculoskeletal Exam: Back is straight and non-tender.  Left anterior and superior shoulder pain with palpation.  Left lateral clavicle pain with palpation.  No bruising or swelling appreciated.  Patient has pain with left shoulder abduction at 90 degrees.  Otherwise unremarkable internal and external rotation.  Skin: no rash or abnormalities  Neurologic Exam: Nonfocal, normal gross motor, tone coordination and no tremor.  Psychiatric Exam: Alert and oriented - appropriate affect.    PHQ Depression Screen  PHQ-9 SCORE 9/15/2016 5/15/2017 8/30/2018   Total Score 0 0 0       ASSESSMENT AND PLAN:    1. Slow transit constipation    2. Abdominal pain, generalized    3. Closed nondisplaced fracture of acromial end of left clavicle with routine healing, subsequent encounter    4. Acute pain of left shoulder           Completed left clavicle xray.  I personally reviewed the xray. I found no fracture appreciated upon initial read of xray.  Final read pending by radiology.    Left clavicle fracture:  Encouraged to take tylenol for relief up to 4 times per day.  Encouraged rest and stretching.  Encouraged to use ice or heat 15 minutes at a time several times per day to decrease pain. Return to clinic in 1-2 weeks as necessary for persistent pain. Return to clinic with any change or worsening of symptoms.   Referred to physical therapy for a consult.       Abdominal pain: Complete electrolyte concerns including CBC, CMP, amylase, lipase.  Patient was unable to give urine sample in the clinic.  Encouraged to return if her symptoms are persistent or worsening over the next few days.    Constipation - Started on lactulose for constipation over the next few days.   Encouraged increase of water and apple, pear and prune juice to decrease symptoms and discomfort.  Use age appropriate over the counter medications such as stool softeners or miralax for constipation relief.  Encouraged soft stools. Return to clinic with change/worsening of symptoms.     Try the BRAT diet (bread, bananas, rice, applesauce, tea, toast).  This is a very bland diet which should not irritate your colon.  Hold off on spicy foods, red sauces, mexican or chinese food.    Call or return to clinic as needed if your symptoms worsen or fail to improve as anticipated.     If the pain does not begin improving, localizes to the right lower belly, there is increased fever, or other progression of symptoms, return for reassessment.    Should I see a doctor or nurse about my stomach ache? -- Most people do not need to see a doctor or nurse for a stomach ache. But you should see your doctor or nurse if:  ?You have bloody bowel movements, diarrhea, or vomiting  ?Your pain is severe and lasts more than an hour or comes and goes for more than 24 hours  ?You cannot eat or  drink for hours  ?You have a fever higher than 102 F (39 C)  ?You lose a lot of weight without trying to, or lose interest in food        Greater than 25 minutes were spent in counseling and coordination of care.       Patient Instructions     Constipation - Started on lactulose for constipation over the next few days.   Encouraged increase of water and apple, pear and prune juice to decrease symptoms and discomfort.  Use age appropriate over the counter medications such as stool softeners or miralax for constipation relief.  Encouraged soft stools. Return to clinic with change/worsening of symptoms.     Try the BRAT diet (bread, bananas, rice, applesauce, tea, toast).  This is a very bland diet which should not irritate your colon.  Hold off on spicy foods, red sauces, mexican or chinese food.    Call or return to clinic as needed if your symptoms worsen or fail to improve as anticipated.     If the pain does not begin improving, localizes to the right lower belly, there is increased fever, or other progression of symptoms, return for reassessment.    Should I see a doctor or nurse about my stomach ache? -- Most people do not need to see a doctor or nurse for a stomach ache. But you should see your doctor or nurse if:  ?You have bloody bowel movements, diarrhea, or vomiting  ?Your pain is severe and lasts more than an hour or comes and goes for more than 24 hours  ?You cannot eat or drink for hours  ?You have a fever higher than 102 F (39 C)  ?You lose a lot of weight without trying to, or lose interest in food        Left shoulder pain:  Encouraged to take tylenol for relief up to 4 times per day.  Encouraged rest and stretching.  Encouraged to use ice or heat 15 minutes at a time several times per day to decrease pain. Return to clinic in 1-2 weeks as necessary for persistent pain. Return to clinic with any change or worsening of symptoms.   Referred to physical therapy for a consult.       Pendulum  "(Flexibility)    1. Lean over next to a table, with your left arm supporting your weight on the table.  2. Relax your right arm and let it hang straight down.  3. Slowly begin to swing your right arm in a small [place]. Gradually make the [place] bigger if you can. Change direction after 1 minute of motion.  4. Next, swing your right arm backward and forward. Then move it side to side. Change direction after 1 minute of motion.  5. Repeat these movements for about 5 minutes.  6. Do this exercise 3 times a day, or as often as instructed.  Date Last Reviewed: 3/10/2016    5499-9190 [employer]. [address] [place] [address], [place] [address]. All rights reserved. This information is not intended as a substitute for professional medical care. Always follow your healthcare professional's instructions.        \"Reaching Up\" for Shoulder Flexibility    This stretch can help restore shoulder flexibility and relieve pain over time. When stretching, be sure to breathe deeply. And follow any special instructions from your healthcare provider or therapist.  1. Raise the hand on the side you want to stretch as high as you can. Then grasp a stable surface, such as a bookcase or a doorframe, with the same hand.  2. Keeping your arm straight, lower your body by bending your knees. Stop when you feel the stretch in the shoulder. Try to hold the stretch for 5 seconds.  3. Work up to doing 3 sets of this stretch, 3 times a day. Work up to holding the stretch for 30 to 60 seconds.  Note: Your back should remain straight. To enhance the stretch over time, try to bend your knees lower. Or, raise your arm higher at the start of the stretch.     Frozen shoulder  Frozen shoulder is another name for adhesive capsulitis. This causes restricted movement in the shoulder. If you have frozen shoulder, this stretch may cause mild pain, especially when you first get started. A few months may pass before you achieve the results you want. But " once your shoulder heals, it rarely becomes frozen again. So stick to your stretching program. If you have any questions, ask your healthcare provider.   Date Last Reviewed: 2/1/2018 2000-2018 [employer]. [address] [place] [id number]. All rights reserved. This information is not intended as a substitute for professional medical care. Always follow your healthcare professional's instructions.        Shoulder Flexion (Flexibility)    1. Sit in a chair sideways next to a table. Lay your right arm on the table, pointed forward.  2. Slowly lean forward.  Slide your arm forward on the table. Feel the stretch in your right shoulder.  3. Hold for 5 seconds. Slowly sit back up.  4. Repeat 5 times.  5. Repeat this exercise 3 times a day, or as instructed.  Date Last Reviewed: 3/10/2016    3737-7798 [employer]. [address] [place] [address]. All rights reserved. This information is not intended as a substitute for professional medical care. Always follow your healthcare professional's instructions.        Treating Frozen Shoulder: Preparing for Your Exercises  Doing special exercises is the first way to treat frozen shoulder. You may see a physical therapist who can help you learn to do them. If these exercises don t help, you may need further medical treatment.  Shoulder stretches    Doing stretches is often the best way to treat frozen shoulder. Stretching each day can help lessen the pain and restore shoulder flexibility. But it often takes a significant amount of time before you notice results. Try to be patient.  To warm up, do the  pendulum.  While standing, let the hand on your frozen side dangle freely as you hold the back of a chair or a table top with your other hand. Slowly make circles and side-to-side motions with the frozen arm.  Physical therapy  Your healthcare provider may refer you to physical therapy. This hands-on care helps you learn how to do  stretching exercises at home. A physical therapist may also work on restoring your shoulder flexibility. To do this, he or she may gently stretch and move your frozen shoulder.  Tips for shoulder stretches    Anti-inflammatory medicines or steroid injections can help relieve pain. This may help you do your stretches. Your healthcare provider can tell you more.    Mild and moist heat can help loosen your shoulder. Try taking a warm shower or bath just before you stretch.    A cold or ice pack can limit pain and swelling. Try icing your shoulder for a few minutes after you do your stretches.  Date Last Reviewed: 5/1/2018 2000-2018 [employer]. [address] [place] [address]. All rights reserved. This information is not intended as a substitute for professional medical care. Always follow your healthcare professional's instructions.             Danna Torres PA-C..................11/19/2018 10:31 AM

## 2018-11-19 NOTE — MR AVS SNAPSHOT
After Visit Summary   11/19/2018    Tenisha Cagle    MRN: [id number]           Patient Information     Date Of Birth          9/7/1933        Visit Information        Provider Department      11/19/2018 10:15 AM Danna Torres PA-C [facility]        Today's Diagnoses     Abdominal pain, generalized    -  1    Closed nondisplaced fracture of acromial end of left clavicle with routine healing, subsequent encounter        Acute pain of left shoulder        Slow transit constipation          Care Instructions    Constipation - Started on lactulose for constipation over the next few days.   Encouraged increase of water and apple, pear and prune juice to decrease symptoms and discomfort.  Use age appropriate over the counter medications such as stool softeners or miralax for constipation relief.  Encouraged soft stools. Return to clinic with change/worsening of symptoms.     Try the BRAT diet (bread, bananas, rice, applesauce, tea, toast).  This is a very bland diet which should not irritate your colon.  Hold off on spicy foods, red sauces, mexican or chinese food.    Call or return to clinic as needed if your symptoms worsen or fail to improve as anticipated.     If the pain does not begin improving, localizes to the right lower belly, there is increased fever, or other progression of symptoms, return for reassessment.    Should I see a doctor or nurse about my stomach ache? -- Most people do not need to see a doctor or nurse for a stomach ache. But you should see your doctor or nurse if:  ?You have bloody bowel movements, diarrhea, or vomiting  ?Your pain is severe and lasts more than an hour or comes and goes for more than 24 hours  ?You cannot eat or drink for hours  ?You have a fever higher than 102 F (39 C)  ?You lose a lot of weight without trying to, or lose interest in food        Left shoulder pain:  Encouraged to take tylenol for relief up to 4 times per day.  Encouraged  "rest and stretching.  Encouraged to use ice or heat 15 minutes at a time several times per day to decrease pain. Return to clinic in 1-2 weeks as necessary for persistent pain. Return to clinic with any change or worsening of symptoms.   Referred to physical therapy for a consult.       Pendulum (Flexibility)    1. Lean over next to a table, with your left arm supporting your weight on the table.  2. Relax your right arm and let it hang straight down.  3. Slowly begin to swing your right arm in a small [place]. Gradually make the [place] bigger if you can. Change direction after 1 minute of motion.  4. Next, swing your right arm backward and forward. Then move it side to side. Change direction after 1 minute of motion.  5. Repeat these movements for about 5 minutes.  6. Do this exercise 3 times a day, or as often as instructed.  Date Last Reviewed: 3/10/2016    3753-8075 [employer]. [address] [place] [address]. All rights reserved. This information is not intended as a substitute for professional medical care. Always follow your healthcare professional's instructions.        \"Reaching Up\" for Shoulder Flexibility    This stretch can help restore shoulder flexibility and relieve pain over time. When stretching, be sure to breathe deeply. And follow any special instructions from your healthcare provider or therapist.  1. Raise the hand on the side you want to stretch as high as you can. Then grasp a stable surface, such as a bookcase or a doorframe, with the same hand.  2. Keeping your arm straight, lower your body by bending your knees. Stop when you feel the stretch in the shoulder. Try to hold the stretch for 5 seconds.  3. Work up to doing 3 sets of this stretch, 3 times a day. Work up to holding the stretch for 30 to 60 seconds.  Note: Your back should remain straight. To enhance the stretch over time, try to bend your knees lower. Or, raise your arm higher at the start of the " stretch.     Frozen shoulder  Frozen shoulder is another name for adhesive capsulitis. This causes restricted movement in the shoulder. If you have frozen shoulder, this stretch may cause mild pain, especially when you first get started. A few months may pass before you achieve the results you want. But once your shoulder heals, it rarely becomes frozen again. So stick to your stretching program. If you have any questions, ask your healthcare provider.   Date Last Reviewed: 2/1/2018 2000-2018 [employer]. [address] [place] [address]. All rights reserved. This information is not intended as a substitute for professional medical care. Always follow your healthcare professional's instructions.        Shoulder Flexion (Flexibility)    1. Sit in a chair sideways next to a table. Lay your right arm on the table, pointed forward.  2. Slowly lean forward.  Slide your arm forward on the table. Feel the stretch in your right shoulder.  3. Hold for 5 seconds. Slowly sit back up.  4. Repeat 5 times.  5. Repeat this exercise 3 times a day, or as instructed.  Date Last Reviewed: 3/10/2016    6317-9046 [employer]. [address] [place] [address]. All rights reserved. This information is not intended as a substitute for professional medical care. Always follow your healthcare professional's instructions.        Treating Frozen Shoulder: Preparing for Your Exercises  Doing special exercises is the first way to treat frozen shoulder. You may see a physical therapist who can help you learn to do them. If these exercises don t help, you may need further medical treatment.  Shoulder stretches    Doing stretches is often the best way to treat frozen shoulder. Stretching each day can help lessen the pain and restore shoulder flexibility. But it often takes a significant amount of time before you notice results. Try to be patient.  To warm up, do the  pendulum.  While standing, let  the hand on your frozen side dangle freely as you hold the back of a chair or a table top with your other hand. Slowly make circles and side-to-side motions with the frozen arm.  Physical therapy  Your healthcare provider may refer you to physical therapy. This hands-on care helps you learn how to do stretching exercises at home. A physical therapist may also work on restoring your shoulder flexibility. To do this, he or she may gently stretch and move your frozen shoulder.  Tips for shoulder stretches    Anti-inflammatory medicines or steroid injections can help relieve pain. This may help you do your stretches. Your healthcare provider can tell you more.    Mild and moist heat can help loosen your shoulder. Try taking a warm shower or bath just before you stretch.    A cold or ice pack can limit pain and swelling. Try icing your shoulder for a few minutes after you do your stretches.  Date Last Reviewed: 5/1/2018 2000-2018 [employer]. [address] [place] [address]. All rights reserved. This information is not intended as a substitute for professional medical care. Always follow your healthcare professional's instructions.                Follow-ups after your visit        Additional Services     PHYSICAL THERAPY REFERRAL       If you have not heard from the scheduling office within 2 business days, please call [phone] for all locations, with the exception of [place], please call [phone] and [place], please call [phone].    Please be aware that coverage of these services is subject to the terms and limitations of your health insurance plan.  Call member services at your health plan with any benefit or coverage questions.                  Follow-up notes from your care team     Return if symptoms worsen or fail to improve.      Your next 10 appointments already scheduled     Nov 21, 2018 11:00 AM SALEEM MONROE with Franki Garibay MD   [facility]  "([facility])    [address]  [place] [address]   [phone]              Future tests that were ordered for you today     Open Future Orders        Priority Expected Expires Ordered    PHYSICAL THERAPY REFERRAL Routine  2019    XR Clavicle Left 2 Views Routine 2018    XR Abdomen 2 Views Routine 2018            Who to contact     If you have questions or need follow up information about today's clinic visit or your schedule please contact [facility] directly at [phone].  Normal or non-critical lab and imaging results will be communicated to you by K[employer], letter or phone within 4 business days after the clinic has received the results. If you do not hear from us within 7 days, please contact the clinic through KLab[employer] or phone. If you have a critical or abnormal lab result, we will notify you by phone as soon as possible.  Submit refill requests through [employer] or call your pharmacy and they will forward the refill request to us. Please allow 3 business days for your refill to be completed.          Additional Information About Your Visit        MyChart Information     [employer] lets you send messages to your doctor, view your test results, renew your prescriptions, schedule appointments and more. To sign up, go to [email or web address] . Click on \"Log in\" on the left side of the screen, which will take you to the Welcome page. Then click on \"Sign up Now\" on the right side of the page.     You will be asked to enter the access code listed below, as well as some personal information. Please follow the directions to create your username and password.     Your access code is: [email or web address]  Expires: 2018  2:20 PM     Your access code will  in 90 days. If you need help or a new code, please call your [facility] clinic or [phone].        Care EveryWhere ID     This is " your Care EveryWhere ID. This could be used by other organizations to access your [facility] medical records  [id number]        Your Vitals Were     Pulse Temperature Respirations BMI (Body Mass Index)          72 98  F (36.7  C) 16 22.28 kg/m2         Blood Pressure from Last 3 Encounters:   11/19/18 132/70   11/06/18 130/62   10/29/18 138/68    Weight from Last 3 Encounters:   11/19/18 116 lb (52.6 kg)   11/06/18 116 lb (52.6 kg)   10/29/18 116 lb (52.6 kg)              We Performed the Following     Amylase     CBC and Differential     Comprehensive Metabolic Panel     Lipase     Urinalysis w Reflex Microscopic If Positive          Today's Medication Changes          These changes are accurate as of 11/19/18 11:46 AM.  If you have any questions, ask your nurse or doctor.               Start taking these medicines.        Dose/Directions    lactulose 10 GM/15ML solution   Commonly known as:  CHRONULAC   Used for:  Slow transit constipation   Started by:  Danna Torres PA-C        Dose:  20 g   Take 30 mLs (20 g) by mouth 2 times daily as needed for constipation   Quantity:  1000 mL   Refills:  11            Where to get your medicines      These medications were sent to [id number] - [place], [place] - [address]  [address], [facility] [address]     Phone:  [phone]     lactulose 10 GM/15ML solution                Primary Care Provider Office Phone # Fax #    Godwin Kapoor [phone]       [address] [facility] [address]        Equal Access to Services     [facility]: Hadii sheila palacios hadasho Soomaali, waaxda luqadaha, qaybta kaalmada adeegyada, waxay idiin hayaan adeeg kharash la'aan . So [facility] [phone].    ATENCIÓN: Si habla español, tiene a torres disposición servicios gratuitos de asistencia lingüística. Llame al [phone].    We comply with applicable federal civil rights laws and [place] laws. We do not  discriminate on the basis of race, color, national origin, age, disability, sex, sexual orientation, or gender identity.            Thank you!     Thank you for choosing [facility]  for your care. Our goal is always to provide you with excellent care. Hearing back from our patients is one way we can continue to improve our services. Please take a few minutes to complete the written survey that you may receive in the mail after your visit with us. Thank you!             Your Updated Medication List - Protect others around you: Learn how to safely use, store and throw away your medicines at www.disposemymeds.org.          This list is accurate as of 11/19/18 11:46 AM.  Always use your most recent med list.                   Brand Name Dispense Instructions for use Diagnosis    acetaminophen 500 MG tablet    TYLENOL     Take 1,000 mg by mouth every 6 hours as needed        aspirin-dipyridamole  MG per 12 hr capsule    AGGRENOX    180 capsule    Take 1 capsule by mouth 2 times daily    History of TIA (transient ischemic attack)       CALCIUM 500 + D 500-125 MG-UNIT Tabs   Generic drug:  Calcium Carbonate-Vitamin D      Take 1 tablet by mouth daily        lactulose 10 GM/15ML solution    CHRONULAC    1000 mL    Take 30 mLs (20 g) by mouth 2 times daily as needed for constipation    Slow transit constipation       losartan-hydrochlorothiazide 50-12.5 MG per tablet    HYZAAR    90 tablet    Take 1 tablet by mouth daily    Essential hypertension       magnesium hydroxide 400 MG/5ML suspension    MILK OF MAGNESIA     Take 15 mLs by mouth At Bedtime        MULTI-VITAMINS Tabs      Take 1 tablet by mouth daily        OCUVITE ADULT 50+ Caps      Take 1 capsule by mouth daily        sertraline 25 MG tablet    ZOLOFT    90 tablet    Take 1 tablet (25 mg) by mouth daily    Memory loss       traMADol 50 MG tablet    ULTRAM    90 tablet    Take 1 tablet (50 mg) by mouth every 6 hours as needed for severe  pain    Primary osteoarthritis of left shoulder

## 2018-11-19 NOTE — NURSING NOTE
"Patient presents to clinic with c/o stomach pain, states thinks has worsened with tramadol. Right knee swollen, left hip pain  . Fell August 29. No appetite.  Susi Dean LPN ...... 11/19/2018 10:24 AM    Chief Complaint   Patient presents with     GI Problem       Initial /70 (BP Location: Right arm, Patient Position: Sitting, Cuff Size: Adult Regular)  Pulse 72  Temp 98  F (36.7  C)  Resp 16  Wt 116 lb (52.6 kg)  BMI 22.28 kg/m2 Estimated body mass index is 22.28 kg/(m^2) as calculated from the following:    Height as of 6/26/18: 5' 0.5\" (1.537 m).    Weight as of this encounter: 116 lb (52.6 kg).  Medication Reconciliation: complete    Sandy Dean LPN    "

## 2018-11-19 NOTE — PATIENT INSTRUCTIONS
Constipation - Started on lactulose for constipation over the next few days.   Encouraged increase of water and apple, pear and prune juice to decrease symptoms and discomfort.  Use age appropriate over the counter medications such as stool softeners or miralax for constipation relief.  Encouraged soft stools. Return to clinic with change/worsening of symptoms.     Try the BRAT diet (bread, bananas, rice, applesauce, tea, toast).  This is a very bland diet which should not irritate your colon.  Hold off on spicy foods, red sauces, mexican or chinese food.    Call or return to clinic as needed if your symptoms worsen or fail to improve as anticipated.     If the pain does not begin improving, localizes to the right lower belly, there is increased fever, or other progression of symptoms, return for reassessment.    Should I see a doctor or nurse about my stomach ache? -- Most people do not need to see a doctor or nurse for a stomach ache. But you should see your doctor or nurse if:  ?You have bloody bowel movements, diarrhea, or vomiting  ?Your pain is severe and lasts more than an hour or comes and goes for more than 24 hours  ?You cannot eat or drink for hours  ?You have a fever higher than 102 F (39 C)  ?You lose a lot of weight without trying to, or lose interest in food        Left shoulder pain:  Encouraged to take tylenol for relief up to 4 times per day.  Encouraged rest and stretching.  Encouraged to use ice or heat 15 minutes at a time several times per day to decrease pain. Return to clinic in 1-2 weeks as necessary for persistent pain. Return to clinic with any change or worsening of symptoms.   Referred to physical therapy for a consult.       Pendulum (Flexibility)    1. Lean over next to a table, with your left arm supporting your weight on the table.  2. Relax your right arm and let it hang straight down.  3. Slowly begin to swing your right arm in a small [place]. Gradually make the [place] bigger if  "you can. Change direction after 1 minute of motion.  4. Next, swing your right arm backward and forward. Then move it side to side. Change direction after 1 minute of motion.  5. Repeat these movements for about 5 minutes.  6. Do this exercise 3 times a day, or as often as instructed.  Date Last Reviewed: 3/10/2016    7372-4657 [employer]. [address] [place] [address]. All rights reserved. This information is not intended as a substitute for professional medical care. Always follow your healthcare professional's instructions.        \"Reaching Up\" for Shoulder Flexibility    This stretch can help restore shoulder flexibility and relieve pain over time. When stretching, be sure to breathe deeply. And follow any special instructions from your healthcare provider or therapist.  1. Raise the hand on the side you want to stretch as high as you can. Then grasp a stable surface, such as a bookcase or a doorframe, with the same hand.  2. Keeping your arm straight, lower your body by bending your knees. Stop when you feel the stretch in the shoulder. Try to hold the stretch for 5 seconds.  3. Work up to doing 3 sets of this stretch, 3 times a day. Work up to holding the stretch for 30 to 60 seconds.  Note: Your back should remain straight. To enhance the stretch over time, try to bend your knees lower. Or, raise your arm higher at the start of the stretch.     Frozen shoulder  Frozen shoulder is another name for adhesive capsulitis. This causes restricted movement in the shoulder. If you have frozen shoulder, this stretch may cause mild pain, especially when you first get started. A few months may pass before you achieve the results you want. But once your shoulder heals, it rarely becomes frozen again. So stick to your stretching program. If you have any questions, ask your healthcare provider.   Date Last Reviewed: 2/1/2018 2000-2018 [employer]. [address] [place] [address], [place], " [place] [address]. All rights reserved. This information is not intended as a substitute for professional medical care. Always follow your healthcare professional's instructions.        Shoulder Flexion (Flexibility)    1. Sit in a chair sideways next to a table. Lay your right arm on the table, pointed forward.  2. Slowly lean forward.  Slide your arm forward on the table. Feel the stretch in your right shoulder.  3. Hold for 5 seconds. Slowly sit back up.  4. Repeat 5 times.  5. Repeat this exercise 3 times a day, or as instructed.  Date Last Reviewed: 3/10/2016    7511-1239 [employer]. [address] [place] [address], [place] [address]. All rights reserved. This information is not intended as a substitute for professional medical care. Always follow your healthcare professional's instructions.        Treating Frozen Shoulder: Preparing for Your Exercises  Doing special exercises is the first way to treat frozen shoulder. You may see a physical therapist who can help you learn to do them. If these exercises don t help, you may need further medical treatment.  Shoulder stretches    Doing stretches is often the best way to treat frozen shoulder. Stretching each day can help lessen the pain and restore shoulder flexibility. But it often takes a significant amount of time before you notice results. Try to be patient.  To warm up, do the  pendulum.  While standing, let the hand on your frozen side dangle freely as you hold the back of a chair or a table top with your other hand. Slowly make circles and side-to-side motions with the frozen arm.  Physical therapy  Your healthcare provider may refer you to physical therapy. This hands-on care helps you learn how to do stretching exercises at home. A physical therapist may also work on restoring your shoulder flexibility. To do this, he or she may gently stretch and move your frozen shoulder.  Tips for shoulder stretches    Anti-inflammatory medicines or steroid  injections can help relieve pain. This may help you do your stretches. Your healthcare provider can tell you more.    Mild and moist heat can help loosen your shoulder. Try taking a warm shower or bath just before you stretch.    A cold or ice pack can limit pain and swelling. Try icing your shoulder for a few minutes after you do your stretches.  Date Last Reviewed: 5/1/2018 2000-2018 [employer]. [address] [place] [address], [place] [address]. All rights reserved. This information is not intended as a substitute for professional medical care. Always follow your healthcare professional's instructions.

## 2018-11-29 ENCOUNTER — TELEPHONE (OUTPATIENT)
Dept: FAMILY MEDICINE | Facility: OTHER | Age: 83
End: 2018-11-29

## 2018-11-29 DIAGNOSIS — M17.9 OSTEOARTHRITIS OF KNEE, UNSPECIFIED (CODE): ICD-10-CM

## 2018-11-29 DIAGNOSIS — M16.12 PRIMARY OSTEOARTHRITIS OF LEFT HIP: Primary | ICD-10-CM

## 2018-11-29 NOTE — TELEPHONE ENCOUNTER
Pt's daughter is calling and mom isn't doing so good with hip-Daughter asked for a call as wants a referral for a better type shot in her hip --wants a call today.

## 2018-11-29 NOTE — TELEPHONE ENCOUNTER
Would like to go through with the joint injection in Radiology   Monisha Gamboa LPN on 11/29/2018 at 12:37 PM

## 2018-11-29 NOTE — TELEPHONE ENCOUNTER
Is thee something specific she has in mind?  She had an x-ray in the summer, with only mild joint arthritis.  We can try an injection into it.  Therapy is really the most effective for the bursitis.  See if they can do therapy.  Godwin Kapoor MD on 11/29/2018 at 11:23 AM

## 2018-11-30 ENCOUNTER — TELEPHONE (OUTPATIENT)
Dept: FAMILY MEDICINE | Facility: OTHER | Age: 83
End: 2018-11-30

## 2018-11-30 DIAGNOSIS — M25.552 LEFT HIP PAIN: Primary | ICD-10-CM

## 2018-11-30 DIAGNOSIS — M17.0 PRIMARY OSTEOARTHRITIS OF BOTH KNEES: ICD-10-CM

## 2018-11-30 NOTE — TELEPHONE ENCOUNTER
Call radiology.  I cannot find the right order.  Everything I type in changes it to a knee injection.  Let pt know we are working on it.  Godwin Kapoor MD on 11/30/2018 at 10:39 AM

## 2018-11-30 NOTE — TELEPHONE ENCOUNTER
Daughter Nona notified we are working on this.  Please note she wants both knee injections and Left hip injection if possible.  Radiology was called for correct orders and waiting call back.  Norma J. Gosselin LPN......  11/30/2018   10:54 AM

## 2018-11-30 NOTE — TELEPHONE ENCOUNTER
Pt daughter is upset mom is in quite a bit of pain after her fall-a referral for inj was to be put in for her left hip and was put in for a knee--she wants a call as soon as possible-as long as new referral please put in for shot not only in for left hip but for both knees--

## 2018-11-30 NOTE — TELEPHONE ENCOUNTER
I (or ortho) would do the knees, radiology does the hip.  Let her knw and see which she prefers for the knees.  Godwin Kapoor MD on 11/30/2018 at 12:18 PM

## 2018-11-30 NOTE — TELEPHONE ENCOUNTER
Called and left Nona MILES's message regarding injections  Monisha Gamboa LPN on 11/30/2018 at 2:23 PM

## 2018-12-03 NOTE — TELEPHONE ENCOUNTER
Patient's daughter would like her to have the Left hip injection with [facility] and Orthopedic provider to administer the knee injections.  Please place orders and patient will be contacted for assistance in scheduling.    Sabra Hale LPN............12/3/2018 3:27 PM  .

## 2018-12-04 NOTE — TELEPHONE ENCOUNTER
Please fix this today.  The order listed is not for hips.  It is the same one I tried to order last week and is for shoulders and knees only.  Call [facility] and get the correct one.  I worry it has been deleted from Epic, as I cannot find it either.  Let daughter know about the delay.  Make sure this all gets done today.      I placed the consult for OA.  Godwin Kapoor MD on 12/4/2018 at 7:15 AM

## 2018-12-04 NOTE — TELEPHONE ENCOUNTER
Left hip injection is ordered and and patient's daughter will be called to schedule a left hip injection. No further orders are needed.  Norma J. Gosselin LPN......  12/4/2018   9:29 AM    Not sure if you needed to order referral that is already mey'd up?

## 2018-12-06 ENCOUNTER — TELEPHONE (OUTPATIENT)
Dept: FAMILY MEDICINE | Facility: OTHER | Age: 83
End: 2018-12-06

## 2018-12-06 DIAGNOSIS — M54.10 RADICULAR LEG PAIN: ICD-10-CM

## 2018-12-06 DIAGNOSIS — M54.10 RADICULAR LEG PAIN: Primary | ICD-10-CM

## 2018-12-07 RX ORDER — PREDNISONE 20 MG/1
20 TABLET ORAL DAILY
Qty: 5 TABLET | Refills: 0 | Status: SHIPPED | OUTPATIENT
Start: 2018-12-07 | End: 2019-01-07

## 2018-12-07 NOTE — TELEPHONE ENCOUNTER
Patient's daughter states that patient's left hip is hurting her and was wondering about prednisone rx as this helped before. She states that 10mg prednisone helped but would like 20 mg tabs??  Injection is left hip is scheduled for Dec 17th.  Pharmacy: [facility] ....................  12/7/2018   8:26 AM

## 2018-12-10 RX ORDER — PREDNISONE 10 MG/1
TABLET ORAL
Qty: 20 TABLET | Refills: 0 | OUTPATIENT
Start: 2018-12-10

## 2018-12-10 NOTE — TELEPHONE ENCOUNTER
Refill request from [facility] for:  predniSONE (DELTASONE) 10 MG tablet    LOV 11/19/2018 with Danna Torres PA-C    Noted Prednisone 20 mg filled 12/7/2018 for 5 tablets in encounter 12/6/2018    Duplicate  Requested Prescriptions   Pending Prescriptions Disp Refills     predniSONE (DELTASONE) 10 MG tablet [Pharmacy Med Name: PREDNISONE 10MG** TABLETS] 20 tablet 0     Sig: TAKE 1 TABLET(10 MG) BY MOUTH TWICE DAILY FOR 10 DAYS    There is no refill protocol information for this order        Prescription refused.  This is a duplicate request.  Shirley Claire.......12/10/2018 4:32 PM

## 2018-12-17 ENCOUNTER — HOSPITAL ENCOUNTER (OUTPATIENT)
Dept: GENERAL RADIOLOGY | Facility: OTHER | Age: 83
Discharge: HOME | End: 2018-12-17
Attending: FAMILY MEDICINE | Admitting: FAMILY MEDICINE
Payer: MEDICARE

## 2018-12-17 DIAGNOSIS — M16.12 PRIMARY OSTEOARTHRITIS OF LEFT HIP: ICD-10-CM

## 2018-12-17 DIAGNOSIS — M17.9 OSTEOARTHRITIS OF KNEE, UNSPECIFIED (CODE): ICD-10-CM

## 2018-12-17 PROCEDURE — 20610 DRAIN/INJ JOINT/BURSA W/O US: CPT | Mod: LT

## 2018-12-17 PROCEDURE — 25000128 H RX IP 250 OP 636: Performed by: RADIOLOGY

## 2018-12-17 PROCEDURE — 25000125 ZZHC RX 250: Performed by: RADIOLOGY

## 2018-12-17 RX ORDER — TRIAMCINOLONE ACETONIDE 40 MG/ML
40 INJECTION, SUSPENSION INTRA-ARTICULAR; INTRAMUSCULAR ONCE
Status: COMPLETED | OUTPATIENT
Start: 2018-12-17 | End: 2018-12-17

## 2018-12-17 RX ORDER — BUPIVACAINE HYDROCHLORIDE 5 MG/ML
2 INJECTION, SOLUTION PERINEURAL ONCE
Status: COMPLETED | OUTPATIENT
Start: 2018-12-17 | End: 2018-12-17

## 2018-12-17 RX ORDER — METHYLPREDNISOLONE ACETATE 80 MG/ML
80 INJECTION, SUSPENSION INTRA-ARTICULAR; INTRALESIONAL; INTRAMUSCULAR; SOFT TISSUE ONCE
Status: DISCONTINUED | OUTPATIENT
Start: 2018-12-17 | End: 2018-12-17 | Stop reason: CLARIF

## 2018-12-17 RX ORDER — LIDOCAINE HYDROCHLORIDE 10 MG/ML
2 INJECTION, SOLUTION EPIDURAL; INFILTRATION; INTRACAUDAL; PERINEURAL ONCE
Status: DISCONTINUED | OUTPATIENT
Start: 2018-12-17 | End: 2018-12-17 | Stop reason: CLARIF

## 2018-12-17 RX ADMIN — TRIAMCINOLONE ACETONIDE 40 MG: 40 INJECTION, SUSPENSION INTRA-ARTICULAR; INTRAMUSCULAR at 10:49

## 2018-12-17 RX ADMIN — BUPIVACAINE HYDROCHLORIDE 10 MG: 5 INJECTION, SOLUTION PERINEURAL at 10:49

## 2018-12-17 RX ADMIN — LIDOCAINE HYDROCHLORIDE 4 ML: 10 INJECTION, SOLUTION INFILTRATION; PERINEURAL at 10:49

## 2019-01-01 ENCOUNTER — OFFICE VISIT (OUTPATIENT)
Dept: FAMILY MEDICINE | Facility: OTHER | Age: 84
End: 2019-01-01
Attending: FAMILY MEDICINE
Payer: MEDICARE

## 2019-01-01 ENCOUNTER — HOSPITAL ENCOUNTER (OUTPATIENT)
Dept: PHYSICAL THERAPY | Facility: OTHER | Age: 84
Setting detail: THERAPIES SERIES
End: 2019-09-19
Attending: FAMILY MEDICINE
Payer: MEDICARE

## 2019-01-01 ENCOUNTER — HOSPITAL ENCOUNTER (OUTPATIENT)
Dept: PHYSICAL THERAPY | Facility: OTHER | Age: 84
Setting detail: THERAPIES SERIES
End: 2019-10-14
Attending: FAMILY MEDICINE
Payer: MEDICARE

## 2019-01-01 ENCOUNTER — TELEPHONE (OUTPATIENT)
Dept: FAMILY MEDICINE | Facility: OTHER | Age: 84
End: 2019-01-01

## 2019-01-01 ENCOUNTER — HOSPITAL ENCOUNTER (OUTPATIENT)
Dept: PHYSICAL THERAPY | Facility: OTHER | Age: 84
Setting detail: THERAPIES SERIES
End: 2019-10-28
Attending: FAMILY MEDICINE
Payer: MEDICARE

## 2019-01-01 ENCOUNTER — HOSPITAL ENCOUNTER (OUTPATIENT)
Dept: PHYSICAL THERAPY | Facility: OTHER | Age: 84
Setting detail: THERAPIES SERIES
End: 2019-09-09
Attending: FAMILY MEDICINE
Payer: MEDICARE

## 2019-01-01 ENCOUNTER — HOSPITAL ENCOUNTER (OUTPATIENT)
Dept: PHYSICAL THERAPY | Facility: OTHER | Age: 84
Setting detail: THERAPIES SERIES
End: 2019-10-21
Attending: FAMILY MEDICINE
Payer: MEDICARE

## 2019-01-01 ENCOUNTER — HOSPITAL ENCOUNTER (OUTPATIENT)
Dept: GENERAL RADIOLOGY | Facility: OTHER | Age: 84
Discharge: HOME | End: 2019-11-25
Attending: ORTHOPAEDIC SURGERY | Admitting: ORTHOPAEDIC SURGERY
Payer: MEDICARE

## 2019-01-01 ENCOUNTER — OFFICE VISIT (OUTPATIENT)
Dept: FAMILY MEDICINE | Facility: OTHER | Age: 84
End: 2019-01-01
Attending: PHYSICIAN ASSISTANT
Payer: MEDICARE

## 2019-01-01 ENCOUNTER — HOSPITAL ENCOUNTER (OUTPATIENT)
Dept: PHYSICAL THERAPY | Facility: OTHER | Age: 84
Setting detail: THERAPIES SERIES
End: 2019-09-25
Attending: FAMILY MEDICINE
Payer: MEDICARE

## 2019-01-01 ENCOUNTER — HOSPITAL ENCOUNTER (OUTPATIENT)
Dept: PHYSICAL THERAPY | Facility: OTHER | Age: 84
Setting detail: THERAPIES SERIES
End: 2019-10-24
Attending: FAMILY MEDICINE
Payer: MEDICARE

## 2019-01-01 ENCOUNTER — HOSPITAL ENCOUNTER (OUTPATIENT)
Dept: PHYSICAL THERAPY | Facility: OTHER | Age: 84
Setting detail: THERAPIES SERIES
End: 2019-10-07
Attending: FAMILY MEDICINE
Payer: MEDICARE

## 2019-01-01 ENCOUNTER — HOSPITAL ENCOUNTER (EMERGENCY)
Facility: OTHER | Age: 84
Discharge: HOME | End: 2019-11-01
Attending: EMERGENCY MEDICINE | Admitting: EMERGENCY MEDICINE
Payer: MEDICARE

## 2019-01-01 ENCOUNTER — HOSPITAL ENCOUNTER (OUTPATIENT)
Dept: PHYSICAL THERAPY | Facility: OTHER | Age: 84
Setting detail: THERAPIES SERIES
End: 2019-09-27
Attending: FAMILY MEDICINE
Payer: MEDICARE

## 2019-01-01 ENCOUNTER — OFFICE VISIT (OUTPATIENT)
Dept: ORTHOPEDICS | Facility: OTHER | Age: 84
End: 2019-01-01
Attending: ORTHOPAEDIC SURGERY
Payer: MEDICARE

## 2019-01-01 ENCOUNTER — HOSPITAL ENCOUNTER (OUTPATIENT)
Dept: PHYSICAL THERAPY | Facility: OTHER | Age: 84
Setting detail: THERAPIES SERIES
End: 2019-10-01
Attending: FAMILY MEDICINE
Payer: MEDICARE

## 2019-01-01 ENCOUNTER — OFFICE VISIT (OUTPATIENT)
Dept: PODIATRY | Facility: OTHER | Age: 84
End: 2019-01-01
Attending: PODIATRIST
Payer: MEDICARE

## 2019-01-01 ENCOUNTER — HOSPITAL ENCOUNTER (OUTPATIENT)
Dept: CT IMAGING | Facility: OTHER | Age: 84
Discharge: HOME | End: 2019-11-26
Attending: FAMILY MEDICINE | Admitting: FAMILY MEDICINE
Payer: MEDICARE

## 2019-01-01 ENCOUNTER — HOSPITAL ENCOUNTER (OUTPATIENT)
Dept: PHYSICAL THERAPY | Facility: OTHER | Age: 84
Setting detail: THERAPIES SERIES
End: 2019-10-09
Attending: FAMILY MEDICINE
Payer: MEDICARE

## 2019-01-01 ENCOUNTER — HOSPITAL ENCOUNTER (OUTPATIENT)
Dept: PHYSICAL THERAPY | Facility: OTHER | Age: 84
Setting detail: THERAPIES SERIES
End: 2019-10-17
Attending: FAMILY MEDICINE
Payer: MEDICARE

## 2019-01-01 ENCOUNTER — OFFICE VISIT (OUTPATIENT)
Dept: SURGERY | Facility: OTHER | Age: 84
End: 2019-01-01
Attending: FAMILY MEDICINE
Payer: MEDICARE

## 2019-01-01 ENCOUNTER — HOSPITAL ENCOUNTER (OUTPATIENT)
Dept: PHYSICAL THERAPY | Facility: OTHER | Age: 84
Setting detail: THERAPIES SERIES
End: 2019-10-03
Attending: FAMILY MEDICINE
Payer: MEDICARE

## 2019-01-01 ENCOUNTER — HOSPITAL ENCOUNTER (OUTPATIENT)
Dept: PHYSICAL THERAPY | Facility: OTHER | Age: 84
Setting detail: THERAPIES SERIES
End: 2019-11-11
Attending: FAMILY MEDICINE
Payer: MEDICARE

## 2019-01-01 ENCOUNTER — HOSPITAL ENCOUNTER (OUTPATIENT)
Dept: GENERAL RADIOLOGY | Facility: OTHER | Age: 84
Discharge: HOME | End: 2019-08-23
Attending: FAMILY MEDICINE | Admitting: FAMILY MEDICINE
Payer: MEDICARE

## 2019-01-01 ENCOUNTER — HOSPITAL ENCOUNTER (OUTPATIENT)
Dept: PHYSICAL THERAPY | Facility: OTHER | Age: 84
Setting detail: THERAPIES SERIES
End: 2019-11-04
Attending: FAMILY MEDICINE
Payer: MEDICARE

## 2019-01-01 ENCOUNTER — HOSPITAL ENCOUNTER (OUTPATIENT)
Dept: PHYSICAL THERAPY | Facility: OTHER | Age: 84
Setting detail: THERAPIES SERIES
End: 2019-11-06
Attending: FAMILY MEDICINE
Payer: MEDICARE

## 2019-01-01 VITALS
RESPIRATION RATE: 18 BRPM | DIASTOLIC BLOOD PRESSURE: 70 MMHG | TEMPERATURE: 96.6 F | HEART RATE: 76 BPM | SYSTOLIC BLOOD PRESSURE: 138 MMHG

## 2019-01-01 VITALS
BODY MASS INDEX: 19.59 KG/M2 | WEIGHT: 110.6 LBS | TEMPERATURE: 99.2 F | HEART RATE: 84 BPM | OXYGEN SATURATION: 98 % | RESPIRATION RATE: 20 BRPM | SYSTOLIC BLOOD PRESSURE: 132 MMHG | DIASTOLIC BLOOD PRESSURE: 60 MMHG

## 2019-01-01 VITALS
HEART RATE: 68 BPM | TEMPERATURE: 98.7 F | DIASTOLIC BLOOD PRESSURE: 68 MMHG | SYSTOLIC BLOOD PRESSURE: 152 MMHG | WEIGHT: 113 LBS | BODY MASS INDEX: 22.44 KG/M2

## 2019-01-01 VITALS
BODY MASS INDEX: 20.02 KG/M2 | SYSTOLIC BLOOD PRESSURE: 120 MMHG | TEMPERATURE: 98.6 F | HEART RATE: 72 BPM | DIASTOLIC BLOOD PRESSURE: 64 MMHG | OXYGEN SATURATION: 98 % | WEIGHT: 113 LBS | RESPIRATION RATE: 16 BRPM

## 2019-01-01 VITALS
TEMPERATURE: 98.1 F | RESPIRATION RATE: 16 BRPM | DIASTOLIC BLOOD PRESSURE: 66 MMHG | HEART RATE: 60 BPM | SYSTOLIC BLOOD PRESSURE: 118 MMHG

## 2019-01-01 VITALS
WEIGHT: 110 LBS | RESPIRATION RATE: 16 BRPM | TEMPERATURE: 98.6 F | SYSTOLIC BLOOD PRESSURE: 120 MMHG | HEIGHT: 60 IN | BODY MASS INDEX: 21.6 KG/M2 | DIASTOLIC BLOOD PRESSURE: 56 MMHG | OXYGEN SATURATION: 98 % | HEART RATE: 72 BPM

## 2019-01-01 VITALS
RESPIRATION RATE: 16 BRPM | TEMPERATURE: 98.6 F | OXYGEN SATURATION: 96 % | HEART RATE: 65 BPM | BODY MASS INDEX: 22.24 KG/M2 | SYSTOLIC BLOOD PRESSURE: 108 MMHG | DIASTOLIC BLOOD PRESSURE: 64 MMHG | WEIGHT: 112 LBS

## 2019-01-01 VITALS
RESPIRATION RATE: 12 BRPM | SYSTOLIC BLOOD PRESSURE: 179 MMHG | BODY MASS INDEX: 22.24 KG/M2 | HEART RATE: 70 BPM | DIASTOLIC BLOOD PRESSURE: 76 MMHG | WEIGHT: 112 LBS | OXYGEN SATURATION: 97 % | TEMPERATURE: 97.3 F

## 2019-01-01 VITALS
RESPIRATION RATE: 16 BRPM | BODY MASS INDEX: 22.68 KG/M2 | TEMPERATURE: 98.6 F | HEART RATE: 78 BPM | DIASTOLIC BLOOD PRESSURE: 64 MMHG | OXYGEN SATURATION: 97 % | WEIGHT: 114.2 LBS | SYSTOLIC BLOOD PRESSURE: 122 MMHG

## 2019-01-01 VITALS
HEART RATE: 63 BPM | TEMPERATURE: 98.7 F | WEIGHT: 110.9 LBS | OXYGEN SATURATION: 98 % | DIASTOLIC BLOOD PRESSURE: 64 MMHG | RESPIRATION RATE: 16 BRPM | SYSTOLIC BLOOD PRESSURE: 126 MMHG | BODY MASS INDEX: 22.02 KG/M2

## 2019-01-01 VITALS
TEMPERATURE: 97.5 F | DIASTOLIC BLOOD PRESSURE: 68 MMHG | DIASTOLIC BLOOD PRESSURE: 64 MMHG | HEART RATE: 61 BPM | OXYGEN SATURATION: 98 % | HEIGHT: 63 IN | TEMPERATURE: 98.6 F | SYSTOLIC BLOOD PRESSURE: 124 MMHG | WEIGHT: 110 LBS | SYSTOLIC BLOOD PRESSURE: 145 MMHG | BODY MASS INDEX: 19.49 KG/M2 | WEIGHT: 110 LBS | HEART RATE: 70 BPM | BODY MASS INDEX: 21.85 KG/M2 | OXYGEN SATURATION: 98 % | RESPIRATION RATE: 16 BRPM

## 2019-01-01 VITALS
WEIGHT: 111 LBS | SYSTOLIC BLOOD PRESSURE: 118 MMHG | DIASTOLIC BLOOD PRESSURE: 68 MMHG | RESPIRATION RATE: 16 BRPM | HEART RATE: 61 BPM | TEMPERATURE: 98.4 F | OXYGEN SATURATION: 97 % | BODY MASS INDEX: 22.04 KG/M2

## 2019-01-01 VITALS
WEIGHT: 110 LBS | SYSTOLIC BLOOD PRESSURE: 142 MMHG | RESPIRATION RATE: 16 BRPM | OXYGEN SATURATION: 99 % | DIASTOLIC BLOOD PRESSURE: 54 MMHG | BODY MASS INDEX: 21.85 KG/M2 | TEMPERATURE: 98.6 F | HEART RATE: 72 BPM

## 2019-01-01 DIAGNOSIS — K59.01 SLOW TRANSIT CONSTIPATION: ICD-10-CM

## 2019-01-01 DIAGNOSIS — L60.0 INGROWING NAIL: Primary | ICD-10-CM

## 2019-01-01 DIAGNOSIS — M79.675 PAIN OF LEFT GREAT TOE: ICD-10-CM

## 2019-01-01 DIAGNOSIS — M47.26 OSTEOARTHRITIS OF SPINE WITH RADICULOPATHY, LUMBAR REGION: Primary | ICD-10-CM

## 2019-01-01 DIAGNOSIS — I87.2 EDEMA OF LEFT LOWER EXTREMITY DUE TO PERIPHERAL VENOUS INSUFFICIENCY: ICD-10-CM

## 2019-01-01 DIAGNOSIS — I10 ESSENTIAL HYPERTENSION: ICD-10-CM

## 2019-01-01 DIAGNOSIS — H61.001 CHONDRODERMATITIS NODULARIS CHRONICA HELICIS, RIGHT: Primary | ICD-10-CM

## 2019-01-01 DIAGNOSIS — I87.2 EDEMA OF RIGHT LOWER EXTREMITY DUE TO PERIPHERAL VENOUS INSUFFICIENCY: ICD-10-CM

## 2019-01-01 DIAGNOSIS — M79.674 PAIN OF RIGHT GREAT TOE: ICD-10-CM

## 2019-01-01 DIAGNOSIS — Z23 NEED FOR IMMUNIZATION AGAINST INFLUENZA: Primary | ICD-10-CM

## 2019-01-01 DIAGNOSIS — M70.60 TROCHANTERIC BURSITIS, UNSPECIFIED LATERALITY: Primary | ICD-10-CM

## 2019-01-01 DIAGNOSIS — C44.320 SQUAMOUS CELL CARCINOMA OF FACE: ICD-10-CM

## 2019-01-01 DIAGNOSIS — M53.3 SACROILIAC JOINT PAIN: ICD-10-CM

## 2019-01-01 DIAGNOSIS — R60.0 BILATERAL LOWER EXTREMITY EDEMA: ICD-10-CM

## 2019-01-01 DIAGNOSIS — M47.26 OSTEOARTHRITIS OF SPINE WITH RADICULOPATHY, LUMBAR REGION: ICD-10-CM

## 2019-01-01 DIAGNOSIS — R63.4 WEIGHT LOSS: Primary | ICD-10-CM

## 2019-01-01 DIAGNOSIS — R10.84 ABDOMINAL PAIN, GENERALIZED: ICD-10-CM

## 2019-01-01 DIAGNOSIS — R10.84 ABDOMINAL PAIN, GENERALIZED: Primary | ICD-10-CM

## 2019-01-01 DIAGNOSIS — M47.26 OTHER SPONDYLOSIS WITH RADICULOPATHY, LUMBAR REGION: ICD-10-CM

## 2019-01-01 DIAGNOSIS — M70.60 TROCHANTERIC BURSITIS, UNSPECIFIED LATERALITY: ICD-10-CM

## 2019-01-01 DIAGNOSIS — M70.62 TROCHANTERIC BURSITIS OF LEFT HIP: Primary | ICD-10-CM

## 2019-01-01 DIAGNOSIS — L60.3 ONYCHODYSTROPHY: ICD-10-CM

## 2019-01-01 DIAGNOSIS — C91.10 CHRONIC LYMPHOCYTIC LEUKEMIA (H): ICD-10-CM

## 2019-01-01 DIAGNOSIS — M25.552 PAIN OF LEFT HIP JOINT: Primary | ICD-10-CM

## 2019-01-01 DIAGNOSIS — M16.10 ARTHRITIS OF HIP: ICD-10-CM

## 2019-01-01 DIAGNOSIS — K59.01 SLOW TRANSIT CONSTIPATION: Primary | ICD-10-CM

## 2019-01-01 DIAGNOSIS — M47.818 ARTHROPATHY OF SACROILIAC JOINT: ICD-10-CM

## 2019-01-01 DIAGNOSIS — M12.9 ARTHROPATHY: ICD-10-CM

## 2019-01-01 DIAGNOSIS — Z00.00 ROUTINE GENERAL MEDICAL EXAMINATION AT A HEALTH CARE FACILITY: Primary | ICD-10-CM

## 2019-01-01 DIAGNOSIS — R25.1 SHAKINESS: ICD-10-CM

## 2019-01-01 DIAGNOSIS — M25.552 PAIN OF LEFT HIP JOINT: ICD-10-CM

## 2019-01-01 DIAGNOSIS — Z86.73 HISTORY OF TIA (TRANSIENT ISCHEMIC ATTACK): ICD-10-CM

## 2019-01-01 DIAGNOSIS — I78.1 TELANGIECTASIAS: ICD-10-CM

## 2019-01-01 LAB
ALBUMIN SERPL-MCNC: 3.8 G/DL (ref 3.5–5.7)
ALBUMIN SERPL-MCNC: 4.3 G/DL (ref 3.5–5.7)
ALBUMIN UR-MCNC: NEGATIVE MG/DL
ALBUMIN UR-MCNC: NEGATIVE MG/DL
ALP SERPL-CCNC: 26 U/L (ref 34–104)
ALP SERPL-CCNC: 30 U/L (ref 34–104)
ALT SERPL W P-5'-P-CCNC: 10 U/L (ref 7–52)
ALT SERPL W P-5'-P-CCNC: 9 U/L (ref 7–52)
ANION GAP SERPL CALCULATED.3IONS-SCNC: 10 MMOL/L (ref 3–14)
ANION GAP SERPL CALCULATED.3IONS-SCNC: 9 MMOL/L (ref 3–14)
APPEARANCE UR: CLEAR
APPEARANCE UR: CLEAR
AST SERPL W P-5'-P-CCNC: 14 U/L (ref 13–39)
AST SERPL W P-5'-P-CCNC: 16 U/L (ref 13–39)
BASOPHILS # BLD AUTO: 0 10E9/L (ref 0–0.2)
BASOPHILS # BLD AUTO: 0 10E9/L (ref 0–0.2)
BASOPHILS # BLD AUTO: 0.1 10E9/L (ref 0–0.2)
BASOPHILS NFR BLD AUTO: 0.1 %
BASOPHILS NFR BLD AUTO: 0.2 %
BASOPHILS NFR BLD AUTO: 0.8 %
BILIRUB SERPL-MCNC: 0.3 MG/DL (ref 0.3–1)
BILIRUB SERPL-MCNC: 0.4 MG/DL (ref 0.3–1)
BILIRUB UR QL STRIP: NEGATIVE
BILIRUB UR QL STRIP: NEGATIVE
BUN SERPL-MCNC: 37 MG/DL (ref 7–25)
BUN SERPL-MCNC: 43 MG/DL (ref 7–25)
CALCIUM SERPL-MCNC: 8.9 MG/DL (ref 8.6–10.3)
CALCIUM SERPL-MCNC: 9 MG/DL (ref 8.6–10.3)
CHLORIDE SERPL-SCNC: 100 MMOL/L (ref 98–107)
CHLORIDE SERPL-SCNC: 102 MMOL/L (ref 98–107)
CO2 SERPL-SCNC: 25 MMOL/L (ref 21–31)
CO2 SERPL-SCNC: 26 MMOL/L (ref 21–31)
COLOR UR AUTO: YELLOW
COLOR UR AUTO: YELLOW
CREAT SERPL-MCNC: 1.3 MG/DL (ref 0.6–1.2)
CREAT SERPL-MCNC: 1.34 MG/DL (ref 0.6–1.2)
DIFFERENTIAL METHOD BLD: ABNORMAL
EOSINOPHIL # BLD AUTO: 0 10E9/L (ref 0–0.7)
EOSINOPHIL # BLD AUTO: 0 10E9/L (ref 0–0.7)
EOSINOPHIL # BLD AUTO: 0.7 10E9/L (ref 0–0.7)
EOSINOPHIL NFR BLD AUTO: 0.2 %
EOSINOPHIL NFR BLD AUTO: 0.3 %
EOSINOPHIL NFR BLD AUTO: 7 %
ERYTHROCYTE [DISTWIDTH] IN BLOOD BY AUTOMATED COUNT: 14.5 % (ref 10–15)
ERYTHROCYTE [DISTWIDTH] IN BLOOD BY AUTOMATED COUNT: 15.3 % (ref 10–15)
ERYTHROCYTE [DISTWIDTH] IN BLOOD BY AUTOMATED COUNT: 16.8 % (ref 10–15)
GFR SERPL CREATININE-BSD FRML MDRD: 38 ML/MIN/{1.73_M2}
GFR SERPL CREATININE-BSD FRML MDRD: 39 ML/MIN/{1.73_M2}
GLUCOSE SERPL-MCNC: 112 MG/DL (ref 70–105)
GLUCOSE SERPL-MCNC: 121 MG/DL (ref 70–105)
GLUCOSE UR STRIP-MCNC: NEGATIVE MG/DL
GLUCOSE UR STRIP-MCNC: NEGATIVE MG/DL
HCT VFR BLD AUTO: 28.4 % (ref 35–47)
HCT VFR BLD AUTO: 29 % (ref 35–47)
HCT VFR BLD AUTO: 31.9 % (ref 35–47)
HGB BLD-MCNC: 8.6 G/DL (ref 11.7–15.7)
HGB BLD-MCNC: 9.1 G/DL (ref 11.7–15.7)
HGB BLD-MCNC: 9.9 G/DL (ref 11.7–15.7)
HGB UR QL STRIP: NEGATIVE
HGB UR QL STRIP: NEGATIVE
IMM GRANULOCYTES # BLD: 0 10E9/L (ref 0–0.4)
IMM GRANULOCYTES # BLD: 0.1 10E9/L (ref 0–0.4)
IMM GRANULOCYTES # BLD: 0.1 10E9/L (ref 0–0.4)
IMM GRANULOCYTES NFR BLD: 0.4 %
KETONES UR STRIP-MCNC: ABNORMAL MG/DL
KETONES UR STRIP-MCNC: NEGATIVE MG/DL
LEUKOCYTE ESTERASE UR QL STRIP: NEGATIVE
LEUKOCYTE ESTERASE UR QL STRIP: NEGATIVE
LYMPHOCYTES # BLD AUTO: 4.3 10E9/L (ref 0.8–5.3)
LYMPHOCYTES # BLD AUTO: 5.9 10E9/L (ref 0.8–5.3)
LYMPHOCYTES # BLD AUTO: 7.7 10E9/L (ref 0.8–5.3)
LYMPHOCYTES NFR BLD AUTO: 42 %
LYMPHOCYTES NFR BLD AUTO: 43 %
LYMPHOCYTES NFR BLD AUTO: 57.9 %
MCH RBC QN AUTO: 25.9 PG (ref 26.5–33)
MCH RBC QN AUTO: 28 PG (ref 26.5–33)
MCH RBC QN AUTO: 28.3 PG (ref 26.5–33)
MCHC RBC AUTO-ENTMCNC: 30.3 G/DL (ref 31.5–36.5)
MCHC RBC AUTO-ENTMCNC: 31 G/DL (ref 31.5–36.5)
MCHC RBC AUTO-ENTMCNC: 31.4 G/DL (ref 31.5–36.5)
MCV RBC AUTO: 86 FL (ref 78–100)
MCV RBC AUTO: 90 FL (ref 78–100)
MCV RBC AUTO: 90 FL (ref 78–100)
MONOCYTES # BLD AUTO: 0.6 10E9/L (ref 0–1.3)
MONOCYTES # BLD AUTO: 0.8 10E9/L (ref 0–1.3)
MONOCYTES # BLD AUTO: 0.8 10E9/L (ref 0–1.3)
MONOCYTES NFR BLD AUTO: 4.2 %
MONOCYTES NFR BLD AUTO: 5.8 %
MONOCYTES NFR BLD AUTO: 7.5 %
NEUTROPHILS # BLD AUTO: 4.1 10E9/L (ref 1.6–8.3)
NEUTROPHILS # BLD AUTO: 4.9 10E9/L (ref 1.6–8.3)
NEUTROPHILS # BLD AUTO: 7.2 10E9/L (ref 1.6–8.3)
NEUTROPHILS NFR BLD AUTO: 37 %
NEUTROPHILS NFR BLD AUTO: 41.3 %
NEUTROPHILS NFR BLD AUTO: 51.5 %
NITRATE UR QL: NEGATIVE
NITRATE UR QL: NEGATIVE
PH UR STRIP: 5 PH (ref 5–9)
PH UR STRIP: 5.5 PH (ref 5–9)
PLATELET # BLD AUTO: 309 10E9/L (ref 150–450)
PLATELET # BLD AUTO: 345 10E9/L (ref 150–450)
PLATELET # BLD AUTO: 353 10E9/L (ref 150–450)
POTASSIUM SERPL-SCNC: 4 MMOL/L (ref 3.5–5.1)
POTASSIUM SERPL-SCNC: 4.5 MMOL/L (ref 3.5–5.1)
PROT SERPL-MCNC: 6.2 G/DL (ref 6.4–8.9)
PROT SERPL-MCNC: 6.7 G/DL (ref 6.4–8.9)
RBC # BLD AUTO: 3.21 10E12/L (ref 3.8–5.2)
RBC # BLD AUTO: 3.32 10E12/L (ref 3.8–5.2)
RBC # BLD AUTO: 3.53 10E12/L (ref 3.8–5.2)
SODIUM SERPL-SCNC: 136 MMOL/L (ref 134–144)
SODIUM SERPL-SCNC: 136 MMOL/L (ref 134–144)
SOURCE: ABNORMAL
SOURCE: NORMAL
SP GR UR STRIP: 1.01 (ref 1–1.03)
SP GR UR STRIP: 1.01 (ref 1–1.03)
UROBILINOGEN UR STRIP-ACNC: 0.2 EU/DL (ref 0.2–1)
UROBILINOGEN UR STRIP-ACNC: 0.2 EU/DL (ref 0.2–1)
WBC # BLD AUTO: 10.1 10E9/L (ref 4–11)
WBC # BLD AUTO: 13.3 10E9/L (ref 4–11)
WBC # BLD AUTO: 14 10E9/L (ref 4–11)

## 2019-01-01 PROCEDURE — 99283 EMERGENCY DEPT VISIT LOW MDM: CPT | Mod: Z6 | Performed by: EMERGENCY MEDICINE

## 2019-01-01 PROCEDURE — 99213 OFFICE O/P EST LOW 20 MIN: CPT | Performed by: FAMILY MEDICINE

## 2019-01-01 PROCEDURE — 97035 APP MDLTY 1+ULTRASOUND EA 15: CPT | Mod: GP

## 2019-01-01 PROCEDURE — 11721 DEBRIDE NAIL 6 OR MORE: CPT | Performed by: PODIATRIST

## 2019-01-01 PROCEDURE — 99213 OFFICE O/P EST LOW 20 MIN: CPT | Mod: 25 | Performed by: FAMILY MEDICINE

## 2019-01-01 PROCEDURE — 25500064 ZZH RX 255 OP 636: Performed by: RADIOLOGY

## 2019-01-01 PROCEDURE — 11721 DEBRIDE NAIL 6 OR MORE: CPT | Mod: Q8 | Performed by: PODIATRIST

## 2019-01-01 PROCEDURE — 85025 COMPLETE CBC W/AUTO DIFF WBC: CPT | Performed by: EMERGENCY MEDICINE

## 2019-01-01 PROCEDURE — 25000128 H RX IP 250 OP 636: Performed by: RADIOLOGY

## 2019-01-01 PROCEDURE — 99214 OFFICE O/P EST MOD 30 MIN: CPT | Performed by: FAMILY MEDICINE

## 2019-01-01 PROCEDURE — G0463 HOSPITAL OUTPT CLINIC VISIT: HCPCS

## 2019-01-01 PROCEDURE — 97110 THERAPEUTIC EXERCISES: CPT | Mod: GP

## 2019-01-01 PROCEDURE — 81003 URINALYSIS AUTO W/O SCOPE: CPT | Performed by: EMERGENCY MEDICINE

## 2019-01-01 PROCEDURE — 93005 ELECTROCARDIOGRAM TRACING: CPT | Performed by: EMERGENCY MEDICINE

## 2019-01-01 PROCEDURE — 74177 CT ABD & PELVIS W/CONTRAST: CPT

## 2019-01-01 PROCEDURE — 25000128 H RX IP 250 OP 636: Performed by: FAMILY MEDICINE

## 2019-01-01 PROCEDURE — G0463 HOSPITAL OUTPT CLINIC VISIT: HCPCS | Mod: 25

## 2019-01-01 PROCEDURE — 97161 PT EVAL LOW COMPLEX 20 MIN: CPT | Mod: GP

## 2019-01-01 PROCEDURE — 20610 DRAIN/INJ JOINT/BURSA W/O US: CPT | Mod: LT

## 2019-01-01 PROCEDURE — 25000125 ZZHC RX 250: Performed by: RADIOLOGY

## 2019-01-01 PROCEDURE — 85025 COMPLETE CBC W/AUTO DIFF WBC: CPT | Mod: ZL | Performed by: FAMILY MEDICINE

## 2019-01-01 PROCEDURE — 20610 DRAIN/INJ JOINT/BURSA W/O US: CPT | Performed by: FAMILY MEDICINE

## 2019-01-01 PROCEDURE — 80053 COMPREHEN METABOLIC PANEL: CPT | Mod: ZL | Performed by: FAMILY MEDICINE

## 2019-01-01 PROCEDURE — 90662 IIV NO PRSV INCREASED AG IM: CPT

## 2019-01-01 PROCEDURE — 99213 OFFICE O/P EST LOW 20 MIN: CPT | Performed by: PHYSICIAN ASSISTANT

## 2019-01-01 PROCEDURE — 27096 INJECT SACROILIAC JOINT: CPT | Mod: LT

## 2019-01-01 PROCEDURE — 99213 OFFICE O/P EST LOW 20 MIN: CPT | Mod: 25 | Performed by: PODIATRIST

## 2019-01-01 PROCEDURE — 99284 EMERGENCY DEPT VISIT MOD MDM: CPT | Performed by: EMERGENCY MEDICINE

## 2019-01-01 PROCEDURE — 36415 COLL VENOUS BLD VENIPUNCTURE: CPT | Mod: ZL | Performed by: FAMILY MEDICINE

## 2019-01-01 PROCEDURE — 20610 DRAIN/INJ JOINT/BURSA W/O US: CPT | Mod: LT | Performed by: FAMILY MEDICINE

## 2019-01-01 PROCEDURE — G0008 ADMIN INFLUENZA VIRUS VAC: HCPCS | Performed by: FAMILY MEDICINE

## 2019-01-01 PROCEDURE — 36415 COLL VENOUS BLD VENIPUNCTURE: CPT | Performed by: EMERGENCY MEDICINE

## 2019-01-01 PROCEDURE — 99213 OFFICE O/P EST LOW 20 MIN: CPT | Performed by: SURGERY

## 2019-01-01 PROCEDURE — 25500064 ZZH RX 255 OP 636: Performed by: FAMILY MEDICINE

## 2019-01-01 PROCEDURE — 81003 URINALYSIS AUTO W/O SCOPE: CPT | Mod: ZL | Performed by: FAMILY MEDICINE

## 2019-01-01 PROCEDURE — 80053 COMPREHEN METABOLIC PANEL: CPT | Performed by: EMERGENCY MEDICINE

## 2019-01-01 PROCEDURE — 93010 ELECTROCARDIOGRAM REPORT: CPT | Performed by: INTERNAL MEDICINE

## 2019-01-01 RX ORDER — HYDROCODONE BITARTRATE AND ACETAMINOPHEN 5; 325 MG/1; MG/1
1 TABLET ORAL EVERY 4 HOURS PRN
Qty: 150 TABLET | Refills: 0 | Status: SHIPPED | OUTPATIENT
Start: 2019-01-01 | End: 2019-01-01

## 2019-01-01 RX ORDER — PREDNISONE 20 MG/1
20 TABLET ORAL DAILY
Qty: 5 TABLET | Refills: 0 | Status: SHIPPED | OUTPATIENT
Start: 2019-01-01 | End: 2019-01-01

## 2019-01-01 RX ORDER — LOSARTAN POTASSIUM AND HYDROCHLOROTHIAZIDE 12.5; 5 MG/1; MG/1
1 TABLET ORAL DAILY
Qty: 90 TABLET | Refills: 3 | Status: SHIPPED | OUTPATIENT
Start: 2019-01-01 | End: 2019-01-01

## 2019-01-01 RX ORDER — OXYCODONE HYDROCHLORIDE 5 MG/1
5 TABLET ORAL EVERY 4 HOURS PRN
Qty: 150 TABLET | Refills: 0 | Status: SHIPPED | OUTPATIENT
Start: 2019-01-01 | End: 2019-01-01

## 2019-01-01 RX ORDER — PREDNISONE 10 MG/1
10 TABLET ORAL DAILY
Qty: 15 TABLET | Refills: 0 | Status: SHIPPED | OUTPATIENT
Start: 2019-01-01 | End: 2020-01-01

## 2019-01-01 RX ORDER — HYDROCODONE BITARTRATE AND ACETAMINOPHEN 5; 325 MG/1; MG/1
1 TABLET ORAL EVERY 6 HOURS PRN
Qty: 120 TABLET | Refills: 0 | Status: SHIPPED | OUTPATIENT
Start: 2019-01-01 | End: 2019-01-01

## 2019-01-01 RX ORDER — TRIAMCINOLONE ACETONIDE 40 MG/ML
40 INJECTION, SUSPENSION INTRA-ARTICULAR; INTRAMUSCULAR ONCE
Status: COMPLETED | OUTPATIENT
Start: 2019-01-01 | End: 2019-01-01

## 2019-01-01 RX ORDER — LIDOCAINE HYDROCHLORIDE 10 MG/ML
2 INJECTION, SOLUTION EPIDURAL; INFILTRATION; INTRACAUDAL; PERINEURAL ONCE
Status: COMPLETED | OUTPATIENT
Start: 2019-01-01 | End: 2019-01-01

## 2019-01-01 RX ORDER — METHYLPREDNISOLONE ACETATE 80 MG/ML
80 INJECTION, SUSPENSION INTRA-ARTICULAR; INTRALESIONAL; INTRAMUSCULAR; SOFT TISSUE ONCE
Status: COMPLETED | OUTPATIENT
Start: 2019-01-01 | End: 2019-01-01

## 2019-01-01 RX ORDER — LACTULOSE 10 G/15ML
SOLUTION ORAL
Qty: 1892 ML | Refills: 11 | Status: ON HOLD | OUTPATIENT
Start: 2019-01-01 | End: 2020-01-01

## 2019-01-01 RX ORDER — BUPIVACAINE HYDROCHLORIDE 5 MG/ML
3 INJECTION, SOLUTION EPIDURAL; INTRACAUDAL ONCE
Status: COMPLETED | OUTPATIENT
Start: 2019-01-01 | End: 2019-01-01

## 2019-01-01 RX ORDER — OXYCODONE HYDROCHLORIDE 5 MG/1
5 TABLET ORAL EVERY 4 HOURS PRN
Qty: 150 TABLET | Refills: 0 | Status: SHIPPED | OUTPATIENT
Start: 2019-01-01 | End: 2020-01-01

## 2019-01-01 RX ORDER — LOSARTAN POTASSIUM AND HYDROCHLOROTHIAZIDE 12.5; 5 MG/1; MG/1
1 TABLET ORAL DAILY
Qty: 90 TABLET | Refills: 3 | Status: ON HOLD | OUTPATIENT
Start: 2019-01-01 | End: 2020-01-01

## 2019-01-01 RX ORDER — MIRTAZAPINE 7.5 MG/1
7.5 TABLET, FILM COATED ORAL AT BEDTIME
Qty: 90 TABLET | Refills: 3 | Status: SHIPPED | OUTPATIENT
Start: 2019-01-01 | End: 2019-01-01

## 2019-01-01 RX ORDER — LIDOCAINE HYDROCHLORIDE 10 MG/ML
2 INJECTION, SOLUTION INFILTRATION; PERINEURAL ONCE
Status: COMPLETED | OUTPATIENT
Start: 2019-01-01 | End: 2019-01-01

## 2019-01-01 RX ORDER — IODIXANOL 320 MG/ML
100 INJECTION, SOLUTION INTRAVASCULAR ONCE
Status: COMPLETED | OUTPATIENT
Start: 2019-01-01 | End: 2019-01-01

## 2019-01-01 RX ORDER — PREDNISONE 20 MG/1
20 TABLET ORAL DAILY
Qty: 10 TABLET | Refills: 0 | Status: SHIPPED | OUTPATIENT
Start: 2019-01-01 | End: 2019-01-01

## 2019-01-01 RX ORDER — ASPIRIN AND DIPYRIDAMOLE 25; 200 MG/1; MG/1
CAPSULE, EXTENDED RELEASE ORAL
Qty: 180 CAPSULE | Refills: 3 | Status: SHIPPED | OUTPATIENT
Start: 2019-01-01 | End: 2020-01-01

## 2019-01-01 RX ADMIN — Medication 2 ML: at 11:24

## 2019-01-01 RX ADMIN — LIDOCAINE HYDROCHLORIDE 2 ML: 10 INJECTION, SOLUTION EPIDURAL; INFILTRATION; INTRACAUDAL; PERINEURAL at 11:25

## 2019-01-01 RX ADMIN — TRIAMCINOLONE ACETONIDE 40 MG: 40 INJECTION, SUSPENSION INTRA-ARTICULAR; INTRAMUSCULAR at 11:25

## 2019-01-01 RX ADMIN — TRIAMCINOLONE ACETONIDE 40 MG: 40 INJECTION, SUSPENSION INTRA-ARTICULAR; INTRAMUSCULAR at 11:14

## 2019-01-01 RX ADMIN — IODIXANOL 73 ML: 320 INJECTION, SOLUTION INTRAVASCULAR at 11:35

## 2019-01-01 RX ADMIN — BUPIVACAINE HYDROCHLORIDE 3 ML: 5 INJECTION, SOLUTION EPIDURAL; INTRACAUDAL; PERINEURAL at 11:24

## 2019-01-01 RX ADMIN — BUPIVACAINE HYDROCHLORIDE 3 ML: 5 INJECTION, SOLUTION EPIDURAL; INTRACAUDAL at 11:13

## 2019-01-01 RX ADMIN — IOHEXOL 2 ML: 240 INJECTION, SOLUTION INTRATHECAL; INTRAVASCULAR; INTRAVENOUS; ORAL at 11:14

## 2019-01-01 RX ADMIN — METHYLPREDNISOLONE ACETATE 80 MG: 80 INJECTION, SUSPENSION INTRA-ARTICULAR; INTRALESIONAL; INTRAMUSCULAR; SOFT TISSUE at 11:47

## 2019-01-01 RX ADMIN — LIDOCAINE HYDROCHLORIDE 2 ML: 10 INJECTION, SOLUTION INFILTRATION; PERINEURAL at 11:14

## 2019-01-01 RX ADMIN — TRIAMCINOLONE ACETONIDE 40 MG: 40 INJECTION, SUSPENSION INTRA-ARTICULAR; INTRAMUSCULAR at 11:42

## 2019-01-01 ASSESSMENT — ENCOUNTER SYMPTOMS
MYALGIAS: 1
CONFUSION: 1
BACK PAIN: 1
FEVER: 0
SLEEP DISTURBANCE: 0
CONSTIPATION: 0
SHORTNESS OF BREATH: 0
ABDOMINAL PAIN: 1
AGITATION: 0
SLEEP DISTURBANCE: 0
BACK PAIN: 1
ARTHRALGIAS: 1
VOMITING: 0
FATIGUE: 1
ARTHRALGIAS: 1
ARTHRALGIAS: 1
FATIGUE: 0
BACK PAIN: 1
WEAKNESS: 1
WEAKNESS: 1
TREMORS: 1
HEADACHES: 0
ARTHRALGIAS: 1
CHILLS: 0
SLEEP DISTURBANCE: 0
BACK PAIN: 1
APPETITE CHANGE: 0
FATIGUE: 1
WOUND: 0
FEVER: 0
BACK PAIN: 1
BACK PAIN: 1
NAUSEA: 0
FEVER: 0
ARTHRALGIAS: 0
FATIGUE: 0
FATIGUE: 0
FEVER: 0
ARTHRALGIAS: 1
ABDOMINAL DISTENTION: 0
ARTHRALGIAS: 1
BACK PAIN: 1
SHORTNESS OF BREATH: 0
DYSURIA: 0
FEVER: 0
FATIGUE: 0

## 2019-01-01 ASSESSMENT — ANXIETY QUESTIONNAIRES
6. BECOMING EASILY ANNOYED OR IRRITABLE: NOT AT ALL
2. NOT BEING ABLE TO STOP OR CONTROL WORRYING: NOT AT ALL
7. FEELING AFRAID AS IF SOMETHING AWFUL MIGHT HAPPEN: NOT AT ALL
6. BECOMING EASILY ANNOYED OR IRRITABLE: NOT AT ALL
1. FEELING NERVOUS, ANXIOUS, OR ON EDGE: NOT AT ALL
3. WORRYING TOO MUCH ABOUT DIFFERENT THINGS: NOT AT ALL
GAD7 TOTAL SCORE: 0
7. FEELING AFRAID AS IF SOMETHING AWFUL MIGHT HAPPEN: NOT AT ALL
3. WORRYING TOO MUCH ABOUT DIFFERENT THINGS: NOT AT ALL
1. FEELING NERVOUS, ANXIOUS, OR ON EDGE: NOT AT ALL
5. BEING SO RESTLESS THAT IT IS HARD TO SIT STILL: NOT AT ALL
GAD7 TOTAL SCORE: 0
GAD7 TOTAL SCORE: 0
2. NOT BEING ABLE TO STOP OR CONTROL WORRYING: NOT AT ALL
IF YOU CHECKED OFF ANY PROBLEMS ON THIS QUESTIONNAIRE, HOW DIFFICULT HAVE THESE PROBLEMS MADE IT FOR YOU TO DO YOUR WORK, TAKE CARE OF THINGS AT HOME, OR GET ALONG WITH OTHER PEOPLE: NOT DIFFICULT AT ALL
GAD7 TOTAL SCORE: 0
IF YOU CHECKED OFF ANY PROBLEMS ON THIS QUESTIONNAIRE, HOW DIFFICULT HAVE THESE PROBLEMS MADE IT FOR YOU TO DO YOUR WORK, TAKE CARE OF THINGS AT HOME, OR GET ALONG WITH OTHER PEOPLE: NOT DIFFICULT AT ALL
5. BEING SO RESTLESS THAT IT IS HARD TO SIT STILL: NOT AT ALL

## 2019-01-01 ASSESSMENT — PAIN SCALES - GENERAL
PAINLEVEL: SEVERE PAIN (7)
PAINLEVEL: SEVERE PAIN (7)
PAINLEVEL: EXTREME PAIN (8)
PAINLEVEL: NO PAIN (0)
PAINLEVEL: SEVERE PAIN (7)
PAINLEVEL: MILD PAIN (3)
PAINLEVEL: MODERATE PAIN (5)
PAINLEVEL: EXTREME PAIN (8)
PAINLEVEL: SEVERE PAIN (7)
PAINLEVEL: EXTREME PAIN (8)
PAINLEVEL: EXTREME PAIN (8)
PAINLEVEL: SEVERE PAIN (6)
PAINLEVEL: EXTREME PAIN (8)

## 2019-01-01 ASSESSMENT — MIFFLIN-ST. JEOR
SCORE: 852.52
SCORE: 908.09

## 2019-01-01 ASSESSMENT — PATIENT HEALTH QUESTIONNAIRE - PHQ9
5. POOR APPETITE OR OVEREATING: NOT AT ALL
5. POOR APPETITE OR OVEREATING: NOT AT ALL

## 2019-01-07 ENCOUNTER — OFFICE VISIT (OUTPATIENT)
Dept: FAMILY MEDICINE | Facility: OTHER | Age: 84
End: 2019-01-07
Attending: FAMILY MEDICINE
Payer: MEDICARE

## 2019-01-07 VITALS
BODY MASS INDEX: 22.57 KG/M2 | DIASTOLIC BLOOD PRESSURE: 62 MMHG | HEART RATE: 60 BPM | WEIGHT: 117.5 LBS | RESPIRATION RATE: 16 BRPM | SYSTOLIC BLOOD PRESSURE: 130 MMHG

## 2019-01-07 DIAGNOSIS — M19.012 PRIMARY OSTEOARTHRITIS OF LEFT SHOULDER: ICD-10-CM

## 2019-01-07 DIAGNOSIS — M70.62 GREATER TROCHANTERIC BURSITIS OF LEFT HIP: Primary | ICD-10-CM

## 2019-01-07 DIAGNOSIS — Z86.73 HISTORY OF TIA (TRANSIENT ISCHEMIC ATTACK): ICD-10-CM

## 2019-01-07 PROCEDURE — G0463 HOSPITAL OUTPT CLINIC VISIT: HCPCS

## 2019-01-07 PROCEDURE — 99213 OFFICE O/P EST LOW 20 MIN: CPT | Performed by: FAMILY MEDICINE

## 2019-01-07 RX ORDER — OXYCODONE HYDROCHLORIDE 5 MG/1
5 TABLET ORAL 2 TIMES DAILY PRN
Qty: 12 TABLET | Refills: 0 | Status: SHIPPED | OUTPATIENT
Start: 2019-01-07 | End: 2019-01-07

## 2019-01-07 RX ORDER — ASPIRIN AND DIPYRIDAMOLE 25; 200 MG/1; MG/1
1 CAPSULE, EXTENDED RELEASE ORAL 2 TIMES DAILY
Qty: 180 CAPSULE | Refills: 0 | Status: SHIPPED | OUTPATIENT
Start: 2019-01-07 | End: 2019-04-08

## 2019-01-07 RX ORDER — OXYCODONE HYDROCHLORIDE 5 MG/1
5 TABLET ORAL 2 TIMES DAILY PRN
Qty: 60 TABLET | Refills: 0 | Status: SHIPPED | OUTPATIENT
Start: 2019-01-07 | End: 2019-01-15

## 2019-01-07 ASSESSMENT — PATIENT HEALTH QUESTIONNAIRE - PHQ9: 5. POOR APPETITE OR OVEREATING: NOT AT ALL

## 2019-01-07 ASSESSMENT — ANXIETY QUESTIONNAIRES
6. BECOMING EASILY ANNOYED OR IRRITABLE: NOT AT ALL
3. WORRYING TOO MUCH ABOUT DIFFERENT THINGS: NOT AT ALL
2. NOT BEING ABLE TO STOP OR CONTROL WORRYING: NOT AT ALL
5. BEING SO RESTLESS THAT IT IS HARD TO SIT STILL: NOT AT ALL
GAD7 TOTAL SCORE: 0
7. FEELING AFRAID AS IF SOMETHING AWFUL MIGHT HAPPEN: NOT AT ALL
IF YOU CHECKED OFF ANY PROBLEMS ON THIS QUESTIONNAIRE, HOW DIFFICULT HAVE THESE PROBLEMS MADE IT FOR YOU TO DO YOUR WORK, TAKE CARE OF THINGS AT HOME, OR GET ALONG WITH OTHER PEOPLE: NOT DIFFICULT AT ALL
1. FEELING NERVOUS, ANXIOUS, OR ON EDGE: NOT AT ALL

## 2019-01-07 ASSESSMENT — ENCOUNTER SYMPTOMS
FEVER: 0
BACK PAIN: 1
SLEEP DISTURBANCE: 0

## 2019-01-07 ASSESSMENT — PAIN SCALES - GENERAL: PAINLEVEL: SEVERE PAIN (6)

## 2019-01-07 NOTE — NURSING NOTE
"Coming in for hip and back pain    Chief Complaint   Patient presents with     Musculoskeletal Problem     and back pain       Initial /62 (BP Location: Right arm, Patient Position: Sitting, Cuff Size: Adult Large)   Pulse 60   Resp 16   Wt 53.3 kg (117 lb 8 oz)   BMI 22.57 kg/m   Estimated body mass index is 22.57 kg/m  as calculated from the following:    Height as of 6/26/18: 1.537 m (5' 0.5\").    Weight as of this encounter: 53.3 kg (117 lb 8 oz).  Medication Reconciliation: complete    Monisha Gamboa LPN  "

## 2019-01-07 NOTE — PROGRESS NOTES
SUBJECTIVE:   Tenisha Cagle is a 85 year old female who presents to clinic today for the following health issues:    HPI  Pain.  Here with daughter.  She is having lots of pain in left shoulder, right 1st toe, low back, hips and knees.  Notes some swelling in the right knee more than the left.  Has a pending appointment for knee injections with ortho on 2/4/19 with Dr. Nelson.  I have done trochanter injections for her.  She says it helped, her daughter says it did not.  We then had one done by radiology, and her daughter says it made it all worse.  Does not fall often, last was in late August.  Cannot lay on left hip, and also cannot lay on her back.  Has ultram, prescribed at 1 at a time, daughter gave her 2 once and it helped better.   Prednisone helps the most.  Last got this 5 weeks ago.     Had an x-ray of left clavicle on 8/30/18 with severe glenohumeral joint djd noted along with a clavicle fracture.      She sleeps reasonably well, gets up only to urinate.  Pain does not affect sleep.    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) 06/15/2017     Priority: Medium     Abnormal weight loss 04/18/2016     Priority: Medium     Anemia 04/18/2016     Priority: Medium     Greater trochanteric bursitis of left hip 04/18/2016     Priority: Medium     Visual changes 04/18/2016     Priority: Medium     Osteoarthritis of spine  with radiculopathy, lumbar region 02/03/2016     Priority: Medium     History of TIA (transient ischemic attack) 02/02/2016     Priority: Medium     Numbness and tingling of right leg 02/02/2016     Priority: Medium     Radicular leg pain 02/02/2016     Priority: Medium     Basal cell carcinoma of right cheek 11/18/2015     Priority: Medium     Sacroiliac joint pain 12/10/2014     Priority: Medium     Alvarado's cyst of knee 02/20/2014     Priority: Medium     Paresthesia of right leg 02/20/2014     Priority: Medium     Right knee DJD 02/20/2014     Priority: Medium     ACP (advance care planning) 01/02/2014     Priority: Medium     Cystocele 08/23/2013     Priority: Medium     Pancreatic mass 09/18/2012     Priority: Medium     Overview:   Possible, abnormal CT        Cerebral aneurysm, nonruptured 12/20/2011     Priority: Medium     Diverticulosis of colon 05/13/2011     Priority: Medium     Chronic lymphocytic leukemia (H) 04/14/2011     Priority: Medium     Overview:   WBC stable in the 30,000       Past Surgical History:   Procedure Laterality Date     CHOLECYSTECTOMY      No Comments Provided     COLONOSCOPY      2007,Sigmoid diverticulosis     ENDOSCOPY UPPER, COLONOSCOPY, COMBINED      5/5/11,Hiatal hernia, gastric gland hyperplasia in antrum     LAPAROSCOPIC TUBAL LIGATION      No Comments Provided     OTHER SURGICAL HISTORY      1986,205093,BIOPSY BREAST,Right     OTHER SURGICAL HISTORY      [id number],PREMALIG/BENIGN SKIN LESION EXCISION,R side of nose, face and chest wall/Basal Cell Cancer Excision     TONSILLECTOMY, ADENOIDECTOMY, COMBINED      1954     Social History     Tobacco Use     Smoking status: Never Smoker     Smokeless tobacco: Never Used   Substance Use Topics     Alcohol use: No     Alcohol/week: 0.0 oz     Current Outpatient Medications   Medication Sig Dispense Refill     acetaminophen (TYLENOL) 500 MG tablet Take 1,000 mg by mouth every 6 hours as needed       aspirin-dipyridamole (AGGRENOX)   MG per 12 hr capsule Take 1 capsule by mouth 2 times daily 180 capsule 0     Calcium Carbonate-Vitamin D (CALCIUM 500 + D) 500-125 MG-UNIT TABS Take 1 tablet by mouth daily       lactulose (CHRONULAC) 10 GM/15ML solution Take 30 mLs (20 g) by mouth 2 times daily as needed for constipation 1000 mL 11     losartan-hydrochlorothiazide (HYZAAR) 50-12.5 MG per tablet Take 1 tablet by mouth daily 90 tablet 3     magnesium hydroxide (MILK OF MAGNESIA) 400 MG/5ML suspension Take 15 mLs by mouth At Bedtime       Multiple Vitamin (MULTI-VITAMINS) TABS Take 1 tablet by mouth daily       Multiple Vitamins-Minerals (OCUVITE ADULT 50+) CAPS Take 1 capsule by mouth daily       predniSONE (DELTASONE) 20 MG tablet Take 20 mg by mouth daily. 5 tablet 0     sertraline (ZOLOFT) 25 MG tablet Take 1 tablet (25 mg) by mouth daily 90 tablet 3     traMADol (ULTRAM) 50 MG tablet Take 1 tablet (50 mg) by mouth every 6 hours as needed for severe pain 90 tablet 3     Allergies   Allergen Reactions     Lansoprazole Other (See Comments) and Unknown     Patient states that medication didn't work for her.  Daughter states she got delusional, water did not taste right  Other reaction(s): Other - Describe In Comment Field  Patient states that medication didn't work for her.     Lisinopril Cough       Review of Systems   Constitutional: Negative for fever.   Musculoskeletal: Positive for back pain and gait problem.   Psychiatric/Behavioral: Negative for sleep disturbance.        OBJECTIVE:     /62 (BP Location: Right arm, Patient Position: Sitting, Cuff Size: Adult Large)   Pulse 60   Resp 16   Wt 53.3 kg (117 lb 8 oz)   BMI 22.57 kg/m    Body mass index is 22.57 kg/m .  Physical Exam   Constitutional: She is oriented to person, place, and time. She appears well-developed. No distress.   Musculoskeletal:   Slow to stand.  Significant kyphosis.  Uses a cane.  Left shoulder has pain on palpation.  Lumbar spine and left greater  trochanter also have moderate pain on light to mod palpation.   Neurological: She is alert and oriented to person, place, and time.   Fair to poor memory   Skin: She is not diaphoretic.           ASSESSMENT/PLAN:         (M70.62) Greater trochanteric bursitis of left hip  (primary encounter diagnosis)  Comment: her memory is declining, and her daughter is now more actively managing her meds.  Goals of care are to try to balance risks of falls and sedation, with pain relief.  Unfortunately there are not a lot of great answers here. With her significant osteoporosis, prednisone is relatively contraindicated.  Discussed with her daughter the logic here.  I sense her daughter is mostly frustrated.  Plan: I offered therapy, she has no way to get in to town.  Tried this for a few days and it made her pain worse.  Will next do a trial of oxycodone instead of the ultram.    (Z86.73) History of TIA (transient ischemic attack)  Comment: stable  Plan: aspirin-dipyridamole ER (AGGRENOX)  MG 12        hr capsule             (M19.012) Primary osteoarthritis of left shoulder  Comment: really is end stage.  Oxycodone 5 milligram 1 twice daily for a trial.  Plan:        Godwin Kapoor MD  [facility] AND [facility]

## 2019-01-09 ASSESSMENT — ANXIETY QUESTIONNAIRES: GAD7 TOTAL SCORE: 0

## 2019-01-15 DIAGNOSIS — M53.3 SACROILIAC JOINT PAIN: Primary | ICD-10-CM

## 2019-01-15 RX ORDER — HYDROCODONE BITARTRATE AND ACETAMINOPHEN 5; 325 MG/1; MG/1
1 TABLET ORAL 2 TIMES DAILY PRN
Qty: 60 TABLET | Refills: 0 | Status: SHIPPED | OUTPATIENT
Start: 2019-01-15 | End: 2019-02-05

## 2019-01-15 NOTE — TELEPHONE ENCOUNTER
DMO:  Patient's daughter called stating that the Percocet patient is on is causing her stomach pain    Please call    Thank you

## 2019-01-15 NOTE — TELEPHONE ENCOUNTER
Spoke with patient's daughter and she prefers something other than tramadol because it didn't seem to help.    Shonda Montez LPN...................1/15/2019  12:43 PM

## 2019-01-15 NOTE — TELEPHONE ENCOUNTER
Patient's daughter calling in regards to oxycodone giving patient abdominal pain. She states Dr. Kapoor switched it on the 7th from tramadol. Patient is taking medication for hip/back pain. She states patient is having regular bowel movements. Patient has tried taking medication with food, but is not helpful. She is wondering if Delma SIMMONS would be willing to switch this due to Dr. Kapoor being out of the office all week. Delma SIMMONS saw patient in Sept for clavicle fracture.    Shonda Montez LPN...................1/15/2019  11:33 AM

## 2019-01-15 NOTE — TELEPHONE ENCOUNTER
I she wanting to change the medication back to ultram? Delma Cooper, LUCIA CNP on 1/15/2019 at 11:47 AM

## 2019-01-15 NOTE — TELEPHONE ENCOUNTER
Will try hydrocodone apap 5/325 mg. Will need to  Rx. LUCIA Gonzalez CNP on 1/15/2019 at 12:45 PM

## 2019-01-16 ENCOUNTER — HOSPITAL ENCOUNTER (EMERGENCY)
Facility: OTHER | Age: 84
Discharge: HOME | End: 2019-01-17
Attending: FAMILY MEDICINE | Admitting: FAMILY MEDICINE
Payer: MEDICARE

## 2019-01-16 ENCOUNTER — APPOINTMENT (OUTPATIENT)
Dept: CT IMAGING | Facility: OTHER | Age: 84
End: 2019-01-16
Payer: MEDICARE

## 2019-01-16 DIAGNOSIS — K50.00 CROHN'S DISEASE OF SMALL INTESTINE WITHOUT COMPLICATION (H): ICD-10-CM

## 2019-01-16 DIAGNOSIS — N28.9 RENAL INSUFFICIENCY: ICD-10-CM

## 2019-01-16 DIAGNOSIS — R11.0 NAUSEA: ICD-10-CM

## 2019-01-16 LAB
ALBUMIN SERPL-MCNC: 4.1 G/DL (ref 3.5–5.7)
ALBUMIN UR-MCNC: NEGATIVE MG/DL
ALP SERPL-CCNC: 42 U/L (ref 34–104)
ALT SERPL W P-5'-P-CCNC: 11 U/L (ref 7–52)
AMYLASE SERPL-CCNC: 27 U/L (ref 29–103)
ANION GAP SERPL CALCULATED.3IONS-SCNC: 8 MMOL/L (ref 3–14)
APPEARANCE UR: CLEAR
APTT PPP: 34 SEC (ref 26–39)
AST SERPL W P-5'-P-CCNC: 18 U/L (ref 13–39)
BASOPHILS # BLD AUTO: 0.1 10E9/L (ref 0–0.2)
BASOPHILS NFR BLD AUTO: 0.5 %
BILIRUB SERPL-MCNC: 0.3 MG/DL (ref 0.3–1)
BILIRUB UR QL STRIP: NEGATIVE
BUN SERPL-MCNC: 36 MG/DL (ref 7–25)
CALCIUM SERPL-MCNC: 9.1 MG/DL (ref 8.6–10.3)
CHLORIDE SERPL-SCNC: 100 MMOL/L (ref 98–107)
CO2 SERPL-SCNC: 27 MMOL/L (ref 21–31)
COLOR UR AUTO: YELLOW
CREAT SERPL-MCNC: 1.3 MG/DL (ref 0.6–1.2)
DIFFERENTIAL METHOD BLD: ABNORMAL
EOSINOPHIL # BLD AUTO: 0.2 10E9/L (ref 0–0.7)
EOSINOPHIL NFR BLD AUTO: 1.9 %
ERYTHROCYTE [DISTWIDTH] IN BLOOD BY AUTOMATED COUNT: 14.7 % (ref 10–15)
GFR SERPL CREATININE-BSD FRML MDRD: 39 ML/MIN/{1.73_M2}
GLUCOSE SERPL-MCNC: 113 MG/DL (ref 70–105)
GLUCOSE UR STRIP-MCNC: NEGATIVE MG/DL
HCT VFR BLD AUTO: 32.4 % (ref 35–47)
HGB BLD-MCNC: 10.3 G/DL (ref 11.7–15.7)
HGB UR QL STRIP: NEGATIVE
IMM GRANULOCYTES # BLD: 0 10E9/L (ref 0–0.4)
IMM GRANULOCYTES NFR BLD: 0.3 %
INR PPP: 0.82 (ref 0–1.3)
KETONES UR STRIP-MCNC: NEGATIVE MG/DL
LACTATE SERPL-SCNC: 0.6 MMOL/L (ref 0.5–2.2)
LEUKOCYTE ESTERASE UR QL STRIP: NEGATIVE
LIPASE SERPL-CCNC: 14 U/L (ref 11–82)
LYMPHOCYTES # BLD AUTO: 5.1 10E9/L (ref 0.8–5.3)
LYMPHOCYTES NFR BLD AUTO: 46.9 %
MCH RBC QN AUTO: 28.4 PG (ref 26.5–33)
MCHC RBC AUTO-ENTMCNC: 31.8 G/DL (ref 31.5–36.5)
MCV RBC AUTO: 89 FL (ref 78–100)
MONOCYTES # BLD AUTO: 0.7 10E9/L (ref 0–1.3)
MONOCYTES NFR BLD AUTO: 6.7 %
NEUTROPHILS # BLD AUTO: 4.8 10E9/L (ref 1.6–8.3)
NEUTROPHILS NFR BLD AUTO: 43.7 %
NITRATE UR QL: NEGATIVE
PH UR STRIP: 6 PH (ref 5–9)
PLATELET # BLD AUTO: 266 10E9/L (ref 150–450)
POTASSIUM SERPL-SCNC: 4.3 MMOL/L (ref 3.5–5.1)
PROT SERPL-MCNC: 6.9 G/DL (ref 6.4–8.9)
RBC # BLD AUTO: 3.63 10E12/L (ref 3.8–5.2)
SODIUM SERPL-SCNC: 135 MMOL/L (ref 134–144)
SOURCE: NORMAL
SP GR UR STRIP: 1.01 (ref 1–1.03)
UROBILINOGEN UR STRIP-ACNC: 0.2 EU/DL (ref 0.2–1)
WBC # BLD AUTO: 11 10E9/L (ref 4–11)

## 2019-01-16 PROCEDURE — 81003 URINALYSIS AUTO W/O SCOPE: CPT | Performed by: FAMILY MEDICINE

## 2019-01-16 PROCEDURE — 25000128 H RX IP 250 OP 636: Performed by: FAMILY MEDICINE

## 2019-01-16 PROCEDURE — 83605 ASSAY OF LACTIC ACID: CPT | Performed by: FAMILY MEDICINE

## 2019-01-16 PROCEDURE — C9113 INJ PANTOPRAZOLE SODIUM, VIA: HCPCS | Performed by: FAMILY MEDICINE

## 2019-01-16 PROCEDURE — 74176 CT ABD & PELVIS W/O CONTRAST: CPT

## 2019-01-16 PROCEDURE — 82150 ASSAY OF AMYLASE: CPT | Performed by: FAMILY MEDICINE

## 2019-01-16 PROCEDURE — 85025 COMPLETE CBC W/AUTO DIFF WBC: CPT | Performed by: FAMILY MEDICINE

## 2019-01-16 PROCEDURE — 74177 CT ABD & PELVIS W/CONTRAST: CPT | Mod: TC

## 2019-01-16 PROCEDURE — 80053 COMPREHEN METABOLIC PANEL: CPT | Performed by: FAMILY MEDICINE

## 2019-01-16 PROCEDURE — 99284 EMERGENCY DEPT VISIT MOD MDM: CPT | Mod: 25 | Performed by: FAMILY MEDICINE

## 2019-01-16 PROCEDURE — 83690 ASSAY OF LIPASE: CPT | Performed by: FAMILY MEDICINE

## 2019-01-16 PROCEDURE — 85730 THROMBOPLASTIN TIME PARTIAL: CPT | Performed by: FAMILY MEDICINE

## 2019-01-16 PROCEDURE — 85610 PROTHROMBIN TIME: CPT | Performed by: FAMILY MEDICINE

## 2019-01-16 PROCEDURE — 96374 THER/PROPH/DIAG INJ IV PUSH: CPT | Performed by: FAMILY MEDICINE

## 2019-01-16 PROCEDURE — 96376 TX/PRO/DX INJ SAME DRUG ADON: CPT | Performed by: FAMILY MEDICINE

## 2019-01-16 PROCEDURE — 99284 EMERGENCY DEPT VISIT MOD MDM: CPT | Mod: Z6 | Performed by: FAMILY MEDICINE

## 2019-01-16 PROCEDURE — 96375 TX/PRO/DX INJ NEW DRUG ADDON: CPT | Performed by: FAMILY MEDICINE

## 2019-01-16 RX ORDER — SODIUM CHLORIDE 9 MG/ML
1000 INJECTION, SOLUTION INTRAVENOUS CONTINUOUS
Status: DISCONTINUED | OUTPATIENT
Start: 2019-01-16 | End: 2019-01-17 | Stop reason: HOSPADM

## 2019-01-16 RX ORDER — MORPHINE SULFATE 4 MG/ML
4 INJECTION, SOLUTION INTRAMUSCULAR; INTRAVENOUS
Status: COMPLETED | OUTPATIENT
Start: 2019-01-16 | End: 2019-01-16

## 2019-01-16 RX ORDER — ONDANSETRON 2 MG/ML
8 INJECTION INTRAMUSCULAR; INTRAVENOUS ONCE
Status: COMPLETED | OUTPATIENT
Start: 2019-01-16 | End: 2019-01-16

## 2019-01-16 RX ORDER — DIPHENHYDRAMINE HYDROCHLORIDE 50 MG/ML
12.5 INJECTION INTRAMUSCULAR; INTRAVENOUS ONCE
Status: COMPLETED | OUTPATIENT
Start: 2019-01-16 | End: 2019-01-16

## 2019-01-16 RX ADMIN — MORPHINE SULFATE 4 MG: 4 INJECTION INTRAVENOUS at 21:34

## 2019-01-16 RX ADMIN — SODIUM CHLORIDE 1000 ML: 900 INJECTION, SOLUTION INTRAVENOUS at 21:29

## 2019-01-16 RX ADMIN — MORPHINE SULFATE 4 MG: 4 INJECTION INTRAVENOUS at 22:34

## 2019-01-16 RX ADMIN — PANTOPRAZOLE SODIUM 40 MG: 40 INJECTION, POWDER, FOR SOLUTION INTRAVENOUS at 21:32

## 2019-01-16 RX ADMIN — DIPHENHYDRAMINE HYDROCHLORIDE 12.5 MG: 50 INJECTION INTRAMUSCULAR; INTRAVENOUS at 23:56

## 2019-01-16 RX ADMIN — ONDANSETRON 8 MG: 2 INJECTION INTRAMUSCULAR; INTRAVENOUS at 21:27

## 2019-01-16 RX ADMIN — PROCHLORPERAZINE EDISYLATE 5 MG: 5 INJECTION INTRAMUSCULAR; INTRAVENOUS at 23:57

## 2019-01-16 RX ADMIN — SODIUM CHLORIDE 1000 ML: 900 INJECTION, SOLUTION INTRAVENOUS at 22:35

## 2019-01-16 ASSESSMENT — ENCOUNTER SYMPTOMS
COLOR CHANGE: 0
COUGH: 0
SHORTNESS OF BREATH: 0
NAUSEA: 1
ABDOMINAL PAIN: 1
CONFUSION: 0
DYSURIA: 0
DIARRHEA: 0
ABDOMINAL DISTENTION: 1
BACK PAIN: 0
FATIGUE: 0
FLANK PAIN: 0
VOMITING: 0
FEVER: 0
SORE THROAT: 0
HEADACHES: 0
BRUISES/BLEEDS EASILY: 0
PALPITATIONS: 0

## 2019-01-16 ASSESSMENT — MIFFLIN-ST. JEOR: SCORE: 944.84

## 2019-01-16 NOTE — ED AVS SNAPSHOT
[facility]  [address]  [place] [address]  Phone:  [phone]  Fax:  [phone]                                    Tenisha Cagle   MRN: [id number]    Department:  [facility]   Date of Visit:  1/16/2019           After Visit Summary Signature Page    I have received my discharge instructions, and my questions have been answered. I have discussed any challenges I see with this plan with the nurse or doctor.    ..........................................................................................................................................  Patient/Patient Representative Signature      ..........................................................................................................................................  Patient Representative Print Name and Relationship to Patient    ..................................................               ................................................  Date                                   Time    ..........................................................................................................................................  Reviewed by Signature/Title    ...................................................              ..............................................  Date                                               Time          22EPIC Rev 08/18

## 2019-01-17 VITALS
WEIGHT: 117 LBS | BODY MASS INDEX: 20.73 KG/M2 | HEIGHT: 63 IN | TEMPERATURE: 97.2 F | DIASTOLIC BLOOD PRESSURE: 58 MMHG | OXYGEN SATURATION: 96 % | SYSTOLIC BLOOD PRESSURE: 148 MMHG

## 2019-01-17 PROCEDURE — 25000132 ZZH RX MED GY IP 250 OP 250 PS 637: Mod: GY | Performed by: FAMILY MEDICINE

## 2019-01-17 PROCEDURE — A9270 NON-COVERED ITEM OR SERVICE: HCPCS | Mod: GY | Performed by: FAMILY MEDICINE

## 2019-01-17 PROCEDURE — 96375 TX/PRO/DX INJ NEW DRUG ADDON: CPT | Performed by: FAMILY MEDICINE

## 2019-01-17 PROCEDURE — 25000128 H RX IP 250 OP 636: Performed by: FAMILY MEDICINE

## 2019-01-17 RX ORDER — METRONIDAZOLE 500 MG/1
500 TABLET ORAL 3 TIMES DAILY
Qty: 30 TABLET | Refills: 0 | Status: SHIPPED | OUTPATIENT
Start: 2019-01-17 | End: 2019-02-05

## 2019-01-17 RX ORDER — CIPROFLOXACIN 500 MG/1
500 TABLET, FILM COATED ORAL DAILY
Qty: 10 TABLET | Refills: 0 | Status: SHIPPED | OUTPATIENT
Start: 2019-01-17 | End: 2019-02-05

## 2019-01-17 RX ORDER — METRONIDAZOLE 500 MG/1
500 TABLET ORAL ONCE
Status: COMPLETED | OUTPATIENT
Start: 2019-01-17 | End: 2019-01-17

## 2019-01-17 RX ORDER — ACETAMINOPHEN 10 MG/ML
1000 INJECTION, SOLUTION INTRAVENOUS ONCE
Status: COMPLETED | OUTPATIENT
Start: 2019-01-17 | End: 2019-01-17

## 2019-01-17 RX ORDER — CIPROFLOXACIN 500 MG/1
500 TABLET, FILM COATED ORAL ONCE
Status: COMPLETED | OUTPATIENT
Start: 2019-01-17 | End: 2019-01-17

## 2019-01-17 RX ORDER — ONDANSETRON 4 MG/1
8 TABLET, ORALLY DISINTEGRATING ORAL EVERY 8 HOURS PRN
Qty: 10 TABLET | Refills: 0 | Status: SHIPPED | OUTPATIENT
Start: 2019-01-17 | End: 2019-02-05

## 2019-01-17 RX ADMIN — ACETAMINOPHEN 1000 MG: 10 INJECTION, SOLUTION INTRAVENOUS at 01:44

## 2019-01-17 RX ADMIN — CIPROFLOXACIN 500 MG: 500 TABLET, FILM COATED ORAL at 01:50

## 2019-01-17 RX ADMIN — METRONIDAZOLE 500 MG: 500 TABLET ORAL at 01:50

## 2019-01-17 NOTE — ED TRIAGE NOTES
"ED Nursing Triage Note (General)   ________________________________    Tenisha Cagle is a 85 year old Female that presents to triage private car  With history of  Abdominal pain with firm epigastric area onset after starting oxycodone due to a fall reported by daughter.  One month ago was given lactulose for constipation after dx from xray  Significant symptoms had onset 3 day(s) ago.  /61   Temp 98.2  F (36.8  C) (Tympanic)   Ht 1.6 m (5' 3\")   Wt 53.1 kg (117 lb)   SpO2 99%   Breastfeeding? No   BMI 20.73 kg/m  t  Patient appears alert , in moderate distress., and cooperative behavior.    GCS Total = 15  Airway: intact  Breathing noted as Normal.  Circulation Normal  Skin normal  Action taken: to room      PRE HOSPITAL PRIOR LIVING SITUATION Alone  "

## 2019-01-17 NOTE — DISCHARGE INSTRUCTIONS
Fluids only for 24 hours and then gradually advance as tolerated.  Zofran scheduled every 8 hours for 24 hours and then as needed.  Continue Norco for pain at home.

## 2019-01-17 NOTE — ED PROVIDER NOTES
History     Chief Complaint   Patient presents with     Abdominal Pain     HPI  Tenisha Cagle is a 85 year old female who presents to ED with abdominal pain. Patient reports the pain began yesterday and has worsened tonight. The pain is located in the center of the abdomen and described as it hurts really bad, 8 out of 10 for severity. The pain is exacerbated by movement and relieved by nothing. The patient complains of associated nausea, constipation. Denies associated fever, chest pain, shortness of breath, cough, vomiting, diarrhea, dysuria, back or flank pain.  She does have a history of similar pain.   She was seen by her doctor previously for similar complaints and had x-rays of the abdomen done.  She was told that she was really constipated and was given lactulose.  The daughter states that she gave her lactulose last night and then again tonight.  Patient denies other complaints    Allergies:  Allergies   Allergen Reactions     Lansoprazole Other (See Comments) and Unknown     Patient states that medication didn't work for her.  Daughter states she got delusional, water did not taste right  Other reaction(s): Other - Describe In Comment Field  Patient states that medication didn't work for her.     Lisinopril Cough       Problem List:    Patient Active Problem List    Diagnosis Date Noted     Arthritis of shoulder region, left, degenerative 01/07/2019     Priority: Medium     Memory loss 05/25/2018     Priority: Medium     Atrial fibrillation with RVR (H) 01/25/2018     Priority: Medium     Hiatal hernia 01/17/2018     Priority: Medium     Overview:   large, mostly intrathoracic       Essential hypertension 01/17/2018     Priority: Medium     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/27/2017     Priority: Medium     Benign skin lesion of nose 09/27/2017     Priority: Medium     AK (actinic keratosis) 07/10/2017     Priority: Medium     SK (seborrheic keratosis) 07/10/2017     Priority: Medium     Malignant  melanoma of left upper extremity including shoulder (H) 06/15/2017     Priority: Medium     Abnormal weight loss 04/18/2016     Priority: Medium     Anemia 04/18/2016     Priority: Medium     Greater trochanteric bursitis of left hip 04/18/2016     Priority: Medium     Visual changes 04/18/2016     Priority: Medium     Osteoarthritis of spine with radiculopathy, lumbar region 02/03/2016     Priority: Medium     History of TIA (transient ischemic attack) 02/02/2016     Priority: Medium     Numbness and tingling of right leg 02/02/2016     Priority: Medium     Radicular leg pain 02/02/2016     Priority: Medium     Basal cell carcinoma of right cheek 11/18/2015     Priority: Medium     Sacroiliac joint pain 12/10/2014     Priority: Medium     Alvarado's cyst of knee 02/20/2014     Priority: Medium     Paresthesia of right leg 02/20/2014     Priority: Medium     Right knee DJD 02/20/2014     Priority: Medium     ACP (advance care planning) 01/02/2014     Priority: Medium     Cystocele 08/23/2013     Priority: Medium     Pancreatic mass 09/18/2012     Priority: Medium     Overview:   Possible, abnormal CT        Cerebral aneurysm, nonruptured 12/20/2011     Priority: Medium     Diverticulosis of colon 05/13/2011     Priority: Medium     Chronic lymphocytic leukemia (H) 04/14/2011     Priority: Medium     Overview:   WBC stable in the 30,000          Past Medical History:    Past Medical History:   Diagnosis Date     Cardiac murmur      Cataract      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)      Diaphragmatic hernia without obstruction or gangrene      Diverticulosis of large intestine without perforation or abscess without bleeding      Dysthymic disorder      Essential (primary) hypertension      Female climacteric state      Gastritis without bleeding      Nonruptured cerebral aneurysm      Osteoarthritis      Other fecal abnormalities      Other lymphoid leukemia not having achieved  remission (H)      Other specified bacterial intestinal infections      Other specified enthesopathies of unspecified lower limb, excluding foot      Peptic ulcer without hemorrhage or perforation      Personal history of other diseases of the digestive system (CODE)      Personal history of other medical treatment (CODE)      Pure hypercholesterolemia      Sepsis (H)      Sleep disorder      Transient cerebral ischemic attack        Past Surgical History:    Past Surgical History:   Procedure Laterality Date     CHOLECYSTECTOMY      No Comments Provided     COLONOSCOPY      2007,Sigmoid diverticulosis     ENDOSCOPY UPPER, COLONOSCOPY, COMBINED      5/5/11,Hiatal hernia, gastric gland hyperplasia in antrum     LAPAROSCOPIC TUBAL LIGATION      No Comments Provided     OTHER SURGICAL HISTORY      1986,[id number],BIOPSY BREAST,Right     OTHER SURGICAL HISTORY      [id number],PREMALIG/BENIGN SKIN LESION EXCISION,R side of nose, face and chest wall/Basal Cell Cancer Excision     TONSILLECTOMY, ADENOIDECTOMY, COMBINED      1954       Family History:    Family History   Problem Relation Age of Onset     Other - See Comments Mother         Stroke,Had a CVA age 85     Cancer Father         Cancer,Brain tumor, age 56     Blood Disease Sister         Blood Disease,Leukemia and     Cancer Sister         Cancer, kidney cancer     Blood Disease Sister         Blood Disease,Chronic lymphocytic leukemia       Social History:  Marital Status:   [5]  Social History     Tobacco Use     Smoking status: Never Smoker     Smokeless tobacco: Never Used   Substance Use Topics     Alcohol use: No     Alcohol/week: 0.0 oz     Drug use: No        Medications:      acetaminophen (TYLENOL) 500 MG tablet   aspirin-dipyridamole ER (AGGRENOX)  MG 12 hr capsule   Calcium Carbonate-Vitamin D (CALCIUM 500 + D) 500-125 MG-UNIT TABS   ciprofloxacin (CIPRO) 500 MG tablet   HYDROcodone-acetaminophen (NORCO) 5-325 MG tablet   lactulose (CHRONULAC) 10  "GM/15ML solution   losartan-hydrochlorothiazide (HYZAAR) 50-12.5 MG per tablet   magnesium hydroxide (MILK OF MAGNESIA) 400 MG/5ML suspension   metroNIDAZOLE (FLAGYL) 500 MG tablet   Multiple Vitamin (MULTI-VITAMINS) TABS   ondansetron (ZOFRAN ODT) 4 MG ODT tab         Review of Systems   Constitutional: Negative for fatigue and fever.   HENT: Negative for congestion and sore throat.    Eyes: Negative for visual disturbance.   Respiratory: Negative for cough and shortness of breath.    Cardiovascular: Negative for chest pain, palpitations and leg swelling.   Gastrointestinal: Positive for abdominal distention, abdominal pain and nausea. Negative for diarrhea and vomiting.   Genitourinary: Negative for dysuria and flank pain.   Musculoskeletal: Negative for back pain.   Skin: Negative for color change.   Neurological: Negative for headaches.   Hematological: Does not bruise/bleed easily.   Psychiatric/Behavioral: Negative for confusion.       Physical Exam   BP: 187/61  Heart Rate: 66  Temp: 98.2  F (36.8  C)  Height: 160 cm (5' 3\")  Weight: 53.1 kg (117 lb)  SpO2: 99 %      Physical Exam   Constitutional: She is oriented to person, place, and time. She appears well-developed and well-nourished. She is cooperative.  Non-toxic appearance. She does not have a sickly appearance. She appears ill. No distress.   HENT:   Head: Normocephalic and atraumatic.   Right Ear: External ear normal.   Left Ear: External ear normal.   Nose: Nose normal.   Mouth/Throat: Oropharynx is clear and moist.   Eyes: Conjunctivae and EOM are normal. Pupils are equal, round, and reactive to light.   Neck: Normal range of motion. Neck supple.   Cardiovascular: Normal rate, regular rhythm, normal heart sounds and intact distal pulses.   No murmur heard.  Pulmonary/Chest: Effort normal and breath sounds normal. She has no rales.   Abdominal: Soft. She exhibits no distension. Bowel sounds are increased. There is no hepatosplenomegaly. There is " generalized tenderness. There is guarding. There is no rigidity, no rebound, no CVA tenderness, no tenderness at McBurney's point and negative Nunn's sign.   Musculoskeletal: Normal range of motion. She exhibits edema and tenderness.   Lymphadenopathy:     She has no cervical adenopathy.   Neurological: She is alert and oriented to person, place, and time.   Skin: Capillary refill takes less than 2 seconds. No rash noted. No erythema.   Nursing note and vitals reviewed.      ED Course     Procedures       Critical Care time:  none  Results for orders placed or performed during the hospital encounter of 01/16/19 (from the past 24 hour(s))   CBC with platelets differential   Result Value Ref Range    WBC 11.0 4.0 - 11.0 10e9/L    RBC Count 3.63 (L) 3.8 - 5.2 10e12/L    Hemoglobin 10.3 (L) 11.7 - 15.7 g/dL    Hematocrit 32.4 (L) 35.0 - 47.0 %    MCV 89 78 - 100 fl    MCH 28.4 26.5 - 33.0 pg    MCHC 31.8 31.5 - 36.5 g/dL    RDW 14.7 10.0 - 15.0 %    Platelet Count 266 150 - 450 10e9/L    Diff Method Automated Method     % Neutrophils 43.7 %    % Lymphocytes 46.9 %    % Monocytes 6.7 %    % Eosinophils 1.9 %    % Basophils 0.5 %    % Immature Granulocytes 0.3 %    Absolute Neutrophil 4.8 1.6 - 8.3 10e9/L    Absolute Lymphocytes 5.1 0.8 - 5.3 10e9/L    Absolute Monocytes 0.7 0.0 - 1.3 10e9/L    Absolute Eosinophils 0.2 0.0 - 0.7 10e9/L    Absolute Basophils 0.1 0.0 - 0.2 10e9/L    Abs Immature Granulocytes 0.0 0 - 0.4 10e9/L   INR   Result Value Ref Range    INR 0.82 0 - 1.3   Partial thromboplastin time   Result Value Ref Range    PTT 34 26 - 39 sec   Comprehensive metabolic panel   Result Value Ref Range    Sodium 135 134 - 144 mmol/L    Potassium 4.3 3.5 - 5.1 mmol/L    Chloride 100 98 - 107 mmol/L    Carbon Dioxide 27 21 - 31 mmol/L    Anion Gap 8 3 - 14 mmol/L    Glucose 113 (H) 70 - 105 mg/dL    Urea Nitrogen 36 (H) 7 - 25 mg/dL    Creatinine 1.30 (H) 0.60 - 1.20 mg/dL    GFR Estimate 39 (L) >60 mL/min/[1.73_m2]     GFR Estimate If Black 47 (L) >60 mL/min/[1.73_m2]    Calcium 9.1 8.6 - 10.3 mg/dL    Bilirubin Total 0.3 0.3 - 1.0 mg/dL    Albumin 4.1 3.5 - 5.7 g/dL    Protein Total 6.9 6.4 - 8.9 g/dL    Alkaline Phosphatase 42 34 - 104 U/L    ALT 11 7 - 52 U/L    AST 18 13 - 39 U/L   Lactic acid   Result Value Ref Range    Lactic Acid 0.6 0.5 - 2.2 mmol/L   Lipase   Result Value Ref Range    Lipase 14 11 - 82 U/L   Amylase   Result Value Ref Range    Amylase 27 (L) 29 - 103 U/L   UA reflex to Microscopic and Culture   Result Value Ref Range    Color Urine Yellow     Appearance Urine Clear     Glucose Urine Negative NEG^Negative mg/dL    Bilirubin Urine Negative NEG^Negative    Ketones Urine Negative NEG^Negative mg/dL    Specific Gravity Urine 1.015 1.000 - 1.030    Blood Urine Negative NEG^Negative    pH Urine 6.0 5.0 - 9.0 pH    Protein Albumin Urine Negative NEG^Negative mg/dL    Urobilinogen Urine 0.2 0.2 - 1.0 EU/dL    Nitrite Urine Negative NEG^Negative    Leukocyte Esterase Urine Negative NEG^Negative    Source Midstream Urine    CT Abdomen Pelvis w Contrast    Narrative    EXAM:    CT Abdomen and Pelvis With Contrast     EXAM DATE/TIME:    1/16/2019 11:37 PM     CLINICAL HISTORY:    85 years old, female; Pain; Abdominal pain; Epigastric; Prior surgery; Surgery   date: 6+ months; Surgery type: Cholecystectomy; Patient HX: Vomiting and   generalized abdominal pain x3 days; Additional info: Abd pain, unspecified     TECHNIQUE:    Axial computed tomography images of the abdomen and pelvis with intravenous   contrast.    All CT scans at this facility use at least one of these dose optimization   techniques: automated exposure control; mA and/or kV adjustment per patient   size (includes targeted exams where dose is matched to clinical indication); or   iterative reconstruction.    Coronal and sagittal reformatted images were created and reviewed.     CONTRAST:    50 ml of Omni 240 administered intravenously.      COMPARISON:    CT ABDOMEN PELVIS WO 1/25/2018 2:14 AM     FINDINGS:    Lower thorax:  Linear opacities looking lung bases which may represent areas   of atelectasis and or scarring. Large hiatal hernia.     ABDOMEN:    Liver:  The liver is of normal size and appearance. No focal hepatic lesion.    Gallbladder and bile ducts:  Cholecystectomy.    Pancreas:  The pancreas is of normal size and appearance. No distention of the   pancreatic duct.    Spleen:  The spleen is of normal size and appearance.    Adrenals:  Hyperplasia of the left adrenal gland.    Kidneys and ureters:  The kidneys are diminutive in size. Simple appearing   renal cortical cysts involving both kidneys. The largest cysts is in the   superior pole of the right kidney measuring 1.5 cm in greatest dimension. Small   punctate calcification in the area of the collecting system in the mid pole of   the right kidney. This is nonobstructing. No hydronephrosis. Left kidney shows   no nephrolithiasis. No hydroureter.    Stomach and bowel:  Portions of the stomach is located within the thoracic   cage. Segment of small bowel which shows thickening of the bowel wall. This is   located in the central abdomen. The diameter of the small bowel measures   approximately 2-2.5 cm. The width of the small bowel is approximately 1 cm. No   bowel wall pneumatosis. No bowel obstruction. Diverticular changes involving   the colon. No evidence of acute diverticulitis.    Appendix: No evidence of appendicitis.     PELVIS:    Bladder: Unremarkable as visualized.    Reproductive:  No adnexal mass.     ABDOMEN and PELVIS:    Intraperitoneal space:  No mesenteric inflammation.    Bones/joints:  Multilevel degenerative lumbar disc disease. No evidence of   fracture or pathologic subluxation.    Soft tissues: Unremarkable.    Vasculature:  Calcifications of the wall of the abdominal aorta and iliac   arteries. No aneurysmal dilatation.    Lymph nodes: Normal. No enlarged  lymph nodes.       Impression    IMPRESSION:   1. Segment of small bowel located in the central abdomen which shows abnormal   bowel wall thickening. No bowel wall pneumatosis. This could represent an   segment of small bowel enteritis. Recommend further correlation. Consider small   bowel follow through. This was not seen on prior CT scan of 01/25/2018.   2. Colonic diverticulosis without evidence of acute diverticulitis.   3. No mesenteric inflammation. No free intra-abdominal fluid or air.     COMMENT:   Consistent with the American College of Radiology's Incidental Findings   Committee Report (J Am Karina Radiol 2010): Unless the patient's specific   circumstances suggest otherwise, any liver lesion 0.5 cm or less, any cystic   kidney lesion less than 1.0 cm, and/or any adrenal lesion 1.0 cm or less not   otherwise characterized in this report as possessing suspicious or   indeterminate imaging features is/are highly likely to be benign and do not   require follow-up imaging or biopsy.     THIS DOCUMENT HAS BEEN ELECTRONICALLY SIGNED BY ELVIA FELIX MD       Medications   0.9% sodium chloride BOLUS (0 mLs Intravenous Stopped 1/16/19 2230)     Followed by   sodium chloride 0.9% infusion (1,000 mLs Intravenous New Bag 1/16/19 2235)   acetaminophen (OFIRMEV) infusion 1,000 mg (1,000 mg Intravenous New Bag 1/17/19 0144)   morphine (PF) injection 4 mg (4 mg Intravenous Given 1/16/19 2134)   pantoprazole (PROTONIX) 40 mg IV push injection (40 mg Intravenous Given 1/16/19 2132)   ondansetron (ZOFRAN) injection 8 mg (8 mg Intravenous Given 1/16/19 2127)   morphine (PF) injection 4 mg (4 mg Intravenous Given 1/16/19 2234)   prochlorperazine (COMPAZINE) injection 5 mg (5 mg Intravenous Given 1/16/19 2357)   diphenhydrAMINE (BENADRYL) injection 12.5 mg (12.5 mg Intravenous Given 1/16/19 2356)   iohexol (OMNIPAQUE) 240 mg/mL solution 50 mL (50 mLs Oral Given 1/16/19 2130)   ciprofloxacin (CIPRO) tablet 500 mg (500 mg Oral  Given 1/17/19 0150)   metroNIDAZOLE (FLAGYL) tablet 500 mg (500 mg Oral Given 1/17/19 0150)         I had a discussion with the patient and the family regarding the symptoms, exam, laboratory, CT Scan results, diagnosis, and plan.  CT scan shows findings consistent with an enteritis.  It is indeterminate whether this is viral or bacterial.  Given the patient's discomfort and advanced age she will be covered with Cipro and Flagyl.  The patient already has Norco at home for pain control.  I am not comfortable providing any additional narcotics at this point.  Patient has chronic pain and will need to follow-up with her primary care physician for further management of this issue.    Patient is discharged home in good condition.  P.o. clears only for 24 hours then gradually advance as tolerated, Zofran scheduled every 8 hours for nausea and continue Cipro and Flagyl for 10 days.  Primary care physician in 2-3 days.  I answered all questions to the best of my ability.    The patient and her daughter voiced understanding of the plan, was agreeable, and had no further questions or concerns. Advised to return for any worsening symptoms.      Assessments & Plan (with Medical Decision Making)     I have reviewed the nursing notes.    I have reviewed the findings, diagnosis, plan and need for follow up with the patient and her daughter.          Medication List      Started    ciprofloxacin 500 MG tablet  Commonly known as:  CIPRO  500 mg, Oral, DAILY     metroNIDAZOLE 500 MG tablet  Commonly known as:  FLAGYL  500 mg, Oral, 3 TIMES DAILY     ondansetron 4 MG ODT tab  Commonly known as:  ZOFRAN ODT  8 mg, Oral, EVERY 8 HOURS PRN            Final diagnoses:   Crohn's disease of small intestine without complication (H)   Nausea   Renal insufficiency       1/16/2019   [facility]     Cooper Mayorga MD  01/17/19 0152

## 2019-02-04 DIAGNOSIS — M25.562 PAIN IN BOTH KNEES, UNSPECIFIED CHRONICITY: Primary | ICD-10-CM

## 2019-02-04 DIAGNOSIS — M25.561 PAIN IN BOTH KNEES, UNSPECIFIED CHRONICITY: Primary | ICD-10-CM

## 2019-02-05 ENCOUNTER — OFFICE VISIT (OUTPATIENT)
Dept: FAMILY MEDICINE | Facility: OTHER | Age: 84
End: 2019-02-05
Attending: FAMILY MEDICINE
Payer: MEDICARE

## 2019-02-05 ENCOUNTER — OFFICE VISIT (OUTPATIENT)
Dept: ORTHOPEDICS | Facility: OTHER | Age: 84
End: 2019-02-05
Attending: ORTHOPAEDIC SURGERY
Payer: MEDICARE

## 2019-02-05 ENCOUNTER — HOSPITAL ENCOUNTER (OUTPATIENT)
Dept: GENERAL RADIOLOGY | Facility: OTHER | Age: 84
Discharge: HOME | End: 2019-02-05
Attending: FAMILY MEDICINE | Admitting: FAMILY MEDICINE
Payer: MEDICARE

## 2019-02-05 VITALS
HEART RATE: 72 BPM | DIASTOLIC BLOOD PRESSURE: 60 MMHG | SYSTOLIC BLOOD PRESSURE: 132 MMHG | BODY MASS INDEX: 20.37 KG/M2 | TEMPERATURE: 98.6 F | WEIGHT: 115 LBS

## 2019-02-05 DIAGNOSIS — M17.0 PRIMARY OSTEOARTHRITIS OF BOTH KNEES: Primary | ICD-10-CM

## 2019-02-05 DIAGNOSIS — M25.552 HIP PAIN, LEFT: Primary | ICD-10-CM

## 2019-02-05 DIAGNOSIS — M25.562 PAIN IN BOTH KNEES, UNSPECIFIED CHRONICITY: ICD-10-CM

## 2019-02-05 DIAGNOSIS — M25.561 PAIN IN BOTH KNEES, UNSPECIFIED CHRONICITY: ICD-10-CM

## 2019-02-05 DIAGNOSIS — M53.3 SACROILIAC JOINT PAIN: ICD-10-CM

## 2019-02-05 PROCEDURE — G0463 HOSPITAL OUTPT CLINIC VISIT: HCPCS

## 2019-02-05 PROCEDURE — 99213 OFFICE O/P EST LOW 20 MIN: CPT | Performed by: FAMILY MEDICINE

## 2019-02-05 PROCEDURE — G0463 HOSPITAL OUTPT CLINIC VISIT: HCPCS | Mod: 25,27 | Performed by: FAMILY MEDICINE

## 2019-02-05 PROCEDURE — 73564 X-RAY EXAM KNEE 4 OR MORE: CPT | Mod: 50

## 2019-02-05 PROCEDURE — G0463 HOSPITAL OUTPT CLINIC VISIT: HCPCS | Mod: 25

## 2019-02-05 RX ORDER — HYDROCODONE BITARTRATE AND ACETAMINOPHEN 5; 325 MG/1; MG/1
1 TABLET ORAL EVERY 6 HOURS PRN
Qty: 90 TABLET | Refills: 0 | Status: SHIPPED | OUTPATIENT
Start: 2019-02-05 | End: 2019-03-08

## 2019-02-05 ASSESSMENT — ENCOUNTER SYMPTOMS
ARTHRALGIAS: 1
FEVER: 0
ABDOMINAL DISTENTION: 0

## 2019-02-05 ASSESSMENT — PAIN SCALES - GENERAL: PAINLEVEL: SEVERE PAIN (6)

## 2019-02-05 NOTE — PROGRESS NOTES
SUBJECTIVE:   Tenisha Cagle is a 85 year old female who presents to clinic today for the following health issues:    HPI  Here with daughter.  Pt continues to have significant hip pain in left and bilateral knee pains.  we started narcotics at her last visit.  They says she still was crying out in pain a few days, her daughter gave her an additional pill on these days (3 daily) and will be running out in a few days.  I wrote for 60 tabs on 1/15/19.  She is set up to see ortho later today.    Was seen in the emergency department a few weeks ago.  CT showed possible inflammation in small bowel.  Pains are now completely resolved.    Patient Active Problem List    Diagnosis Date Noted     Arthritis of shoulder region, left, degenerative 01/07/2019     Priority: Medium     Memory loss 05/25/2018     Priority: Medium     Atrial fibrillation with RVR (H) 01/25/2018     Priority: Medium     Hiatal hernia 01/17/2018     Priority: Medium     Overview:   large, mostly intrathoracic       Essential hypertension 01/17/2018     Priority: Medium     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/27/2017     Priority: Medium     Benign skin lesion of nose 09/27/2017     Priority: Medium     AK (actinic keratosis) 07/10/2017     Priority: Medium     SK (seborrheic keratosis) 07/10/2017     Priority: Medium     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) 06/15/2017     Priority: Medium     Abnormal weight loss 04/18/2016     Priority: Medium     Anemia 04/18/2016     Priority: Medium     Greater trochanteric bursitis of left hip 04/18/2016     Priority: Medium     Visual changes 04/18/2016     Priority: Medium     Osteoarthritis of spine with radiculopathy, lumbar region 02/03/2016     Priority: Medium     History of TIA (transient ischemic attack) 02/02/2016     Priority: Medium     Numbness and tingling of right leg 02/02/2016     Priority: Medium     Radicular leg pain 02/02/2016     Priority: Medium     Basal cell  carcinoma of right cheek 11/18/2015     Priority: Medium     Sacroiliac joint pain 12/10/2014     Priority: Medium     Alvarado's cyst of knee 02/20/2014     Priority: Medium     Paresthesia of right leg 02/20/2014     Priority: Medium     Right knee DJD 02/20/2014     Priority: Medium     ACP (advance care planning) 01/02/2014     Priority: Medium     Cystocele 08/23/2013     Priority: Medium     Pancreatic mass 09/18/2012     Priority: Medium     Overview:   Possible, abnormal CT        Cerebral aneurysm, nonruptured 12/20/2011     Priority: Medium     Diverticulosis of colon 05/13/2011     Priority: Medium     Chronic lymphocytic leukemia (H) 04/14/2011     Priority: Medium     Overview:   WBC stable in the 30,000       Past Surgical History:   Procedure Laterality Date     CHOLECYSTECTOMY      No Comments Provided     COLONOSCOPY      2007,Sigmoid diverticulosis     ENDOSCOPY UPPER, COLONOSCOPY, COMBINED      5/5/11,Hiatal hernia, gastric gland hyperplasia in antrum     LAPAROSCOPIC TUBAL LIGATION      No Comments Provided     OTHER SURGICAL HISTORY      1986,[id number],BIOPSY BREAST,Right     OTHER SURGICAL HISTORY      [id number],PREMALIG/BENIGN SKIN LESION EXCISION,R side of nose, face and chest wall/Basal Cell Cancer Excision     TONSILLECTOMY, ADENOIDECTOMY, COMBINED      1954     Social History     Tobacco Use     Smoking status: Never Smoker     Smokeless tobacco: Never Used   Substance Use Topics     Alcohol use: No     Alcohol/week: 0.0 oz     Current Outpatient Medications   Medication Sig Dispense Refill     acetaminophen (TYLENOL) 500 MG tablet Take 1,000 mg by mouth every 6 hours as needed       aspirin-dipyridamole ER (AGGRENOX)  MG 12 hr capsule Take 1 capsule by mouth 2 times daily 180 capsule 0     Calcium Carbonate-Vitamin D (CALCIUM 500 + D) 500-125 MG-UNIT TABS Take 1 tablet by mouth daily       HYDROcodone-acetaminophen (NORCO) 5-325 MG tablet Take 1 tablet by mouth every 6 hours as needed for  severe pain 90 tablet 0     lactulose (CHRONULAC) 10 GM/15ML solution Take 30 mLs (20 g) by mouth 2 times daily as needed for constipation 1000 mL 11     losartan-hydrochlorothiazide (HYZAAR) 50-12.5 MG per tablet Take 1 tablet by mouth daily 90 tablet 3     magnesium hydroxide (MILK OF MAGNESIA) 400 MG/5ML suspension Take 15 mLs by mouth At Bedtime       Multiple Vitamin (MULTI-VITAMINS) TABS Take 1 tablet by mouth daily       Allergies   Allergen Reactions     Lansoprazole Other (See Comments) and Unknown     Patient states that medication didn't work for her.  Daughter states she got delusional, water did not taste right  Other reaction(s): Other - Describe In Comment Field  Patient states that medication didn't work for her.     Lisinopril Cough       Review of Systems   Constitutional: Negative for fever.   Gastrointestinal: Negative for abdominal distention.   Musculoskeletal: Positive for arthralgias and gait problem.        OBJECTIVE:     /60 (BP Location: Right arm, Patient Position: Sitting, Cuff Size: Adult Regular)   Pulse 72   Temp 98.6  F (37  C) (Tympanic)   Wt 52.2 kg (115 lb)   BMI 20.37 kg/m    Body mass index is 20.37 kg/m .  Physical Exam   Constitutional: She appears well-developed and well-nourished.   Musculoskeletal:   Frail appearing.  Significant pain over left greater trochanter.  Knees also have pain along medial joint lines.   Neurological: She is alert.   Skin: Skin is warm and dry.       Diagnostic Test Results:  none     ASSESSMENT/PLAN:     [id number]}    (M17.0) Primary osteoarthritis of both knees  (primary encounter diagnosis)  Comment: end stage  Plan: she is very frail.  Goals of care are really symptom relief.  Increase lortab up to 3 daily max.  Follow up in 30 days.  Once dose is stabilized, then can have her sign a contract.    (M53.3) Sacroiliac joint pain  Comment:    Plan: HYDROcodone-acetaminophen (NORCO) 5-325 MG         tablet                     Godwin Kapoor  MD  [facility]

## 2019-02-05 NOTE — NURSING NOTE
"Patient comes in for follow up on medication.  Edie Mai LPN ....................2/5/2019   12:42 PM  Chief Complaint   Patient presents with     Follow Up     medications       Initial /60 (BP Location: Right arm, Patient Position: Sitting, Cuff Size: Adult Regular)   Pulse 72   Temp 98.6  F (37  C) (Tympanic)   Wt 52.2 kg (115 lb)   BMI 20.37 kg/m   Estimated body mass index is 20.37 kg/m  as calculated from the following:    Height as of 1/16/19: 1.6 m (5' 3\").    Weight as of this encounter: 52.2 kg (115 lb).  Meds Reconciled: complete  Pt is on Aspirin  Pt is not on a Statin  PHQ and/or ZO reviewed. Pt referred to PCP/MH Provider as appropriate.    Edie Mai LPN      "

## 2019-02-07 ENCOUNTER — TELEPHONE (OUTPATIENT)
Dept: FAMILY MEDICINE | Facility: OTHER | Age: 84
End: 2019-02-07

## 2019-02-07 NOTE — TELEPHONE ENCOUNTER
TJP-Pt is having a joint injection next week. They were told to contact you re Rx Aggrenox. Radiology says they need the ok to stop taking this med prior to injection. Please call. Thank you.  Kalina Cerda

## 2019-02-07 NOTE — TELEPHONE ENCOUNTER
Is safe to stop it any time.  Can hold beginning today.  Godwin Kapoor MD on 2/7/2019 at 9:57 AM

## 2019-02-08 NOTE — PROGRESS NOTES
VITALS:   Height:   63  Weight:   115  Pulse rate:  76  Blood Pressure:  132 / 60      CHIEF COMPLAINT: Bilateral Knee Pain, Left Hip Pain     PROBLEMS:   Patient has no noted problems.    PATIENT REPORTED MEDICATIONS:  HYDROCODONE-ACETAMINOPHEN TABLET (HYDROCODONE-ACETAMINOPHEN TABS)   HYZAAR TABLET (LOSARTAN POTASSIUM-HCTZ TABS)   AGGRENOX CAPSULE EXTENDED RELEASE 12 HOUR (ASPIRIN-DIPYRIDAMOLE XR12H-CAP)     Medications were reviewed with patient this visit.    PATIENT REPORTED ALLERGIES:  LISINOPRIL (LISINOPRIL) (ModerateReaction: No reaction details noted)    Allergies were reviewed during this visit.    RISK FACTORS:  Tobacco use:   never smoker  Alcohol Use:   No    HISTORY OF PRESENT ILLNESS:    REASON FOR EVALUATION:         1.  Bilateral knee pain.       2.  Left hip pain.      HISTORY OF PRESENT ILLNESS:  Tenisha comes in with regard to bilateral knee.  She is interested in having injections done there.  Last injections done a while back here with decent response    The left hip is also painful.  Is interesting in looking at maybe another injection there, as well, under x-ray guidance.  The patient reports pain with weightbearing.  Pain with activity.  Occasional swelling there, as well.  Seems to be getting a little bit worse here over time.  The patient denies any numbness or tingling otherwise.     PAST MEDICAL HISTORY:  The patient's health history form dated 02/05/19 was reviewed and signed.  Past medical history, medications, allergies, surgical history, social history, family history, and review of systems noted and scanned into EMR.  ALLERGIES:  LISINOPRIL.      PAST MEDICAL HISTORY:    Hypertension  Arthritis     SOCIAL HISTORY:     Alcohol Use - No   Tobacco Use - Never    REVIEW OF SYSTEMS:  Joint or Muscle pain: Yes  Stiffness:  Yes  Swelling:  Yes  Difficulty in walking: Yes  Cold extremities: Yes  Weakness of muscles: Yes  Rash or  "Itching: No  Bruising:  No  Numbness/Tingling: Yes    PHYSICAL EXAMINATION:    On physical exam, the patient's height 5'3\", weight 115 pounds, pulse 76, blood pressure 132/60.  She is alert and oriented x3, cooperative on my exam, in no acute distress.  Does ambulate with some gait antalgia.  Affect is appropriate.  Examination of both knees shows valgus deformity on the right with range of motion 0-115.  Varus deformity on the left with range of motion 0-125.  Ligament examination is stable and balanced bilaterally . Crepitation with flexion and extension.  Trace effusion otherwise seen at this time.  Prominent medial condyles are seen bilaterally.  No skin lesions seen.  Dorsalis pedis pulse otherwise 2+.     Left hip examination shows pain with hip flexion and internal rotation past 10 degrees.  Mild tenderness to palpation across the trochanteric bursa.  MYNOR testing is asymptomatic.  Straight leg raise is asymptomatic.       X-RAY:  X-rays are obtained and reviewed here today three views of the knees.  Does show severe arthrosis of the bilateral knees, lateral compartment mostly impacted on the right knee and medial compartment on the left knee at this time.     ASSESSMENT:    IMPRESSION:         1.  Bilateral knee osteoarthrosis severe.        2.  Left hip arthrosis.     PROCEDURES:   Risks and benefits of the procedure were reviewed with the patient. Informed consent was obtained. Blood sugar risks for our diabetic patients were also discussed.    After achieving informed consent and sterile preparation, the patient's bilateral knees are injected with 2 cc 2% prilocaine and 40 mg Kenalog under sterile conditions.  The patient did tolerate the procedures well.      PLAN:   At this time injections for both knees at this time.      We will also set the patient up for left hip intraarticular injection with interventional radiology.      Long-term management continue injections and repeat in the future as " appropriate and necessary.      Dictated by:  Say Nelson MD  Copy to:  Godwin Kapoor MD    D:  02/05/19  T:  02/08/19    Typed and/or reviewed and corrected by signing  below, and sent to the Physician for final review and signature.      This report was created using voice recording software and computer-generated templates. Although every effort has been made to review for and eliminate errors, some errors may still occur.         Electronically signed by Johnna Dickens on 02/08/2019 at 8:37 AM    ________________________________________________________________________

## 2019-02-12 ENCOUNTER — HOSPITAL ENCOUNTER (OUTPATIENT)
Dept: GENERAL RADIOLOGY | Facility: OTHER | Age: 84
Discharge: HOME | End: 2019-02-12
Attending: ORTHOPAEDIC SURGERY | Admitting: ORTHOPAEDIC SURGERY
Payer: MEDICARE

## 2019-02-12 DIAGNOSIS — M25.552 HIP PAIN, LEFT: ICD-10-CM

## 2019-02-12 PROCEDURE — 25000125 ZZHC RX 250: Performed by: RADIOLOGY

## 2019-02-12 PROCEDURE — 20610 DRAIN/INJ JOINT/BURSA W/O US: CPT | Mod: LT

## 2019-02-12 PROCEDURE — 25500064 ZZH RX 255 OP 636: Performed by: RADIOLOGY

## 2019-02-12 PROCEDURE — 25000128 H RX IP 250 OP 636: Performed by: RADIOLOGY

## 2019-02-12 RX ORDER — TRIAMCINOLONE ACETONIDE 40 MG/ML
40 INJECTION, SUSPENSION INTRA-ARTICULAR; INTRAMUSCULAR ONCE
Status: COMPLETED | OUTPATIENT
Start: 2019-02-12 | End: 2019-02-12

## 2019-02-12 RX ORDER — LIDOCAINE HYDROCHLORIDE 10 MG/ML
10 INJECTION, SOLUTION INFILTRATION; PERINEURAL ONCE
Status: COMPLETED | OUTPATIENT
Start: 2019-02-12 | End: 2019-02-12

## 2019-02-12 RX ORDER — BUPIVACAINE HYDROCHLORIDE 5 MG/ML
30 INJECTION, SOLUTION PERINEURAL ONCE
Status: COMPLETED | OUTPATIENT
Start: 2019-02-12 | End: 2019-02-12

## 2019-02-12 RX ADMIN — BUPIVACAINE HYDROCHLORIDE 3 ML: 5 INJECTION, SOLUTION EPIDURAL; INTRACAUDAL at 15:00

## 2019-02-12 RX ADMIN — TRIAMCINOLONE ACETONIDE 40 MG: 40 INJECTION, SUSPENSION INTRA-ARTICULAR; INTRAMUSCULAR at 15:02

## 2019-02-12 RX ADMIN — IOHEXOL 2 ML: 240 INJECTION, SOLUTION INTRATHECAL; INTRAVASCULAR; INTRAVENOUS; ORAL at 15:01

## 2019-02-12 RX ADMIN — LIDOCAINE HYDROCHLORIDE 2 ML: 10 INJECTION, SOLUTION INFILTRATION; PERINEURAL at 15:01

## 2019-02-18 ENCOUNTER — HOSPITAL ENCOUNTER (OUTPATIENT)
Dept: GENERAL RADIOLOGY | Facility: OTHER | Age: 84
Discharge: HOME | End: 2019-02-18
Attending: PHYSICIAN ASSISTANT | Admitting: PHYSICIAN ASSISTANT
Payer: MEDICARE

## 2019-02-18 ENCOUNTER — TELEPHONE (OUTPATIENT)
Dept: FAMILY MEDICINE | Facility: OTHER | Age: 84
End: 2019-02-18

## 2019-02-18 ENCOUNTER — OFFICE VISIT (OUTPATIENT)
Dept: FAMILY MEDICINE | Facility: OTHER | Age: 84
End: 2019-02-18
Attending: PHYSICIAN ASSISTANT
Payer: MEDICARE

## 2019-02-18 VITALS
RESPIRATION RATE: 20 BRPM | DIASTOLIC BLOOD PRESSURE: 70 MMHG | BODY MASS INDEX: 21.36 KG/M2 | SYSTOLIC BLOOD PRESSURE: 170 MMHG | HEART RATE: 62 BPM | WEIGHT: 120.6 LBS

## 2019-02-18 DIAGNOSIS — K59.01 SLOW TRANSIT CONSTIPATION: Primary | ICD-10-CM

## 2019-02-18 DIAGNOSIS — R10.32 LLQ ABDOMINAL PAIN: ICD-10-CM

## 2019-02-18 DIAGNOSIS — R10.31 RLQ ABDOMINAL PAIN: ICD-10-CM

## 2019-02-18 LAB
ALBUMIN UR-MCNC: NEGATIVE MG/DL
ANION GAP SERPL CALCULATED.3IONS-SCNC: 6 MMOL/L (ref 3–14)
APPEARANCE UR: CLEAR
BACTERIA #/AREA URNS HPF: ABNORMAL /HPF
BASOPHILS # BLD AUTO: 0 10E9/L (ref 0–0.2)
BASOPHILS NFR BLD AUTO: 0.3 %
BILIRUB UR QL STRIP: NEGATIVE
BUN SERPL-MCNC: 29 MG/DL (ref 7–25)
CALCIUM SERPL-MCNC: 9 MG/DL (ref 8.6–10.3)
CHLORIDE SERPL-SCNC: 99 MMOL/L (ref 98–107)
CO2 SERPL-SCNC: 29 MMOL/L (ref 21–31)
COLOR UR AUTO: YELLOW
CREAT SERPL-MCNC: 1.17 MG/DL (ref 0.6–1.2)
DIFFERENTIAL METHOD BLD: ABNORMAL
EOSINOPHIL # BLD AUTO: 0.1 10E9/L (ref 0–0.7)
EOSINOPHIL NFR BLD AUTO: 0.9 %
ERYTHROCYTE [DISTWIDTH] IN BLOOD BY AUTOMATED COUNT: 14.8 % (ref 10–15)
GFR SERPL CREATININE-BSD FRML MDRD: 44 ML/MIN/{1.73_M2}
GLUCOSE SERPL-MCNC: 111 MG/DL (ref 70–105)
GLUCOSE UR STRIP-MCNC: NEGATIVE MG/DL
HCT VFR BLD AUTO: 31.6 % (ref 35–47)
HGB BLD-MCNC: 10.1 G/DL (ref 11.7–15.7)
HGB UR QL STRIP: ABNORMAL
IMM GRANULOCYTES # BLD: 0.1 10E9/L (ref 0–0.4)
IMM GRANULOCYTES NFR BLD: 0.6 %
KETONES UR STRIP-MCNC: NEGATIVE MG/DL
LEUKOCYTE ESTERASE UR QL STRIP: NEGATIVE
LYMPHOCYTES # BLD AUTO: 5.2 10E9/L (ref 0.8–5.3)
LYMPHOCYTES NFR BLD AUTO: 33.5 %
MCH RBC QN AUTO: 28.2 PG (ref 26.5–33)
MCHC RBC AUTO-ENTMCNC: 32 G/DL (ref 31.5–36.5)
MCV RBC AUTO: 88 FL (ref 78–100)
MONOCYTES # BLD AUTO: 0.9 10E9/L (ref 0–1.3)
MONOCYTES NFR BLD AUTO: 6 %
NEUTROPHILS # BLD AUTO: 9.1 10E9/L (ref 1.6–8.3)
NEUTROPHILS NFR BLD AUTO: 58.7 %
NITRATE UR QL: NEGATIVE
NON-SQ EPI CELLS #/AREA URNS LPF: ABNORMAL /LPF
PH UR STRIP: 6.5 PH (ref 5–9)
PLATELET # BLD AUTO: 287 10E9/L (ref 150–450)
POTASSIUM SERPL-SCNC: 3.9 MMOL/L (ref 3.5–5.1)
RBC # BLD AUTO: 3.58 10E12/L (ref 3.8–5.2)
RBC #/AREA URNS AUTO: ABNORMAL /HPF
SODIUM SERPL-SCNC: 134 MMOL/L (ref 134–144)
SOURCE: ABNORMAL
SP GR UR STRIP: <1.005 (ref 1–1.03)
UROBILINOGEN UR STRIP-ACNC: 0.2 EU/DL (ref 0.2–1)
WBC # BLD AUTO: 15.5 10E9/L (ref 4–11)
WBC #/AREA URNS AUTO: ABNORMAL /HPF

## 2019-02-18 PROCEDURE — G0463 HOSPITAL OUTPT CLINIC VISIT: HCPCS | Mod: 25

## 2019-02-18 PROCEDURE — 74019 RADEX ABDOMEN 2 VIEWS: CPT

## 2019-02-18 PROCEDURE — 81001 URINALYSIS AUTO W/SCOPE: CPT | Performed by: PHYSICIAN ASSISTANT

## 2019-02-18 PROCEDURE — 85025 COMPLETE CBC W/AUTO DIFF WBC: CPT | Performed by: PHYSICIAN ASSISTANT

## 2019-02-18 PROCEDURE — 80048 BASIC METABOLIC PNL TOTAL CA: CPT | Performed by: PHYSICIAN ASSISTANT

## 2019-02-18 PROCEDURE — 99214 OFFICE O/P EST MOD 30 MIN: CPT | Performed by: PHYSICIAN ASSISTANT

## 2019-02-18 PROCEDURE — 36415 COLL VENOUS BLD VENIPUNCTURE: CPT | Performed by: PHYSICIAN ASSISTANT

## 2019-02-18 PROCEDURE — G0463 HOSPITAL OUTPT CLINIC VISIT: HCPCS

## 2019-02-18 ASSESSMENT — PAIN SCALES - GENERAL: PAINLEVEL: SEVERE PAIN (7)

## 2019-02-18 NOTE — PATIENT INSTRUCTIONS
Constipation - Encouraged increase of water and apple, pear and prune juice to decrease symptoms and discomfort.  Encouraged soft stools. Return to clinic with change/worsening of symptoms.     Lactulose - Use 15 ml (1 tablespoon) up to 2 times daily as needed for constipation.       Try small amounts of food and drink frequently. Offer a bland diet. Advance as tolerated. Avoid dairy products the first day. You may add yogurt tomorrow as the first dairy product.    Try the BRAT diet (bread, bananas, rice, applesauce, tea, toast).  This is a very bland diet which should not irritate your colon.  Hold off on spicy foods, red sauces, mexican or chinese food.    Call or return to clinic as needed if your symptoms worsen or fail to improve as anticipated.     If the pain does not begin improving, localizes to the right lower belly, there is increased fever, or other progression of symptoms, return for reassessment.    Should I see a doctor or nurse about my stomach ache? -- Most people do not need to see a doctor or nurse for a stomach ache. But you should see your doctor or nurse if:  ?You have bloody bowel movements, diarrhea, or vomiting  ?Your pain is severe and lasts more than an hour or comes and goes for more than 24 hours  ?You cannot eat or drink for hours  ?You have a fever higher than 102 F (39 C)  ?You lose a lot of weight without trying to, or lose interest in food

## 2019-02-18 NOTE — TELEPHONE ENCOUNTER
Called and discussed lab results.  BMP is stable.  Hemoglobin is stable.  CBC is mildly elevated as compared to previous.  Encourage close monitoring.  Encouraged to return in 1-2 days for recheck if symptoms are persistent or worsening as an abdominal CT may be warranted if symptoms are not calming down.  Danna Torres PA-C.......... 2/18/2019 4:28 PM

## 2019-02-18 NOTE — PROGRESS NOTES
Nursing Notes:   Sandy Dean LPN  2019  2:41 PM  Signed  Chief Complaint   Patient presents with     Abdominal Pain         Medication Reconciliation: complete    Sandy Dean LPN      HPI:    Tenisha Cagle is a 85 year old female who presents for belly pain since Friday.  Phlegm.  Patient has history of small bowel in January 2/15.  Concerned that this is similar.  No blood in her stool or urine.  No fevers or chills.  Keeping food and fluids down.  Cramping.  No dysuria, frequency, urgency.  Unsure about constipation.  Patient is not lightheaded or dizzy.  No dehydration concerns.    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)     2011     Diaphragmatic hernia without obstruction or gangrene     large, mostly intrathoracic     Diverticulosis of large intestine without perforation or abscess without bleeding     2011     Dysthymic disorder     No Comments Provided     Essential (primary) hypertension     No Comments Provided     Female climacteric state     Menopausal age 56     Gastritis without bleeding     03,Treated with PrevPac     Nonruptured cerebral aneurysm     2011     Osteoarthritis     No Comments Provided     Other fecal abnormalities     Recurrent loose stool     Other lymphoid leukemia not having achieved remission (H)     CLL, stable WBC 30,000     Other specified bacterial intestinal infections     2003, H. pylori gastritis treated with Prevpac     Other specified enthesopathies of unspecified lower limb, excluding foot     No Comments Provided     Peptic ulcer without hemorrhage or perforation     No Comments Provided     Personal history of other diseases of the digestive system (CODE)     2011     Personal history of other medical treatment (CODE)     , vaginal  deliveries     Pure hypercholesterolemia     No Comments Provided     Sepsis (H)     child,Hospitalized as a child for blood poisoning     Sleep disorder     5/13/2011     Transient cerebral ischemic attack     11/17/2011       Past Surgical History:   Procedure Laterality Date     CHOLECYSTECTOMY      No Comments Provided     COLONOSCOPY      2007,Sigmoid diverticulosis     ENDOSCOPY UPPER, COLONOSCOPY, COMBINED      5/5/11,Hiatal hernia, gastric gland hyperplasia in antrum     LAPAROSCOPIC TUBAL LIGATION      No Comments Provided     OTHER SURGICAL HISTORY      1986,205093,BIOPSY BREAST,Right     OTHER SURGICAL HISTORY      [id number],PREMALIG/BENIGN SKIN LESION EXCISION,R side of nose, face and chest wall/Basal Cell Cancer Excision     TONSILLECTOMY, ADENOIDECTOMY, COMBINED      1954       Family History   Problem Relation Age of Onset     Other - See Comments Mother         Stroke,Had a CVA age 85     Cancer Father         Cancer,Brain tumor, age 56     Blood Disease Sister         Blood Disease,Leukemia and     Cancer Sister         Cancer, kidney cancer     Blood Disease Sister         Blood Disease,Chronic lymphocytic leukemia       Social History     Tobacco Use     Smoking status: Never Smoker     Smokeless tobacco: Never Used   Substance Use Topics     Alcohol use: No     Alcohol/week: 0.0 oz       Current Outpatient Medications   Medication Sig Dispense Refill     acetaminophen (TYLENOL) 500 MG tablet Take 1,000 mg by mouth every 6 hours as needed       aspirin-dipyridamole ER (AGGRENOX)  MG 12 hr capsule Take 1 capsule by mouth 2 times daily 180 capsule 0     Calcium Carbonate-Vitamin D (CALCIUM 500 + D) 500-125 MG-UNIT TABS Take 1 tablet by mouth daily       HYDROcodone-acetaminophen (NORCO) 5-325 MG tablet Take 1 tablet by mouth every 6 hours as needed for severe pain 90 tablet 0     lactulose (CHRONULAC) 10 GM/15ML solution Take 30 mLs (20 g) by mouth 2 times daily as needed for constipation 1000 mL  11     losartan-hydrochlorothiazide (HYZAAR) 50-12.5 MG per tablet Take 1 tablet by mouth daily 90 tablet 3     magnesium hydroxide (MILK OF MAGNESIA) 400 MG/5ML suspension Take 15 mLs by mouth At Bedtime       Multiple Vitamin (MULTI-VITAMINS) TABS Take 1 tablet by mouth daily         Allergies   Allergen Reactions     Lansoprazole Other (See Comments) and Unknown     Patient states that medication didn't work for her.  Daughter states she got delusional, water did not taste right  Other reaction(s): Other - Describe In Comment Field  Patient states that medication didn't work for her.     Lisinopril Cough       REVIEW OF SYSTEMS:  Refer to HPI.    EXAM:   Vitals:    /70 (BP Location: Right arm, Patient Position: Sitting, Cuff Size: Adult Regular)   Pulse 62   Resp 20   Wt 54.7 kg (120 lb 9.6 oz)   BMI 21.36 kg/m      General Appearance: Pleasant, alert, appropriate appearance for age. No acute distress   OroPharynx Exam: Dental hygiene adequate. Normal buccal mucosa. Normal pharynx.  Neck Exam:  Supple, no masses or nodes.  Chest/Respiratory Exam: Normal chest wall and respirations. Clear to auscultation.  Cardiovascular Exam: Regular rate and rhythm. S1, S2, no murmur, click, gallop, or rubs.  Gastrointestinal Exam: Soft, no masses or organomegaly. Normal BS x 4.  Abdominal pain with palpation over the left lower quadrant and right lower quadrant.  No rebound tenderness or guarding appreciated.  No CVA tenderness to palpation  Skin: no rash or abnormalities  Neurologic Exam: Nonfocal, normal gross motor, tone coordination and no tremor.  Psychiatric Exam: Alert and oriented - appropriate affect.    PHQ Depression Screen  PHQ-9 SCORE 9/15/2016 5/15/2017 8/30/2018   PHQ-9 Total Score 0 0 0       Labs:  Results for orders placed or performed in visit on 02/18/19   Urinalysis w Reflex Microscopic If Positive   Result Value Ref Range    Color Urine Yellow     Appearance Urine Clear     Glucose Urine Negative  NEG^Negative mg/dL    Bilirubin Urine Negative NEG^Negative    Ketones Urine Negative NEG^Negative mg/dL    Specific Gravity Urine <1.005 1.000 - 1.030    Blood Urine Trace (A) NEG^Negative    pH Urine 6.5 5.0 - 9.0 pH    Protein Albumin Urine Negative NEG^Negative mg/dL    Urobilinogen Urine 0.2 0.2 - 1.0 EU/dL    Nitrite Urine Negative NEG^Negative    Leukocyte Esterase Urine Negative NEG^Negative    Source Midstream Urine    Basic Metabolic Panel   Result Value Ref Range    Sodium 134 134 - 144 mmol/L    Potassium 3.9 3.5 - 5.1 mmol/L    Chloride 99 98 - 107 mmol/L    Carbon Dioxide 29 21 - 31 mmol/L    Anion Gap 6 3 - 14 mmol/L    Glucose 111 (H) 70 - 105 mg/dL    Urea Nitrogen 29 (H) 7 - 25 mg/dL    Creatinine 1.17 0.60 - 1.20 mg/dL    GFR Estimate 44 (L) >60 mL/min/[1.73_m2]    GFR Estimate If Black 53 (L) >60 mL/min/[1.73_m2]    Calcium 9.0 8.6 - 10.3 mg/dL   CBC and Differential   Result Value Ref Range    WBC 15.5 (H) 4.0 - 11.0 10e9/L    RBC Count 3.58 (L) 3.8 - 5.2 10e12/L    Hemoglobin 10.1 (L) 11.7 - 15.7 g/dL    Hematocrit 31.6 (L) 35.0 - 47.0 %    MCV 88 78 - 100 fl    MCH 28.2 26.5 - 33.0 pg    MCHC 32.0 31.5 - 36.5 g/dL    RDW 14.8 10.0 - 15.0 %    Platelet Count 287 150 - 450 10e9/L    Diff Method Automated Method     % Neutrophils 58.7 %    % Lymphocytes 33.5 %    % Monocytes 6.0 %    % Eosinophils 0.9 %    % Basophils 0.3 %    % Immature Granulocytes 0.6 %    Absolute Neutrophil 9.1 (H) 1.6 - 8.3 10e9/L    Absolute Lymphocytes 5.2 0.8 - 5.3 10e9/L    Absolute Monocytes 0.9 0.0 - 1.3 10e9/L    Absolute Eosinophils 0.1 0.0 - 0.7 10e9/L    Absolute Basophils 0.0 0.0 - 0.2 10e9/L    Abs Immature Granulocytes 0.1 0 - 0.4 10e9/L   Urine Microscopic   Result Value Ref Range    WBC Urine 0 - 5 OTO5^0 - 5 /HPF    RBC Urine 2-5 (A) OTO2^O - 2 /HPF    Squamous Epithelial /LPF Urine Few FEW^Few /LPF    Bacteria Urine Few (A) NEG^Negative /HPF       ASSESSMENT AND PLAN:      ICD-10-CM    1. Slow transit  constipation K59.01    2. LLQ abdominal pain R10.32 CBC and Differential     Basic Metabolic Panel     Urinalysis w Reflex Microscopic If Positive     XR Abdomen 2 Views     Basic Metabolic Panel     CBC and Differential     Urine Microscopic   3. RLQ abdominal pain R10.31 CBC and Differential     Basic Metabolic Panel     Urinalysis w Reflex Microscopic If Positive     XR Abdomen 2 Views     Basic Metabolic Panel     CBC and Differential         Patient had unremarkable urinalysis.  CBC showed that the white blood cell count was elevated at 15.5.  Otherwise unremarkable CBC and BMP.    Completed abdominal xray.  I personally reviewed the xray. I found constipation concerns appreciated upon initial read of xray.  Final read pending by radiology.    Constipation - Encouraged increase of water and apple, pear and prune juice to decrease symptoms and discomfort.  Encouraged soft stools. Return to clinic with change/worsening of symptoms.     Lactulose - Use 15 ml (1 tablespoon) up to 2 times daily as needed for constipation.     Discussed symptoms at length.  Encouraged to return in the next 1-2 days if symptoms are not calming down or worsening as needed.  May need recheck blood work and possible belly CT if symptoms are progressing.  Gave warning signs and symptoms.  Encouraged routine lactulose to calm down constipation.    Greater than 25 minutes were spent in counseling and coordination of care.     Patient Instructions   Constipation - Encouraged increase of water and apple, pear and prune juice to decrease symptoms and discomfort.  Encouraged soft stools. Return to clinic with change/worsening of symptoms.     Lactulose - Use 15 ml (1 tablespoon) up to 2 times daily as needed for constipation.       Try small amounts of food and drink frequently. Offer a bland diet. Advance as tolerated. Avoid dairy products the first day. You may add yogurt tomorrow as the first dairy product.    Try the BRAT diet (bread,  bananas, rice, applesauce, tea, toast).  This is a very bland diet which should not irritate your colon.  Hold off on spicy foods, red sauces, mexican or chinese food.    Call or return to clinic as needed if your symptoms worsen or fail to improve as anticipated.     If the pain does not begin improving, localizes to the right lower belly, there is increased fever, or other progression of symptoms, return for reassessment.    Should I see a doctor or nurse about my stomach ache? -- Most people do not need to see a doctor or nurse for a stomach ache. But you should see your doctor or nurse if:  ?You have bloody bowel movements, diarrhea, or vomiting  ?Your pain is severe and lasts more than an hour or comes and goes for more than 24 hours  ?You cannot eat or drink for hours  ?You have a fever higher than 102 F (39 C)  ?You lose a lot of weight without trying to, or lose interest in food       Danna Torres PA-C..................2/18/2019 2:42 PM

## 2019-02-18 NOTE — NURSING NOTE
Chief Complaint   Patient presents with     Abdominal Pain         Medication Reconciliation: complete    Sandy Dean, LPN

## 2019-02-20 ENCOUNTER — TELEPHONE (OUTPATIENT)
Dept: FAMILY MEDICINE | Facility: OTHER | Age: 84
End: 2019-02-20

## 2019-02-20 NOTE — TELEPHONE ENCOUNTER
Patients daughter notified no openings with Danna Torres PA-C or in clinic today, advised to go to the ED with stomach/chest pain.  Susi Dean LPN ...... 2/20/2019 10:10 AM

## 2019-02-20 NOTE — TELEPHONE ENCOUNTER
JRO - Daughter is requesting a follow up  appointment for today. Patient is again having stomach pains and going into chest. Possible return of stomach infection. Please contact Jesse

## 2019-02-21 ENCOUNTER — HOSPITAL ENCOUNTER (OUTPATIENT)
Dept: CT IMAGING | Facility: OTHER | Age: 84
Discharge: HOME | End: 2019-02-21
Attending: PHYSICIAN ASSISTANT | Admitting: PHYSICIAN ASSISTANT
Payer: MEDICARE

## 2019-02-21 ENCOUNTER — OFFICE VISIT (OUTPATIENT)
Dept: FAMILY MEDICINE | Facility: OTHER | Age: 84
End: 2019-02-21
Attending: PHYSICIAN ASSISTANT
Payer: MEDICARE

## 2019-02-21 VITALS
DIASTOLIC BLOOD PRESSURE: 60 MMHG | RESPIRATION RATE: 20 BRPM | WEIGHT: 120 LBS | TEMPERATURE: 97.6 F | SYSTOLIC BLOOD PRESSURE: 152 MMHG | HEART RATE: 64 BPM | BODY MASS INDEX: 21.26 KG/M2

## 2019-02-21 DIAGNOSIS — K52.9 COLITIS: Primary | ICD-10-CM

## 2019-02-21 DIAGNOSIS — R10.32 LLQ ABDOMINAL PAIN: ICD-10-CM

## 2019-02-21 DIAGNOSIS — R10.12 LUQ ABDOMINAL PAIN: ICD-10-CM

## 2019-02-21 LAB
ALBUMIN SERPL-MCNC: 3.7 G/DL (ref 3.5–5.7)
ALP SERPL-CCNC: 46 U/L (ref 34–104)
ALT SERPL W P-5'-P-CCNC: 8 U/L (ref 7–52)
AMYLASE SERPL-CCNC: 20 U/L (ref 29–103)
ANION GAP SERPL CALCULATED.3IONS-SCNC: 6 MMOL/L (ref 3–14)
AST SERPL W P-5'-P-CCNC: 11 U/L (ref 13–39)
BASOPHILS # BLD AUTO: 0 10E9/L (ref 0–0.2)
BASOPHILS NFR BLD AUTO: 0.2 %
BILIRUB SERPL-MCNC: 0.3 MG/DL (ref 0.3–1)
BUN SERPL-MCNC: 32 MG/DL (ref 7–25)
CALCIUM SERPL-MCNC: 9.1 MG/DL (ref 8.6–10.3)
CHLORIDE SERPL-SCNC: 101 MMOL/L (ref 98–107)
CO2 SERPL-SCNC: 30 MMOL/L (ref 21–31)
CREAT SERPL-MCNC: 1.3 MG/DL (ref 0.6–1.2)
DIFFERENTIAL METHOD BLD: ABNORMAL
EOSINOPHIL # BLD AUTO: 0.1 10E9/L (ref 0–0.7)
EOSINOPHIL NFR BLD AUTO: 0.4 %
ERYTHROCYTE [DISTWIDTH] IN BLOOD BY AUTOMATED COUNT: 14.5 % (ref 10–15)
GFR SERPL CREATININE-BSD FRML MDRD: 39 ML/MIN/{1.73_M2}
GLUCOSE SERPL-MCNC: 112 MG/DL (ref 70–105)
HCT VFR BLD AUTO: 29 % (ref 35–47)
HGB BLD-MCNC: 9.5 G/DL (ref 11.7–15.7)
IMM GRANULOCYTES # BLD: 0.1 10E9/L (ref 0–0.4)
IMM GRANULOCYTES NFR BLD: 0.7 %
LIPASE SERPL-CCNC: <3 U/L (ref 11–82)
LYMPHOCYTES # BLD AUTO: 4.5 10E9/L (ref 0.8–5.3)
LYMPHOCYTES NFR BLD AUTO: 22.7 %
MCH RBC QN AUTO: 27.9 PG (ref 26.5–33)
MCHC RBC AUTO-ENTMCNC: 32.8 G/DL (ref 31.5–36.5)
MCV RBC AUTO: 85 FL (ref 78–100)
MONOCYTES # BLD AUTO: 1.4 10E9/L (ref 0–1.3)
MONOCYTES NFR BLD AUTO: 7.3 %
NEUTROPHILS # BLD AUTO: 13.6 10E9/L (ref 1.6–8.3)
NEUTROPHILS NFR BLD AUTO: 68.7 %
PLATELET # BLD AUTO: 276 10E9/L (ref 150–450)
POTASSIUM SERPL-SCNC: 4.5 MMOL/L (ref 3.5–5.1)
PROT SERPL-MCNC: 6.7 G/DL (ref 6.4–8.9)
RBC # BLD AUTO: 3.41 10E12/L (ref 3.8–5.2)
SODIUM SERPL-SCNC: 137 MMOL/L (ref 134–144)
WBC # BLD AUTO: 19.7 10E9/L (ref 4–11)

## 2019-02-21 PROCEDURE — 82150 ASSAY OF AMYLASE: CPT | Performed by: PHYSICIAN ASSISTANT

## 2019-02-21 PROCEDURE — 74176 CT ABD & PELVIS W/O CONTRAST: CPT

## 2019-02-21 PROCEDURE — 80053 COMPREHEN METABOLIC PANEL: CPT | Performed by: PHYSICIAN ASSISTANT

## 2019-02-21 PROCEDURE — 99214 OFFICE O/P EST MOD 30 MIN: CPT | Performed by: PHYSICIAN ASSISTANT

## 2019-02-21 PROCEDURE — 83690 ASSAY OF LIPASE: CPT | Performed by: PHYSICIAN ASSISTANT

## 2019-02-21 PROCEDURE — 36415 COLL VENOUS BLD VENIPUNCTURE: CPT | Performed by: PHYSICIAN ASSISTANT

## 2019-02-21 PROCEDURE — G0463 HOSPITAL OUTPT CLINIC VISIT: HCPCS | Mod: 25

## 2019-02-21 PROCEDURE — G0463 HOSPITAL OUTPT CLINIC VISIT: HCPCS

## 2019-02-21 PROCEDURE — 85025 COMPLETE CBC W/AUTO DIFF WBC: CPT | Performed by: PHYSICIAN ASSISTANT

## 2019-02-21 RX ORDER — CIPROFLOXACIN 500 MG/1
500 TABLET, FILM COATED ORAL DAILY
Qty: 10 TABLET | Refills: 0 | Status: SHIPPED | OUTPATIENT
Start: 2019-02-21 | End: 2019-04-09

## 2019-02-21 RX ORDER — METRONIDAZOLE 500 MG/1
500 TABLET ORAL 3 TIMES DAILY
Qty: 30 TABLET | Refills: 0 | Status: SHIPPED | OUTPATIENT
Start: 2019-02-21 | End: 2019-04-09

## 2019-02-21 ASSESSMENT — PAIN SCALES - GENERAL: PAINLEVEL: EXTREME PAIN (8)

## 2019-02-21 NOTE — PROGRESS NOTES
Nursing Notes:   Sandy Dean LPN  2/21/2019 11:41 AM  Signed  Chief Complaint   Patient presents with     Follow Up     abdominal pain         Medication Reconciliation: complete    Sandy Dean LPN      HPI:    Tenisha Cagle is a 85 year old female who presents for recheck.  Patient presented initially on 2/18/2019.  Patient has been having abdominal pain since Friday, 2/15.  Pain is more present on the left lateral abdomen.  Patient has been dealing with ongoing constipation.  Patient also uses narcotics which exacerbate her constipation symptoms.  No fevers.  No nausea or vomiting.  No dehydration concerns.  Keeping food and fluids down.  Eating normally.  Pain is persistent on the left upper and left lower quadrant.  Patient has cramping.  Patient has been using lactulose as needed for irritation.  Patient did have some cramping 2 days ago with a lactulose.  Cramping was better with the use of the lactulose yesterday.  Patient is only trying 1 teaspoon twice daily.  No dysuria, frequency, urgency.  No blood in urine or stool.  Patient is not lightheaded or dizzy.  No dehydration concerns.  Patient had a previous CT completed on 1/16/2019 which showed possible small bowel enteritis.  Patient was started on antibiotics at that time which helped resolve his symptoms..    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)     4/14/2011     Diaphragmatic hernia without obstruction or gangrene     large, mostly intrathoracic     Diverticulosis of large intestine without perforation or abscess without bleeding     5/13/2011     Dysthymic disorder     No Comments Provided     Essential (primary) hypertension     No Comments Provided     Female climacteric state     Menopausal age 56     Gastritis without bleeding     5/9/03,Treated with  PrevPac     Nonruptured cerebral aneurysm     2011     Osteoarthritis     No Comments Provided     Other fecal abnormalities     Recurrent loose stool     Other lymphoid leukemia not having achieved remission (H)     CLL, stable WBC 30,000     Other specified bacterial intestinal infections     2003, H. pylori gastritis treated with Prevpac     Other specified enthesopathies of unspecified lower limb, excluding foot     No Comments Provided     Peptic ulcer without hemorrhage or perforation     No Comments Provided     Personal history of other diseases of the digestive system (CODE)     2011     Personal history of other medical treatment (CODE)     , vaginal deliveries     Pure hypercholesterolemia     No Comments Provided     Sepsis (H)     child,Hospitalized as a child for blood poisoning     Sleep disorder     2011     Transient cerebral ischemic attack     2011       Past Surgical History:   Procedure Laterality Date     CHOLECYSTECTOMY      No Comments Provided     COLONOSCOPY      ,Sigmoid diverticulosis     ENDOSCOPY UPPER, COLONOSCOPY, COMBINED      11,Hiatal hernia, gastric gland hyperplasia in antrum     LAPAROSCOPIC TUBAL LIGATION      No Comments Provided     OTHER SURGICAL HISTORY      ,2050,BIOPSY BREAST,Right     OTHER SURGICAL HISTORY      [id number],PREMALIG/BENIGN SKIN LESION EXCISION,R side of nose, face and chest wall/Basal Cell Cancer Excision     TONSILLECTOMY, ADENOIDECTOMY, COMBINED             Family History   Problem Relation Age of Onset     Other - See Comments Mother         Stroke,Had a CVA age 85     Cancer Father         Cancer,Brain tumor, age 56     Blood Disease Sister         Blood Disease,Leukemia and     Cancer Sister         Cancer, kidney cancer     Blood Disease Sister         Blood Disease,Chronic lymphocytic leukemia       Social History     Tobacco Use     Smoking status: Never Smoker     Smokeless tobacco: Never Used    Substance Use Topics     Alcohol use: No     Alcohol/week: 0.0 oz       Current Outpatient Medications   Medication Sig Dispense Refill     acetaminophen (TYLENOL) 500 MG tablet Take 1,000 mg by mouth every 6 hours as needed       aspirin-dipyridamole ER (AGGRENOX)  MG 12 hr capsule Take 1 capsule by mouth 2 times daily 180 capsule 0     Calcium Carbonate-Vitamin D (CALCIUM 500 + D) 500-125 MG-UNIT TABS Take 1 tablet by mouth daily       ciprofloxacin (CIPRO) 500 MG tablet Take 1 tablet (500 mg) by mouth daily for 10 days 10 tablet 0     HYDROcodone-acetaminophen (NORCO) 5-325 MG tablet Take 1 tablet by mouth every 6 hours as needed for severe pain 90 tablet 0     lactulose (CHRONULAC) 10 GM/15ML solution Take 30 mLs (20 g) by mouth 2 times daily as needed for constipation 1000 mL 11     losartan-hydrochlorothiazide (HYZAAR) 50-12.5 MG per tablet Take 1 tablet by mouth daily 90 tablet 3     magnesium hydroxide (MILK OF MAGNESIA) 400 MG/5ML suspension Take 15 mLs by mouth At Bedtime       metroNIDAZOLE (FLAGYL) 500 MG tablet Take 1 tablet (500 mg) by mouth 3 times daily for 10 days 30 tablet 0     Multiple Vitamin (MULTI-VITAMINS) TABS Take 1 tablet by mouth daily         Allergies   Allergen Reactions     Lansoprazole Other (See Comments) and Unknown     Patient states that medication didn't work for her.  Daughter states she got delusional, water did not taste right  Other reaction(s): Other - Describe In Comment Field  Patient states that medication didn't work for her.     Lisinopril Cough       REVIEW OF SYSTEMS:  Refer to HPI.    EXAM:   Vitals:    /60 (BP Location: Right arm, Patient Position: Sitting, Cuff Size: Adult Regular)   Pulse 64   Temp 97.6  F (36.4  C)   Resp 20   Wt 54.4 kg (120 lb)   BMI 21.26 kg/m      General Appearance: Pleasant, alert, appropriate appearance for age. No acute distress  Chest/Respiratory Exam: Normal chest wall and respirations. Clear to  auscultation.  Cardiovascular Exam: Regular rate and rhythm. S1, S2, no murmur, click, gallop, or rubs.  Gastrointestinal Exam: Soft, no masses or organomegaly. Normal BS x 4.  Abdominal pain with palpation of the left upper and left lower quadrant.  No rebound tenderness or guarding appreciated.  No CVA tenderness to palpation.  Skin: no rash or abnormalities  Neurologic Exam: Nonfocal, normal gross motor, tone coordination and no tremor.  Psychiatric Exam: Alert and oriented - appropriate affect.    PHQ Depression Screen  PHQ-9 SCORE 9/15/2016 5/15/2017 8/30/2018   PHQ-9 Total Score 0 0 0       Labs:  Results for orders placed or performed in visit on 02/21/19   Comprehensive Metabolic Panel   Result Value Ref Range    Sodium 137 134 - 144 mmol/L    Potassium 4.5 3.5 - 5.1 mmol/L    Chloride 101 98 - 107 mmol/L    Carbon Dioxide 30 21 - 31 mmol/L    Anion Gap 6 3 - 14 mmol/L    Glucose 112 (H) 70 - 105 mg/dL    Urea Nitrogen 32 (H) 7 - 25 mg/dL    Creatinine 1.30 (H) 0.60 - 1.20 mg/dL    GFR Estimate 39 (L) >60 mL/min/[1.73_m2]    GFR Estimate If Black 47 (L) >60 mL/min/[1.73_m2]    Calcium 9.1 8.6 - 10.3 mg/dL    Bilirubin Total 0.3 0.3 - 1.0 mg/dL    Albumin 3.7 3.5 - 5.7 g/dL    Protein Total 6.7 6.4 - 8.9 g/dL    Alkaline Phosphatase 46 34 - 104 U/L    ALT 8 7 - 52 U/L    AST 11 (L) 13 - 39 U/L   CBC and Differential   Result Value Ref Range    WBC 19.7 (H) 4.0 - 11.0 10e9/L    RBC Count 3.41 (L) 3.8 - 5.2 10e12/L    Hemoglobin 9.5 (L) 11.7 - 15.7 g/dL    Hematocrit 29.0 (L) 35.0 - 47.0 %    MCV 85 78 - 100 fl    MCH 27.9 26.5 - 33.0 pg    MCHC 32.8 31.5 - 36.5 g/dL    RDW 14.5 10.0 - 15.0 %    Platelet Count 276 150 - 450 10e9/L    Diff Method Automated Method     % Neutrophils 68.7 %    % Lymphocytes 22.7 %    % Monocytes 7.3 %    % Eosinophils 0.4 %    % Basophils 0.2 %    % Immature Granulocytes 0.7 %    Absolute Neutrophil 13.6 (H) 1.6 - 8.3 10e9/L    Absolute Lymphocytes 4.5 0.8 - 5.3 10e9/L    Absolute  Monocytes 1.4 (H) 0.0 - 1.3 10e9/L    Absolute Eosinophils 0.1 0.0 - 0.7 10e9/L    Absolute Basophils 0.0 0.0 - 0.2 10e9/L    Abs Immature Granulocytes 0.1 0 - 0.4 10e9/L   Lipase   Result Value Ref Range    Lipase <3 (L) 11 - 82 U/L   Amylase   Result Value Ref Range    Amylase 20 (L) 29 - 103 U/L       ASSESSMENT AND PLAN:      ICD-10-CM    1. Colitis K52.9 metroNIDAZOLE (FLAGYL) 500 MG tablet     ciprofloxacin (CIPRO) 500 MG tablet   2. LUQ abdominal pain R10.12 CBC and Differential     Comprehensive Metabolic Panel     Comprehensive Metabolic Panel     CBC and Differential     Amylase     Lipase     Lipase     Amylase     CANCELED: CT Abdomen Pelvis w Contrast   3. LLQ abdominal pain R10.32 Amylase     Lipase     Lipase     Amylase     CANCELED: CT Abdomen Pelvis w Contrast         Completed recheck labs today including CBC, CMP, amylase and lipase.  White blood cell count has increased to 19.7.  Hemoglobin mildly decreased further to 9.5.  Otherwise unremarkable CBC, CMP, amylase and lipase.    Ordered an abdomen and pelvic CT to rule out concerns.  CT of the abdomen showed focal colitis of the rectosigmoid colon.    Patient was started on ciprofloxacin and metronidazole for colitis.  Gave warning signs and symptoms.  Encouraged clear liquid diet for 24-48 hours until the pain calms down. Then increase diet as tolerated.     Try small amounts of food and drink frequently. Offer a bland diet. Advance as tolerated. Avoid dairy products the first day. You may add yogurt tomorrow as the first dairy product.    Call or return to clinic as needed if your symptoms worsen or fail to improve as anticipated.   Monitor for increased abdominal pain, fevers, chills, blood in stool or urine, not keeping food or fluids down.  Return to clinic or emergency room if symptoms are changing or worsening as needed.    Greater than 25 minutes were spent in counseling and coordination of care.     Patient Instructions     Encouraged  clear liquid diet for 24-48 hours until the pain calms down. Then increase diet as tolerated.     Try small amounts of food and drink frequently. Offer a bland diet. Advance as tolerated. Avoid dairy products the first day. You may add yogurt tomorrow as the first dairy product.      Call or return to clinic as needed if your symptoms worsen or fail to improve as anticipated.     If the pain does not begin improving, localizes to the right lower belly, there is increased fever, or other progression of symptoms, return for reassessment.    Should I see a doctor or nurse about my stomach ache? -- Most people do not need to see a doctor or nurse for a stomach ache. But you should see your doctor or nurse if:  ?You have bloody bowel movements, diarrhea, or vomiting  ?Your pain is severe and lasts more than an hour or comes and goes for more than 24 hours  ?You cannot eat or drink for hours  ?You have a fever higher than 102 F (39 C)  ?You lose a lot of weight without trying to, or lose interest in food       Patient Education     Understanding Colitis   Colitis is when a part of your colon becomes inflamed or swollen. The colon is also called the large intestine. It helps with digestion and waste removal.   What causes colitis?   Colitis can be caused by many things. The most common causes are:    Viral or bacterial infections    Inflammatory bowel disease (ulcerative colitis or Crohn s disease)    Certain medicines, such as antibiotics    Radiation therapy to the colon   Symptoms of colitis   The symptoms of colitis may last a short time. Or they can be chronic. The most common symptoms are:    Diarrhea, sometimes bloody    Stomach pain or cramping    Fever    Weight loss in severe cases   Diagnosing colitis  Your healthcare provider will take a full health history and family history. He or she will also give you a physical exam. Depending on the results of your history and physical exam, your provider may also order  certain tests to help find out the cause of your colitis. These may include:    Lab tests. Your blood and stool will be checked.    Endoscopy and biopsy.  Endoscopy uses a long, flexible tube with a tiny light and camera on one end to check the inside of your large intestine. When only the colon is checked, this is called a colonoscopy. During an endoscopy, your provider may take a small sample of your tissue to look at under a microscope. This is called a biopsy.  Treatment for colitis   Treatment for colitis depends on what is causing it and how serious your symptoms are. In some cases, you may not need treatment. For example, colitis from an infection may go away without care.   Treatment may include:     Medicines. You may take these by mouth (oral) or  as a rectal suppository or enema. They can lessen swelling and ease symptoms.    Changes in your diet. Some foods can make symptoms worse. Common triggers are milk, coffee, alcohol, and fried foods.    Surgery. In some cases, you may need surgery to remove a damaged part of the colon.     Call 911  Call 911 if any of these occur:    Trouble breathing    Confusion    Very drowsy or trouble awakening    Fainting or loss of consciousness    Rapid heart rate    Chest pain   When to call your healthcare provider   Call your healthcare provider right away if you have any of these:    Symptoms that don t get better, or get worse    Fever of 100.4 F (38 C) or higher, or as directed by your healthcare provider    Pain that gets worse    Bloody diarrhea    Bleeding from your rectum    New symptoms      Date Last Reviewed: 12/1/2017 2000-2018 [employer]. [address] [place] [address]. All rights reserved. This information is not intended as a substitute for professional medical care. Always follow your healthcare professional's instructions.              Danna Torres PA-C..................2/21/2019 11:42 AM

## 2019-02-21 NOTE — NURSING NOTE
Chief Complaint   Patient presents with     Follow Up     abdominal pain         Medication Reconciliation: complete    Sandy Dean, LPN

## 2019-02-21 NOTE — TELEPHONE ENCOUNTER
Please call to find out how the patient is feeling. If her belly pain is persistent she should be seen in clinic for recheck. (please have her arrive in the morning if she is put in my same day appt schedule).  Danna Torres PA-C..................2/20/2019 9:06 PM

## 2019-02-21 NOTE — PATIENT INSTRUCTIONS
Encouraged clear liquid diet for 24-48 hours until the pain calms down. Then increase diet as tolerated.     Try small amounts of food and drink frequently. Offer a bland diet. Advance as tolerated. Avoid dairy products the first day. You may add yogurt tomorrow as the first dairy product.      Call or return to clinic as needed if your symptoms worsen or fail to improve as anticipated.     If the pain does not begin improving, localizes to the right lower belly, there is increased fever, or other progression of symptoms, return for reassessment.    Should I see a doctor or nurse about my stomach ache? -- Most people do not need to see a doctor or nurse for a stomach ache. But you should see your doctor or nurse if:  ?You have bloody bowel movements, diarrhea, or vomiting  ?Your pain is severe and lasts more than an hour or comes and goes for more than 24 hours  ?You cannot eat or drink for hours  ?You have a fever higher than 102 F (39 C)  ?You lose a lot of weight without trying to, or lose interest in food       Patient Education     Understanding Colitis   Colitis is when a part of your colon becomes inflamed or swollen. The colon is also called the large intestine. It helps with digestion and waste removal.   What causes colitis?   Colitis can be caused by many things. The most common causes are:    Viral or bacterial infections    Inflammatory bowel disease (ulcerative colitis or Crohn s disease)    Certain medicines, such as antibiotics    Radiation therapy to the colon   Symptoms of colitis   The symptoms of colitis may last a short time. Or they can be chronic. The most common symptoms are:    Diarrhea, sometimes bloody    Stomach pain or cramping    Fever    Weight loss in severe cases   Diagnosing colitis  Your healthcare provider will take a full health history and family history. He or she will also give you a physical exam. Depending on the results of your history and physical exam, your provider may  also order certain tests to help find out the cause of your colitis. These may include:    Lab tests. Your blood and stool will be checked.    Endoscopy and biopsy.  Endoscopy uses a long, flexible tube with a tiny light and camera on one end to check the inside of your large intestine. When only the colon is checked, this is called a colonoscopy. During an endoscopy, your provider may take a small sample of your tissue to look at under a microscope. This is called a biopsy.  Treatment for colitis   Treatment for colitis depends on what is causing it and how serious your symptoms are. In some cases, you may not need treatment. For example, colitis from an infection may go away without care.   Treatment may include:     Medicines. You may take these by mouth (oral) or  as a rectal suppository or enema. They can lessen swelling and ease symptoms.    Changes in your diet. Some foods can make symptoms worse. Common triggers are milk, coffee, alcohol, and fried foods.    Surgery. In some cases, you may need surgery to remove a damaged part of the colon.     Call 911  Call 911 if any of these occur:    Trouble breathing    Confusion    Very drowsy or trouble awakening    Fainting or loss of consciousness    Rapid heart rate    Chest pain   When to call your healthcare provider   Call your healthcare provider right away if you have any of these:    Symptoms that don t get better, or get worse    Fever of 100.4 F (38 C) or higher, or as directed by your healthcare provider    Pain that gets worse    Bloody diarrhea    Bleeding from your rectum    New symptoms      Date Last Reviewed: 12/1/2017 2000-2018 [employer]. [address] [place] [address], [place] [address]. All rights reserved. This information is not intended as a substitute for professional medical care. Always follow your healthcare professional's instructions.

## 2019-03-08 ENCOUNTER — OFFICE VISIT (OUTPATIENT)
Dept: FAMILY MEDICINE | Facility: OTHER | Age: 84
End: 2019-03-08
Attending: FAMILY MEDICINE
Payer: MEDICARE

## 2019-03-08 VITALS
WEIGHT: 115 LBS | TEMPERATURE: 98.6 F | SYSTOLIC BLOOD PRESSURE: 136 MMHG | HEART RATE: 64 BPM | DIASTOLIC BLOOD PRESSURE: 72 MMHG | BODY MASS INDEX: 20.37 KG/M2 | RESPIRATION RATE: 14 BRPM

## 2019-03-08 DIAGNOSIS — Z79.899 CONTROLLED SUBSTANCE AGREEMENT SIGNED: ICD-10-CM

## 2019-03-08 DIAGNOSIS — M70.62 GREATER TROCHANTERIC BURSITIS OF LEFT HIP: ICD-10-CM

## 2019-03-08 DIAGNOSIS — M53.3 SACROILIAC JOINT PAIN: Primary | ICD-10-CM

## 2019-03-08 PROCEDURE — G0463 HOSPITAL OUTPT CLINIC VISIT: HCPCS

## 2019-03-08 PROCEDURE — 99214 OFFICE O/P EST MOD 30 MIN: CPT | Performed by: FAMILY MEDICINE

## 2019-03-08 RX ORDER — HYDROCODONE BITARTRATE AND ACETAMINOPHEN 5; 325 MG/1; MG/1
1 TABLET ORAL EVERY 6 HOURS PRN
Qty: 90 TABLET | Refills: 0 | Status: SHIPPED | OUTPATIENT
Start: 2019-03-08 | End: 2019-03-08

## 2019-03-08 RX ORDER — MV-MN/OM3/DHA/EPA/FISH/LUT/ZEA 250-5-1 MG
1 CAPSULE ORAL DAILY
Status: ON HOLD | COMMUNITY
Start: 2013-03-01 | End: 2020-01-01

## 2019-03-08 RX ORDER — HYDROCODONE BITARTRATE AND ACETAMINOPHEN 5; 325 MG/1; MG/1
1 TABLET ORAL EVERY 6 HOURS PRN
Qty: 90 TABLET | Refills: 0 | Status: SHIPPED | OUTPATIENT
Start: 2019-03-08 | End: 2019-06-03

## 2019-03-08 ASSESSMENT — ENCOUNTER SYMPTOMS
ARTHRALGIAS: 1
BACK PAIN: 1
SLEEP DISTURBANCE: 1
FATIGUE: 1

## 2019-03-08 ASSESSMENT — PAIN SCALES - GENERAL: PAINLEVEL: EXTREME PAIN (8)

## 2019-03-08 NOTE — PROGRESS NOTES
SUBJECTIVE:   Tenisha Cagle is a 85 year old female who presents to clinic today for the following health issues:    HPI    Follow up pain. Today hurts in left shoulder, left hip, right knee and right ear.  Rates it at 8/10, over the left greater trochanter mostly.  Had an injection in the left hip joint on 2/12/19.  Daughter says this led to her complaining a bit less about joint pain in this area.  Uses lots of ice, as well as aspercreme.      We started her on long term hydrocodone at the last visit.  She feels this has helped a little.  No side effects from them at all apart range of motion constipation.  Using lactulose and ex lax.      Patient Active Problem List    Diagnosis Date Noted     Arthritis of shoulder region, left, degenerative 01/07/2019     Priority: Medium     Memory loss 05/25/2018     Priority: Medium     Atrial fibrillation with RVR (H) 01/25/2018     Priority: Medium     Hiatal hernia 01/17/2018     Priority: Medium     Overview:   large, mostly intrathoracic       Essential hypertension 01/17/2018     Priority: Medium     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/27/2017     Priority: Medium     Benign skin lesion of nose 09/27/2017     Priority: Medium     AK (actinic keratosis) 07/10/2017     Priority: Medium     SK (seborrheic keratosis) 07/10/2017     Priority: Medium     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) 06/15/2017     Priority: Medium     Abnormal weight loss 04/18/2016     Priority: Medium     Anemia 04/18/2016     Priority: Medium     Greater trochanteric bursitis of left hip 04/18/2016     Priority: Medium     Visual changes 04/18/2016     Priority: Medium     Osteoarthritis of spine with radiculopathy, lumbar region 02/03/2016     Priority: Medium     History of TIA (transient ischemic attack) 02/02/2016     Priority: Medium     Numbness and tingling of right leg 02/02/2016     Priority: Medium     Radicular leg pain 02/02/2016     Priority: Medium      Basal cell carcinoma of right cheek 11/18/2015     Priority: Medium     Sacroiliac joint pain 12/10/2014     Priority: Medium     Alvarado's cyst of knee 02/20/2014     Priority: Medium     Paresthesia of right leg 02/20/2014     Priority: Medium     Right knee DJD 02/20/2014     Priority: Medium     ACP (advance care planning) 01/02/2014     Priority: Medium     Cystocele 08/23/2013     Priority: Medium     Pancreatic mass 09/18/2012     Priority: Medium     Overview:   Possible, abnormal CT        Cerebral aneurysm, nonruptured 12/20/2011     Priority: Medium     Diverticulosis of colon 05/13/2011     Priority: Medium     Chronic lymphocytic leukemia (H) 04/14/2011     Priority: Medium     Overview:   WBC stable in the 30,000       Past Surgical History:   Procedure Laterality Date     CHOLECYSTECTOMY      No Comments Provided     COLONOSCOPY      2007,Sigmoid diverticulosis     ENDOSCOPY UPPER, COLONOSCOPY, COMBINED      5/5/11,Hiatal hernia, gastric gland hyperplasia in antrum     LAPAROSCOPIC TUBAL LIGATION      No Comments Provided     OTHER SURGICAL HISTORY      1986,[id number],BIOPSY BREAST,Right     OTHER SURGICAL HISTORY      [id number],PREMALIG/BENIGN SKIN LESION EXCISION,R side of nose, face and chest wall/Basal Cell Cancer Excision     TONSILLECTOMY, ADENOIDECTOMY, COMBINED      1954     Social History     Tobacco Use     Smoking status: Never Smoker     Smokeless tobacco: Never Used   Substance Use Topics     Alcohol use: No     Alcohol/week: 0.0 oz     Current Outpatient Medications   Medication Sig Dispense Refill     acetaminophen (TYLENOL) 500 MG tablet Take 1,000 mg by mouth every 6 hours as needed       aspirin-dipyridamole ER (AGGRENOX)  MG 12 hr capsule Take 1 capsule by mouth 2 times daily 180 capsule 0     Calcium Carbonate-Vitamin D (CALCIUM 500 + D) 500-125 MG-UNIT TABS Take 1 tablet by mouth daily       HYDROcodone-acetaminophen (NORCO) 5-325 MG tablet Take 1 tablet by mouth every 6 hours as  needed for severe pain Fill on or after 5/7/19 90 tablet 0     lactulose (CHRONULAC) 10 GM/15ML solution Take 30 mLs (20 g) by mouth 2 times daily as needed for constipation 1000 mL 11     losartan-hydrochlorothiazide (HYZAAR) 50-12.5 MG per tablet Take 1 tablet by mouth daily 90 tablet 3     magnesium hydroxide (MILK OF MAGNESIA) 400 MG/5ML suspension Take 15 mLs by mouth At Bedtime       Multiple Vitamin (MULTI-VITAMINS) TABS Take 1 tablet by mouth daily       Multiple Vitamins-Minerals (OCUVITE ADULT 50+) CAPS Take 1 capsule by mouth daily       Allergies   Allergen Reactions     Lansoprazole Other (See Comments) and Unknown     Patient states that medication didn't work for her.  Daughter states she got delusional, water did not taste right  Other reaction(s): Other - Describe In Comment Field  Patient states that medication didn't work for her.     Lisinopril Cough       Review of Systems   Constitutional: Positive for fatigue.   Musculoskeletal: Positive for arthralgias and back pain.   Psychiatric/Behavioral: Positive for sleep disturbance.        OBJECTIVE:     /72 (BP Location: Right arm, Patient Position: Sitting, Cuff Size: Adult Regular)   Pulse 64   Temp 98.6  F (37  C) (Tympanic)   Resp 14   Wt 52.2 kg (115 lb)   BMI 20.37 kg/m    Body mass index is 20.37 kg/m .  Physical Exam   Constitutional: She appears well-developed. No distress.   Musculoskeletal:   Significant pain on palpation over left greater trochanter   Skin: Skin is warm and dry. She is not diaphoretic.   Psychiatric: She has a normal mood and affect.   Poor memory       Diagnostic Test Results:  none     ASSESSMENT/PLAN:         (M53.3) Sacroiliac joint pain  (primary encounter diagnosis)  Comment: chronic.   It appears she is having more and more memory issues.  Has a bit of perserveration about her pains, but also has significant diffuse djd.  Daughter is metering out her pain meds.  Contract signed and 3 month's given.  25  minutes with them, over 1/2 in counseling and coordination of cares.   Plan: HYDROcodone-acetaminophen (NORCO) 5-325 MG         tablet, DISCONTINUED: HYDROcodone-acetaminophen        (NORCO) 5-325 MG tablet, DISCONTINUED:         HYDROcodone-acetaminophen (NORCO) 5-325 MG         tablet             (Z79.899) Controlled substance agreement signed  Comment: new today  Plan:      (M70.62) Greater trochanteric bursitis of left hip  Comment: ongoing.   Plan: She again asks for an injection, but her daughter says in the past she will still complain of pain the following day.  Since the shots are not seeming to help her, I declined it today.            Godwin Kapoor MD  [facility]

## 2019-03-08 NOTE — NURSING NOTE
"Coming in for a medication check up, and would like a Left hip injection    Chief Complaint   Patient presents with     Recheck Medication     check up and check Right ear       Initial /72 (BP Location: Right arm, Patient Position: Sitting, Cuff Size: Adult Regular)   Pulse 64   Temp 98.6  F (37  C) (Tympanic)   Resp 14   Wt 52.2 kg (115 lb)   BMI 20.37 kg/m   Estimated body mass index is 20.37 kg/m  as calculated from the following:    Height as of 1/16/19: 1.6 m (5' 3\").    Weight as of this encounter: 52.2 kg (115 lb).  Medication Reconciliation: complete    Monisha Gamboa LPN  "

## 2019-04-08 DIAGNOSIS — Z86.73 HISTORY OF TIA (TRANSIENT ISCHEMIC ATTACK): ICD-10-CM

## 2019-04-09 ENCOUNTER — OFFICE VISIT (OUTPATIENT)
Dept: FAMILY MEDICINE | Facility: OTHER | Age: 84
End: 2019-04-09
Attending: NURSE PRACTITIONER
Payer: MEDICARE

## 2019-04-09 VITALS
BODY MASS INDEX: 20.24 KG/M2 | DIASTOLIC BLOOD PRESSURE: 62 MMHG | WEIGHT: 114.25 LBS | RESPIRATION RATE: 16 BRPM | HEIGHT: 63 IN | HEART RATE: 74 BPM | TEMPERATURE: 97.2 F | SYSTOLIC BLOOD PRESSURE: 122 MMHG

## 2019-04-09 DIAGNOSIS — B35.1 NAIL FUNGUS: Primary | ICD-10-CM

## 2019-04-09 DIAGNOSIS — L60.0 INGROWN TOENAIL WITHOUT INFECTION: ICD-10-CM

## 2019-04-09 PROCEDURE — G0463 HOSPITAL OUTPT CLINIC VISIT: HCPCS

## 2019-04-09 PROCEDURE — 99213 OFFICE O/P EST LOW 20 MIN: CPT | Performed by: NURSE PRACTITIONER

## 2019-04-09 ASSESSMENT — MIFFLIN-ST. JEOR: SCORE: 932.36

## 2019-04-09 ASSESSMENT — PATIENT HEALTH QUESTIONNAIRE - PHQ9: SUM OF ALL RESPONSES TO PHQ QUESTIONS 1-9: 0

## 2019-04-09 ASSESSMENT — PAIN SCALES - GENERAL: PAINLEVEL: NO PAIN (0)

## 2019-04-09 NOTE — PROGRESS NOTES
HPI:    Tenisha Cagle is a 85 year old female who presents to clinic today for toenail concerns. She comes in to the clinic with the daughter today.  The daughter reports that she has concerns of ingrown toenails.  Her nails are very thick and curl in.  There is skin growing over them and is causing her some discomfort.  They have been getting her nails done in a salon but due to the thickness and pain they are no longer able to do this.  She is wondering what should be done regarding this.    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)     2011     Diaphragmatic hernia without obstruction or gangrene     large, mostly intrathoracic     Diverticulosis of large intestine without perforation or abscess without bleeding     2011     Dysthymic disorder     No Comments Provided     Essential (primary) hypertension     No Comments Provided     Female climacteric state     Menopausal age 56     Gastritis without bleeding     03,Treated with PrevPac     Nonruptured cerebral aneurysm     2011     Osteoarthritis     No Comments Provided     Other fecal abnormalities     Recurrent loose stool     Other lymphoid leukemia not having achieved remission (H)     CLL, stable WBC 30,000     Other specified bacterial intestinal infections     2003, H. pylori gastritis treated with Prevpac     Other specified enthesopathies of unspecified lower limb, excluding foot     No Comments Provided     Peptic ulcer without hemorrhage or perforation     No Comments Provided     Personal history of other diseases of the digestive system (CODE)     2011     Personal history of other medical treatment (CODE)     , vaginal deliveries     Pure hypercholesterolemia     No Comments Provided     Sepsis (H)     child,Hospitalized as a child for blood  "poisoning     Sleep disorder     5/13/2011     Transient cerebral ischemic attack     11/17/2011     Current Outpatient Medications   Medication Sig Dispense Refill     acetaminophen (TYLENOL) 500 MG tablet Take 1,000 mg by mouth every 6 hours as needed       aspirin-dipyridamole ER (AGGRENOX)  MG 12 hr capsule Take 1 capsule by mouth 2 times daily 180 capsule 0     Calcium Carbonate-Vitamin D (CALCIUM 500 + D) 500-125 MG-UNIT TABS Take 1 tablet by mouth daily       HYDROcodone-acetaminophen (NORCO) 5-325 MG tablet Take 1 tablet by mouth every 6 hours as needed for severe pain Fill on or after 5/7/19 90 tablet 0     lactulose (CHRONULAC) 10 GM/15ML solution Take 30 mLs (20 g) by mouth 2 times daily as needed for constipation 1000 mL 11     losartan-hydrochlorothiazide (HYZAAR) 50-12.5 MG per tablet Take 1 tablet by mouth daily 90 tablet 3     magnesium hydroxide (MILK OF MAGNESIA) 400 MG/5ML suspension Take 15 mLs by mouth At Bedtime       Multiple Vitamin (MULTI-VITAMINS) TABS Take 1 tablet by mouth daily       Multiple Vitamins-Minerals (OCUVITE ADULT 50+) CAPS Take 1 capsule by mouth daily         Allergies   Allergen Reactions     Lansoprazole Other (See Comments) and Unknown     Patient states that medication didn't work for her.  Daughter states she got delusional, water did not taste right  Other reaction(s): Other - Describe In Comment Field  Patient states that medication didn't work for her.     Lisinopril Cough       ROS:  Pertinent positives and negatives are noted in HPI.    EXAM:  /62 (BP Location: Right arm, Patient Position: Sitting, Cuff Size: Adult Regular)   Pulse 74   Temp 97.2  F (36.2  C) (Tympanic)   Resp 16   Ht 1.6 m (5' 3\")   Wt 51.8 kg (114 lb 4 oz)   Breastfeeding? No   BMI 20.24 kg/m    General appearance: well appearing female, in no acute distress  Dermatological: All 10 toenails are very thick and yellow.  The nail edges are currently under causing pinching of her " skin.  No purulent discharge or erythema noted.  Psychological: normal affect, alert and pleasant    ASSESSMENT AND PLAN:    1. Nail fungus    2. Ingrown toenail without infection      Recommend referral to podiatry for nail care due to fungal infection and ingrown nails. Daughter agrees and will schedule this.       Delma Cooper..................4/9/2019 1:56 PM      This document was prepared using voice generated software.  While every attempt was made for accuracy, grammatical errors may exist.

## 2019-04-09 NOTE — NURSING NOTE
Patient presents to clinic today for toenail issue. Daughter states skin is growing over the toenail. Daughter states it is on both feet.    No LMP recorded. Patient is postmenopausal.  Medication Reconciliation: complete    Shonda Montez LPN  4/9/2019 1:59 PM

## 2019-04-12 RX ORDER — ASPIRIN AND DIPYRIDAMOLE 25; 200 MG/1; MG/1
CAPSULE, EXTENDED RELEASE ORAL
Qty: 180 CAPSULE | Refills: 3 | Status: SHIPPED | OUTPATIENT
Start: 2019-04-12 | End: 2019-01-01

## 2019-04-22 ENCOUNTER — OFFICE VISIT (OUTPATIENT)
Dept: PODIATRY | Facility: OTHER | Age: 84
End: 2019-04-22
Attending: PODIATRIST
Payer: MEDICARE

## 2019-04-22 VITALS
OXYGEN SATURATION: 97 % | DIASTOLIC BLOOD PRESSURE: 60 MMHG | TEMPERATURE: 98.1 F | BODY MASS INDEX: 20.37 KG/M2 | SYSTOLIC BLOOD PRESSURE: 128 MMHG | HEART RATE: 96 BPM | WEIGHT: 115 LBS

## 2019-04-22 DIAGNOSIS — M79.674 PAIN OF RIGHT GREAT TOE: ICD-10-CM

## 2019-04-22 DIAGNOSIS — I87.2 EDEMA OF LEFT LOWER EXTREMITY DUE TO PERIPHERAL VENOUS INSUFFICIENCY: ICD-10-CM

## 2019-04-22 DIAGNOSIS — I87.2 EDEMA OF RIGHT LOWER EXTREMITY DUE TO PERIPHERAL VENOUS INSUFFICIENCY: ICD-10-CM

## 2019-04-22 DIAGNOSIS — L60.3 ONYCHODYSTROPHY: ICD-10-CM

## 2019-04-22 DIAGNOSIS — L60.0 INGROWING NAIL: Primary | ICD-10-CM

## 2019-04-22 DIAGNOSIS — M79.675 PAIN OF LEFT GREAT TOE: ICD-10-CM

## 2019-04-22 DIAGNOSIS — I78.1 TELANGIECTASIAS: ICD-10-CM

## 2019-04-22 PROCEDURE — G0463 HOSPITAL OUTPT CLINIC VISIT: HCPCS

## 2019-04-22 PROCEDURE — 11721 DEBRIDE NAIL 6 OR MORE: CPT | Performed by: PODIATRIST

## 2019-04-22 PROCEDURE — 99203 OFFICE O/P NEW LOW 30 MIN: CPT | Mod: 25 | Performed by: PODIATRIST

## 2019-04-22 PROCEDURE — G0463 HOSPITAL OUTPT CLINIC VISIT: HCPCS | Mod: 25

## 2019-04-22 ASSESSMENT — PAIN SCALES - GENERAL: PAINLEVEL: NO PAIN (0)

## 2019-04-22 NOTE — PROGRESS NOTES
Chief complaint: Patient presents with:  Toenail: dystrophic        History of Present Illness: This 85 year old female is seen with her daughter for evaluation and suggestions of management of elongated and ingrown toenails. The RIGHT hallux toenail has been very sensitive due tot he nail wrapping around the toe. Patient lives in [place] and she says it has been difficult to find a provider to care for her nails. She previously had a nurse to trim them. She also had her beauty salon try to trim them, but they told her that her nails curved in so they couldn't reach them as far as they needed to go. The patient would like them trimmed today. No further pedal complaints today.     /60 (BP Location: Left arm, Patient Position: Sitting, Cuff Size: Adult Regular)   Pulse 96   Temp 98.1  F (36.7  C) (Tympanic)   Wt 52.2 kg (115 lb)   SpO2 97%   BMI 20.37 kg/m      Patient Active Problem List   Diagnosis     Abnormal weight loss     ACP (advance care planning)     AK (actinic keratosis)     Anemia     Alvarado's cyst of knee     Basal cell carcinoma of right cheek     Cerebral aneurysm, nonruptured     Cystocele     Diverticulosis of colon     Greater trochanteric bursitis of left hip     Squamous cell carcinoma of face     Hiatal hernia     History of TIA (transient ischemic attack)     Essential hypertension     Chronic lymphocytic leukemia (H)     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)     Numbness and tingling of right leg     Osteoarthritis of spine with radiculopathy, lumbar region     Pancreatic mass     Paresthesia of right leg     Radicular leg pain     Right knee DJD     Sacroiliac joint pain     SK (seborrheic keratosis)     Benign skin lesion of nose     Visual changes     Memory loss     Atrial fibrillation with RVR (H)     Arthritis of shoulder region, left, degenerative     Controlled substance agreement signed       Past Surgical History:   Procedure Laterality Date      CHOLECYSTECTOMY      No Comments Provided     COLONOSCOPY      2007,Sigmoid diverticulosis     ENDOSCOPY UPPER, COLONOSCOPY, COMBINED      5/5/11,Hiatal hernia, gastric gland hyperplasia in antrum     LAPAROSCOPIC TUBAL LIGATION      No Comments Provided     OTHER SURGICAL HISTORY      1986,[id number],BIOPSY BREAST,Right     OTHER SURGICAL HISTORY      [id number],PREMALIG/BENIGN SKIN LESION EXCISION,R side of nose, face and chest wall/Basal Cell Cancer Excision     TONSILLECTOMY, ADENOIDECTOMY, COMBINED      1954       Current Outpatient Medications   Medication     acetaminophen (TYLENOL) 500 MG tablet     aspirin-dipyridamole ER (AGGRENOX)  MG 12 hr capsule     Calcium Carbonate-Vitamin D (CALCIUM 500 + D) 500-125 MG-UNIT TABS     HYDROcodone-acetaminophen (NORCO) 5-325 MG tablet     lactulose (CHRONULAC) 10 GM/15ML solution     losartan-hydrochlorothiazide (HYZAAR) 50-12.5 MG per tablet     magnesium hydroxide (MILK OF MAGNESIA) 400 MG/5ML suspension     Multiple Vitamin (MULTI-VITAMINS) TABS     Multiple Vitamins-Minerals (OCUVITE ADULT 50+) CAPS     No current facility-administered medications for this visit.           Allergies   Allergen Reactions     Lansoprazole Other (See Comments) and Unknown     Patient states that medication didn't work for her.  Daughter states she got delusional, water did not taste right  Other reaction(s): Other - Describe In Comment Field  Patient states that medication didn't work for her.     Lisinopril Cough       Family History   Problem Relation Age of Onset     Other - See Comments Mother         Stroke,Had a CVA age 85     Cancer Father         Cancer,Brain tumor, age 56     Blood Disease Sister         Blood Disease,Leukemia and     Cancer Sister         Cancer, kidney cancer     Blood Disease Sister         Blood Disease,Chronic lymphocytic leukemia       Social History     Socioeconomic History     Marital status:      Spouse name: None     Number of children:  None     Years of education: None     Highest education level: None   Occupational History     None   Social Needs     Financial resource strain: None     Food insecurity:     Worry: None     Inability: None     Transportation needs:     Medical: None     Non-medical: None   Tobacco Use     Smoking status: Never Smoker     Smokeless tobacco: Never Used   Substance and Sexual Activity     Alcohol use: No     Alcohol/week: 0.0 oz     Drug use: No     Sexual activity: Not Currently   Lifestyle     Physical activity:     Days per week: None     Minutes per session: None     Stress: None   Relationships     Social connections:     Talks on phone: None     Gets together: None     Attends Jewish service: None     Active member of club or organization: None     Attends meetings of clubs or organizations: None     Relationship status: None     Intimate partner violence:     Fear of current or ex partner: None     Emotionally abused: None     Physically abused: None     Forced sexual activity: None   Other Topics Concern     Parent/sibling w/ CABG, MI or angioplasty before 65F 55M? Not Asked   Social History Narrative    Nonsmoker. .  Lives alone in a house.   Continues to drive.   2 grown kids, one in [place]       ROS: 10 point ROS neg other than the symptoms noted above in the HPI.  EXAM  Constitutional: healthy, alert and no distress    Psychiatric: mentation appears normal and affect normal/bright    VASCULAR:  -Dorsalis pedis pulse +1/4 b/l (biphasic on doppler bilaterally on 04/22/2019)  -Posterior tibial pulse +1/4 b/l (biphasic on doppler bilaterally on 04/22/2019)  -Hair growth Absent to b/l anterior legs and ankles  -Moderate 3+ pitting edema to bilateral legs  -Telangiectasias to bilateral feet, ankles and legs  NEURO:  -Light touch sensation intact to b/l plantar forefoot  DERM:  -Skin temperature cool to bilateral legs and feet  -Skin thin, shiny, atrophic to bilateral feet and legs  -Toenails elongated,  dystrophic and discolored x 10  ---Incurvation to the bilateral border of the bilateral hallux  ---Mild erythema and edema to the nail border  ---Mild serous drainage with debridement of RIGHT hallux bilateral borders  ---No drainage to the LEFT hallux bilateral borders  ---No severe erythema, no ascending erythema, no calor, no purulence, no malodor, no other SOI.  MSK:  -Pain on palpation to bilateral hallux bilateral borders (R>L)  -Muscle strength of ankles +5/5 for dorsiflexion, plantarflexion, ABDUction and ADDuction b/l    ============================================================    ASSESSMENT:  (L60.0) Ingrowing nail  (primary encounter diagnosis)    (L60.3) Onychodystrophy    (M79.674) Pain of right great toe    (M79.675) Pain of left great toe    (I78.1) Telangiectasias    (I87.2,  R60.0) Edema of left lower extremity due to peripheral venous insufficiency    (I87.2,  R60.0) Edema of right lower extremity due to peripheral venous insufficiency        PLAN:  -Patient evaluated and examined. Treatment options discussed with no educational barriers noted.  -Discussed proper shoe gear including shoes wide enough to accommodate bilateral feet  -Nails debrided x 10 without incident  ---Aggressive slant back to bilateral borders of bilateral hallux  ---Patient still had tenderness to the lateral RIGHT hallux nail border post debridement. No open wounds.  ---ABN signed for nails  -Compression socks: Advised patient to wear compression socks all day (especially when working) to decrease LOWER EXTREMITY edema b/l. Educated patient about applying the socks first thing in the morning before getting out of bed and removing them at night when the feet are elevated in bed.  ---If patient cannot or does not apply compression socks daily, she is encouraged to elevate them as much as possible (a couple pillows under her legs in a recliner chair) as often as possible.  -Patient in agreement with the above treatment plan  and all of patient's questions were answered.      RTC 63+ days for bilateral hallux bilateral border slant back and toenail debridement        Sally Arshad DPM

## 2019-04-22 NOTE — NURSING NOTE
"Chief Complaint   Patient presents with     Toenail     dystrophic       Initial /60 (BP Location: Left arm, Patient Position: Sitting, Cuff Size: Adult Regular)   Pulse 96   Temp 98.1  F (36.7  C) (Tympanic)   Wt 52.2 kg (115 lb)   SpO2 97%   BMI 20.37 kg/m   Estimated body mass index is 20.37 kg/m  as calculated from the following:    Height as of 4/9/19: 1.6 m (5' 3\").    Weight as of this encounter: 52.2 kg (115 lb).  Medication Reconciliation: complete    Leila Jensen LPN  "

## 2019-05-07 ENCOUNTER — OFFICE VISIT (OUTPATIENT)
Dept: ORTHOPEDICS | Facility: OTHER | Age: 84
End: 2019-05-07
Attending: ORTHOPAEDIC SURGERY
Payer: MEDICARE

## 2019-05-07 DIAGNOSIS — M25.552 HIP PAIN, LEFT: Primary | ICD-10-CM

## 2019-05-07 PROCEDURE — G0463 HOSPITAL OUTPT CLINIC VISIT: HCPCS

## 2019-05-10 NOTE — PROGRESS NOTES
CHIEF COMPLAINT: Bilateral Knee Pain & Left Hip Pain Recheck     PROBLEMS:   Patient has no noted problems.    PATIENT REPORTED MEDICATIONS:  HYDROCODONE-ACETAMINOPHEN TABLET (HYDROCODONE-ACETAMINOPHEN TABS)   HYZAAR TABLET (LOSARTAN POTASSIUM-HCTZ TABS)   AGGRENOX CAPSULE EXTENDED RELEASE 12 HOUR (ASPIRIN-DIPYRIDAMOLE XR12H-CAP)     PATIENT REPORTED ALLERGIES:  LISINOPRIL (LISINOPRIL) (ModerateReaction: No reaction details noted)      HISTORY OF PRESENT ILLNESS:    REASON FOR EVALUATION:        1.  Bilateral knee pain.       2.  Left hip pain.    HISTORY OF PRESENT ILLNESS:  Tenisha comes in with regard to bilateral knee pain, as well as her left hip . The bilateral knees are here for injection.  Last injection was done in February.  The left knee is hurting more than the right at this time.  Pain is worse with activity.      The patient also reports left hip pain return.  Has had a good response in regard to her previous cortisone injection done under x-ray guidance, as well.      PAST MEDICAL HISTORY:    Hypertension  Arthritis     SOCIAL HISTORY:     Alcohol Use - No   Tobacco Use - Never    PHYSICAL EXAMINATION:    Examination of both knees shows varus deformity.  Crepitation with flexion and extension.      Examination of the left hip shows pain with hip flexion, as well as internal rotation.      ASSESSMENT:    IMPRESSION:         1.  Bilateral knee osteoarthrosis severe.        2.  Left hip arthrosis.     PROCEDURES:   Risks and benefits of the procedure were reviewed with the patient. Informed consent was obtained. Blood sugar risks for our diabetic patients were also discussed.    After achieving informed consent and sterile preparation, the patient's bilateral knees are injected with 2 cc 1% polocaine and 40 mg Kenalog under sterile conditions.  The patient did tolerate the procedures well.      PLAN:   Bilateral knee injections done here.      We will also set her up for a  fluoroscopically-guided left hip injection with x-ray guidance in the radiology department, as well.  Conservative care certainly is advised.  She is not interested in any definitive surgical management at this point in time.      Dictated by:  Say Nelson MD  Copy to:  Godwin Kapoor MD    D:  05/07/19  T:  05/09/19    Typed and/or reviewed and corrected by signing  below, and sent to the Physician for final review and signature.      This report was created using voice recording software and computer-generated templates. Although every effort has been made to review for and eliminate errors, some errors may still occur.         Electronically signed by Johnna Dickens on 05/10/2019 at 8:16 AM    ________________________________________________________________________

## 2019-05-14 ENCOUNTER — HOSPITAL ENCOUNTER (OUTPATIENT)
Dept: GENERAL RADIOLOGY | Facility: OTHER | Age: 84
Discharge: HOME | End: 2019-05-14
Attending: ORTHOPAEDIC SURGERY | Admitting: FAMILY MEDICINE
Payer: MEDICARE

## 2019-05-14 DIAGNOSIS — M25.552 HIP PAIN, LEFT: ICD-10-CM

## 2019-05-14 PROCEDURE — 25500064 ZZH RX 255 OP 636: Performed by: RADIOLOGY

## 2019-05-14 PROCEDURE — 20610 DRAIN/INJ JOINT/BURSA W/O US: CPT | Mod: LT

## 2019-05-14 PROCEDURE — 25000125 ZZHC RX 250: Performed by: RADIOLOGY

## 2019-05-14 PROCEDURE — 25000128 H RX IP 250 OP 636: Performed by: RADIOLOGY

## 2019-05-14 RX ORDER — TRIAMCINOLONE ACETONIDE 40 MG/ML
40 INJECTION, SUSPENSION INTRA-ARTICULAR; INTRAMUSCULAR ONCE
Status: COMPLETED | OUTPATIENT
Start: 2019-05-14 | End: 2019-05-14

## 2019-05-14 RX ORDER — LIDOCAINE HYDROCHLORIDE 10 MG/ML
4 INJECTION, SOLUTION INFILTRATION; PERINEURAL ONCE
Status: COMPLETED | OUTPATIENT
Start: 2019-05-14 | End: 2019-05-14

## 2019-05-14 RX ADMIN — TRIAMCINOLONE ACETONIDE 40 MG: 40 INJECTION, SUSPENSION INTRA-ARTICULAR; INTRAMUSCULAR at 13:51

## 2019-05-14 RX ADMIN — LIDOCAINE HYDROCHLORIDE 4 ML: 10 INJECTION, SOLUTION INFILTRATION; PERINEURAL at 13:51

## 2019-05-14 RX ADMIN — IOHEXOL 2 ML: 240 INJECTION, SOLUTION INTRATHECAL; INTRAVASCULAR; INTRAVENOUS; ORAL at 13:51

## 2019-05-22 ENCOUNTER — DOCUMENTATION ONLY (OUTPATIENT)
Dept: OTHER | Facility: CLINIC | Age: 84
End: 2019-05-22

## 2019-06-03 ENCOUNTER — OFFICE VISIT (OUTPATIENT)
Dept: FAMILY MEDICINE | Facility: OTHER | Age: 84
End: 2019-06-03
Attending: FAMILY MEDICINE
Payer: MEDICARE

## 2019-06-03 VITALS
HEART RATE: 61 BPM | TEMPERATURE: 98.2 F | DIASTOLIC BLOOD PRESSURE: 62 MMHG | RESPIRATION RATE: 16 BRPM | BODY MASS INDEX: 20.69 KG/M2 | SYSTOLIC BLOOD PRESSURE: 136 MMHG | WEIGHT: 116.8 LBS

## 2019-06-03 DIAGNOSIS — M70.62 TROCHANTERIC BURSITIS OF LEFT HIP: ICD-10-CM

## 2019-06-03 DIAGNOSIS — Z79.899 CONTROLLED SUBSTANCE AGREEMENT SIGNED: ICD-10-CM

## 2019-06-03 DIAGNOSIS — M53.3 SACROILIAC JOINT PAIN: Primary | ICD-10-CM

## 2019-06-03 PROCEDURE — 20610 DRAIN/INJ JOINT/BURSA W/O US: CPT | Performed by: FAMILY MEDICINE

## 2019-06-03 PROCEDURE — G0463 HOSPITAL OUTPT CLINIC VISIT: HCPCS | Mod: 25

## 2019-06-03 PROCEDURE — G0463 HOSPITAL OUTPT CLINIC VISIT: HCPCS

## 2019-06-03 PROCEDURE — 25000125 ZZHC RX 250: Performed by: FAMILY MEDICINE

## 2019-06-03 PROCEDURE — 99213 OFFICE O/P EST LOW 20 MIN: CPT | Mod: 25 | Performed by: FAMILY MEDICINE

## 2019-06-03 PROCEDURE — 80307 DRUG TEST PRSMV CHEM ANLYZR: CPT | Mod: ZL | Performed by: FAMILY MEDICINE

## 2019-06-03 RX ORDER — HYDROCODONE BITARTRATE AND ACETAMINOPHEN 5; 325 MG/1; MG/1
1 TABLET ORAL EVERY 6 HOURS PRN
Qty: 120 TABLET | Refills: 0 | Status: SHIPPED | OUTPATIENT
Start: 2019-06-03 | End: 2019-06-03

## 2019-06-03 RX ORDER — METHYLPREDNISOLONE ACETATE 80 MG/ML
80 INJECTION, SUSPENSION INTRA-ARTICULAR; INTRALESIONAL; INTRAMUSCULAR; SOFT TISSUE ONCE
Status: COMPLETED | OUTPATIENT
Start: 2019-06-03 | End: 2019-06-03

## 2019-06-03 RX ORDER — HYDROCODONE BITARTRATE AND ACETAMINOPHEN 5; 325 MG/1; MG/1
1 TABLET ORAL EVERY 6 HOURS PRN
Qty: 120 TABLET | Refills: 0 | Status: SHIPPED | OUTPATIENT
Start: 2019-06-03 | End: 2019-01-01

## 2019-06-03 RX ADMIN — METHYLPREDNISOLONE ACETATE 80 MG: 80 INJECTION, SUSPENSION INTRA-ARTICULAR; INTRALESIONAL; INTRAMUSCULAR; SOFT TISSUE at 12:40

## 2019-06-03 ASSESSMENT — PATIENT HEALTH QUESTIONNAIRE - PHQ9: 5. POOR APPETITE OR OVEREATING: NOT AT ALL

## 2019-06-03 ASSESSMENT — ENCOUNTER SYMPTOMS
JOINT SWELLING: 0
SHORTNESS OF BREATH: 0
SLEEP DISTURBANCE: 1
BACK PAIN: 1
ARTHRALGIAS: 1
NECK PAIN: 1

## 2019-06-03 ASSESSMENT — ANXIETY QUESTIONNAIRES
7. FEELING AFRAID AS IF SOMETHING AWFUL MIGHT HAPPEN: NOT AT ALL
GAD7 TOTAL SCORE: 0
2. NOT BEING ABLE TO STOP OR CONTROL WORRYING: NOT AT ALL
1. FEELING NERVOUS, ANXIOUS, OR ON EDGE: NOT AT ALL
5. BEING SO RESTLESS THAT IT IS HARD TO SIT STILL: NOT AT ALL
6. BECOMING EASILY ANNOYED OR IRRITABLE: NOT AT ALL
3. WORRYING TOO MUCH ABOUT DIFFERENT THINGS: NOT AT ALL
IF YOU CHECKED OFF ANY PROBLEMS ON THIS QUESTIONNAIRE, HOW DIFFICULT HAVE THESE PROBLEMS MADE IT FOR YOU TO DO YOUR WORK, TAKE CARE OF THINGS AT HOME, OR GET ALONG WITH OTHER PEOPLE: NOT DIFFICULT AT ALL

## 2019-06-03 ASSESSMENT — PAIN SCALES - GENERAL: PAINLEVEL: EXTREME PAIN (8)

## 2019-06-03 NOTE — NURSING NOTE
"Coming in for a medication refill, talk about an increase dose, injection in the Left bursa and check ears    Chief Complaint   Patient presents with     Recheck Medication     refill, talk about a dose change, shot in the bursa, check ears        Initial /62 (BP Location: Right arm, Patient Position: Sitting, Cuff Size: Adult Regular)   Pulse 61   Temp 98.2  F (36.8  C) (Tympanic)   Resp 16   Wt 53 kg (116 lb 12.8 oz)   BMI 20.69 kg/m   Estimated body mass index is 20.69 kg/m  as calculated from the following:    Height as of 4/9/19: 1.6 m (5' 3\").    Weight as of this encounter: 53 kg (116 lb 12.8 oz).  Medication Reconciliation: complete    Monisha Gamboa LPN  "

## 2019-06-03 NOTE — PROGRESS NOTES
SUBJECTIVE:   Tenisha Cagle is a 85 year old female who presents to clinic today for the following health issues:    HPI  Here with daughter for follow up her meds.  Follow up on pain meds.  Daughter feels the hydrocodone is not working well pains in hips, knees and back as well as left shoulder.  Lots of pain in the left trochanter area.  Injections in the past had helped for a few months and would like another today.  Is often up at night crying in pain.  Ice packs do not help a lot.      Patient Active Problem List    Diagnosis Date Noted     Controlled substance agreement signed 03/08/2019     Priority: Medium     Arthritis of shoulder region, left, degenerative 01/07/2019     Priority: Medium     Memory loss 05/25/2018     Priority: Medium     Atrial fibrillation with RVR (H) 01/25/2018     Priority: Medium     Hiatal hernia 01/17/2018     Priority: Medium     Overview:   large, mostly intrathoracic       Essential hypertension 01/17/2018     Priority: Medium     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/27/2017     Priority: Medium     Benign skin lesion of nose 09/27/2017     Priority: Medium     AK (actinic keratosis) 07/10/2017     Priority: Medium     SK (seborrheic keratosis) 07/10/2017     Priority: Medium     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) 06/15/2017     Priority: Medium     Abnormal weight loss 04/18/2016     Priority: Medium     Anemia 04/18/2016     Priority: Medium     Greater trochanteric bursitis of left hip 04/18/2016     Priority: Medium     Visual changes 04/18/2016     Priority: Medium     Osteoarthritis of spine with radiculopathy, lumbar region 02/03/2016     Priority: Medium     History of TIA (transient ischemic attack) 02/02/2016     Priority: Medium     Numbness and tingling of right leg 02/02/2016     Priority: Medium     Radicular leg pain 02/02/2016     Priority: Medium     Basal cell carcinoma of right cheek 11/18/2015     Priority: Medium     Sacroiliac  joint pain 12/10/2014     Priority: Medium     Alvarado's cyst of knee 02/20/2014     Priority: Medium     Paresthesia of right leg 02/20/2014     Priority: Medium     Right knee DJD 02/20/2014     Priority: Medium     Cystocele 08/23/2013     Priority: Medium     Pancreatic mass 09/18/2012     Priority: Medium     Overview:   Possible, abnormal CT        Cerebral aneurysm, nonruptured 12/20/2011     Priority: Medium     Diverticulosis of colon 05/13/2011     Priority: Medium     Chronic lymphocytic leukemia (H) 04/14/2011     Priority: Medium     Overview:   WBC stable in the 30,000       Past Surgical History:   Procedure Laterality Date     CHOLECYSTECTOMY      No Comments Provided     COLONOSCOPY      2007,Sigmoid diverticulosis     ENDOSCOPY UPPER, COLONOSCOPY, COMBINED      5/5/11,Hiatal hernia, gastric gland hyperplasia in antrum     LAPAROSCOPIC TUBAL LIGATION      No Comments Provided     OTHER SURGICAL HISTORY      1986,205093,BIOPSY BREAST,Right     OTHER SURGICAL HISTORY      [id number],PREMALIG/BENIGN SKIN LESION EXCISION,R side of nose, face and chest wall/Basal Cell Cancer Excision     TONSILLECTOMY, ADENOIDECTOMY, COMBINED      1954     Social History     Tobacco Use     Smoking status: Never Smoker     Smokeless tobacco: Never Used   Substance Use Topics     Alcohol use: No     Alcohol/week: 0.0 oz     Current Outpatient Medications   Medication Sig Dispense Refill     acetaminophen (TYLENOL) 500 MG tablet Take 1,000 mg by mouth every 6 hours as needed       aspirin-dipyridamole ER (AGGRENOX)  MG 12 hr capsule TAKE 1 CAPSULE TWICE A  capsule 3     Calcium Carbonate-Vitamin D (CALCIUM 500 + D) 500-125 MG-UNIT TABS Take 1 tablet by mouth daily       HYDROcodone-acetaminophen (NORCO) 5-325 MG tablet Take 1 tablet by mouth every 6 hours as needed for severe pain Fill on or after 8/2/19 120 tablet 0     lactulose (CHRONULAC) 10 GM/15ML solution Take 30 mLs (20 g) by mouth 2 times daily as needed  for constipation 1000 mL 11     losartan-hydrochlorothiazide (HYZAAR) 50-12.5 MG per tablet Take 1 tablet by mouth daily 90 tablet 3     magnesium hydroxide (MILK OF MAGNESIA) 400 MG/5ML suspension Take 15 mLs by mouth At Bedtime       Multiple Vitamin (MULTI-VITAMINS) TABS Take 1 tablet by mouth daily       Multiple Vitamins-Minerals (OCUVITE ADULT 50+) CAPS Take 1 capsule by mouth daily       Allergies   Allergen Reactions     Lansoprazole Other (See Comments) and Unknown     Patient states that medication didn't work for her.  Daughter states she got delusional, water did not taste right  Other reaction(s): Other - Describe In Comment Field  Patient states that medication didn't work for her.     Lisinopril Cough       Review of Systems   Respiratory: Negative for shortness of breath.    Cardiovascular: Negative for chest pain.   Musculoskeletal: Positive for arthralgias, back pain, gait problem and neck pain. Negative for joint swelling.   Psychiatric/Behavioral: Positive for sleep disturbance.        OBJECTIVE:     /62 (BP Location: Right arm, Patient Position: Sitting, Cuff Size: Adult Regular)   Pulse 61   Temp 98.2  F (36.8  C) (Tympanic)   Resp 16   Wt 53 kg (116 lb 12.8 oz)   BMI 20.69 kg/m    Body mass index is 20.69 kg/m .  Physical Exam   Constitutional: She appears well-developed and well-nourished.   Musculoskeletal:   Left greater trochanter with moderate pain on palpation, no redness.  Discussed with her risks and consent signed.  area prepped and infiltrated with 3 ml 1% lidocaine and 80 milligram depot medrol.     Neurological: She is alert.   Moderate confusion today.  oriented x 2   Skin: Skin is warm and dry.           ASSESSMENT/PLAN:         (M53.3) Sacroiliac joint pain  (primary encounter diagnosis)  Comment: end stage diffuse DJD, in many joints.  Plan: HYDROcodone-acetaminophen (NORCO) 5-325 MG         tablet, Drug  Screen Comprehensive , Urine with        Reported Meds  (MedTox) (Pain Care Package),         DISCONTINUED: HYDROcodone-acetaminophen (NORCO)        5-325 MG tablet, DISCONTINUED:         HYDROcodone-acetaminophen (NORCO) 5-325 MG         tablet        Increased to 120 per month, from 90 per month.    (Z79.899) Controlled substance agreement signed  Comment:    Plan: Drug  Screen Comprehensive , Urine with         Reported Meds (MedTox) (Pain Care Package)             (M70.62) Trochanteric bursitis of left hip  Comment: ongoing pain  Plan: methylPREDNISolone (DEPO-MEDROL) injection 80         mg, DRAIN/INJECT LARGE JOINT/BURSA [07002]        Repeat injections every 3 months as needed.        Godwin Kapoor MD  [facility]

## 2019-06-03 NOTE — NURSING NOTE
came to coordinators office to say that patient has been waiting for 1 hour in an attempt to leave a UA for compliance of narcotic contract.  I consulted with Dr. Botello and he has given permission for the patient to leave at this time but to make sure that a UA is completed at next OV.  Patient was in agreement however did not leave on happy terms. Liset Lagos LPN....................  6/3/2019   1:27 PM

## 2019-06-04 ENCOUNTER — OFFICE VISIT (OUTPATIENT)
Dept: FAMILY MEDICINE | Facility: OTHER | Age: 84
End: 2019-06-04
Attending: FAMILY MEDICINE
Payer: MEDICARE

## 2019-06-04 VITALS
RESPIRATION RATE: 14 BRPM | DIASTOLIC BLOOD PRESSURE: 70 MMHG | HEART RATE: 69 BPM | SYSTOLIC BLOOD PRESSURE: 142 MMHG | OXYGEN SATURATION: 98 %

## 2019-06-04 DIAGNOSIS — E87.1 HYPONATREMIA: ICD-10-CM

## 2019-06-04 DIAGNOSIS — R25.1 TREMOR: Primary | ICD-10-CM

## 2019-06-04 LAB
ANION GAP SERPL CALCULATED.3IONS-SCNC: 8 MMOL/L (ref 3–14)
BUN SERPL-MCNC: 28 MG/DL (ref 7–25)
CALCIUM SERPL-MCNC: 9.2 MG/DL (ref 8.6–10.3)
CHLORIDE SERPL-SCNC: 92 MMOL/L (ref 98–107)
CO2 SERPL-SCNC: 25 MMOL/L (ref 21–31)
CREAT SERPL-MCNC: 1.26 MG/DL (ref 0.6–1.2)
GFR SERPL CREATININE-BSD FRML MDRD: 40 ML/MIN/{1.73_M2}
GLUCOSE SERPL-MCNC: 106 MG/DL (ref 70–105)
POTASSIUM SERPL-SCNC: 4.3 MMOL/L (ref 3.5–5.1)
SODIUM SERPL-SCNC: 125 MMOL/L (ref 134–144)

## 2019-06-04 PROCEDURE — G0463 HOSPITAL OUTPT CLINIC VISIT: HCPCS

## 2019-06-04 PROCEDURE — 99213 OFFICE O/P EST LOW 20 MIN: CPT | Performed by: FAMILY MEDICINE

## 2019-06-04 PROCEDURE — 80048 BASIC METABOLIC PNL TOTAL CA: CPT | Mod: ZL | Performed by: FAMILY MEDICINE

## 2019-06-04 PROCEDURE — 36415 COLL VENOUS BLD VENIPUNCTURE: CPT | Mod: ZL | Performed by: FAMILY MEDICINE

## 2019-06-04 ASSESSMENT — ENCOUNTER SYMPTOMS
FATIGUE: 1
SLEEP DISTURBANCE: 0
WEAKNESS: 1
SHORTNESS OF BREATH: 0
TREMORS: 1

## 2019-06-04 ASSESSMENT — PAIN SCALES - GENERAL: PAINLEVEL: MODERATE PAIN (4)

## 2019-06-04 ASSESSMENT — ANXIETY QUESTIONNAIRES: GAD7 TOTAL SCORE: 0

## 2019-06-04 NOTE — NURSING NOTE
"Coming in to have her shakiness checked, unable yo leave urine yesterday.    Chief Complaint   Patient presents with     History of Present Illness     russell, not feeling well, was here until 2 yesterday trying to leave urine       Initial /70 (BP Location: Right arm, Patient Position: Sitting, Cuff Size: Adult Regular)   Pulse 69   Resp 14   SpO2 98%  Estimated body mass index is 20.69 kg/m  as calculated from the following:    Height as of 4/9/19: 1.6 m (5' 3\").    Weight as of 6/3/19: 53 kg (116 lb 12.8 oz).  Medication Reconciliation: complete    Monisha Gamboa LPN  "

## 2019-06-04 NOTE — PROGRESS NOTES
SUBJECTIVE:   Tenisha Cagle is a 85 year old female who presents to clinic today for the following health issues:    HPI  Illness?  Here with her daughter.  Yesterday I ordered a tox screen.  She could not urinate and her daughter gave her lots of water here.  Still was here about 5 hours without any urination,.  They went home without getting it after drinking over 1/2 gallon.  Was more confused last night.  This morning she had some diffuse tremors.  Did urinate once she got home.  Daughter says she has no pain in her hip since the injection yesterday. Daughter notes more forgetfulness.  She is alone only at night so far.  Continues to balance her own checkbook.      Patient Active Problem List    Diagnosis Date Noted     Controlled substance agreement signed 03/08/2019     Priority: Medium     Arthritis of shoulder region, left, degenerative 01/07/2019     Priority: Medium     Memory loss 05/25/2018     Priority: Medium     Atrial fibrillation with RVR (H) 01/25/2018     Priority: Medium     Hiatal hernia 01/17/2018     Priority: Medium     Overview:   large, mostly intrathoracic       Essential hypertension 01/17/2018     Priority: Medium     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/27/2017     Priority: Medium     Benign skin lesion of nose 09/27/2017     Priority: Medium     AK (actinic keratosis) 07/10/2017     Priority: Medium     SK (seborrheic keratosis) 07/10/2017     Priority: Medium     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) 06/15/2017     Priority: Medium     Abnormal weight loss 04/18/2016     Priority: Medium     Anemia 04/18/2016     Priority: Medium     Greater trochanteric bursitis of left hip 04/18/2016     Priority: Medium     Visual changes 04/18/2016     Priority: Medium     Osteoarthritis of spine with radiculopathy, lumbar region 02/03/2016     Priority: Medium     History of TIA (transient ischemic attack) 02/02/2016     Priority: Medium     Numbness and tingling of  right leg 02/02/2016     Priority: Medium     Radicular leg pain 02/02/2016     Priority: Medium     Basal cell carcinoma of right cheek 11/18/2015     Priority: Medium     Sacroiliac joint pain 12/10/2014     Priority: Medium     Alvarado's cyst of knee 02/20/2014     Priority: Medium     Paresthesia of right leg 02/20/2014     Priority: Medium     Right knee DJD 02/20/2014     Priority: Medium     Cystocele 08/23/2013     Priority: Medium     Pancreatic mass 09/18/2012     Priority: Medium     Overview:   Possible, abnormal CT        Cerebral aneurysm, nonruptured 12/20/2011     Priority: Medium     Diverticulosis of colon 05/13/2011     Priority: Medium     Chronic lymphocytic leukemia (H) 04/14/2011     Priority: Medium     Overview:   WBC stable in the 30,000       Past Surgical History:   Procedure Laterality Date     CHOLECYSTECTOMY      No Comments Provided     COLONOSCOPY      2007,Sigmoid diverticulosis     ENDOSCOPY UPPER, COLONOSCOPY, COMBINED      5/5/11,Hiatal hernia, gastric gland hyperplasia in antrum     LAPAROSCOPIC TUBAL LIGATION      No Comments Provided     OTHER SURGICAL HISTORY      1986,[id number],BIOPSY BREAST,Right     OTHER SURGICAL HISTORY      [id number],PREMALIG/BENIGN SKIN LESION EXCISION,R side of nose, face and chest wall/Basal Cell Cancer Excision     TONSILLECTOMY, ADENOIDECTOMY, COMBINED      1954     Social History     Tobacco Use     Smoking status: Never Smoker     Smokeless tobacco: Never Used   Substance Use Topics     Alcohol use: No     Alcohol/week: 0.0 oz     Current Outpatient Medications   Medication Sig Dispense Refill     acetaminophen (TYLENOL) 500 MG tablet Take 1,000 mg by mouth every 6 hours as needed       aspirin-dipyridamole ER (AGGRENOX)  MG 12 hr capsule TAKE 1 CAPSULE TWICE A  capsule 3     Calcium Carbonate-Vitamin D (CALCIUM 500 + D) 500-125 MG-UNIT TABS Take 1 tablet by mouth daily       HYDROcodone-acetaminophen (NORCO) 5-325 MG tablet Take 1 tablet by  mouth every 6 hours as needed for severe pain Fill on or after 8/2/19 120 tablet 0     lactulose (CHRONULAC) 10 GM/15ML solution Take 30 mLs (20 g) by mouth 2 times daily as needed for constipation 1000 mL 11     losartan-hydrochlorothiazide (HYZAAR) 50-12.5 MG per tablet Take 1 tablet by mouth daily 90 tablet 3     magnesium hydroxide (MILK OF MAGNESIA) 400 MG/5ML suspension Take 15 mLs by mouth At Bedtime       Multiple Vitamin (MULTI-VITAMINS) TABS Take 1 tablet by mouth daily       Multiple Vitamins-Minerals (OCUVITE ADULT 50+) CAPS Take 1 capsule by mouth daily       Allergies   Allergen Reactions     Lansoprazole Other (See Comments) and Unknown     Patient states that medication didn't work for her.  Daughter states she got delusional, water did not taste right  Other reaction(s): Other - Describe In Comment Field  Patient states that medication didn't work for her.     Lisinopril Cough       Review of Systems   Constitutional: Positive for fatigue.   Respiratory: Negative for shortness of breath.    Cardiovascular: Negative for chest pain.   Neurological: Positive for tremors and weakness.   Psychiatric/Behavioral: Negative for sleep disturbance.        OBJECTIVE:     /70 (BP Location: Right arm, Patient Position: Sitting, Cuff Size: Adult Regular)   Pulse 69   Resp 14   SpO2 98%   There is no height or weight on file to calculate BMI.  Physical Exam   Constitutional: She appears well-developed. No distress.   Neurological: She is alert. Coordination normal.   No tremors currently   Skin: Skin is warm and dry. She is not diaphoretic.       Diagnostic Test Results:  Results for orders placed or performed in visit on 06/04/19   Basic Metabolic Panel   Result Value Ref Range    Sodium 125 (L) 134 - 144 mmol/L    Potassium 4.3 3.5 - 5.1 mmol/L    Chloride 92 (L) 98 - 107 mmol/L    Carbon Dioxide 25 21 - 31 mmol/L    Anion Gap 8 3 - 14 mmol/L    Glucose 106 (H) 70 - 105 mg/dL    Urea Nitrogen 28 (H) 7 -  25 mg/dL    Creatinine 1.26 (H) 0.60 - 1.20 mg/dL    GFR Estimate 40 (L) >60 mL/min/[1.73_m2]    GFR Estimate If Black 49 (L) >60 mL/min/[1.73_m2]    Calcium 9.2 8.6 - 10.3 mg/dL       ASSESSMENT/PLAN:         (R25.1) Tremor  (primary encounter diagnosis)  Comment: likely due to below  Plan: Basic Metabolic Panel             (E87.1) Hyponatremia  Comment: I assume this is all dilutional from the large amount of water given.  Plan: fluid restrict under 1500 ml for 1 week, repeat the labs in 2 weeks.      Godwin Kapoor MD  [facility]

## 2019-06-07 ENCOUNTER — TELEPHONE (OUTPATIENT)
Dept: FAMILY MEDICINE | Facility: OTHER | Age: 84
End: 2019-06-07

## 2019-06-07 NOTE — TELEPHONE ENCOUNTER
Do they need an order for a spacer between the knees? Can they get this at [facility]?  Danna Torres PA-C..................6/7/2019 11:11 AM

## 2019-06-07 NOTE — TELEPHONE ENCOUNTER
JRO-Pt daughter-Alexsandra-requesting referral for orthopedic device. [facility] not available. Alexsandra then requested you. Please call. Thank you.  Kalina Cerda

## 2019-06-07 NOTE — TELEPHONE ENCOUNTER
Patients daughter looking for a spacer for in between legs. Is going to call [facility] and will let us know if they need an order.  Susi Dean LPN ...... 6/7/2019 10:52 AM

## 2019-06-07 NOTE — TELEPHONE ENCOUNTER
Noted. [facility] called and they can assist in ordering. Message left with patients daughter.  Susi Dean LPN ...... 6/7/2019 11:17 AM

## 2019-06-09 LAB — PAIN DRUG SCR UR W RPTD MEDS: NORMAL

## 2019-06-13 ENCOUNTER — OFFICE VISIT (OUTPATIENT)
Dept: FAMILY MEDICINE | Facility: OTHER | Age: 84
End: 2019-06-13
Attending: PHYSICIAN ASSISTANT
Payer: MEDICARE

## 2019-06-13 VITALS
OXYGEN SATURATION: 98 % | BODY MASS INDEX: 20.41 KG/M2 | RESPIRATION RATE: 20 BRPM | TEMPERATURE: 97.2 F | WEIGHT: 115.2 LBS | DIASTOLIC BLOOD PRESSURE: 68 MMHG | HEART RATE: 76 BPM | SYSTOLIC BLOOD PRESSURE: 142 MMHG

## 2019-06-13 DIAGNOSIS — R07.9 CHEST PAIN, UNSPECIFIED TYPE: ICD-10-CM

## 2019-06-13 DIAGNOSIS — R12 HEARTBURN: Primary | ICD-10-CM

## 2019-06-13 DIAGNOSIS — R10.32 LLQ ABDOMINAL PAIN: ICD-10-CM

## 2019-06-13 DIAGNOSIS — L03.116 LEFT LEG CELLULITIS: ICD-10-CM

## 2019-06-13 DIAGNOSIS — R10.31 RLQ ABDOMINAL PAIN: ICD-10-CM

## 2019-06-13 LAB
ALBUMIN SERPL-MCNC: 4 G/DL (ref 3.5–5.7)
ALP SERPL-CCNC: 32 U/L (ref 34–104)
ALT SERPL W P-5'-P-CCNC: 9 U/L (ref 7–52)
ANION GAP SERPL CALCULATED.3IONS-SCNC: 6 MMOL/L (ref 3–14)
AST SERPL W P-5'-P-CCNC: 14 U/L (ref 13–39)
BASOPHILS # BLD AUTO: 0.1 10E9/L (ref 0–0.2)
BASOPHILS NFR BLD AUTO: 0.4 %
BILIRUB SERPL-MCNC: 0.2 MG/DL (ref 0.3–1)
BUN SERPL-MCNC: 29 MG/DL (ref 7–25)
CALCIUM SERPL-MCNC: 9.1 MG/DL (ref 8.6–10.3)
CHLORIDE SERPL-SCNC: 100 MMOL/L (ref 98–107)
CO2 SERPL-SCNC: 28 MMOL/L (ref 21–31)
CREAT SERPL-MCNC: 1.29 MG/DL (ref 0.6–1.2)
DIFFERENTIAL METHOD BLD: ABNORMAL
EOSINOPHIL # BLD AUTO: 0.4 10E9/L (ref 0–0.7)
EOSINOPHIL NFR BLD AUTO: 3 %
ERYTHROCYTE [DISTWIDTH] IN BLOOD BY AUTOMATED COUNT: 12.9 % (ref 10–15)
GFR SERPL CREATININE-BSD FRML MDRD: 39 ML/MIN/{1.73_M2}
GLUCOSE SERPL-MCNC: 114 MG/DL (ref 70–105)
HCT VFR BLD AUTO: 25.1 % (ref 35–47)
HGB BLD-MCNC: 8 G/DL (ref 11.7–15.7)
IMM GRANULOCYTES # BLD: 0.1 10E9/L (ref 0–0.4)
IMM GRANULOCYTES NFR BLD: 0.5 %
LYMPHOCYTES # BLD AUTO: 4.9 10E9/L (ref 0.8–5.3)
LYMPHOCYTES NFR BLD AUTO: 41.3 %
MCH RBC QN AUTO: 28.3 PG (ref 26.5–33)
MCHC RBC AUTO-ENTMCNC: 31.9 G/DL (ref 31.5–36.5)
MCV RBC AUTO: 89 FL (ref 78–100)
MONOCYTES # BLD AUTO: 0.9 10E9/L (ref 0–1.3)
MONOCYTES NFR BLD AUTO: 7.9 %
NEUTROPHILS # BLD AUTO: 5.6 10E9/L (ref 1.6–8.3)
NEUTROPHILS NFR BLD AUTO: 46.9 %
PLATELET # BLD AUTO: 338 10E9/L (ref 150–450)
POTASSIUM SERPL-SCNC: 4.3 MMOL/L (ref 3.5–5.1)
PROT SERPL-MCNC: 6.3 G/DL (ref 6.4–8.9)
RBC # BLD AUTO: 2.83 10E12/L (ref 3.8–5.2)
SODIUM SERPL-SCNC: 134 MMOL/L (ref 134–144)
TROPONIN I SERPL-MCNC: <0.03 UG/L (ref 0–0.03)
WBC # BLD AUTO: 11.8 10E9/L (ref 4–11)

## 2019-06-13 PROCEDURE — 85025 COMPLETE CBC W/AUTO DIFF WBC: CPT | Mod: ZL | Performed by: PHYSICIAN ASSISTANT

## 2019-06-13 PROCEDURE — 80053 COMPREHEN METABOLIC PANEL: CPT | Mod: ZL | Performed by: PHYSICIAN ASSISTANT

## 2019-06-13 PROCEDURE — 93010 ELECTROCARDIOGRAM REPORT: CPT | Performed by: INTERNAL MEDICINE

## 2019-06-13 PROCEDURE — 99214 OFFICE O/P EST MOD 30 MIN: CPT | Performed by: PHYSICIAN ASSISTANT

## 2019-06-13 PROCEDURE — 84484 ASSAY OF TROPONIN QUANT: CPT | Mod: ZL | Performed by: PHYSICIAN ASSISTANT

## 2019-06-13 PROCEDURE — 36415 COLL VENOUS BLD VENIPUNCTURE: CPT | Mod: ZL | Performed by: PHYSICIAN ASSISTANT

## 2019-06-13 PROCEDURE — 93005 ELECTROCARDIOGRAM TRACING: CPT

## 2019-06-13 PROCEDURE — G0463 HOSPITAL OUTPT CLINIC VISIT: HCPCS

## 2019-06-13 RX ORDER — CEPHALEXIN 500 MG/1
500 CAPSULE ORAL 2 TIMES DAILY
Qty: 20 CAPSULE | Refills: 0 | Status: SHIPPED | OUTPATIENT
Start: 2019-06-13 | End: 2019-06-27

## 2019-06-13 ASSESSMENT — PAIN SCALES - GENERAL: PAINLEVEL: MODERATE PAIN (5)

## 2019-06-13 NOTE — LETTER
June 13, 2019      Tenisha Cagle  [address] [address]        Dear ,    We are writing to inform you of your test results.    Your sodium is back in the normal range. Your troponin (heart factor) is normal.     Your other labs are stable.     Resulted Orders   CBC and Differential   Result Value Ref Range    WBC 11.8 (H) 4.0 - 11.0 10e9/L    RBC Count 2.83 (L) 3.8 - 5.2 10e12/L    Hemoglobin 8.0 (L) 11.7 - 15.7 g/dL    Hematocrit 25.1 (L) 35.0 - 47.0 %    MCV 89 78 - 100 fl    MCH 28.3 26.5 - 33.0 pg    MCHC 31.9 31.5 - 36.5 g/dL    RDW 12.9 10.0 - 15.0 %    Platelet Count 338 150 - 450 10e9/L    Diff Method Automated Method     % Neutrophils 46.9 %    % Lymphocytes 41.3 %    % Monocytes 7.9 %    % Eosinophils 3.0 %    % Basophils 0.4 %    % Immature Granulocytes 0.5 %    Absolute Neutrophil 5.6 1.6 - 8.3 10e9/L    Absolute Lymphocytes 4.9 0.8 - 5.3 10e9/L    Absolute Monocytes 0.9 0.0 - 1.3 10e9/L    Absolute Eosinophils 0.4 0.0 - 0.7 10e9/L    Absolute Basophils 0.1 0.0 - 0.2 10e9/L    Abs Immature Granulocytes 0.1 0 - 0.4 10e9/L   Comprehensive Metabolic Panel   Result Value Ref Range    Sodium 134 134 - 144 mmol/L    Potassium 4.3 3.5 - 5.1 mmol/L    Chloride 100 98 - 107 mmol/L    Carbon Dioxide 28 21 - 31 mmol/L    Anion Gap 6 3 - 14 mmol/L    Glucose 114 (H) 70 - 105 mg/dL    Urea Nitrogen 29 (H) 7 - 25 mg/dL    Creatinine 1.29 (H) 0.60 - 1.20 mg/dL    GFR Estimate 39 (L) >60 mL/min/[1.73_m2]    GFR Estimate If Black 48 (L) >60 mL/min/[1.73_m2]    Calcium 9.1 8.6 - 10.3 mg/dL    Bilirubin Total 0.2 (L) 0.3 - 1.0 mg/dL    Albumin 4.0 3.5 - 5.7 g/dL    Protein Total 6.3 (L) 6.4 - 8.9 g/dL    Alkaline Phosphatase 32 (L) 34 - 104 U/L    ALT 9 7 - 52 U/L    AST 14 13 - 39 U/L   Troponin I   Result Value Ref Range    Troponin I ES <0.030 0.000 - 0.034 ug/L       If you have any questions or concerns, please call the clinic at the number listed above.       Sincerely,        Danna  ELISABETH Torres

## 2019-06-13 NOTE — PROGRESS NOTES
"Nursing Notes:   Sandy Dean LPN  6/13/2019 10:46 AM  Signed  Patient presents to clinic with c/o upper abdominal pain, chest pain yesterday and right big toe pain.  Susi Jermaine VANN ...... 6/13/2019 10:42 AM    Chief Complaint   Patient presents with     Abdominal Pain     Toe Pain         Medication Reconciliation: complete    Sandy Dean LPN            HPI:    Tenisha Cagle is a 85 year old female who presents with her daughter for chest pain, abdominal pain, and left lower leg rash.     Patient and daughter explain the patient had chest pain around 5pm yesterday that lasted approximately 30 minutes. The patient states she was sitting in her chair when this occurred. She was alone at the time. She thinks she may have taken Rolaids which may have helped to alleviate her symptoms. She describes one other similar episode occurring three weeks ago. No nausea, vomiting, diaphoresis, shortness of breath, dizziness, or lightheadedness. Patient's daughter states patient is taking Hyzaar for her blood pressure and Aggrenox for prophylaxis given her history of having a \"mini stroke\".     Patient described continued lower abdominal pain associated with frequent constipation. Patient is taking Norco multiple times daily from chronic pain and relates the constipation to this.  Patient has had a recent increase in Norco dosage and the daughter is wondering if the side effect of constipation due to Norco use is exacerbating her abdominal pain.  Daughter gives her lactulose every evening which has helped some. Daughter states the patient experiences heartburn frequently. Rolaids seem to help. Has not tried any other heartburn treatments. Consumes chocolate frequently and that seems to correlate with heartburn symptoms. No diarrhea, hematochezia, urinary frequency or urgency, dysuria, and hematuria.     When describing her areas of chronic pain the patient lifted her left pant leg and found a new " rash on her left calf that she has not noticed prior. Denies itching, burning, or pain associated with the rash. She is unsure of how long it has been there. Denies any recent exposure to wood or deer ticks. No recent trauma to the area.  No increased leg pain or calf pain.  No lower extremity edema.  No open wound, pus, drainage, warmth.    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)     2011     Diaphragmatic hernia without obstruction or gangrene     large, mostly intrathoracic     Diverticulosis of large intestine without perforation or abscess without bleeding     2011     Dysthymic disorder     No Comments Provided     Essential (primary) hypertension     No Comments Provided     Female climacteric state     Menopausal age 56     Gastritis without bleeding     03,Treated with PrevPac     Nonruptured cerebral aneurysm     2011     Osteoarthritis     No Comments Provided     Other fecal abnormalities     Recurrent loose stool     Other lymphoid leukemia not having achieved remission (H)     CLL, stable WBC 30,000     Other specified bacterial intestinal infections     2003, H. pylori gastritis treated with Prevpac     Other specified enthesopathies of unspecified lower limb, excluding foot     No Comments Provided     Peptic ulcer without hemorrhage or perforation     No Comments Provided     Personal history of other diseases of the digestive system (CODE)     2011     Personal history of other medical treatment (CODE)     , vaginal deliveries     Pure hypercholesterolemia     No Comments Provided     Sepsis (H)     child,Hospitalized as a child for blood poisoning     Sleep disorder     2011     Transient cerebral ischemic attack     2011       Past Surgical History:   Procedure Laterality Date      CHOLECYSTECTOMY      No Comments Provided     COLONOSCOPY      2007,Sigmoid diverticulosis     ENDOSCOPY UPPER, COLONOSCOPY, COMBINED      5/5/11,Hiatal hernia, gastric gland hyperplasia in antrum     LAPAROSCOPIC TUBAL LIGATION      No Comments Provided     OTHER SURGICAL HISTORY      1986,[id number],BIOPSY BREAST,Right     OTHER SURGICAL HISTORY      [id number],PREMALIG/BENIGN SKIN LESION EXCISION,R side of nose, face and chest wall/Basal Cell Cancer Excision     TONSILLECTOMY, ADENOIDECTOMY, COMBINED      1954       Family History   Problem Relation Age of Onset     Other - See Comments Mother         Stroke,Had a CVA age 85     Cancer Father         Cancer,Brain tumor, age 56     Blood Disease Sister         Blood Disease,Leukemia and     Cancer Sister         Cancer, kidney cancer     Blood Disease Sister         Blood Disease,Chronic lymphocytic leukemia       Social History     Tobacco Use     Smoking status: Never Smoker     Smokeless tobacco: Never Used   Substance Use Topics     Alcohol use: No     Alcohol/week: 0.0 oz       Current Outpatient Medications   Medication Sig Dispense Refill     acetaminophen (TYLENOL) 500 MG tablet Take 1,000 mg by mouth every 6 hours as needed       aspirin-dipyridamole ER (AGGRENOX)  MG 12 hr capsule TAKE 1 CAPSULE TWICE A  capsule 3     Calcium Carbonate-Vitamin D (CALCIUM 500 + D) 500-125 MG-UNIT TABS Take 1 tablet by mouth daily       cephALEXin (KEFLEX) 500 MG capsule Take 1 capsule (500 mg) by mouth 2 times daily for 10 days 20 capsule 0     HYDROcodone-acetaminophen (NORCO) 5-325 MG tablet Take 1 tablet by mouth every 6 hours as needed for severe pain Fill on or after 8/2/19 120 tablet 0     lactulose (CHRONULAC) 10 GM/15ML solution Take 30 mLs (20 g) by mouth 2 times daily as needed for constipation 1000 mL 11     losartan-hydrochlorothiazide (HYZAAR) 50-12.5 MG per tablet Take 1 tablet by mouth daily 90 tablet 3     magnesium hydroxide (MILK OF MAGNESIA) 400  MG/5ML suspension Take 15 mLs by mouth At Bedtime       Multiple Vitamin (MULTI-VITAMINS) TABS Take 1 tablet by mouth daily       Multiple Vitamins-Minerals (OCUVITE ADULT 50+) CAPS Take 1 capsule by mouth daily       ranitidine (ZANTAC) 150 MG tablet Take 1 tablet (150 mg) by mouth 2 times daily as needed for heartburn 180 tablet 3       Allergies   Allergen Reactions     Lansoprazole Other (See Comments) and Unknown     Patient states that medication didn't work for her.  Daughter states she got delusional, water did not taste right  Other reaction(s): Other - Describe In Comment Field  Patient states that medication didn't work for her.     Lisinopril Cough       REVIEW OF SYSTEMS:  Refer to HPI.    EXAM:   Vitals:    /68 (BP Location: Right arm, Patient Position: Sitting, Cuff Size: Adult Regular)   Pulse 76   Temp 97.2  F (36.2  C)   Resp 20   Wt 52.3 kg (115 lb 3.2 oz)   SpO2 98%   BMI 20.41 kg/m      General Appearance: Pleasant, alert, appropriate appearance for age. No acute distress  Chest/Respiratory Exam: Normal chest wall and respirations. Clear to auscultation.  Cardiovascular Exam: Regular rate and rhythm. S1, S2, no murmur, click, gallop, or rubs.  Gastrointestinal Exam: Soft, non-tender, no masses or organomegaly. Normal BS x 4.  No rebound tenderness or guarding appreciated.  Skin: Multiple mildly erythematous, confluent circular papules on left lateral and posterolateral proximal calf. No tenderness to palpation over lesions. Mild flaking surrounding and on lesions.  No pus, drainage, warmth.  Neurologic Exam: Nonfocal, normal gross motor, tone coordination and no tremor.  Psychiatric Exam: Alert and oriented - appropriate affect.    Results for orders placed or performed in visit on 06/13/19   CBC and Differential   Result Value Ref Range    WBC 11.8 (H) 4.0 - 11.0 10e9/L    RBC Count 2.83 (L) 3.8 - 5.2 10e12/L    Hemoglobin 8.0 (L) 11.7 - 15.7 g/dL    Hematocrit 25.1 (L) 35.0 - 47.0  %    MCV 89 78 - 100 fl    MCH 28.3 26.5 - 33.0 pg    MCHC 31.9 31.5 - 36.5 g/dL    RDW 12.9 10.0 - 15.0 %    Platelet Count 338 150 - 450 10e9/L    Diff Method Automated Method     % Neutrophils 46.9 %    % Lymphocytes 41.3 %    % Monocytes 7.9 %    % Eosinophils 3.0 %    % Basophils 0.4 %    % Immature Granulocytes 0.5 %    Absolute Neutrophil 5.6 1.6 - 8.3 10e9/L    Absolute Lymphocytes 4.9 0.8 - 5.3 10e9/L    Absolute Monocytes 0.9 0.0 - 1.3 10e9/L    Absolute Eosinophils 0.4 0.0 - 0.7 10e9/L    Absolute Basophils 0.1 0.0 - 0.2 10e9/L    Abs Immature Granulocytes 0.1 0 - 0.4 10e9/L   Comprehensive Metabolic Panel   Result Value Ref Range    Sodium 134 134 - 144 mmol/L    Potassium 4.3 3.5 - 5.1 mmol/L    Chloride 100 98 - 107 mmol/L    Carbon Dioxide 28 21 - 31 mmol/L    Anion Gap 6 3 - 14 mmol/L    Glucose 114 (H) 70 - 105 mg/dL    Urea Nitrogen 29 (H) 7 - 25 mg/dL    Creatinine 1.29 (H) 0.60 - 1.20 mg/dL    GFR Estimate 39 (L) >60 mL/min/[1.73_m2]    GFR Estimate If Black 48 (L) >60 mL/min/[1.73_m2]    Calcium 9.1 8.6 - 10.3 mg/dL    Bilirubin Total 0.2 (L) 0.3 - 1.0 mg/dL    Albumin 4.0 3.5 - 5.7 g/dL    Protein Total 6.3 (L) 6.4 - 8.9 g/dL    Alkaline Phosphatase 32 (L) 34 - 104 U/L    ALT 9 7 - 52 U/L    AST 14 13 - 39 U/L   Troponin I   Result Value Ref Range    Troponin I ES <0.030 0.000 - 0.034 ug/L   EKG 12-lead complete w/read - Clinics    Impression    Normal EKG with a rate of 67.  Compared to previous tracing dated January 25, 2018, bradycardia has resolved  Luis Ortez MD         PHQ Depression Screen  PHQ-9 SCORE 5/15/2017 8/30/2018 4/9/2019   PHQ-9 Total Score 0 0 0       ASSESSMENT AND PLAN:      ICD-10-CM    1. Heartburn R12 ranitidine (ZANTAC) 150 MG tablet     Urinalysis w Reflex Microscopic If Positive   2. Chest pain, unspecified type R07.9 CBC and Differential     Comprehensive Metabolic Panel     Troponin I     EKG 12-lead complete w/read - Clinics     CBC and Differential      Comprehensive Metabolic Panel     Troponin I     Urinalysis w Reflex Microscopic If Positive   3. LLQ abdominal pain R10.32 CBC and Differential     Comprehensive Metabolic Panel     CBC and Differential     Comprehensive Metabolic Panel     Urinalysis w Reflex Microscopic If Positive     Urinalysis w Reflex Microscopic If Positive     CANCELED: Urinalysis w Reflex Microscopic If Positive   4. RLQ abdominal pain R10.31 CBC and Differential     Comprehensive Metabolic Panel     CBC and Differential     Comprehensive Metabolic Panel     Urinalysis w Reflex Microscopic If Positive     Urinalysis w Reflex Microscopic If Positive     CANCELED: Urinalysis w Reflex Microscopic If Positive   5. Left leg cellulitis L03.116 cephALEXin (KEFLEX) 500 MG capsule     Urinalysis w Reflex Microscopic If Positive       Chest pain:  Ordered CBC with diff, CMP, and troponin.  Patient's labs are stable.   Patient has an unchanged EKG.  Final read pending by internal medicine.  No acute cardiac concerns are appreciated at this time.  Discussed results with patient and her daughter. Discussed that the results do not suggests a cardiac cause of her chest pain and do not suggest a significant etiology for her abdominal pain.    Abdominal pain:  Discussed that patient is likely still experiencing abdominal pain from chronic constipation given Norco use. Use lactulose daily to help with constipation.  Encouraged to mildly increase the dose with the recent change in medication amount.  Patient was unable to give a urine sample in clinic.  Placed a future order for Urinalysis w Reflex Microscopic If Positive if her abdominal pain is not calming down or is worsening as needed. Patient provided with collection cup and will return to the clinic if she begins to experience more urinary symptoms.     Cellulitis:  Educated patient and daughter that the rash on her left calf is likely a cellulitis. Prescribed cephALEXin (KEFLEX) 500 MG capsule.  Educated on medication and side effects.  Encouraged to elevate her legs several times a day.  Can use warm heat as needed.  Return to clinic with change/worsening of symptoms.     Heartburn:  Symptoms chest pain is likely attributable to her heartburn.  Prescribed ranitidine (ZANTAC) 150 MG tablet to help with heartburn symptoms. Encouraged patient to limit stomach irritants such as chocolate, coffee, red sauces, laying flat soon after eating, and to avoid eating close to bedtime. Call or return to the clinic if symptoms worsen or new symptoms arise.     Greater than 25 minutes were spent in counseling and coordination of care.     Patient Instructions   Constipation - Encouraged increase of water and apple, pear and prune juice to decrease symptoms and discomfort.  Use lactulose for constipation relief.  Encouraged soft stools. Return to clinic with change/worsening of symptoms.     Can use over-the-counter Pepcid AC or Zantac as needed for heartburn relief.     Monitor for chest pain, palpitations, problems breathing, lightheadedness or dizziness.    Rash:  Started on keflex for cellulitis. Take the antibiotic as prescribed. Antibiotic has been sent to pharmacy. Please take full course of the antibiotic even if symptoms have completely resolved. This helps prevent against antibiotic resistance.     Watch for any signs of increasing infection (redness, pus, increased pain (may be along the tendon and back of hand if the hand is involved), increased swelling or fever). Call if any of these signs occur. Monitor for fevers or chills.    You may draw a line around the area of redness to monitor the area. Please return to clinic if redness is continuing to spread after 2-3 days.    Call or return to clinic as needed if your symptoms worsen or fail to improve as anticipated.        Danna Torres PA-C..................6/13/2019 8:28 AM

## 2019-06-13 NOTE — PATIENT INSTRUCTIONS
Constipation - Encouraged increase of water and apple, pear and prune juice to decrease symptoms and discomfort.  Use lactulose for constipation relief.  Encouraged soft stools. Return to clinic with change/worsening of symptoms.     Can use over-the-counter Pepcid AC or Zantac as needed for heartburn relief.     Monitor for chest pain, palpitations, problems breathing, lightheadedness or dizziness.    Rash:  Started on keflex for cellulitis. Take the antibiotic as prescribed. Antibiotic has been sent to pharmacy. Please take full course of the antibiotic even if symptoms have completely resolved. This helps prevent against antibiotic resistance.     Watch for any signs of increasing infection (redness, pus, increased pain (may be along the tendon and back of hand if the hand is involved), increased swelling or fever). Call if any of these signs occur. Monitor for fevers or chills.    You may draw a line around the area of redness to monitor the area. Please return to clinic if redness is continuing to spread after 2-3 days.    Call or return to clinic as needed if your symptoms worsen or fail to improve as anticipated.

## 2019-06-13 NOTE — NURSING NOTE
Patient presents to clinic with c/o upper abdominal pain, chest pain yesterday and right big toe pain.  Susi Dean LPN ...... 6/13/2019 10:42 AM    Chief Complaint   Patient presents with     Abdominal Pain     Toe Pain         Medication Reconciliation: complete    Sandy Dean LPN

## 2019-06-27 ENCOUNTER — OFFICE VISIT (OUTPATIENT)
Dept: PODIATRY | Facility: OTHER | Age: 84
End: 2019-06-27
Attending: PODIATRIST
Payer: MEDICARE

## 2019-06-27 VITALS
HEART RATE: 73 BPM | SYSTOLIC BLOOD PRESSURE: 138 MMHG | TEMPERATURE: 97.9 F | OXYGEN SATURATION: 93 % | DIASTOLIC BLOOD PRESSURE: 70 MMHG

## 2019-06-27 DIAGNOSIS — L60.0 INGROWING NAIL: Primary | ICD-10-CM

## 2019-06-27 DIAGNOSIS — M79.674 PAIN OF RIGHT GREAT TOE: ICD-10-CM

## 2019-06-27 DIAGNOSIS — L60.3 ONYCHODYSTROPHY: ICD-10-CM

## 2019-06-27 DIAGNOSIS — I87.2 EDEMA OF LEFT LOWER EXTREMITY DUE TO PERIPHERAL VENOUS INSUFFICIENCY: ICD-10-CM

## 2019-06-27 DIAGNOSIS — I87.2 EDEMA OF RIGHT LOWER EXTREMITY DUE TO PERIPHERAL VENOUS INSUFFICIENCY: ICD-10-CM

## 2019-06-27 DIAGNOSIS — M79.675 PAIN OF LEFT GREAT TOE: ICD-10-CM

## 2019-06-27 DIAGNOSIS — I78.1 TELANGIECTASIAS: ICD-10-CM

## 2019-06-27 PROCEDURE — 99213 OFFICE O/P EST LOW 20 MIN: CPT | Mod: 25 | Performed by: PODIATRIST

## 2019-06-27 PROCEDURE — 11721 DEBRIDE NAIL 6 OR MORE: CPT | Performed by: PODIATRIST

## 2019-06-27 PROCEDURE — G0463 HOSPITAL OUTPT CLINIC VISIT: HCPCS | Mod: 25

## 2019-06-27 ASSESSMENT — PAIN SCALES - GENERAL: PAINLEVEL: SEVERE PAIN (7)

## 2019-06-27 NOTE — PROGRESS NOTES
Chief complaint: Patient presents with:  Procedure: nail trimming      History of Present Illness: This 85 year old female is seen with her daughter for evaluation and suggestions of management of elongated and painful bilateral hallux bilateral border ingrown toenails. Her daughter says the patient successfully had pain relief from the bilateral hallux slant back last time, so she would like the same thing done today.     Patient lives in [place] and she says it has been difficult to find a provider to care for her nails. She previously had a nurse to trim them. She also had her beauty salon try to trim them, but they told her that her nails curved in so they couldn't reach them as far as they needed to go. The patient would like them trimmed today. No further pedal complaints today.       /70 (BP Location: Left arm, Patient Position: Sitting, Cuff Size: Adult Regular)   Pulse 73   Temp 97.9  F (36.6  C) (Tympanic)   SpO2 93%     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)     Numbness and tingling of right leg     Osteoarthritis of spine with radiculopathy, lumbar region     Pancreatic mass     Paresthesia of right leg     Radicular leg pain     Right knee DJD     Sacroiliac joint pain     SK (seborrheic keratosis)     Benign skin lesion of nose     Visual changes     Memory loss     Atrial fibrillation with RVR (H)     Arthritis of shoulder region, left, degenerative     Controlled substance agreement signed       Past  Surgical History:   Procedure Laterality Date     CHOLECYSTECTOMY      No Comments Provided     COLONOSCOPY      2007,Sigmoid diverticulosis     ENDOSCOPY UPPER, COLONOSCOPY, COMBINED      5/5/11,Hiatal hernia, gastric gland hyperplasia in antrum     LAPAROSCOPIC TUBAL LIGATION      No Comments Provided     OTHER SURGICAL HISTORY      1986,[id number],BIOPSY BREAST,Right     OTHER SURGICAL HISTORY      [id number],PREMALIG/BENIGN SKIN LESION EXCISION,R side of nose, face and chest wall/Basal Cell Cancer Excision     TONSILLECTOMY, ADENOIDECTOMY, COMBINED      1954       Current Outpatient Medications   Medication     acetaminophen (TYLENOL) 500 MG tablet     aspirin-dipyridamole ER (AGGRENOX)  MG 12 hr capsule     Calcium Carbonate-Vitamin D (CALCIUM 500 + D) 500-125 MG-UNIT TABS     HYDROcodone-acetaminophen (NORCO) 5-325 MG tablet     lactulose (CHRONULAC) 10 GM/15ML solution     losartan-hydrochlorothiazide (HYZAAR) 50-12.5 MG per tablet     magnesium hydroxide (MILK OF MAGNESIA) 400 MG/5ML suspension     Multiple Vitamin (MULTI-VITAMINS) TABS     Multiple Vitamins-Minerals (OCUVITE ADULT 50+) CAPS     ranitidine (ZANTAC) 150 MG tablet     No current facility-administered medications for this visit.           Allergies   Allergen Reactions     Lansoprazole Other (See Comments) and Unknown     Patient states that medication didn't work for her.  Daughter states she got delusional, water did not taste right  Other reaction(s): Other - Describe In Comment Field  Patient states that medication didn't work for her.     Lisinopril Cough       Family History   Problem Relation Age of Onset     Other - See Comments Mother         Stroke,Had a CVA age 85     Cancer Father         Cancer,Brain tumor, age 56     Blood Disease Sister         Blood Disease,Leukemia and     Cancer Sister         Cancer, kidney cancer     Blood Disease Sister         Blood Disease,Chronic lymphocytic leukemia       Social History     Socioeconomic  History     Marital status:      Spouse name: None     Number of children: None     Years of education: None     Highest education level: None   Occupational History     None   Social Needs     Financial resource strain: None     Food insecurity:     Worry: None     Inability: None     Transportation needs:     Medical: None     Non-medical: None   Tobacco Use     Smoking status: Never Smoker     Smokeless tobacco: Never Used   Substance and Sexual Activity     Alcohol use: No     Alcohol/week: 0.0 oz     Drug use: No     Sexual activity: Not Currently   Lifestyle     Physical activity:     Days per week: None     Minutes per session: None     Stress: None   Relationships     Social connections:     Talks on phone: None     Gets together: None     Attends [employer] service: None     Active member of club or organization: None     Attends meetings of clubs or organizations: None     Relationship status: None     Intimate partner violence:     Fear of current or ex partner: None     Emotionally abused: None     Physically abused: None     Forced sexual activity: None   Other Topics Concern     Parent/sibling w/ CABG, MI or angioplasty before 65F 55M? Not Asked   Social History Narrative    Nonsmoker. .  Lives alone in a house.   Continues to drive.   2 grown kids, one in [place]       ROS: 10 point ROS neg other than the symptoms noted above in the HPI.  EXAM  Constitutional: healthy, alert and no distress    Psychiatric: mentation appears normal and affect normal/bright    VASCULAR:  -Dorsalis pedis pulse +1/4 b/l (biphasic on doppler bilaterally on 04/22/2019)  -Posterior tibial pulse +1/4 b/l (biphasic on doppler bilaterally on 04/22/2019)  -Hair growth Absent to b/l anterior legs and ankles  -Moderate 3+ pitting edema to bilateral legs  -Telangiectasias to bilateral feet, ankles and legs  NEURO:  -Light touch sensation intact to b/l plantar forefoot  DERM:  -Skin temperature cool to bilateral legs and  feet  -Skin thin, shiny, atrophic to bilateral feet and legs  -Toenails elongated, dystrophic and discolored x 10  ---Incurvation to the bilateral border of the bilateral hallux  ---Mild erythema and edema to the nail border  ---No severe erythema, no ascending erythema, no calor, no purulence no openings of skin, no malodor, no other SOI.  MSK:  -Pain on palpation to bilateral hallux bilateral borders (R>L)  -Muscle strength of ankles +5/5 for dorsiflexion, plantarflexion, ABDUction and ADDuction b/l    ============================================================    ASSESSMENT:  (L60.0) Ingrowing nail  (primary encounter diagnosis)    (L60.3) Onychodystrophy    (M79.674) Pain of right great toe    (M79.675) Pain of left great toe    (I78.1) Telangiectasias    (I87.2,  R60.0) Edema of left lower extremity due to peripheral venous insufficiency    (I87.2,  R60.0) Edema of right lower extremity due to peripheral venous insufficiency        PLAN:  -Patient evaluated and examined. Treatment options discussed with no educational barriers noted.  -Nails debrided x 10 without incident  ---Aggressive slant back to bilateral borders of bilateral hallux  ---Patient still had tenderness to the lateral RIGHT hallux nail border post debridement. No open wounds.  -Previously recommended compression socks  -Patient in agreement with the above treatment plan and all of patient's questions were answered.      RTC 10 weeks for bilateral hallux bilateral border slant back and toenail debridement        Sally Arshad DPM

## 2019-06-27 NOTE — NURSING NOTE
"Chief Complaint   Patient presents with     Procedure     nail trimming       Initial /70 (BP Location: Left arm, Patient Position: Sitting, Cuff Size: Adult Regular)   Pulse 73   Temp 97.9  F (36.6  C) (Tympanic)   SpO2 93%  Estimated body mass index is 20.41 kg/m  as calculated from the following:    Height as of 4/9/19: 1.6 m (5' 3\").    Weight as of 6/13/19: 52.3 kg (115 lb 3.2 oz).  Medication Reconciliation: complete  "

## 2019-07-01 ENCOUNTER — OFFICE VISIT (OUTPATIENT)
Dept: FAMILY MEDICINE | Facility: OTHER | Age: 84
End: 2019-07-01
Attending: NURSE PRACTITIONER
Payer: MEDICARE

## 2019-07-01 VITALS
SYSTOLIC BLOOD PRESSURE: 126 MMHG | RESPIRATION RATE: 20 BRPM | WEIGHT: 113.8 LBS | HEART RATE: 68 BPM | BODY MASS INDEX: 22.34 KG/M2 | OXYGEN SATURATION: 98 % | DIASTOLIC BLOOD PRESSURE: 60 MMHG | HEIGHT: 60 IN | TEMPERATURE: 98.6 F

## 2019-07-01 DIAGNOSIS — K14.5 FISSURE OF TONGUE: ICD-10-CM

## 2019-07-01 DIAGNOSIS — D64.9 ANEMIA, UNSPECIFIED TYPE: Primary | ICD-10-CM

## 2019-07-01 PROCEDURE — 99213 OFFICE O/P EST LOW 20 MIN: CPT | Performed by: NURSE PRACTITIONER

## 2019-07-01 PROCEDURE — G0463 HOSPITAL OUTPT CLINIC VISIT: HCPCS

## 2019-07-01 RX ORDER — FERROUS SULFATE 325(65) MG
325 TABLET ORAL
Qty: 30 TABLET | Refills: 3 | Status: SHIPPED | OUTPATIENT
Start: 2019-07-01 | End: 2020-01-01

## 2019-07-01 ASSESSMENT — MIFFLIN-ST. JEOR: SCORE: 874.75

## 2019-07-01 ASSESSMENT — PAIN SCALES - GENERAL: PAINLEVEL: NO PAIN (0)

## 2019-07-01 NOTE — NURSING NOTE
"Chief Complaint   Patient presents with     Tongue Lesion(S)     X 1 week         Initial /60   Pulse 68   Temp 98.6  F (37  C) (Temporal)   Resp 20   Ht 1.511 m (4' 11.5\")   Wt 51.6 kg (113 lb 12.8 oz)   SpO2 98%   BMI 22.60 kg/m   Estimated body mass index is 22.6 kg/m  as calculated from the following:    Height as of this encounter: 1.511 m (4' 11.5\").    Weight as of this encounter: 51.6 kg (113 lb 12.8 oz).    Medication Reconciliation: complete      Norma J. Gosselin, LPN  "

## 2019-07-01 NOTE — PROGRESS NOTES
Subjective     Tenisha Cagle is a 85 year old female who presents to clinic today for the following health issues:    HPI   Tongue fissures  Has been complaining of a weird feeling on her tongue for the last 2 weeks or so. Daughter looked at tongue and is concerned that she has some fissures present that she has not noted prior. Tenisha does have some memory loss and is not a great historian, daughter unsure of what else to say. Tenisha does not drink much water at all, maybe 2 glasses all day long and only with prompting.     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)     Numbness and tingling of right leg     Osteoarthritis of spine with radiculopathy, lumbar region     Pancreatic mass     Paresthesia of right leg     Radicular leg pain     Right knee DJD     Sacroiliac joint pain     SK (seborrheic keratosis)     Benign skin lesion of nose     Visual changes     Memory loss     Atrial fibrillation with RVR (H)     Arthritis of shoulder region, left, degenerative     Controlled substance agreement signed     Past Surgical History:   Procedure Laterality Date     CHOLECYSTECTOMY      No Comments Provided     COLONOSCOPY      2007,Sigmoid diverticulosis     ENDOSCOPY UPPER, COLONOSCOPY, COMBINED      5/5/11,Hiatal hernia, gastric gland hyperplasia in antrum     LAPAROSCOPIC TUBAL LIGATION      No Comments Provided     OTHER SURGICAL HISTORY      1986,[id number],BIOPSY BREAST,Right     OTHER SURGICAL HISTORY      [id number],PREMALIG/BENIGN SKIN LESION  EXCISION,R side of nose, face and chest wall/Basal Cell Cancer Excision     TONSILLECTOMY, ADENOIDECTOMY, COMBINED      1954       Social History     Tobacco Use     Smoking status: Never Smoker     Smokeless tobacco: Never Used   Substance Use Topics     Alcohol use: No     Alcohol/week: 0.0 oz     Family History   Problem Relation Age of Onset     Other - See Comments Mother         Stroke,Had a CVA age 85     Cancer Father         Cancer,Brain tumor, age 56     Blood Disease Sister         Blood Disease,Leukemia and     Cancer Sister         Cancer, kidney cancer     Blood Disease Sister         Blood Disease,Chronic lymphocytic leukemia         Current Outpatient Medications   Medication Sig Dispense Refill     acetaminophen (TYLENOL) 500 MG tablet Take 1,000 mg by mouth every 6 hours as needed       aspirin-dipyridamole ER (AGGRENOX)  MG 12 hr capsule TAKE 1 CAPSULE TWICE A  capsule 3     Calcium Carbonate-Vitamin D (CALCIUM 500 + D) 500-125 MG-UNIT TABS Take 1 tablet by mouth daily       ferrous sulfate (FEROSUL) 325 (65 Fe) MG tablet Take 1 tablet (325 mg) by mouth daily (with breakfast) 30 tablet 3     HYDROcodone-acetaminophen (NORCO) 5-325 MG tablet Take 1 tablet by mouth every 6 hours as needed for severe pain Fill on or after 8/2/19 120 tablet 0     lactulose (CHRONULAC) 10 GM/15ML solution Take 30 mLs (20 g) by mouth 2 times daily as needed for constipation 1000 mL 11     losartan-hydrochlorothiazide (HYZAAR) 50-12.5 MG per tablet Take 1 tablet by mouth daily 90 tablet 3     magnesium hydroxide (MILK OF MAGNESIA) 400 MG/5ML suspension Take 15 mLs by mouth At Bedtime       Multiple Vitamin (MULTI-VITAMINS) TABS Take 1 tablet by mouth daily       Multiple Vitamins-Minerals (OCUVITE ADULT 50+) CAPS Take 1 capsule by mouth daily       ranitidine (ZANTAC) 150 MG tablet Take 1 tablet (150 mg) by mouth 2 times daily as needed for heartburn 180 tablet 3     Allergies   Allergen Reactions      "Lansoprazole Other (See Comments) and Unknown     Patient states that medication didn't work for her.  Daughter states she got delusional, water did not taste right  Other reaction(s): Other - Describe In Comment Field  Patient states that medication didn't work for her.     Lisinopril Cough       Reviewed and updated as needed this visit by Provider  Tobacco  Allergies  Meds  Problems  Med Hx  Surg Hx  Fam Hx  Soc Hx          Review of Systems   ROS COMP: As above      Objective    /60   Pulse 68   Temp 98.6  F (37  C) (Temporal)   Resp 20   Ht 1.511 m (4' 11.5\")   Wt 51.6 kg (113 lb 12.8 oz)   SpO2 98%   BMI 22.60 kg/m    Body mass index is 22.6 kg/m .  Physical Exam   GENERAL: alert, no distress, frail and elderly  HENT: normal cephalic/atraumatic and tongue with fissures, oral mucosa slightly dry, no oral lesions  MS: no gross musculoskeletal defects noted, no edema  SKIN: no suspicious lesions or rashes  PSYCH: confused, affect flat and appearance well groomed    Diagnostic Test Results:  Labs reviewed in Epic  none         Assessment & Plan     1. Anemia, unspecified type  Noted on CBC from LOV. Discussed today, will start oral iron replacement. Recheck in 8 weeks.   - ferrous sulfate (FEROSUL) 325 (65 Fe) MG tablet; Take 1 tablet (325 mg) by mouth daily (with breakfast)  Dispense: 30 tablet; Refill: 3    2. Fissure of tongue  Apparently a new issue, though both daughter and patient unaware of when this began. Tenisha is confused, often looking to daughter for answers and asking multiple times throughout visit why she was here. Does have limited oral water intake, discussed importance of adequate hydration.        Destiny Flynn NP  [facility] AND \[facility] [place] [facility]\""        "

## 2019-08-05 NOTE — PROGRESS NOTES
"  SUBJECTIVE:   Tenisha Cagle is a 85 year old female who presents to clinic today for the following health issues:    HPI  Joint pains.  She says she is feeling \"old\".  Here with daughter.  Pains in low back, radiates into left hip.  Getting injections in trochanters and knees.  Seems to last for 8 weeks or so.  Is not yet due.  Has been rubbing in CBD oil, helps too.  Has been getting hydrocodone at 4 daily for all of this.  This too seems to help.  Just got a refill for this on 8/2/19.  No falls since 8/29/18.  Lumbar injections pending in 2 weeks.    Patient Active Problem List    Diagnosis Date Noted     Controlled substance agreement signed 03/08/2019     Priority: Medium     Arthritis of shoulder region, left, degenerative 01/07/2019     Priority: Medium     Memory loss 05/25/2018     Priority: Medium     Atrial fibrillation with RVR (H) 01/25/2018     Priority: Medium     Hiatal hernia 01/17/2018     Priority: Medium     Overview:   large, mostly intrathoracic       Essential hypertension 01/17/2018     Priority: Medium     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/27/2017     Priority: Medium     Benign skin lesion of nose 09/27/2017     Priority: Medium     AK (actinic keratosis) 07/10/2017     Priority: Medium     SK (seborrheic keratosis) 07/10/2017     Priority: Medium     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) 06/15/2017     Priority: Medium     Abnormal weight loss 04/18/2016     Priority: Medium     Anemia 04/18/2016     Priority: Medium     Greater trochanteric bursitis of left hip 04/18/2016     Priority: Medium     Visual changes 04/18/2016     Priority: Medium     Osteoarthritis of spine with radiculopathy, lumbar region 02/03/2016     Priority: Medium     History of TIA (transient ischemic attack) 02/02/2016     Priority: Medium     Numbness and tingling of right leg 02/02/2016     Priority: Medium     Radicular leg pain 02/02/2016     Priority: Medium     Basal cell " carcinoma of right cheek 11/18/2015     Priority: Medium     Sacroiliac joint pain 12/10/2014     Priority: Medium     Alvarado's cyst of knee 02/20/2014     Priority: Medium     Paresthesia of right leg 02/20/2014     Priority: Medium     Right knee DJD 02/20/2014     Priority: Medium     Cystocele 08/23/2013     Priority: Medium     Pancreatic mass 09/18/2012     Priority: Medium     Overview:   Possible, abnormal CT        Cerebral aneurysm, nonruptured 12/20/2011     Priority: Medium     Diverticulosis of colon 05/13/2011     Priority: Medium     Chronic lymphocytic leukemia (H) 04/14/2011     Priority: Medium     Overview:   WBC stable in the 30,000       Past Surgical History:   Procedure Laterality Date     CHOLECYSTECTOMY      No Comments Provided     COLONOSCOPY      2007,Sigmoid diverticulosis     ENDOSCOPY UPPER, COLONOSCOPY, COMBINED      5/5/11,Hiatal hernia, gastric gland hyperplasia in antrum     LAPAROSCOPIC TUBAL LIGATION      No Comments Provided     OTHER SURGICAL HISTORY      1986,[id number],BIOPSY BREAST,Right     OTHER SURGICAL HISTORY      [id number],PREMALIG/BENIGN SKIN LESION EXCISION,R side of nose, face and chest wall/Basal Cell Cancer Excision     TONSILLECTOMY, ADENOIDECTOMY, COMBINED      1954     Social History     Tobacco Use     Smoking status: Never Smoker     Smokeless tobacco: Never Used   Substance Use Topics     Alcohol use: No     Alcohol/week: 0.0 oz     Current Outpatient Medications   Medication Sig Dispense Refill     acetaminophen (TYLENOL) 500 MG tablet Take 1,000 mg by mouth every 6 hours as needed       aspirin-dipyridamole ER (AGGRENOX)  MG 12 hr capsule TAKE 1 CAPSULE TWICE A  capsule 3     Calcium Carbonate-Vitamin D (CALCIUM 500 + D) 500-125 MG-UNIT TABS Take 1 tablet by mouth daily       ferrous sulfate (FEROSUL) 325 (65 Fe) MG tablet Take 1 tablet (325 mg) by mouth daily (with breakfast) 30 tablet 3     HYDROcodone-acetaminophen (NORCO) 5-325 MG tablet Take 1  tablet by mouth every 6 hours as needed for severe pain Fill on or after 8/2/19 120 tablet 0     lactulose (CHRONULAC) 10 GM/15ML solution Take 30 mLs (20 g) by mouth 2 times daily as needed for constipation 1000 mL 11     losartan-hydrochlorothiazide (HYZAAR) 50-12.5 MG per tablet Take 1 tablet by mouth daily 90 tablet 3     magnesium hydroxide (MILK OF MAGNESIA) 400 MG/5ML suspension Take 15 mLs by mouth At Bedtime       Multiple Vitamin (MULTI-VITAMINS) TABS Take 1 tablet by mouth daily       Multiple Vitamins-Minerals (OCUVITE ADULT 50+) CAPS Take 1 capsule by mouth daily       ranitidine (ZANTAC) 150 MG tablet Take 1 tablet (150 mg) by mouth 2 times daily as needed for heartburn 180 tablet 3     Allergies   Allergen Reactions     Lansoprazole Other (See Comments) and Unknown     Patient states that medication didn't work for her.  Daughter states she got delusional, water did not taste right  Other reaction(s): Other - Describe In Comment Field  Patient states that medication didn't work for her.     Lisinopril Cough       Review of Systems   Constitutional: Positive for fatigue.   Musculoskeletal: Positive for arthralgias and back pain.   Psychiatric/Behavioral: Negative for sleep disturbance.        OBJECTIVE:     /68 (BP Location: Right arm, Patient Position: Sitting, Cuff Size: Adult Regular)   Pulse 61   Temp 98.4  F (36.9  C) (Tympanic)   Resp 16   Wt 50.3 kg (111 lb)   SpO2 97%   BMI 22.04 kg/m    Body mass index is 22.04 kg/m .  Physical Exam   Constitutional:   Thin to near cachetic    Musculoskeletal:   Kyphotic.  Tender to light touch over entire lumbar spine.  Knees are without pain on palpation    Skin: Skin is warm and dry.       Diagnostic Test Results:  none     ASSESSMENT/PLAN:         (M47.26) Osteoarthritis of spine with radiculopathy, lumbar region  (primary encounter diagnosis)  Comment: she also has severe end stage knee djd.  Cares are supportive at this point.  Follow up  monthly for narcotic refills.  Too early for repeat injections. But daughter already has a pending appointment on the 13th for this.  Plan: follow up in 2 weeks.    Hgb was 8 in June.  Daughter is afraid to stop her aggrenox, but also has not been giving her iron due to constipation.  Repeat CBC done today.      Godwin Kapoor MD  [facility] AND [facility]    Results for orders placed or performed in visit on 08/05/19   CBC and Differential   Result Value Ref Range    WBC 10.1 4.0 - 11.0 10e9/L    RBC Count 3.32 (L) 3.8 - 5.2 10e12/L    Hemoglobin 8.6 (L) 11.7 - 15.7 g/dL    Hematocrit 28.4 (L) 35.0 - 47.0 %    MCV 86 78 - 100 fl    MCH 25.9 (L) 26.5 - 33.0 pg    MCHC 30.3 (L) 31.5 - 36.5 g/dL    RDW 16.8 (H) 10.0 - 15.0 %    Platelet Count 309 150 - 450 10e9/L    Diff Method Automated Method     % Neutrophils 41.3 %    % Lymphocytes 43.0 %    % Monocytes 7.5 %    % Eosinophils 7.0 %    % Basophils 0.8 %    % Immature Granulocytes 0.4 %    Absolute Neutrophil 4.1 1.6 - 8.3 10e9/L    Absolute Lymphocytes 4.3 0.8 - 5.3 10e9/L    Absolute Monocytes 0.8 0.0 - 1.3 10e9/L    Absolute Eosinophils 0.7 0.0 - 0.7 10e9/L    Absolute Basophils 0.1 0.0 - 0.2 10e9/L    Abs Immature Granulocytes 0.0 0 - 0.4 10e9/L     Hgb is rising with resumption of iron.  Repeat in 6-8 weeks.  Godwin Kapoor MD on 8/6/2019 at 9:16 AM

## 2019-08-05 NOTE — NURSING NOTE
"Coming in for Left hip pain, starts at there lower back and moves down to the knee    Chief Complaint   Patient presents with     Musculoskeletal Problem     back to knee pain-left side       Initial /68 (BP Location: Right arm, Patient Position: Sitting, Cuff Size: Adult Regular)   Pulse 61   Temp 98.4  F (36.9  C) (Tympanic)   Resp 16   Wt 50.3 kg (111 lb)   SpO2 97%   BMI 22.04 kg/m   Estimated body mass index is 22.04 kg/m  as calculated from the following:    Height as of 7/1/19: 1.511 m (4' 11.5\").    Weight as of this encounter: 50.3 kg (111 lb).  Medication Reconciliation: complete    Monisha Gamboa LPN  "

## 2019-08-05 NOTE — LETTER
August 6, 2019      Tenisha Cagle  [address]  [place] [address]        Dear Tenisha,     The hemoglobin is rising.  Stay on the iron replacement for at least 6 months.  Repeat the labs in 6-8 weeks or so.    Results for orders placed or performed in visit on 08/05/19   CBC and Differential   Result Value Ref Range    WBC 10.1 4.0 - 11.0 10e9/L    RBC Count 3.32 (L) 3.8 - 5.2 10e12/L    Hemoglobin 8.6 (L) 11.7 - 15.7 g/dL    Hematocrit 28.4 (L) 35.0 - 47.0 %    MCV 86 78 - 100 fl    MCH 25.9 (L) 26.5 - 33.0 pg    MCHC 30.3 (L) 31.5 - 36.5 g/dL    RDW 16.8 (H) 10.0 - 15.0 %    Platelet Count 309 150 - 450 10e9/L    Diff Method Automated Method     % Neutrophils 41.3 %    % Lymphocytes 43.0 %    % Monocytes 7.5 %    % Eosinophils 7.0 %    % Basophils 0.8 %    % Immature Granulocytes 0.4 %    Absolute Neutrophil 4.1 1.6 - 8.3 10e9/L    Absolute Lymphocytes 4.3 0.8 - 5.3 10e9/L    Absolute Monocytes 0.8 0.0 - 1.3 10e9/L    Absolute Eosinophils 0.7 0.0 - 0.7 10e9/L    Absolute Basophils 0.1 0.0 - 0.2 10e9/L    Abs Immature Granulocytes 0.0 0 - 0.4 10e9/L           Sincerely,        Godwin Kapoor MD

## 2019-08-12 NOTE — PROGRESS NOTES
CHIEF COMPLAINT: Bilateral Knee Pain Recheck     PROBLEMS:   Patient has no noted problems.    PATIENT REPORTED MEDICATIONS:  HYDROCODONE-ACETAMINOPHEN TABLET (HYDROCODONE-ACETAMINOPHEN TABS)   HYZAAR TABLET (LOSARTAN POTASSIUM-HCTZ TABS)   AGGRENOX CAPSULE EXTENDED RELEASE 12 HOUR (ASPIRIN-DIPYRIDAMOLE XR12H-CAP)     PATIENT REPORTED ALLERGIES:  LISINOPRIL (LISINOPRIL) (ModerateReaction: No reaction details noted)      HISTORY OF PRESENT ILLNESS:    REASON FOR EVALUATION:  Bilateral knee pain.     HISTORY OF PRESENT ILLNESS:  Tenisha comes in bilateral knee pain.  Here for bilateral knee injections.  Last injections were done three months back.     PAST MEDICAL HISTORY:    Hypertension  Arthritis     SOCIAL HISTORY:     Alcohol Use - No   Tobacco Use - Never    PHYSICAL EXAMINATION:    Examination of both knees shows tenderness across both medial joint lines.  Neurovascular examination is otherwise intact.  Mild swelling is seen there, as well.     ASSESSMENT:    IMPRESSION:  Bilateral knee arthrosis.     PROCEDURES:   Risks and benefits of the procedure were reviewed with the patient. Informed consent was obtained. Blood sugar risks for our diabetic patients were also discussed.    After achieving informed consent and sterile preparation, the patient's bilateral knees are injected with 2 cc 1% lidocaine and 40 mg Kenalog under sterile conditions.  The patient did tolerate the procedures well.      PLAN:   Injections as stated above, repeat in the future as appropriate and necessary.      Dictated by:  Say Nelson MD  Copy to:  Godwin Kapoor MD    D:  08/06/19  T:  08/09/19    Typed and/or reviewed and corrected by signing  below, and sent to the Physician for final review and signature.      This report was created using voice recording software and computer-generated templates. Although every effort has been made to review for and eliminate errors, some errors may still occur.          Electronically signed by Johnna Dickens on 08/09/2019 at 11:47 AM    Electronically signed by Say Nelson MD on 08/11/2019 at 9:29 AM  ________________________________________________________________________

## 2019-08-14 NOTE — NURSING NOTE
"Time out done with name date of birth and what is being injected    Chief Complaint   Patient presents with     Imm/Inj     left hip       Initial /66 (BP Location: Right arm, Patient Position: Sitting, Cuff Size: Adult Regular)   Pulse 60   Temp 98.1  F (36.7  C) (Temporal)   Resp 16  Estimated body mass index is 22.04 kg/m  as calculated from the following:    Height as of 7/1/19: 1.511 m (4' 11.5\").    Weight as of 8/5/19: 50.3 kg (111 lb).  Medication Reconciliation: complete    Monisha Gamboa LPN  "

## 2019-08-14 NOTE — PROGRESS NOTES
"  SUBJECTIVE:   Tenisha Cagle is a 85 year old female who presents to clinic today for the following health issues:    HPI  Hip pains and injections.  Having pain now in the right trochanter in addition to the left.  Injections get her about 2 months of relief.  More pain when she lays in the hip.    They want to see Dr. Donohue for her right ear lesion. Had a cancer removed form it in the past.  Not \"heling\" lately.  Was a squamous cell carcinoma.  Is getting pain in the ear itself.    Patient Active Problem List    Diagnosis Date Noted     Controlled substance agreement signed 03/08/2019     Priority: Medium     Arthritis of shoulder region, left, degenerative 01/07/2019     Priority: Medium     Memory loss 05/25/2018     Priority: Medium     Atrial fibrillation with RVR (H) 01/25/2018     Priority: Medium     Hiatal hernia 01/17/2018     Priority: Medium     Overview:   large, mostly intrathoracic       Essential hypertension 01/17/2018     Priority: Medium     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/27/2017     Priority: Medium     Benign skin lesion of nose 09/27/2017     Priority: Medium     AK (actinic keratosis) 07/10/2017     Priority: Medium     SK (seborrheic keratosis) 07/10/2017     Priority: Medium     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) 06/15/2017     Priority: Medium     Abnormal weight loss 04/18/2016     Priority: Medium     Anemia 04/18/2016     Priority: Medium     Greater trochanteric bursitis of left hip 04/18/2016     Priority: Medium     Visual changes 04/18/2016     Priority: Medium     Osteoarthritis of spine with radiculopathy, lumbar region 02/03/2016     Priority: Medium     History of TIA (transient ischemic attack) 02/02/2016     Priority: Medium     Numbness and tingling of right leg 02/02/2016     Priority: Medium     Radicular leg pain 02/02/2016     Priority: Medium     Basal cell carcinoma of right cheek 11/18/2015     Priority: Medium     Sacroiliac joint " pain 12/10/2014     Priority: Medium     Alvarado's cyst of knee 02/20/2014     Priority: Medium     Paresthesia of right leg 02/20/2014     Priority: Medium     Right knee DJD 02/20/2014     Priority: Medium     Cystocele 08/23/2013     Priority: Medium     Pancreatic mass 09/18/2012     Priority: Medium     Overview:   Possible, abnormal CT        Cerebral aneurysm, nonruptured 12/20/2011     Priority: Medium     Diverticulosis of colon 05/13/2011     Priority: Medium     Chronic lymphocytic leukemia (H) 04/14/2011     Priority: Medium     Overview:   WBC stable in the 30,000       Past Surgical History:   Procedure Laterality Date     CHOLECYSTECTOMY      No Comments Provided     COLONOSCOPY      2007,Sigmoid diverticulosis     ENDOSCOPY UPPER, COLONOSCOPY, COMBINED      5/5/11,Hiatal hernia, gastric gland hyperplasia in antrum     LAPAROSCOPIC TUBAL LIGATION      No Comments Provided     OTHER SURGICAL HISTORY      1986,[id number],BIOPSY BREAST,Right     OTHER SURGICAL HISTORY      [id number],PREMALIG/BENIGN SKIN LESION EXCISION,R side of nose, face and chest wall/Basal Cell Cancer Excision     TONSILLECTOMY, ADENOIDECTOMY, COMBINED      1954     Social History     Tobacco Use     Smoking status: Never Smoker     Smokeless tobacco: Never Used   Substance Use Topics     Alcohol use: No     Alcohol/week: 0.0 oz     Current Outpatient Medications   Medication Sig Dispense Refill     acetaminophen (TYLENOL) 500 MG tablet Take 1,000 mg by mouth every 6 hours as needed       aspirin-dipyridamole ER (AGGRENOX)  MG 12 hr capsule TAKE 1 CAPSULE TWICE A  capsule 3     Calcium Carbonate-Vitamin D (CALCIUM 500 + D) 500-125 MG-UNIT TABS Take 1 tablet by mouth daily       ferrous sulfate (FEROSUL) 325 (65 Fe) MG tablet Take 1 tablet (325 mg) by mouth daily (with breakfast) 30 tablet 3     HYDROcodone-acetaminophen (NORCO) 5-325 MG tablet Take 1 tablet by mouth every 6 hours as needed for severe pain Fill on or after 8/2/19  120 tablet 0     lactulose (CHRONULAC) 10 GM/15ML solution Take 30 mLs (20 g) by mouth 2 times daily as needed for constipation 1000 mL 11     losartan-hydrochlorothiazide (HYZAAR) 50-12.5 MG per tablet Take 1 tablet by mouth daily 90 tablet 3     magnesium hydroxide (MILK OF MAGNESIA) 400 MG/5ML suspension Take 15 mLs by mouth At Bedtime       Multiple Vitamin (MULTI-VITAMINS) TABS Take 1 tablet by mouth daily       Multiple Vitamins-Minerals (OCUVITE ADULT 50+) CAPS Take 1 capsule by mouth daily       ranitidine (ZANTAC) 150 MG tablet Take 1 tablet (150 mg) by mouth 2 times daily as needed for heartburn 180 tablet 3     Allergies   Allergen Reactions     Lansoprazole Other (See Comments) and Unknown     Patient states that medication didn't work for her.  Daughter states she got delusional, water did not taste right  Other reaction(s): Other - Describe In Comment Field  Patient states that medication didn't work for her.     Lisinopril Cough       Review of Systems   Constitutional: Negative for fever.   Musculoskeletal: Positive for arthralgias and gait problem.   Psychiatric/Behavioral: Negative for sleep disturbance.        OBJECTIVE:     /66 (BP Location: Right arm, Patient Position: Sitting, Cuff Size: Adult Regular)   Pulse 60   Temp 98.1  F (36.7  C) (Temporal)   Resp 16   There is no height or weight on file to calculate BMI.  Physical Exam   Constitutional: She appears well-developed. No distress.   Musculoskeletal:   Walking with a cane.  Tender over the left greater trochanter.  Discussed with her risks, consent signed. Area prepped and infiltrated with 2 ml 1% lidocaine and 80 milligram depot medrol.  Tolerated well.   Skin: Skin is warm and dry. She is not diaphoretic.     Upper right ear with tenderness and some erythema.  Mild scaling with increased vascularity along helix.    Diagnostic Test Results:  none     ASSESSMENT/PLAN:         (M70.62) Trochanteric bursitis of left hip  (primary  encounter diagnosis)  Comment: chronic  Plan: DRAIN/INJECT LARGE JOINT/BURSA [[id number]],         methylPREDNISolone (DEPO-MEDROL) injection 80         mg        Follow up in 3 months.  Ice as needed as well.        (C44.320) Squamous cell carcinoma of face  Comment: this could simply be inflammation of the cartilage, but family is fearful of a cancer recurrence.   Plan: GENERAL SURG ADULT REFERRAL        Consult arranged.         Godwin Kapoor MD  [facility] AND [facility]

## 2019-08-29 PROBLEM — H61.001 CHONDRODERMATITIS NODULARIS CHRONICA HELICIS, RIGHT: Status: ACTIVE | Noted: 2019-01-01

## 2019-08-29 NOTE — PROGRESS NOTES
Surgical Clinic Consult  Referring physician:  Godwin Kapoor   Primary physician:     Godwin Kapoor    Chief complaint:   Right ear lesion    History of present illness:  This is a 85 year old female I am seeing in consultation for a right ear lesion.  The patient reports to painful areas on the right ear.  These areas are red and tender.  She has had previous squamous cell carcinoma of the face.  She has had a verrucous keratosis of the right ear.  She sleeps on her right side.     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)     2011     Diaphragmatic hernia without obstruction or gangrene     large, mostly intrathoracic     Diverticulosis of large intestine without perforation or abscess without bleeding     2011     Dysthymic disorder     No Comments Provided     Essential (primary) hypertension     No Comments Provided     Female climacteric state     Menopausal age 56     Gastritis without bleeding     03,Treated with PrevPac     Nonruptured cerebral aneurysm     2011     Osteoarthritis     No Comments Provided     Other fecal abnormalities     Recurrent loose stool     Other lymphoid leukemia not having achieved remission (H)     CLL, stable WBC 30,000     Other specified bacterial intestinal infections     2003, H. pylori gastritis treated with Prevpac     Other specified enthesopathies of unspecified lower limb, excluding foot     No Comments Provided     Peptic ulcer without hemorrhage or perforation     No Comments Provided     Personal history of other diseases of the digestive system (CODE)     2011     Personal history of other medical treatment (CODE)     , vaginal deliveries     Pure hypercholesterolemia     No Comments Provided     Sepsis (H)     child,Hospitalized as a child for  blood poisoning     Sleep disorder     5/13/2011     Transient cerebral ischemic attack     11/17/2011       Pastsurgical history:  Past Surgical History:   Procedure Laterality Date     CHOLECYSTECTOMY      No Comments Provided     COLONOSCOPY      2007,Sigmoid diverticulosis     ENDOSCOPY UPPER, COLONOSCOPY, COMBINED      5/5/11,Hiatal hernia, gastric gland hyperplasia in antrum     LAPAROSCOPIC TUBAL LIGATION      No Comments Provided     OTHER SURGICAL HISTORY      1986,205093,BIOPSY BREAST,Right     OTHER SURGICAL HISTORY      [id number],PREMALIG/BENIGN SKIN LESION EXCISION,R side of nose, face and chest wall/Basal Cell Cancer Excision     TONSILLECTOMY, ADENOIDECTOMY, COMBINED      1954       Current medications:  Current Outpatient Medications   Medication Sig Dispense Refill     acetaminophen (TYLENOL) 500 MG tablet Take 1,000 mg by mouth every 6 hours as needed       aspirin-dipyridamole ER (AGGRENOX)  MG 12 hr capsule TAKE 1 CAPSULE TWICE A  capsule 3     Calcium Carbonate-Vitamin D (CALCIUM 500 + D) 500-125 MG-UNIT TABS Take 1 tablet by mouth daily       ferrous sulfate (FEROSUL) 325 (65 Fe) MG tablet Take 1 tablet (325 mg) by mouth daily (with breakfast) 30 tablet 3     HYDROcodone-acetaminophen (NORCO) 5-325 MG tablet Take 1 tablet by mouth every 6 hours as needed for severe pain Fill on or after 8/2/19 120 tablet 0     lactulose (CHRONULAC) 10 GM/15ML solution Take 30 mLs (20 g) by mouth 2 times daily as needed for constipation 1000 mL 11     losartan-hydrochlorothiazide (HYZAAR) 50-12.5 MG per tablet Take 1 tablet by mouth daily 90 tablet 3     magnesium hydroxide (MILK OF MAGNESIA) 400 MG/5ML suspension Take 15 mLs by mouth At Bedtime       Multiple Vitamin (MULTI-VITAMINS) TABS Take 1 tablet by mouth daily       Multiple Vitamins-Minerals (OCUVITE ADULT 50+) CAPS Take 1 capsule by mouth daily       ranitidine (ZANTAC) 150 MG tablet Take 1 tablet (150 mg) by mouth 2 times daily as needed for  heartburn 180 tablet 3       Allergies:  Allergies   Allergen Reactions     Lansoprazole Other (See Comments) and Unknown     Patient states that medication didn't work for her.  Daughter states she got delusional, water did not taste right  Other reaction(s): Other - Describe In Comment Field  Patient states that medication didn't work for her.     Lisinopril Cough       Family history:  Family History   Problem Relation Age of Onset     Other - See Comments Mother         Stroke,Had a CVA age 85     Cancer Father         Cancer,Brain tumor, age 56     Blood Disease Sister         Blood Disease,Leukemia and     Cancer Sister         Cancer, kidney cancer     Blood Disease Sister         Blood Disease,Chronic lymphocytic leukemia       Social history:  Social History     Socioeconomic History     Marital status:      Spouse name: Not on file     Number of children: Not on file     Years of education: Not on file     Highest education level: Not on file   Occupational History     Not on file   Social Needs     Financial resource strain: Not on file     Food insecurity:     Worry: Not on file     Inability: Not on file     Transportation needs:     Medical: Not on file     Non-medical: Not on file   Tobacco Use     Smoking status: Never Smoker     Smokeless tobacco: Never Used   Substance and Sexual Activity     Alcohol use: No     Alcohol/week: 0.0 oz     Drug use: No     Sexual activity: Not Currently   Lifestyle     Physical activity:     Days per week: Not on file     Minutes per session: Not on file     Stress: Not on file   Relationships     Social connections:     Talks on phone: Not on file     Gets together: Not on file     Attends [employer] service: Not on file     Active member of club or organization: Not on file     Attends meetings of clubs or organizations: Not on file     Relationship status: Not on file     Intimate partner violence:     Fear of current or ex partner: Not on file      Emotionally abused: Not on file     Physically abused: Not on file     Forced sexual activity: Not on file   Other Topics Concern     Parent/sibling w/ CABG, MI or angioplasty before 65F 55M? Not Asked   Social History Narrative    Nonsmoker. .  Lives alone in a house.   Continues to drive.   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)     Numbness and tingling of right leg     Osteoarthritis of spine with radiculopathy, lumbar region     Pancreatic mass     Paresthesia of right leg     Radicular leg pain     Right knee DJD     Sacroiliac joint pain     SK (seborrheic keratosis)     Benign skin lesion of nose     Visual changes     Memory loss     Atrial fibrillation with RVR (H)     Arthritis of shoulder region, left, degenerative     Controlled substance agreement signed 3/8/19     Chondrodermatitis nodularis chronica helicis, right           Physical exam: /70 (BP Location: Left arm, Patient Position: Sitting, Cuff Size: Adult Regular)   Pulse 76   Temp 96.6  F (35.9  C) (Tympanic)   Resp 18   Breastfeeding? No       General: this is a pleasant female patient in no acute distress.  Patient is awake alert and oriented x3 .   Right ear: An area both superiorly and laterally on the helix is erythematous and tender.  There is no actual skin lesion.     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  If that fails, could consider injection of steroids.       Deniz Donohue MD FACS

## 2019-08-29 NOTE — NURSING NOTE
"Chief Complaint   Patient presents with     Derm Problem     lesion on right ear       Initial /70 (BP Location: Left arm, Patient Position: Sitting, Cuff Size: Adult Regular)   Pulse 76   Temp 96.6  F (35.9  C) (Tympanic)   Resp 18   Breastfeeding? No  Estimated body mass index is 22.04 kg/m  as calculated from the following:    Height as of 7/1/19: 1.511 m (4' 11.5\").    Weight as of 8/5/19: 50.3 kg (111 lb).  Medication Reconciliation: complete    Gail Griggs LPN  "

## 2019-08-30 NOTE — PROGRESS NOTES
SUBJECTIVE:   Tenisha Cagle is a 85 year old female who presents to clinic today for the following health issues:    HPI  Follow up on pain meds.  Here with daughter who cares for her.  Continues to have lumbar pain, bilateral knee pain too.  Feels meds help a little.  No significant side effects.  Daughter feels she is a little tired from them.  No constipation.  Uses supplements, lactulose and metamucil.  tox screen was as expected on 6/03/19.    Just met with Dr. Donohue who was going to get her a pillow with a hole in it for the ear.  Daughter wants a prescription from me for this.    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     Priority: Medium     Controlled substance agreement signed 3/8/19 03/08/2019     Priority: Medium     Class: Chronic     Arthritis of shoulder region, left, degenerative 01/07/2019     Priority: Medium     Memory loss 05/25/2018     Priority: Medium     Atrial fibrillation with RVR (H) 01/25/2018     Priority: Medium     Hiatal hernia 01/17/2018     Priority: Medium     Overview:   large, mostly intrathoracic       Essential hypertension 01/17/2018     Priority: Medium     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/27/2017     Priority: Medium     Benign skin lesion of nose 09/27/2017     Priority: Medium     AK (actinic keratosis) 07/10/2017     Priority: Medium     SK (seborrheic keratosis) 07/10/2017     Priority: Medium     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) 06/15/2017     Priority: Medium     Abnormal weight loss 04/18/2016     Priority: Medium     Anemia 04/18/2016     Priority: Medium     Greater trochanteric bursitis of left hip 04/18/2016     Priority: Medium     Visual changes 04/18/2016     Priority: Medium     Osteoarthritis of spine with radiculopathy, lumbar region 02/03/2016     Priority: Medium     History of TIA (transient ischemic attack) 02/02/2016     Priority: Medium      Numbness and tingling of right leg 02/02/2016     Priority: Medium     Radicular leg pain 02/02/2016     Priority: Medium     Basal cell carcinoma of right cheek 11/18/2015     Priority: Medium     Sacroiliac joint pain 12/10/2014     Priority: Medium     Alvarado's cyst of knee 02/20/2014     Priority: Medium     Paresthesia of right leg 02/20/2014     Priority: Medium     Right knee DJD 02/20/2014     Priority: Medium     Cystocele 08/23/2013     Priority: Medium     Pancreatic mass 09/18/2012     Priority: Medium     Overview:   Possible, abnormal CT        Cerebral aneurysm, nonruptured 12/20/2011     Priority: Medium     Diverticulosis of colon 05/13/2011     Priority: Medium     Chronic lymphocytic leukemia (H) 04/14/2011     Priority: Medium     Overview:   WBC stable in the 30,000       Past Surgical History:   Procedure Laterality Date     CHOLECYSTECTOMY      No Comments Provided     COLONOSCOPY      2007,Sigmoid diverticulosis     ENDOSCOPY UPPER, COLONOSCOPY, COMBINED      5/5/11,Hiatal hernia, gastric gland hyperplasia in antrum     LAPAROSCOPIC TUBAL LIGATION      No Comments Provided     OTHER SURGICAL HISTORY      1986,205093,BIOPSY BREAST,Right     OTHER SURGICAL HISTORY      [id number],PREMALIG/BENIGN SKIN LESION EXCISION,R side of nose, face and chest wall/Basal Cell Cancer Excision     TONSILLECTOMY, ADENOIDECTOMY, COMBINED      1954     Social History     Tobacco Use     Smoking status: Never Smoker     Smokeless tobacco: Never Used   Substance Use Topics     Alcohol use: No     Alcohol/week: 0.0 oz     Current Outpatient Medications   Medication Sig Dispense Refill     acetaminophen (TYLENOL) 500 MG tablet Take 1,000 mg by mouth every 6 hours as needed       aspirin-dipyridamole ER (AGGRENOX)  MG 12 hr capsule TAKE 1 CAPSULE TWICE A  capsule 3     Calcium Carbonate-Vitamin D (CALCIUM 500 + D) 500-125 MG-UNIT TABS Take 1 tablet by mouth daily       ferrous sulfate (FEROSUL) 325 (65 Fe)  MG tablet Take 1 tablet (325 mg) by mouth daily (with breakfast) 30 tablet 3     HYDROcodone-acetaminophen (NORCO) 5-325 MG tablet Take 1 tablet by mouth every 6 hours as needed for severe pain Fill on or after 8/2/19 120 tablet 0     lactulose (CHRONULAC) 10 GM/15ML solution Take 30 mLs (20 g) by mouth 2 times daily as needed for constipation 1000 mL 11     losartan-hydrochlorothiazide (HYZAAR) 50-12.5 MG per tablet Take 1 tablet by mouth daily 90 tablet 3     magnesium hydroxide (MILK OF MAGNESIA) 400 MG/5ML suspension Take 15 mLs by mouth At Bedtime       Multiple Vitamin (MULTI-VITAMINS) TABS Take 1 tablet by mouth daily       Multiple Vitamins-Minerals (OCUVITE ADULT 50+) CAPS Take 1 capsule by mouth daily       order for DME Equipment being ordered: pillow with a hole in the center 1 Device 0     ranitidine (ZANTAC) 150 MG tablet Take 1 tablet (150 mg) by mouth 2 times daily as needed for heartburn 180 tablet 3     Allergies   Allergen Reactions     Lansoprazole Other (See Comments) and Unknown     Patient states that medication didn't work for her.  Daughter states she got delusional, water did not taste right  Other reaction(s): Other - Describe In Comment Field  Patient states that medication didn't work for her.     Lisinopril Cough       Review of Systems   Constitutional: Negative for fatigue.   HENT: Positive for ear pain.    Musculoskeletal: Positive for back pain and gait problem.        OBJECTIVE:     /64 (BP Location: Right arm, Patient Position: Sitting, Cuff Size: Adult Regular)   Pulse 78   Temp 98.6  F (37  C) (Tympanic)   Resp 16   Wt 51.8 kg (114 lb 3.2 oz)   SpO2 97%   BMI 22.68 kg/m    Body mass index is 22.68 kg/m .  Physical Exam   Constitutional: She appears well-developed and well-nourished. No distress.   Musculoskeletal:   Has a can with her.  Stands up with mild difficulty.  Significant thoracic kyphosis.  Moderate pain over both SI joints on palpation   Skin: Skin is warm  and dry. She is not diaphoretic.       Diagnostic Test Results:  none     ASSESSMENT/PLAN:       (H61.001) Chondrodermatitis nodularis chronica helicis, right  (primary encounter diagnosis)  Comment: chronic condition.  Plan: order for DME        Order for pillow given    (M53.3) Sacroiliac joint pain  Comment: chronic  Plan: HYDROcodone-acetaminophen (NORCO) 5-325 MG         tablet        #120 given.  Follow up in 4 weeks.        Godwin Kapoor MD  [facility]

## 2019-08-30 NOTE — NURSING NOTE
"Coming in for medication update    Chief Complaint   Patient presents with     Recheck Medication     pain medication       Initial /64 (BP Location: Right arm, Patient Position: Sitting, Cuff Size: Adult Regular)   Pulse 78   Temp 98.6  F (37  C) (Tympanic)   Resp 16   Wt 51.8 kg (114 lb 3.2 oz)   SpO2 97%   BMI 22.68 kg/m   Estimated body mass index is 22.68 kg/m  as calculated from the following:    Height as of 7/1/19: 1.511 m (4' 11.5\").    Weight as of this encounter: 51.8 kg (114 lb 3.2 oz).  Medication Reconciliation: complete    Monisha Gamboa LPN  "

## 2019-09-03 NOTE — TELEPHONE ENCOUNTER
Patients daughter called stating mothers pain is not controlled with the Norco, she is giving her 1 1/2 tablets three times daily.  Pain is mainly in her back  hips, and knees.   Daughter wondering if they could increase her dose?  Alba Triana LPN ....................  9/3/2019   4:14 PM

## 2019-09-03 NOTE — TELEPHONE ENCOUNTER
Patient's daughter called stating patient is still in quite a bit of pain even after pain medication    Please call     Thank you

## 2019-09-04 NOTE — TELEPHONE ENCOUNTER
No, cannot increase dose over what I prescribed.  Can use OTC ibuprofen, and also should be trying rubs like Jose Carlos Choudhury, Christal hot, etc.  Godwin Kapoor MD on 9/4/2019 at 8:55 AM

## 2019-09-09 NOTE — TELEPHONE ENCOUNTER
Pt's daughter states mom is in some more pain would like referral for PT at our [facility]-please call

## 2019-09-09 NOTE — PROGRESS NOTES
[facility]          OUTPATIENT PHYSICAL THERAPY ORTHOPEDIC EVALUATION  PLAN OF TREATMENT FOR OUTPATIENT REHABILITATION  (COMPLETE FOR INITIAL CLAIMS ONLY)  Patient's Last Name, First Name, M.I.  YOB: 1933  Tenisha Cagle    Provider s Name:  [facility]   Medical Record No.  [id number]   Start of Care Date:  09/09/19   Onset Date:  07/10/19   Type:     _X__PT   ___OT   ___SLP Medical Diagnosis:  trochanteric bursitis, lumbar     PT Diagnosis:  impaired mobility   Visits from SOC:  1      _________________________________________________________________________________  Plan of Treatment/Functional Goals:  bed mobility training, gait training, joint mobilization, manual therapy, neuromuscular re-education, ROM, strengthening, stretching, transfer training, balance training     Hot packs, Ultrasound, Iontophoresis, Cryotherapy  iontophoresis with 4% dexamethasone; ultrasound with 10% ketoprofen  Goals  Goal Identifier: posture  Goal Description: Pt will have improved spinal mobility and core strength to promote upright standing with ambulation in clinic at least 50% of the time observed  Target Date: 11/04/19    Goal Identifier: walking  Goal Description: Pt will have improved hip strength by 1 muscle grade to allow walking 15 minutes with less than 6/10 pain in the hip.  Target Date: 11/18/19    Goal Identifier: HEP  Goal Description: Pt will be independent and consistent with a customized home exercise program for self management of symptoms.  Target Date: 11/18/19                            Therapy Frequency:  2 times/Week  Predicted Duration of Therapy Intervention:  12 weeks    Regina Chávez PT                 I CERTIFY THE NEED FOR THESE SERVICES FURNISHED UNDER        THIS PLAN OF TREATMENT AND WHILE UNDER MY CARE     (Physician co-signature of this document indicates review and certification of the therapy plan).                       Certification Date  From:  09/09/19   Certification Date To:  12/02/19    Referring Provider:  Godwin Kapoor MD    Initial Assessment        See Epic Evaluation Start of Care Date: 09/09/19

## 2019-09-09 NOTE — PROGRESS NOTES
09/09/19 1400   General Information   Type of Visit Initial OP Ortho PT Evaluation   Start of Care Date 09/09/19   Referring Physician Godwin Kapoor MD   Patient/Family Goals Statement decreasing pain   Orders Evaluate and Treat   Certification Required? Yes   Medical Diagnosis trochanteric bursitis, lumbar   Surgical/Medical history reviewed Yes   Body Part(s)   Body Part(s) Lumbar Spine/SI;Hip   Presentation and Etiology   Pertinent history of current problem (include personal factors and/or comorbidities that impact the POC) Fell in August 2018 and has gotten worse in the last month; receives injections in the knees, L hip and back every 3 months.    Impairments A. Pain;B. Decreased WB tolerance;F. Decreased strength and endurance;G. Impaired balance;H. Impaired gait;M. Locking or catching;P. Bowel or bladder problems   Functional Limitations perform activities of daily living   Symptom Location greater trochanter, left sided low back and down to knees   How/Where did it occur With a fall;From Degenerative Joint Disease;With repetition/overuse   Onset date of current episode/exacerbation 07/10/19   Chronicity Chronic   Pain rating (0-10 point scale) Best (/10);Worst (/10)   Best (/10) 6/10   Worst (/10) 10/10   Pain quality C. Aching   Frequency of pain/symptoms A. Constant   Pain/symptoms are: The same all the time   Pain/symptoms exacerbated by A. Sitting;B. Walking;I. Bending;K. Home tasks;J. ADL   Pain/symptoms eased by A. Sitting;E. Changing positions;H. Cold;K. Other  (rx pain med)   Progression of symptoms since onset: Worsened   Prior Level of Function   Prior Level of Function-Mobility SPC   Current Level of Function   Current Community Support Family/friend caregiver   Patient role/employment history F. Retired   Living environment House/[facility]   Home/community accessibility 2 to get in house   Current equipment-Gait/Locomotion Standard cane  (has a walker but doesn't use)   Current equipment-ADL  Wall grab bar;Shower/tub chair   Fall Risk Screen   Fall screen completed by PT   Have you fallen 2 or more times in the past year? No   Have you fallen and had an injury in the past year? No   Abuse Screen (yes response referral indicated)   Feels Unsafe at Home or Work/School no   Feels Threatened by Someone no   Does Anyone Try to Keep You From Having Contact with Others or Doing Things Outside Your Home? no   Physical Signs of Abuse Present no   Hip Objective Findings   Side (if bilateral, select both right and left) Left   Observation moderate kyphosis and scoliosis   Gait/Locomotion flexed posture and knees; ambulates with SPC   Lumbar ROM flexion to knees, minimal extension due to curvature   Pelvic Screen equal leg length in supine   Straight Leg Raise Test tight HS   MYNOR Test +   FADIR Test neg   Palpation tender over gluteals, greater trochanter and ITB   Left Hip Flexion PROM 100 degrees VE   Left Hip ER PROM 25 degrees VE   Left Hip IR PROM 10 degrees VE   Left Hip Ext PROM 0 degrees VE   Left Hip Flexion Strength 3+/5   Left Hip Abduction Strength 3+/5   Left Knee Flexion Strength 4+/5   Left Knee Extension Strength 5-/5   Obers/ITB Flexibility painful but negative   Left Hamstring Flexibility impaired   Left Piriformis Flexibility impaired   Planned Therapy Interventions   Planned Therapy Interventions bed mobility training;gait training;joint mobilization;manual therapy;neuromuscular re-education;ROM;strengthening;stretching;transfer training;balance training   Planned Modality Interventions   Planned Modality Interventions Hot packs;Ultrasound;Iontophoresis;Cryotherapy   Planned Modality Interventions Comments iontophoresis with 4% dexamethasone; ultrasound with 10% ketoprofen   Clinical Impression   Criteria for Skilled Therapeutic Interventions Met yes, treatment indicated   PT Diagnosis impaired mobility   Influenced by the following impairments strength, range of motion, joint mobility    Functional limitations due to impairments transfers, ambulation, bed mobility   Clinical Presentation Stable/Uncomplicated   Clinical Decision Making (Complexity) Low complexity   Therapy Frequency 2 times/Week   Predicted Duration of Therapy Intervention (days/wks) 12 weeks   Risk & Benefits of therapy have been explained Yes   Patient, Family & other staff in agreement with plan of care Yes   Clinical Impression Comments Pt demonstrates impaired joint and soft tissue mobility as well as weakness of core and hip muscles paired with existing spinal deformities causing left hip and low back pain. She will benefit from skilled PT to address limitations and improve functional mobility.   Education Assessment   Preferred Learning Style Demonstration;Pictures/video   Barriers to Learning No barriers   ORTHO GOALS   PT Ortho Eval Goals 1;2;3   Ortho Goal 1   Goal Identifier posture   Goal Description Pt will have improved spinal mobility and core strength to promote upright standing with ambulation in clinic at least 50% of the time observed   Target Date 11/04/19   Ortho Goal 2   Goal Identifier walking   Goal Description Pt will have improved hip strength by 1 muscle grade to allow walking 15 minutes with less than 6/10 pain in the hip.   Target Date 11/18/19   Ortho Goal 3   Goal Identifier HEP   Goal Description Pt will be independent and consistent with a customized home exercise program for self management of symptoms.   Target Date 11/18/19   Total Evaluation Time   PT Eval, Low Complexity Minutes (50032) 45   Therapy Certification   Certification date from 09/09/19   Certification date to 12/02/19   Medical Diagnosis trochanteric bursitis, lumbar

## 2019-09-11 NOTE — PROGRESS NOTES
Chief complaint: Patient presents with:  Toenail: toenail trimming      History of Present Illness: This 86 year old female is seen with her daughter for evaluation and suggestions of management of elongated and painful bilateral hallux bilateral border ingrown toenails. The patient and the patient's daughter stated the patient successfully had pain relief from the bilateral hallux slant back last time, so she would like the same thing done today. Her nails have grown faster than anticipated, so she would like them trimmed every 2 months instead of 2 1/2 months.     Patient lives in [place] and she says it has been difficult to find a provider to care for her nails. She previously had a nurse to trim them. She also had her beauty salon try to trim them, but they told her that her nails curved in so they couldn't reach them as far as they needed to go. The patient would like them trimmed today. No further pedal complaints today.       BP (!) 152/68 (BP Location: Left arm, Cuff Size: Adult Regular)   Pulse 68   Temp 98.7  F (37.1  C) (Temporal)   Wt 51.3 kg (113 lb)   BMI 22.44 kg/m      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)     Numbness and tingling of right leg     Osteoarthritis of spine with radiculopathy, lumbar region     Pancreatic mass     Paresthesia of right leg     Radicular leg pain     Right knee DJD     Sacroiliac joint pain     SK (seborrheic keratosis)     Benign skin lesion of nose     Visual changes     Memory  loss     Atrial fibrillation with RVR (H)     Arthritis of shoulder region, left, degenerative     Controlled substance agreement signed 3/8/19     Chondrodermatitis nodularis chronica helicis, right       Past Surgical History:   Procedure Laterality Date     CHOLECYSTECTOMY      No Comments Provided     COLONOSCOPY      2007,Sigmoid diverticulosis     ENDOSCOPY UPPER, COLONOSCOPY, COMBINED      5/5/11,Hiatal hernia, gastric gland hyperplasia in antrum     LAPAROSCOPIC TUBAL LIGATION      No Comments Provided     OTHER SURGICAL HISTORY      1986,[id number],BIOPSY BREAST,Right     OTHER SURGICAL HISTORY      [id number],PREMALIG/BENIGN SKIN LESION EXCISION,R side of nose, face and chest wall/Basal Cell Cancer Excision     TONSILLECTOMY, ADENOIDECTOMY, COMBINED      1954       Current Outpatient Medications   Medication     acetaminophen (TYLENOL) 500 MG tablet     aspirin-dipyridamole ER (AGGRENOX)  MG 12 hr capsule     Calcium Carbonate-Vitamin D (CALCIUM 500 + D) 500-125 MG-UNIT TABS     ferrous sulfate (FEROSUL) 325 (65 Fe) MG tablet     HYDROcodone-acetaminophen (NORCO) 5-325 MG tablet     lactulose (CHRONULAC) 10 GM/15ML solution     losartan-hydrochlorothiazide (HYZAAR) 50-12.5 MG per tablet     magnesium hydroxide (MILK OF MAGNESIA) 400 MG/5ML suspension     Multiple Vitamin (MULTI-VITAMINS) TABS     Multiple Vitamins-Minerals (OCUVITE ADULT 50+) CAPS     order for DME     ranitidine (ZANTAC) 150 MG tablet     No current facility-administered medications for this visit.           Allergies   Allergen Reactions     Lansoprazole Other (See Comments) and Unknown     Patient states that medication didn't work for her.  Daughter states she got delusional, water did not taste right  Other reaction(s): Other - Describe In Comment Field  Patient states that medication didn't work for her.     Lisinopril Cough       Family History   Problem Relation Age of Onset     Other - See Comments Mother         Stroke,Had a CVA  age 85     Cancer Father         Cancer,Brain tumor, age 56     Blood Disease Sister         Blood Disease,Leukemia and     Cancer Sister         Cancer, kidney cancer     Blood Disease Sister         Blood Disease,Chronic lymphocytic leukemia       Social History     Socioeconomic History     Marital status:      Spouse name: None     Number of children: None     Years of education: None     Highest education level: None   Occupational History     None   Social Needs     Financial resource strain: None     Food insecurity:     Worry: None     Inability: None     Transportation needs:     Medical: None     Non-medical: None   Tobacco Use     Smoking status: Never Smoker     Smokeless tobacco: Never Used   Substance and Sexual Activity     Alcohol use: No     Alcohol/week: 0.0 oz     Drug use: No     Sexual activity: Not Currently   Lifestyle     Physical activity:     Days per week: None     Minutes per session: None     Stress: None   Relationships     Social connections:     Talks on phone: None     Gets together: None     Attends Christianity service: None     Active member of club or organization: None     Attends meetings of clubs or organizations: None     Relationship status: None     Intimate partner violence:     Fear of current or ex partner: None     Emotionally abused: None     Physically abused: None     Forced sexual activity: None   Other Topics Concern     Parent/sibling w/ CABG, MI or angioplasty before 65F 55M? Not Asked   Social History Narrative    Nonsmoker. .  Lives alone in a house.   Continues to drive.   2 grown kids, one in [place]       ROS: 10 point ROS neg other than the symptoms noted above in the HPI.  EXAM  Constitutional: healthy, alert and no distress    Psychiatric: mentation appears normal and affect normal/bright    VASCULAR:  -Dorsalis pedis pulse +1/4 b/l (biphasic on doppler bilaterally on 04/22/2019)  -Posterior tibial pulse +1/4 b/l (biphasic on doppler bilaterally  on 04/22/2019)  -Hair growth Absent to b/l anterior legs and ankles  -Moderate 3+ pitting edema to bilateral legs  -Telangiectasias to bilateral feet, ankles and legs  NEURO:  -Light touch sensation intact to b/l plantar forefoot  DERM:  -Skin temperature cool to bilateral legs and feet  -Skin thin, shiny, atrophic to bilateral feet and legs  -Toenails elongated, dystrophic and discolored x 10  ---Incurvation to the bilateral border of the bilateral hallux  ---Mild erythema and edema to the nail borders  ---No open wounds and no drainage  ---No severe erythema, no ascending erythema, no calor, no purulence no openings of skin, no malodor, no other SOI.  MSK:  -Pain on palpation to bilateral hallux bilateral borders (R>L)  -Muscle strength of ankles +5/5 for dorsiflexion, plantarflexion, ABDUction and ADDuction b/l    ============================================================    ASSESSMENT:  (L60.0) Ingrowing nail  (primary encounter diagnosis)    (L60.3) Onychodystrophy    (M79.674) Pain of right great toe    (M79.675) Pain of left great toe    (I78.1) Telangiectasias    (I87.2,  R60.0) Edema of left lower extremity due to peripheral venous insufficiency    (I87.2,  R60.0) Edema of right lower extremity due to peripheral venous insufficiency        PLAN:  -Patient evaluated and examined. Treatment options discussed with no educational barriers noted.  -Nails debrided x 10 without incident  ---Aggressive slant back to bilateral borders of bilateral hallux  ---Patient still had tenderness to the bilateral borders of the bilateral hallux post debridement.  ---Applied triple antibiotic cream and a bandage to the bilateral hallux toes. Patient should remove these later tonight or tomorrow. There are no open wounds on the bilateral hallux.  -Previously recommended compression socks  -Patient in agreement with the above treatment plan and all of patient's questions were answered.      RTC 63+ days for bilateral hallux  bilateral border slant back and toenail debridement        Sally Arshad, DPM

## 2019-09-11 NOTE — NURSING NOTE
"Chief Complaint   Patient presents with     Toenail     toenail trimming       Initial BP (!) 152/68 (BP Location: Left arm, Cuff Size: Adult Regular)   Pulse 68   Temp 98.7  F (37.1  C) (Temporal)   Wt 51.3 kg (113 lb)   BMI 22.44 kg/m   Estimated body mass index is 22.44 kg/m  as calculated from the following:    Height as of 7/1/19: 1.511 m (4' 11.5\").    Weight as of this encounter: 51.3 kg (113 lb).  Medication Reconciliation: complete     Clementine Squires LPN    "

## 2019-09-20 NOTE — NURSING NOTE
"Coming in for pain after doing Physical Therapy, using extra pain medication    Chief Complaint   Patient presents with     Pain     doing PT and in alot of pain       Initial /64 (BP Location: Right arm, Patient Position: Sitting, Cuff Size: Adult Regular)   Pulse 61   Temp 98.6  F (37  C) (Tympanic)   Resp 16   Wt 49.9 kg (110 lb)   SpO2 98%   BMI 21.85 kg/m   Estimated body mass index is 21.85 kg/m  as calculated from the following:    Height as of 7/1/19: 1.511 m (4' 11.5\").    Weight as of this encounter: 49.9 kg (110 lb).  Medication Reconciliation: complete    Monisha Gamboa LPN  "

## 2019-09-20 NOTE — PROGRESS NOTES
SUBJECTIVE:   Tenisha Cagle is a 86 year old female who presents to clinic today for the following health issues:    HPI  Follow up on pain.  Here with daughter.  Is in PT for her back. Pt denies this, but has poor memory.  Daughter says she was just there yesterday and it seems to be making the pain worse.  Has been adding in ibuprofen but it did not help and caused some confusion.  Bought horse salve, did not work.  They are focusing mostly on her low back currently.  Daughter has increased her narcotic dosing a little on her won, by 1/2 a pill.  Lots of pain radiates into the left hip area too. Had a hip injection in the joint in May without any relief.  Rates it at 7/10 today.    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     Priority: Medium     Controlled substance agreement signed 3/8/19 03/08/2019     Priority: Medium     Class: Chronic     Arthritis of shoulder region, left, degenerative 01/07/2019     Priority: Medium     Memory loss 05/25/2018     Priority: Medium     Atrial fibrillation with RVR (H) 01/25/2018     Priority: Medium     Hiatal hernia 01/17/2018     Priority: Medium     Overview:   large, mostly intrathoracic       Essential hypertension 01/17/2018     Priority: Medium     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/27/2017     Priority: Medium     Benign skin lesion of nose 09/27/2017     Priority: Medium     AK (actinic keratosis) 07/10/2017     Priority: Medium     SK (seborrheic keratosis) 07/10/2017     Priority: Medium     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) 06/15/2017     Priority: Medium     Abnormal weight loss 04/18/2016     Priority: Medium     Anemia 04/18/2016     Priority: Medium     Greater trochanteric bursitis of left hip 04/18/2016     Priority: Medium     Visual changes 04/18/2016     Priority: Medium     Osteoarthritis of spine with radiculopathy, lumbar region 02/03/2016      Priority: Medium     History of TIA (transient ischemic attack) 02/02/2016     Priority: Medium     Numbness and tingling of right leg 02/02/2016     Priority: Medium     Radicular leg pain 02/02/2016     Priority: Medium     Basal cell carcinoma of right cheek 11/18/2015     Priority: Medium     Sacroiliac joint pain 12/10/2014     Priority: Medium     Alvarado's cyst of knee 02/20/2014     Priority: Medium     Paresthesia of right leg 02/20/2014     Priority: Medium     Right knee DJD 02/20/2014     Priority: Medium     Cystocele 08/23/2013     Priority: Medium     Pancreatic mass 09/18/2012     Priority: Medium     Overview:   Possible, abnormal CT        Cerebral aneurysm, nonruptured 12/20/2011     Priority: Medium     Diverticulosis of colon 05/13/2011     Priority: Medium     Chronic lymphocytic leukemia (H) 04/14/2011     Priority: Medium     Overview:   WBC stable in the 30,000       Past Surgical History:   Procedure Laterality Date     CHOLECYSTECTOMY      No Comments Provided     COLONOSCOPY      2007,Sigmoid diverticulosis     ENDOSCOPY UPPER, COLONOSCOPY, COMBINED      5/5/11,Hiatal hernia, gastric gland hyperplasia in antrum     LAPAROSCOPIC TUBAL LIGATION      No Comments Provided     OTHER SURGICAL HISTORY      1986,[id number],BIOPSY BREAST,Right     OTHER SURGICAL HISTORY      [id number],PREMALIG/BENIGN SKIN LESION EXCISION,R side of nose, face and chest wall/Basal Cell Cancer Excision     TONSILLECTOMY, ADENOIDECTOMY, COMBINED      1954     Social History     Tobacco Use     Smoking status: Never Smoker     Smokeless tobacco: Never Used   Substance Use Topics     Alcohol use: No     Alcohol/week: 0.0 oz     Current Outpatient Medications   Medication Sig Dispense Refill     acetaminophen (TYLENOL) 500 MG tablet Take 1,000 mg by mouth every 6 hours as needed       aspirin-dipyridamole ER (AGGRENOX)  MG 12 hr capsule TAKE 1 CAPSULE TWICE A  capsule 3     Calcium Carbonate-Vitamin D (CALCIUM  500 + D) 500-125 MG-UNIT TABS Take 1 tablet by mouth daily       ferrous sulfate (FEROSUL) 325 (65 Fe) MG tablet Take 1 tablet (325 mg) by mouth daily (with breakfast) 30 tablet 3     HYDROcodone-acetaminophen (NORCO) 5-325 MG tablet Take 1 tablet by mouth every 4 hours as needed for severe pain Fill on or after 8/30/2019 150 tablet 0     lactulose (CHRONULAC) 10 GM/15ML solution Take 30 mLs (20 g) by mouth 2 times daily as needed for constipation 1000 mL 11     losartan-hydrochlorothiazide (HYZAAR) 50-12.5 MG per tablet Take 1 tablet by mouth daily 90 tablet 3     magnesium hydroxide (MILK OF MAGNESIA) 400 MG/5ML suspension Take 15 mLs by mouth At Bedtime       Multiple Vitamin (MULTI-VITAMINS) TABS Take 1 tablet by mouth daily       Multiple Vitamins-Minerals (OCUVITE ADULT 50+) CAPS Take 1 capsule by mouth daily       order for DME Equipment being ordered: pillow with a hole in the center 1 Device 0     predniSONE (DELTASONE) 20 MG tablet Take 1 tablet (20 mg) by mouth daily for 5 days 5 tablet 0     ranitidine (ZANTAC) 150 MG tablet Take 1 tablet (150 mg) by mouth 2 times daily as needed for heartburn 180 tablet 3     Allergies   Allergen Reactions     Lansoprazole Other (See Comments) and Unknown     Patient states that medication didn't work for her.  Daughter states she got delusional, water did not taste right  Other reaction(s): Other - Describe In Comment Field  Patient states that medication didn't work for her.     Lisinopril Cough       Review of Systems   Constitutional: Negative for fatigue.   Musculoskeletal: Positive for arthralgias, back pain and gait problem.   Psychiatric/Behavioral: Positive for confusion.        OBJECTIVE:     /64 (BP Location: Right arm, Patient Position: Sitting, Cuff Size: Adult Regular)   Pulse 61   Temp 98.6  F (37  C) (Tympanic)   Resp 16   Wt 49.9 kg (110 lb)   SpO2 98%   BMI 21.85 kg/m    Body mass index is 21.85 kg/m .  Physical Exam   Constitutional: She  appears well-developed and well-nourished. No distress.   Neurological: She is alert.   Oriented x2.     Skin: She is not diaphoretic.   Psychiatric: She has a normal mood and affect.   Poor to very poor memory.           Diagnostic Test Results:  none     ASSESSMENT/PLAN:         (M47.26) Osteoarthritis of spine with radiculopathy, lumbar region  (primary encounter diagnosis)  Comment: I asked her daughter to not increase the dosing even by 1/2 a tab.  explained that the narcotics at increased doses will be expected to only work for about 6 weeks.  She did ask for prednisone and I agreed to do a short course of a moderate dose, 20 milligram daily for 5 days.  She is having some dementia and I have tried to help her daughter learn to differentiate between perseveration and pain from the end stage severe DJD.  Increased hydrocodone to up to 5 daily.  Follow up in 30 days.  Plan: HYDROcodone-acetaminophen (NORCO) 5-325 MG         tablet, predniSONE (DELTASONE) 20 MG tablet             (M53.3) Sacroiliac joint pain  Comment:    Plan: HYDROcodone-acetaminophen (NORCO) 5-325 MG         tablet                 Godwin Kapoor MD  [facility]

## 2019-10-17 NOTE — PROGRESS NOTES
"Outpatient Physical Therapy Progress Note     Patient: Tenisha Cagle  : 1933    Beginning/End Dates of Reporting Period:  19 to 10/17/2019    Referring Provider: Dr. Kapoor    Therapy Diagnosis: impaired mobility     Client Self Report: Tenisha reports 8/10 pain in the low back and hip today but states it's \"not bad\". Doesn't feel that much has changed, but doesn't remember what it feels like from beginning to end of session.    Objective Measurements:                                          Outcome Measures (most recent score):  PSFS    Goals:  Goal Identifier posture   Goal Description Pt will have improved spinal mobility and core strength to promote upright standing with ambulation in clinic at least 50% of the time observed   Target Date 19   Date Met  (Improves following stretching interventions)   Progress:     Goal Identifier walking   Goal Description Pt will have improved hip strength by 1 muscle grade to allow walking 15 minutes with less than 6/10 pain in the hip.   Target Date 19   Date Met  (Minimal progress)   Progress:     Goal Identifier HEP   Goal Description Pt will be independent and consistent with a customized home exercise program for self management of symptoms.   Target Date 19   Date Met      Progress:       Progress Toward Goals:   Progress this reporting period: Pt demonstrates significant tenderness to palpation of her left gluteals but following ultrasound and STM, pt has improved knee extension and stride length during gait. Difficult to assess progression due to memory impairments.           Plan:  Continue therapy per current plan of care.    Discharge:  No  "

## 2019-10-18 NOTE — PROGRESS NOTES
SUBJECTIVE:   Tenisha Cagle is a 86 year old female who presents to clinic today for the following health issues:    Here for refill on pain medication. The left knee and hip hurt, from a fall this past year. She is currently doing PT and says that works. Would like more prednisone. Patient's daughter is in the room with her. The daughter answers most questions for her mother.          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     Priority: Medium     Controlled substance agreement signed 3/8/19 03/08/2019     Priority: Medium     Class: Chronic     Arthritis of shoulder region, left, degenerative 01/07/2019     Priority: Medium     Memory loss 05/25/2018     Priority: Medium     Atrial fibrillation with RVR (H) 01/25/2018     Priority: Medium     Hiatal hernia 01/17/2018     Priority: Medium     Overview:   large, mostly intrathoracic       Essential hypertension 01/17/2018     Priority: Medium     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/27/2017     Priority: Medium     Benign skin lesion of nose 09/27/2017     Priority: Medium     AK (actinic keratosis) 07/10/2017     Priority: Medium     SK (seborrheic keratosis) 07/10/2017     Priority: Medium     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) 06/15/2017     Priority: Medium     Abnormal weight loss 04/18/2016     Priority: Medium     Anemia 04/18/2016     Priority: Medium     Greater trochanteric bursitis of left hip 04/18/2016     Priority: Medium     Visual changes 04/18/2016     Priority: Medium     Osteoarthritis of spine with radiculopathy, lumbar region 02/03/2016     Priority: Medium     History of TIA (transient ischemic attack) 02/02/2016     Priority: Medium     Numbness and tingling of right leg 02/02/2016     Priority: Medium     Radicular leg pain 02/02/2016     Priority: Medium     Basal cell carcinoma of right cheek 11/18/2015     Priority: Medium      Sacroiliac joint pain 12/10/2014     Priority: Medium     Alvarado's cyst of knee 2014     Priority: Medium     Paresthesia of right leg 2014     Priority: Medium     Right knee DJD 2014     Priority: Medium     Cystocele 2013     Priority: Medium     Pancreatic mass 2012     Priority: Medium     Overview:   Possible, abnormal CT        Cerebral aneurysm, nonruptured 2011     Priority: Medium     Diverticulosis of colon 2011     Priority: Medium     Chronic lymphocytic leukemia (H) 2011     Priority: Medium     Overview:   WBC stable in the 30,000       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)     2011     Diaphragmatic hernia without obstruction or gangrene     large, mostly intrathoracic     Diverticulosis of large intestine without perforation or abscess without bleeding     2011     Dysthymic disorder     No Comments Provided     Essential (primary) hypertension     No Comments Provided     Female climacteric state     Menopausal age 56     Gastritis without bleeding     03,Treated with PrevPac     Nonruptured cerebral aneurysm     2011     Osteoarthritis     No Comments Provided     Other fecal abnormalities     Recurrent loose stool     Other lymphoid leukemia not having achieved remission (H)     CLL, stable WBC 30,000     Other specified bacterial intestinal infections     2003, H. pylori gastritis treated with Prevpac     Other specified enthesopathies of unspecified lower limb, excluding foot     No Comments Provided     Peptic ulcer without hemorrhage or perforation     No Comments Provided     Personal history of other diseases of the digestive system (CODE)     2011     Personal history of other medical treatment (CODE)     , vaginal deliveries     Pure  hypercholesterolemia     No Comments Provided     Sepsis (H)     child,Hospitalized as a child for blood poisoning     Sleep disorder     5/13/2011     Transient cerebral ischemic attack     11/17/2011      Social History     Tobacco Use     Smoking status: Never Smoker     Smokeless tobacco: Never Used   Substance Use Topics     Alcohol use: No     Alcohol/week: 0.0 standard drinks     Social History     Patient does not qualify to have social determinant information on file (likely too young).   Social History Narrative    Nonsmoker. .  Lives alone in a house.   Continues to drive.   2 grown kids, one in [place]     Allergies   Allergen Reactions     Lansoprazole Other (See Comments) and Unknown     Patient states that medication didn't work for her.  Daughter states she got delusional, water did not taste right  Other reaction(s): Other - Describe In Comment Field  Patient states that medication didn't work for her.     Lisinopril Cough       Review of Systems   Constitutional: Negative for appetite change and fever.   HENT: Positive for hearing loss.    Respiratory: Negative for shortness of breath.    Cardiovascular: Negative for chest pain.   Musculoskeletal: Positive for back pain and myalgias.   Neurological: Negative for headaches.        OBJECTIVE:     /64 (BP Location: Right arm, Patient Position: Sitting, Cuff Size: Adult Regular)   Pulse 65   Temp 98.6  F (37  C) (Tympanic)   Resp 16   Wt 50.8 kg (112 lb)   SpO2 96%   BMI 22.24 kg/m    Body mass index is 22.24 kg/m .  Physical Exam  Constitutional:       Appearance: Normal appearance.   HENT:      Mouth/Throat:      Mouth: Mucous membranes are moist.      Pharynx: Oropharynx is clear.   Cardiovascular:      Pulses: Normal pulses.      Heart sounds: Normal heart sounds.   Skin:     General: Skin is warm and dry.   Neurological:      General: No focal deficit present.      Mental Status: She is alert.   Psychiatric:         Mood and  Affect: Mood normal.         Diagnostic Test Results:  none     ASSESSMENT/PLAN:       (Z23) Need for immunization against influenza  (primary encounter diagnosis)  Plan: HC FLU VACCINE, INCREASED ANTIGEN, PRESV FREE            (M53.3) Sacroiliac joint pain  Comment: Pain is having pain on her entire left side. States PT has been helping her but it makes her in pain and sore.   Plan: HYDROcodone-acetaminophen (NORCO) 5-325 MG         tablet       (M47.26) Osteoarthritis of spine with radiculopathy, lumbar region  Plan: HYDROcodone-acetaminophen (NORCO) 5-325 MG         tablet, predniSONE (DELTASONE) 20 MG tablet            (Z86.73) History of TIA (transient ischemic attack)  Comment: Stable   Plan: aspirin-dipyridamole ER (AGGRENOX)  MG 12        hr capsule        Julia Eason, MS3, RPAP student   Working under the supervision of MD Godwin Castellano MD  [facility]    Pt was seen and examined by me as well as Julia Eason, MS 3, I was present for the exam portion also.    Godwin Kapoor MD on 10/18/2019 at 2:32 PM

## 2019-10-18 NOTE — NURSING NOTE
"Coming in for a medication check up    Chief Complaint   Patient presents with     Recheck Medication     check up       Initial /64 (BP Location: Right arm, Patient Position: Sitting, Cuff Size: Adult Regular)   Pulse 65   Temp 98.6  F (37  C) (Tympanic)   Resp 16   Wt 50.8 kg (112 lb)   SpO2 96%   BMI 22.24 kg/m   Estimated body mass index is 22.24 kg/m  as calculated from the following:    Height as of 7/1/19: 1.511 m (4' 11.5\").    Weight as of this encounter: 50.8 kg (112 lb).  Medication Reconciliation: complete    Monisha Gamboa LPN  "

## 2019-10-25 NOTE — TELEPHONE ENCOUNTER
Patients daughter called in regards to possibly getting patient on Lyrica for her pain throughout the body. Please call her back in regards to this. Thank you! Hanna Durham on 10/25/2019 at 3:17 PM

## 2019-10-28 NOTE — TELEPHONE ENCOUNTER
Patient is in pain when touched , even in physical therapy. Patient would like to get a medication for the pain.     Erin Harrison on 10/28/2019 at 9:47 AM

## 2019-10-28 NOTE — TELEPHONE ENCOUNTER
Patient's daughter is calling requesting patient be given a prescription for either lyrica or cymbalta.    Daughter states that she has been doing physical therapy for her hip and lower back but the therapist has not been able to work on this much as it has been so sensitive to touch.  Daughter states that the therapist recommended that they call and request something to help with this.   Prema Odom LPN........................10/28/2019  10:26 AM

## 2019-10-28 NOTE — TELEPHONE ENCOUNTER
Daughter notified to schedule appointment.  Prema Odom LPN........................10/28/2019  12:41 PM

## 2019-10-28 NOTE — TELEPHONE ENCOUNTER
I would need to see her for this. Is addictive and needs face to face visit.  Godwin Kaopor MD on 10/28/2019 at 12:12 PM

## 2019-10-28 NOTE — TELEPHONE ENCOUNTER
Daughter notified to make appointment.  Prema Odom LPN........................10/28/2019  12:41 PM

## 2019-11-01 NOTE — ED TRIAGE NOTES
Patient presents to the ED after waking up with increased weakness, shakes, and SOB.  02 on arrival is 99% on RA, patient denies any hx of diabetes.  Per daughter patient is normally shaky in the mornings which subsides after she eats, however, shakes have been worse the past week.

## 2019-11-01 NOTE — ED AVS SNAPSHOT
[facility]  [address]  [place] [address]  Phone:  [phone]  Fax:  [phone]                                    Tenisha Cagle   MRN: [id number]    Department:  [facility]   Date of Visit:  11/1/2019           After Visit Summary Signature Page    I have received my discharge instructions, and my questions have been answered. I have discussed any challenges I see with this plan with the nurse or doctor.    ..........................................................................................................................................  Patient/Patient Representative Signature      ..........................................................................................................................................  Patient Representative Print Name and Relationship to Patient    ..................................................               ................................................  Date                                   Time    ..........................................................................................................................................  Reviewed by Signature/Title    ...................................................              ..............................................  Date                                               Time          22EPIC Rev 08/18

## 2019-11-01 NOTE — PROGRESS NOTES
Patient roomed into [place] [address].  Patient is here with her daughter.  Patient lives by herself but daughter lives just 1/4 miles down the road. Daughter states that her mom always has pain since her fall last year but feels her pain is not controlled this morning.  Daughter also states that patient is more tremulous than usual.  Patient states that she is having a new pain under her left breast.  States the pain radiates down towards her back.    Miley Dudley RN on 11/1/2019 at 9:58 AM

## 2019-11-01 NOTE — ED PROVIDER NOTES
History   No chief complaint on file.    HPI  Tenisha Cagle is a 86 year old female who comes in complaining of weakness and shakes and shortness of breath.  This is been coming on for quite some time.  Was about a year ago that the patient fell and broke her collarbone, and since that time she has had ongoing chronic pain and is still on hydrocodone for this.  Daughter, who gives most of the history as the patient is quite a poor historian, says that she has been shaking in the morning but then throughout the day seems to get better.  They wonder if it is due to pain in her narcotics.  She has not been feeling ill otherwise with no fevers or chills, chest pain, shortness of breath.  Allergies:  Allergies   Allergen Reactions     Lansoprazole Other (See Comments) and Unknown     Patient states that medication didn't work for her.  Daughter states she got delusional, water did not taste right  Other reaction(s): Other - Describe In Comment Field  Patient states that medication didn't work for her.     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     Priority: Medium     Controlled substance agreement signed 3/8/19 03/08/2019     Priority: Medium     Class: Chronic     Arthritis of shoulder region, left, degenerative 01/07/2019     Priority: Medium     Memory loss 05/25/2018     Priority: Medium     Atrial fibrillation with RVR (H) 01/25/2018     Priority: Medium     Hiatal hernia 01/17/2018     Priority: Medium     Overview:   large, mostly intrathoracic       Essential hypertension 01/17/2018     Priority: Medium     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/27/2017     Priority: Medium     Benign skin lesion of nose 09/27/2017     Priority: Medium     AK (actinic keratosis) 07/10/2017     Priority: Medium     SK (seborrheic keratosis) 07/10/2017     Priority: Medium     Malignant melanoma of left upper  extremity including shoulder (H) 06/15/2017     Priority: Medium     Abnormal weight loss 04/18/2016     Priority: Medium     Anemia 04/18/2016     Priority: Medium     Greater trochanteric bursitis of left hip 04/18/2016     Priority: Medium     Visual changes 04/18/2016     Priority: Medium     Osteoarthritis of spine with radiculopathy, lumbar region 02/03/2016     Priority: Medium     History of TIA (transient ischemic attack) 02/02/2016     Priority: Medium     Numbness and tingling of right leg 02/02/2016     Priority: Medium     Radicular leg pain 02/02/2016     Priority: Medium     Basal cell carcinoma of right cheek 11/18/2015     Priority: Medium     Sacroiliac joint pain 12/10/2014     Priority: Medium     Alvarado's cyst of knee 02/20/2014     Priority: Medium     Paresthesia of right leg 02/20/2014     Priority: Medium     Right knee DJD 02/20/2014     Priority: Medium     Cystocele 08/23/2013     Priority: Medium     Pancreatic mass 09/18/2012     Priority: Medium     Overview:   Possible, abnormal CT        Cerebral aneurysm, nonruptured 12/20/2011     Priority: Medium     Diverticulosis of colon 05/13/2011     Priority: Medium     Chronic lymphocytic leukemia (H) 04/14/2011     Priority: Medium     Overview:   WBC stable in the 30,000          Past Medical History:    Past Medical History:   Diagnosis Date     Cardiac murmur      Cataract      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)      Diaphragmatic hernia without obstruction or gangrene      Diverticulosis of large intestine without perforation or abscess without bleeding      Dysthymic disorder      Essential (primary) hypertension      Female climacteric state      Gastritis without bleeding      Nonruptured cerebral aneurysm      Osteoarthritis      Other fecal abnormalities      Other lymphoid leukemia not having achieved remission (H)      Other specified bacterial intestinal infections      Other specified  enthesopathies of unspecified lower limb, excluding foot      Peptic ulcer without hemorrhage or perforation      Personal history of other diseases of the digestive system (CODE)      Personal history of other medical treatment (CODE)      Pure hypercholesterolemia      Sepsis (H)      Sleep disorder      Transient cerebral ischemic attack        Past Surgical History:    Past Surgical History:   Procedure Laterality Date     CHOLECYSTECTOMY      No Comments Provided     COLONOSCOPY      2007,Sigmoid diverticulosis     ENDOSCOPY UPPER, COLONOSCOPY, COMBINED      5/5/11,Hiatal hernia, gastric gland hyperplasia in antrum     LAPAROSCOPIC TUBAL LIGATION      No Comments Provided     OTHER SURGICAL HISTORY      1986,205093,BIOPSY BREAST,Right     OTHER SURGICAL HISTORY      [id number],PREMALIG/BENIGN SKIN LESION EXCISION,R side of nose, face and chest wall/Basal Cell Cancer Excision     TONSILLECTOMY, ADENOIDECTOMY, COMBINED      1954       Family History:    Family History   Problem Relation Age of Onset     Other - See Comments Mother         Stroke,Had a CVA age 85     Cancer Father         Cancer,Brain tumor, age 56     Blood Disease Sister         Blood Disease,Leukemia and     Cancer Sister         Cancer, kidney cancer     Blood Disease Sister         Blood Disease,Chronic lymphocytic leukemia       Social History:  Marital Status:   [5]  Social History     Tobacco Use     Smoking status: Never Smoker     Smokeless tobacco: Never Used   Substance Use Topics     Alcohol use: No     Alcohol/week: 0.0 standard drinks     Drug use: No        Medications:    acetaminophen (TYLENOL) 500 MG tablet  aspirin-dipyridamole ER (AGGRENOX)  MG 12 hr capsule  Calcium Carbonate-Vitamin D (CALCIUM 500 + D) 500-125 MG-UNIT TABS  HYDROcodone-acetaminophen (NORCO) 5-325 MG tablet  lactulose (CHRONULAC) 10 GM/15ML solution  losartan-hydrochlorothiazide (HYZAAR) 50-12.5 MG per tablet  magnesium hydroxide (MILK OF MAGNESIA)  400 MG/5ML suspension  Multiple Vitamin (MULTI-VITAMINS) TABS  Multiple Vitamins-Minerals (OCUVITE ADULT 50+) CAPS  ferrous sulfate (FEROSUL) 325 (65 Fe) MG tablet  order for DME  ranitidine (ZANTAC) 150 MG tablet          Review of Systems   Constitutional: Negative for chills and fever.   HENT: Negative for congestion.    Eyes: Negative for visual disturbance.   Respiratory: Negative for shortness of breath.    Cardiovascular: Negative for chest pain.   Gastrointestinal: Negative for nausea and vomiting.   Genitourinary: Negative for dysuria.   Musculoskeletal: Negative for arthralgias.   Skin: Negative for rash.   Neurological: Positive for tremors and weakness.   Psychiatric/Behavioral: Negative for agitation.       Physical Exam   BP: 137/74  Pulse: 75  Heart Rate: 75  Temp: 96.3  F (35.7  C)  Resp: 19  Weight: 50.8 kg (112 lb)  SpO2: 100 %      Physical Exam  Vitals signs and nursing note reviewed.   Constitutional:       Appearance: Normal appearance.   HENT:      Head: Normocephalic and atraumatic.   Eyes:      Conjunctiva/sclera: Conjunctivae normal.   Neck:      Musculoskeletal: Normal range of motion and neck supple.   Cardiovascular:      Rate and Rhythm: Normal rate and regular rhythm.   Pulmonary:      Effort: Pulmonary effort is normal.      Breath sounds: Normal breath sounds.   Abdominal:      General: Abdomen is flat. Bowel sounds are normal.      Palpations: Abdomen is soft.   Skin:     General: Skin is warm and dry.   Neurological:      General: No focal deficit present.      Mental Status: She is alert and oriented to person, place, and time.   Psychiatric:         Mood and Affect: Mood normal.         Behavior: Behavior normal.         ED Course        Procedures             Results for orders placed or performed during the hospital encounter of 11/01/19 (from the past 24 hour(s))   CBC with platelets differential   Result Value Ref Range    WBC 13.3 (H) 4.0 - 11.0 10e9/L    RBC Count 3.53 (L)  3.8 - 5.2 10e12/L    Hemoglobin 9.9 (L) 11.7 - 15.7 g/dL    Hematocrit 31.9 (L) 35.0 - 47.0 %    MCV 90 78 - 100 fl    MCH 28.0 26.5 - 33.0 pg    MCHC 31.0 (L) 31.5 - 36.5 g/dL    RDW 15.3 (H) 10.0 - 15.0 %    Platelet Count 345 150 - 450 10e9/L    Diff Method Automated Method     % Neutrophils 37.0 %    % Lymphocytes 57.9 %    % Monocytes 4.2 %    % Eosinophils 0.3 %    % Basophils 0.2 %    % Immature Granulocytes 0.4 %    Absolute Neutrophil 4.9 1.6 - 8.3 10e9/L    Absolute Lymphocytes 7.7 (H) 0.8 - 5.3 10e9/L    Absolute Monocytes 0.6 0.0 - 1.3 10e9/L    Absolute Eosinophils 0.0 0.0 - 0.7 10e9/L    Absolute Basophils 0.0 0.0 - 0.2 10e9/L    Abs Immature Granulocytes 0.1 0 - 0.4 10e9/L   Comprehensive metabolic panel   Result Value Ref Range    Sodium 136 134 - 144 mmol/L    Potassium 4.0 3.5 - 5.1 mmol/L    Chloride 100 98 - 107 mmol/L    Carbon Dioxide 26 21 - 31 mmol/L    Anion Gap 10 3 - 14 mmol/L    Glucose 112 (H) 70 - 105 mg/dL    Urea Nitrogen 37 (H) 7 - 25 mg/dL    Creatinine 1.30 (H) 0.60 - 1.20 mg/dL    GFR Estimate 39 (L) >60 mL/min/[1.73_m2]    GFR Estimate If Black 47 (L) >60 mL/min/[1.73_m2]    Calcium 9.0 8.6 - 10.3 mg/dL    Bilirubin Total 0.4 0.3 - 1.0 mg/dL    Albumin 4.3 3.5 - 5.7 g/dL    Protein Total 6.7 6.4 - 8.9 g/dL    Alkaline Phosphatase 30 (L) 34 - 104 U/L    ALT 10 7 - 52 U/L    AST 16 13 - 39 U/L   UA reflex to Microscopic and Culture   Result Value Ref Range    Color Urine Yellow     Appearance Urine Clear     Glucose Urine Negative NEG^Negative mg/dL    Bilirubin Urine Negative NEG^Negative    Ketones Urine Negative NEG^Negative mg/dL    Specific Gravity Urine 1.015 1.000 - 1.030    Blood Urine Negative NEG^Negative    pH Urine 5.5 5.0 - 9.0 pH    Protein Albumin Urine Negative NEG^Negative mg/dL    Urobilinogen Urine 0.2 0.2 - 1.0 EU/dL    Nitrite Urine Negative NEG^Negative    Leukocyte Esterase Urine Negative NEG^Negative    Source Midstream Urine        Medications - No data to  display    Assessments & Plan (with Medical Decision Making)     I have reviewed the nursing notes.    I have reviewed the findings, diagnosis, plan and need for follow up with the patient.  Labs do show an elevated white blood count with no obvious source.  Urinalysis is unremarkable.  She is afebrile and oxygen saturations are reasonable, however discussed with her doing chest x-ray to make sure there is no pneumonia.  Patient has had a long stay here in the emergency department waiting for tests and she is anxious and wishes to go.  She states that she is feeling better and not feeling shaky.  She and her daughter both would like to leave and not do any further testing.  They do have an appointment scheduled in clinic in 15 days time and would like to follow-up there.  Told them they were free to go if this is what they chose to do, however I had no explanation for her symptoms.  They chose to go home at this time.  They should return if worse, otherwise follow-up in clinic as planned.    New Prescriptions    No medications on file       Final diagnoses:   Veronica       11/1/2019   [facility]     Iglesia Bazan MD  11/01/19 [id number]

## 2019-11-04 NOTE — NURSING NOTE
"Coming in for a medication check up, she says the medication is not working,     Chief Complaint   Patient presents with     Recheck Medication     medication not working and getting withdrawals       Initial /64 (BP Location: Right arm, Patient Position: Sitting, Cuff Size: Adult Regular)   Pulse 63   Temp 98.7  F (37.1  C) (Tympanic)   Resp 16   Wt 50.3 kg (110 lb 14.4 oz)   SpO2 98%   BMI 22.02 kg/m   Estimated body mass index is 22.02 kg/m  as calculated from the following:    Height as of 7/1/19: 1.511 m (4' 11.5\").    Weight as of this encounter: 50.3 kg (110 lb 14.4 oz).  Medication Reconciliation: complete    Monisha Gamboa LPN  "

## 2019-11-04 NOTE — NURSING NOTE
Patient presents today for left hip injection.  Medication Reconciliation Complete    Hanna Zarco LPN  11/4/2019 11:19 AM

## 2019-11-04 NOTE — PROGRESS NOTES
SUBJECTIVE:   Tenisha Cagle is a 86 year old female who presents to clinic today for the following health issues:    HPI  Follow up on pain meds.  Here with daughter.  Pain in left hip, left lower back and both knees.  Uses a cane all the time.  Sometimes shoulders too.  Sleeps well, pain not waking her from sleep.  Daughter says they were in the emergency department recently, pt woke up with significant tremors and they told her it was withdrawl from her pain meds.  Had been taking 2 at 10:00 and 4:00 PM.  Would then get a 5th dose at [facility].  Daughter notes she shakes more when she misses breakfast.  Has plenty of food at home, but has lost a few pounds.  Gained it with prednisone which seemed to really help her eat more.  EMERGENCY DEPARTMENT notes reviewed.  Has completed physical therapy, says this hurts more. Ibuprofen no help at all.  Makes her confused.  Oxycodone gave her significant GI upset.      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     Priority: Medium     Controlled substance agreement signed 3/8/19 03/08/2019     Priority: Medium     Class: Chronic     Arthritis of shoulder region, left, degenerative 01/07/2019     Priority: Medium     Memory loss 05/25/2018     Priority: Medium     Atrial fibrillation with RVR (H) 01/25/2018     Priority: Medium     Hiatal hernia 01/17/2018     Priority: Medium     Overview:   large, mostly intrathoracic       Essential hypertension 01/17/2018     Priority: Medium     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/27/2017     Priority: Medium     Benign skin lesion of nose 09/27/2017     Priority: Medium     AK (actinic keratosis) 07/10/2017     Priority: Medium     SK (seborrheic keratosis) 07/10/2017     Priority: Medium     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) 06/15/2017     Priority: Medium     Abnormal weight loss 04/18/2016     Priority: Medium     Anemia 04/18/2016      Priority: Medium     Greater trochanteric bursitis of left hip 04/18/2016     Priority: Medium     Visual changes 04/18/2016     Priority: Medium     Osteoarthritis of spine with radiculopathy, lumbar region 02/03/2016     Priority: Medium     History of TIA (transient ischemic attack) 02/02/2016     Priority: Medium     Numbness and tingling of right leg 02/02/2016     Priority: Medium     Radicular leg pain 02/02/2016     Priority: Medium     Basal cell carcinoma of right cheek 11/18/2015     Priority: Medium     Sacroiliac joint pain 12/10/2014     Priority: Medium     Alvarado's cyst of knee 02/20/2014     Priority: Medium     Paresthesia of right leg 02/20/2014     Priority: Medium     Right knee DJD 02/20/2014     Priority: Medium     Cystocele 08/23/2013     Priority: Medium     Pancreatic mass 09/18/2012     Priority: Medium     Overview:   Possible, abnormal CT        Cerebral aneurysm, nonruptured 12/20/2011     Priority: Medium     Diverticulosis of colon 05/13/2011     Priority: Medium     Chronic lymphocytic leukemia (H) 04/14/2011     Priority: Medium     Overview:   WBC stable in the 30,000       Past Surgical History:   Procedure Laterality Date     CHOLECYSTECTOMY      No Comments Provided     COLONOSCOPY      2007,Sigmoid diverticulosis     ENDOSCOPY UPPER, COLONOSCOPY, COMBINED      5/5/11,Hiatal hernia, gastric gland hyperplasia in antrum     LAPAROSCOPIC TUBAL LIGATION      No Comments Provided     OTHER SURGICAL HISTORY      1986,[id number],BIOPSY BREAST,Right     OTHER SURGICAL HISTORY      [id number],PREMALIG/BENIGN SKIN LESION EXCISION,R side of nose, face and chest wall/Basal Cell Cancer Excision     TONSILLECTOMY, ADENOIDECTOMY, COMBINED      1954     Social History     Tobacco Use     Smoking status: Never Smoker     Smokeless tobacco: Never Used   Substance Use Topics     Alcohol use: No     Alcohol/week: 0.0 standard drinks     Current Outpatient Medications   Medication Sig Dispense Refill      acetaminophen (TYLENOL) 500 MG tablet Take 1,000 mg by mouth every 6 hours as needed       aspirin-dipyridamole ER (AGGRENOX)  MG 12 hr capsule TAKE 1 CAPSULE TWICE A  capsule 3     Calcium Carbonate-Vitamin D (CALCIUM 500 + D) 500-125 MG-UNIT TABS Take 1 tablet by mouth daily       ferrous sulfate (FEROSUL) 325 (65 Fe) MG tablet Take 1 tablet (325 mg) by mouth daily (with breakfast) 30 tablet 3     HYDROcodone-acetaminophen (NORCO) 5-325 MG tablet Take 1 tablet by mouth every 4 hours as needed for severe pain Fill on or after 10/18/2019 150 tablet 0     lactulose (CHRONULAC) 10 GM/15ML solution Take 30 mLs (20 g) by mouth 2 times daily as needed for constipation 1000 mL 11     losartan-hydrochlorothiazide (HYZAAR) 50-12.5 MG per tablet Take 1 tablet by mouth daily 90 tablet 3     magnesium hydroxide (MILK OF MAGNESIA) 400 MG/5ML suspension Take 15 mLs by mouth At Bedtime       Multiple Vitamin (MULTI-VITAMINS) TABS Take 1 tablet by mouth daily       Multiple Vitamins-Minerals (OCUVITE ADULT 50+) CAPS Take 1 capsule by mouth daily       order for DME Equipment being ordered: pillow with a hole in the center 1 Device 0     ranitidine (ZANTAC) 150 MG tablet Take 1 tablet (150 mg) by mouth 2 times daily as needed for heartburn 180 tablet 3     Allergies   Allergen Reactions     Lansoprazole Other (See Comments) and Unknown     Patient states that medication didn't work for her.  Daughter states she got delusional, water did not taste right  Other reaction(s): Other - Describe In Comment Field  Patient states that medication didn't work for her.     Lisinopril Cough       Review of Systems   Constitutional: Negative for fatigue.   Musculoskeletal: Positive for arthralgias, back pain and gait problem.   Psychiatric/Behavioral: Negative for sleep disturbance.        OBJECTIVE:     /64 (BP Location: Right arm, Patient Position: Sitting, Cuff Size: Adult Regular)   Pulse 63   Temp 98.7  F (37.1  C)  (Tympanic)   Resp 16   Wt 50.3 kg (110 lb 14.4 oz)   SpO2 98%   BMI 22.02 kg/m    Body mass index is 22.02 kg/m .  Physical Exam  Constitutional:       Appearance: Normal appearance.   Skin:     General: Skin is warm and dry.   Neurological:      General: No focal deficit present.      Mental Status: She is alert.         Diagnostic Test Results:  none     ASSESSMENT/PLAN:     She is frail and has cognitive decline.  Daughter has been helping but not yet living with her.  I suspect it is hard for daughter to see her in pain, but med side effects are a significant worry to me.  Given the weight concerns, she did agree to starting remeron.  Will help with weight and also pain relief to some degree.  Follow up in 2 weeks or so for narcotic refill.      ICD-10-CM    1. Weight loss R63.4 mirtazapine (REMERON) 7.5 MG tablet   2. Osteoarthritis of spine with radiculopathy, lumbar region M47.26          Godwin Kapoor MD  [facility]

## 2019-11-04 NOTE — PROGRESS NOTES
"SUBJECTIVE:  Tenisha Cagle is a 86 year old female here for left hip pain follow-up.  She reports that she has had left hip, shoulder knee pain ever since a fall last year.  She has had periodic injections including SI joint, intra-articular and trochanteric injections.  She is covered by her daughter.  She states that her pain seems to be worsening once again in her lateral hip.  Her last SI joint injection was August 23, 2019 and her last intra-articular injection of her left hip was May 14, 2019.  Her daughter is quite frustrated that she cannot simply \"take more pain pills.\"  They are not using any anti-inflammatories at home.    ROS:    As above otherwise ROS is unremarkable.    OBJECTIVE:  /56   Pulse 72   Temp 98.6  F (37  C)   Resp 16   Ht 1.511 m (4' 11.5\")   Wt 49.9 kg (110 lb)   SpO2 98%   BMI 21.85 kg/m      EXAM:  General Appearance: Pleasant, alert, appropriate appearance for age. No acute distress  Musculoskeletal: Left hip shows range of motion from 0 to 90 degrees.  She has tenderness palpation of her greater trochanter.  Mild SI joint tenderness.   Neurologic Exam: No focal motor or sensory deficits in her left lower extremity.    ASSESSEMENT AND PLAN:    1. Trochanteric bursitis of left hip      I had a long discussion in regards to the reasonings behind not simply increasing her narcotics.  She is currently using 5 tablets of hydrocodone a day and still having quite a bit of pain and \"withdrawal\" type symptoms.  Discussed that increasing narcotics will likely not help with pain relief and because increased potential side effects.  It does not appear that they have tried anti-inflammatories in the past so we discussed trying naproxen twice daily with meals going to a maximum of 2 tablets twice daily.  Her daughter seem comfortable with trying that in the short-term.  Also discussed that chronic pain such as this needs to be treated over the long-term so I would continue to recommend " following up with her primary physician as it seems like he is working on nonnarcotic options.    In regards to her trochanteric bursitis they requested repeating injection.  Verbal informed consent was obtained.  Her left lateral hip was cleansed normal fashion.  A 5 mL mixture of 40 mg of Kenalog and lidocaine were used to infiltrate the point of maximal tenderness in a fanlike fashion.  She tolerated this well, no immediate complications.  She will ice the area to prevent postinjection flare and follow-up as needed.    Issa Botello MD    This document was prepared using voice generated software.  While every attempt was made for accuracy, grammatical errors may exist.

## 2019-11-15 NOTE — LETTER
November 26, 2019      Tenisha Nobles[address] [facility] [id number]        Dear Tenisha,     This is all about the same, the hemoglobin is slowly falling.      Results for orders placed or performed in visit on 11/15/19   CBC and Differential     Status: Abnormal   Result Value Ref Range    WBC 14.0 (H) 4.0 - 11.0 10e9/L    RBC Count 3.21 (L) 3.8 - 5.2 10e12/L    Hemoglobin 9.1 (L) 11.7 - 15.7 g/dL    Hematocrit 29.0 (L) 35.0 - 47.0 %    MCV 90 78 - 100 fl    MCH 28.3 26.5 - 33.0 pg    MCHC 31.4 (L) 31.5 - 36.5 g/dL    RDW 14.5 10.0 - 15.0 %    Platelet Count 353 150 - 450 10e9/L    Diff Method Automated Method     % Neutrophils 51.5 %    % Lymphocytes 42.0 %    % Monocytes 5.8 %    % Eosinophils 0.2 %    % Basophils 0.1 %    % Immature Granulocytes 0.4 %    Absolute Neutrophil 7.2 1.6 - 8.3 10e9/L    Absolute Lymphocytes 5.9 (H) 0.8 - 5.3 10e9/L    Absolute Monocytes 0.8 0.0 - 1.3 10e9/L    Absolute Eosinophils 0.0 0.0 - 0.7 10e9/L    Absolute Basophils 0.0 0.0 - 0.2 10e9/L    Abs Immature Granulocytes 0.1 0 - 0.4 10e9/L   Comprehensive Metabolic Panel     Status: Abnormal   Result Value Ref Range    Sodium 136 134 - 144 mmol/L    Potassium 4.5 3.5 - 5.1 mmol/L    Chloride 102 98 - 107 mmol/L    Carbon Dioxide 25 21 - 31 mmol/L    Anion Gap 9 3 - 14 mmol/L    Glucose 121 (H) 70 - 105 mg/dL    Urea Nitrogen 43 (H) 7 - 25 mg/dL    Creatinine 1.34 (H) 0.60 - 1.20 mg/dL    GFR Estimate 38 (L) >60 mL/min/[1.73_m2]    GFR Estimate If Black 45 (L) >60 mL/min/[1.73_m2]    Calcium 8.9 8.6 - 10.3 mg/dL    Bilirubin Total 0.3 0.3 - 1.0 mg/dL    Albumin 3.8 3.5 - 5.7 g/dL    Protein Total 6.2 (L) 6.4 - 8.9 g/dL    Alkaline Phosphatase 26 (L) 34 - 104 U/L    ALT 9 7 - 52 U/L    AST 14 13 - 39 U/L           Sincerely,        Godwin Kapoor MD

## 2019-11-15 NOTE — LETTER
November 18, 2019      Tenisha Cagle  [address] [facility] [address]        Dear Tenisha,     This shows you might be losing more blood, most likely from colitis.  The CT can will give us more information.    Results for orders placed or performed in visit on 11/15/19   CBC and Differential     Status: Abnormal   Result Value Ref Range    WBC 14.0 (H) 4.0 - 11.0 10e9/L    RBC Count 3.21 (L) 3.8 - 5.2 10e12/L    Hemoglobin 9.1 (L) 11.7 - 15.7 g/dL    Hematocrit 29.0 (L) 35.0 - 47.0 %    MCV 90 78 - 100 fl    MCH 28.3 26.5 - 33.0 pg    MCHC 31.4 (L) 31.5 - 36.5 g/dL    RDW 14.5 10.0 - 15.0 %    Platelet Count 353 150 - 450 10e9/L    Diff Method Automated Method     % Neutrophils 51.5 %    % Lymphocytes 42.0 %    % Monocytes 5.8 %    % Eosinophils 0.2 %    % Basophils 0.1 %    % Immature Granulocytes 0.4 %    Absolute Neutrophil 7.2 1.6 - 8.3 10e9/L    Absolute Lymphocytes 5.9 (H) 0.8 - 5.3 10e9/L    Absolute Monocytes 0.8 0.0 - 1.3 10e9/L    Absolute Eosinophils 0.0 0.0 - 0.7 10e9/L    Absolute Basophils 0.0 0.0 - 0.2 10e9/L    Abs Immature Granulocytes 0.1 0 - 0.4 10e9/L   Comprehensive Metabolic Panel     Status: Abnormal   Result Value Ref Range    Sodium 136 134 - 144 mmol/L    Potassium 4.5 3.5 - 5.1 mmol/L    Chloride 102 98 - 107 mmol/L    Carbon Dioxide 25 21 - 31 mmol/L    Anion Gap 9 3 - 14 mmol/L    Glucose 121 (H) 70 - 105 mg/dL    Urea Nitrogen 43 (H) 7 - 25 mg/dL    Creatinine 1.34 (H) 0.60 - 1.20 mg/dL    GFR Estimate Not Calculated >60 mL/min/[1.73_m2]    GFR Estimate If Black Not Calculated >60 mL/min/[1.73_m2]    Calcium 8.9 8.6 - 10.3 mg/dL    Bilirubin Total 0.3 0.3 - 1.0 mg/dL    Albumin 3.8 3.5 - 5.7 g/dL    Protein Total 6.2 (L) 6.4 - 8.9 g/dL    Alkaline Phosphatase 26 (L) 34 - 104 U/L    ALT 9 7 - 52 U/L    AST 14 13 - 39 U/L           Sincerely,        Godwin Kapoor MD

## 2019-11-15 NOTE — PROGRESS NOTES
"    SUBJECTIVE:   Tenisha Cagle is a 86 year old female who presents to clinic today for the following health issues:    HPI  Here with daughter in follow up of her chronic pain.  Health appears to be declining.  Had a spell this morning where she was gagging, having chula dysphagia.  Seems to be worse with whole milk too.  In the evenings she has been getting periumbilical pains too.  Seems to come off and on.  Daughter notes the remeron helped her be much more alert but did cause some fatigue the next day.  Pain in back remains about the same, still gets \"unbearable\".  They have been trying vinegar water too, helps a bit with the abdomen pains.  More burping lately as well.  Last CT of abdomen was 2/21/19 showing sigmoid colitis.  Pt cannot recall how she felt then and cannot say if this is similar.    Met with ortho 2 days ago, got bilateral knee injections.    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     Priority: Medium     Controlled substance agreement signed 3/8/19 03/08/2019     Priority: Medium     Class: Chronic     Arthritis of shoulder region, left, degenerative 01/07/2019     Priority: Medium     Memory loss 05/25/2018     Priority: Medium     Atrial fibrillation with RVR (H) 01/25/2018     Priority: Medium     Hiatal hernia 01/17/2018     Priority: Medium     Overview:   large, mostly intrathoracic       Essential hypertension 01/17/2018     Priority: Medium     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/27/2017     Priority: Medium     Benign skin lesion of nose 09/27/2017     Priority: Medium     AK (actinic keratosis) 07/10/2017     Priority: Medium     SK (seborrheic keratosis) 07/10/2017     Priority: Medium     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) 06/15/2017     Priority: Medium     Abnormal weight loss 04/18/2016     Priority: Medium     Anemia 04/18/2016     Priority: Medium     Visual changes " 04/18/2016     Priority: Medium     Osteoarthritis of spine with radiculopathy, lumbar region 02/03/2016     Priority: Medium     History of TIA (transient ischemic attack) 02/02/2016     Priority: Medium     Numbness and tingling of right leg 02/02/2016     Priority: Medium     Radicular leg pain 02/02/2016     Priority: Medium     Basal cell carcinoma of right cheek 11/18/2015     Priority: Medium     Alvarado's cyst of knee 02/20/2014     Priority: Medium     Paresthesia of right leg 02/20/2014     Priority: Medium     Right knee DJD 02/20/2014     Priority: Medium     Cystocele 08/23/2013     Priority: Medium     Pancreatic mass 09/18/2012     Priority: Medium     Overview:   Possible, abnormal CT        Cerebral aneurysm, nonruptured 12/20/2011     Priority: Medium     Diverticulosis of colon 05/13/2011     Priority: Medium     Chronic lymphocytic leukemia (H) 04/14/2011     Priority: Medium     Overview:   WBC stable in the 30,000       Past Surgical History:   Procedure Laterality Date     CHOLECYSTECTOMY      No Comments Provided     COLONOSCOPY      2007,Sigmoid diverticulosis     ENDOSCOPY UPPER, COLONOSCOPY, COMBINED      5/5/11,Hiatal hernia, gastric gland hyperplasia in antrum     LAPAROSCOPIC TUBAL LIGATION      No Comments Provided     OTHER SURGICAL HISTORY      1986,[id number],BIOPSY BREAST,Right     OTHER SURGICAL HISTORY      [id number],PREMALIG/BENIGN SKIN LESION EXCISION,R side of nose, face and chest wall/Basal Cell Cancer Excision     TONSILLECTOMY, ADENOIDECTOMY, COMBINED      1954     Social History     Tobacco Use     Smoking status: Never Smoker     Smokeless tobacco: Never Used   Substance Use Topics     Alcohol use: No     Alcohol/week: 0.0 standard drinks     Current Outpatient Medications   Medication Sig Dispense Refill     acetaminophen (TYLENOL) 500 MG tablet Take 1,000 mg by mouth every 6 hours as needed       aspirin-dipyridamole ER (AGGRENOX)  MG 12 hr capsule TAKE 1 CAPSULE TWICE A   capsule 3     Calcium Carbonate-Vitamin D (CALCIUM 500 + D) 500-125 MG-UNIT TABS Take 1 tablet by mouth daily       ferrous sulfate (FEROSUL) 325 (65 Fe) MG tablet Take 1 tablet (325 mg) by mouth daily (with breakfast) 30 tablet 3     HYDROcodone-acetaminophen (NORCO) 5-325 MG tablet Take 1 tablet by mouth every 4 hours as needed for severe pain Fill on or after 10/18/2019 150 tablet 0     lactulose (CHRONULAC) 10 GM/15ML solution Take 30 mLs (20 g) by mouth 2 times daily as needed for constipation 1000 mL 11     losartan-hydrochlorothiazide (HYZAAR) 50-12.5 MG per tablet Take 1 tablet by mouth daily 90 tablet 3     magnesium hydroxide (MILK OF MAGNESIA) 400 MG/5ML suspension Take 15 mLs by mouth At Bedtime       mirtazapine (REMERON) 7.5 MG tablet Take 1 tablet (7.5 mg) by mouth At Bedtime 90 tablet 3     Multiple Vitamin (MULTI-VITAMINS) TABS Take 1 tablet by mouth daily       Multiple Vitamins-Minerals (OCUVITE ADULT 50+) CAPS Take 1 capsule by mouth daily       order for DME Equipment being ordered: pillow with a hole in the center 1 Device 0     ranitidine (ZANTAC) 150 MG tablet Take 1 tablet (150 mg) by mouth 2 times daily as needed for heartburn 180 tablet 3     Allergies   Allergen Reactions     Lansoprazole Other (See Comments) and Unknown     Patient states that medication didn't work for her.  Daughter states she got delusional, water did not taste right  Other reaction(s): Other - Describe In Comment Field  Patient states that medication didn't work for her.     Lisinopril Cough       Review of Systems   Constitutional: Positive for fatigue. Negative for fever.   Gastrointestinal: Positive for abdominal pain. Negative for abdominal distention and constipation.   Musculoskeletal: Positive for arthralgias and back pain. Negative for gait problem.   Skin: Negative for rash and wound.   Neurological: Positive for weakness.        OBJECTIVE:     BP (!) 142/54 (BP Location: Right arm, Patient Position:  Sitting, Cuff Size: Adult Large)   Pulse 72   Temp 98.6  F (37  C) (Tympanic)   Resp 16   Wt 49.9 kg (110 lb)   SpO2 99%   BMI 21.85 kg/m    Body mass index is 21.85 kg/m .  Physical Exam  Constitutional:       Appearance: Normal appearance.   Cardiovascular:      Rate and Rhythm: Normal rate and regular rhythm.   Pulmonary:      Effort: Pulmonary effort is normal.      Breath sounds: Normal breath sounds.   Abdominal:      Comments: Mild diffuse pain on palpation.   Musculoskeletal:      Comments: Significant kyphosis but not tender on palpation of spine.  Has moderate tenderness on palpation of lower extremity muscles, not joints   Neurological:      General: No focal deficit present.      Mental Status: She is alert.   Psychiatric:         Mood and Affect: Mood normal.         Behavior: Behavior normal.             ASSESSMENT/PLAN:   Challenging situation.  Her overall health is declining slowly, but significantly.  It is very hard to get a completely accurate history of details.  Daughter now lives with her, but has a limited understanding of the decline as well and would like to continue to treat her aggressively.  Daughter would like to change her to oxycodone for the pain.  Will change to oxycodone.  Really has end stage diffuse DJD.    (R10.84) Abdominal pain, generalized  (primary encounter diagnosis)  Comment: see above  Plan: Comprehensive Metabolic Panel, CBC and         Differential, CT Abdomen Pelvis w/o & w         Contrast             (M53.3) Sacroiliac joint pain  Comment: progressing  Plan: oxyCODONE (ROXICODONE) 5 MG tablet,         DISCONTINUED: HYDROcodone-acetaminophen (NORCO)        5-325 MG tablet        #150 given, follow up in 4 weeks    (M47.26) Osteoarthritis of spine with radiculopathy, lumbar region  Comment:    Plan: oxyCODONE (ROXICODONE) 5 MG tablet,         DISCONTINUED: HYDROcodone-acetaminophen (NORCO)        5-325 MG tablet                 Godwin Kapoor MD  [facility]  [facility]

## 2019-11-15 NOTE — NURSING NOTE
"Coming in for a medication check up    Getting stomach pains in the evening    Vomited this morning    Chief Complaint   Patient presents with     Recheck Medication     back, knees and toe hurts and is shakey       Initial BP (!) 142/54 (BP Location: Right arm, Patient Position: Sitting, Cuff Size: Adult Large)   Pulse 72   Temp 98.6  F (37  C) (Tympanic)   Resp 16   Wt 49.9 kg (110 lb)   SpO2 99%   BMI 21.85 kg/m   Estimated body mass index is 21.85 kg/m  as calculated from the following:    Height as of 11/4/19: 1.511 m (4' 11.5\").    Weight as of this encounter: 49.9 kg (110 lb).  Medication Reconciliation: complete    Monisha Gamboa LPN  "

## 2019-11-21 NOTE — PROGRESS NOTES
CHIEF COMPLAINT: Bilateral Knee Pain Recheck     PROBLEMS:   Patient has no noted problems.    PATIENT REPORTED MEDICATIONS:  HYDROCODONE-ACETAMINOPHEN TABLET (HYDROCODONE-ACETAMINOPHEN TABS)   HYZAAR TABLET (LOSARTAN POTASSIUM-HCTZ TABS)   AGGRENOX CAPSULE EXTENDED RELEASE 12 HOUR (ASPIRIN-DIPYRIDAMOLE XR12H-CAP)     PATIENT REPORTED ALLERGIES:  LISINOPRIL (LISINOPRIL) (ModerateReaction: No reaction details noted)      HISTORY OF PRESENT ILLNESS:    REASON FOR EVALUATION:  Bilateral knee pain.    HISTORY OF PRESENT ILLNESS:  Tenisha comes in bilateral knee pain.  Is overall looking for repeat injections.  Last set of injections were done about three months back.     PAST MEDICAL HISTORY:    Hypertension  Arthritis     SOCIAL HISTORY:     Alcohol Use - No   Tobacco Use - Never    PHYSICAL EXAMINATION:    Examination of both knees shows tenderness across both medial joint lines.  Neurovascular examination is otherwise intact.  Mild swelling is seen there, as well.      ASSESSMENT:    IMPRESSION:  Bilateral knee arthrosis.     PROCEDURES:   Risks and benefits of the procedure were reviewed with the patient. Informed consent was obtained. Blood sugar risks for our diabetic patients were also discussed.    After achieving informed consent and sterile preparation, the patient's bilateral knees are injected with 4 cc 1% lidocaine and 40 mg Kenalog under sterile conditions.  The patient did tolerate the procedures well.      PLAN:   Bilateral knee injections as stated above.      We are also going to coordinate left hip intraarticular, in addition to lumbar epidural steroid injection with Dr. Meek here per her request.  She has had these in the past with decent overall success.      Dictated by:  Say Nelson MD  Copy to:  Godwin Kapoor MD     D:  11/13/19  T:  11/20/19    Typed and/or reviewed and corrected by signing  below, and sent to the Physician for final review and signature.       This report was created using voice recording software and computer-generated templates. Although every effort has been made to review for and eliminate errors, some errors may still occur.         Electronically signed by Johnna Dickens on 11/20/2019 at 9:53 AM    Electronically signed by Say Nelson MD on 11/20/2019 at 12:19 PM  ________________________________________________________________________

## 2019-11-26 NOTE — LETTER
November 26, 2019      Tenisha Cagle  [address] [address]        Dear Tenisha,     The CT scan shows only a large amount of stool and a hiatal hernia.  The constipation can clearly make you feel poorly, but nothing that looks like cancer at all.  This type of hernia is very common and most people have no symptoms from it.      Sincerely,      Godwin Kapoor MD on 11/26/2019 at 1:19 PM

## 2019-11-26 NOTE — PROGRESS NOTES
IV Contrast- Discharge Instructions After Your CT Scan      The IV contrast you received today will be filtered from your bloodstream by your kidneys during the next 24 hours and pass from the body in urine.  You will not be aware of this process and your urine will not change in color.  To help this process you should drink at least 4 additional glasses of water or juice today.  This reduces stress on your kidneys.    Most contrast reactions are immediate.  Should you develop symptoms of concern after discharge, contact the department at the number below.  After hours you should contact your personal physician.  If you develop breathing distress or wheezing, call 911.      1.  Has the patient had a previous reaction to IV contrast? n    2.  Does the patient have kidney disease? GFR 38, Ok per RAD to give conrast    3.  Is the patient on dialysis? n    If YES to any of these questions, exam will be reviewed with a Radiologist before administering contrast.

## 2019-12-02 NOTE — TELEPHONE ENCOUNTER
called daughter and she said to [facility]    Monisha Gamboa LPN on 12/2/2019 at 12:23 PM

## 2019-12-02 NOTE — TELEPHONE ENCOUNTER
Patients daughter called in regards to filling patients hysar RX. She states pharmacy told her to call PCP office. Please call her back in regard to this. Thank you! Hanna Durham on 12/2/2019 at 12:20 PM

## 2019-12-03 NOTE — NURSING NOTE
"Chief Complaint   Patient presents with     RECHECK     Toenail Trimming       Initial BP (!) 145/68   Pulse 70   Temp 97.5  F (36.4  C) (Tympanic)   Ht 1.6 m (5' 3\")   Wt 49.9 kg (110 lb)   SpO2 98%   BMI 19.49 kg/m   Estimated body mass index is 19.49 kg/m  as calculated from the following:    Height as of this encounter: 1.6 m (5' 3\").    Weight as of this encounter: 49.9 kg (110 lb).  Medication Reconciliation: complete  Ramandeep Hernandez LPN  "

## 2019-12-03 NOTE — PROGRESS NOTES
"Chief complaint: Patient presents with:  RECHECK: Toenail Trimming      History of Present Illness: This 86 year old female is seen with her daughter for evaluation and suggestions of management of elongated and painful bilateral hallux bilateral border ingrown toenails. The patient and the patient's daughter stated the patient did not have a lot of pain relief from the bilateral hallux slant back last time. She prefers to have her nails trimmed close to the 63+ day timeline to avoid the nails from becoming too long.    The patient prefers to wear her tennis shoes around her house. Her daughter was going to take her shopping for new shoes and a new recliner (the recliner portion is broken and does not elevate her feet a lot). She has had increased edema and pain in her bilateral legs and her daughter is wondering if Lasix can be prescribed for her edema.    Patient lives in [place] and she says it has been difficult to find a provider to care for her nails. She previously had a nurse to trim them. She also had her beauty salon try to trim them, but they told her that her nails curved in so they couldn't reach them as far as they needed to go. The patient would like them trimmed today. No further pedal complaints today.       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)     Numbness and tingling of right leg     Osteoarthritis of spine with " radiculopathy, lumbar region     Pancreatic mass     Paresthesia of right leg     Radicular leg pain     Right knee DJD     SK (seborrheic keratosis)     Benign skin lesion of nose     Visual changes     Memory loss     Atrial fibrillation with RVR (H)     Arthritis of shoulder region, left, degenerative     Controlled substance agreement signed 3/8/19     Chondrodermatitis nodularis chronica helicis, right       Past Surgical History:   Procedure Laterality Date     CHOLECYSTECTOMY      No Comments Provided     COLONOSCOPY      2007,Sigmoid diverticulosis     ENDOSCOPY UPPER, COLONOSCOPY, COMBINED      5/5/11,Hiatal hernia, gastric gland hyperplasia in antrum     LAPAROSCOPIC TUBAL LIGATION      No Comments Provided     OTHER SURGICAL HISTORY      1986,[id number],BIOPSY BREAST,Right     OTHER SURGICAL HISTORY      [id number],PREMALIG/BENIGN SKIN LESION EXCISION,R side of nose, face and chest wall/Basal Cell Cancer Excision     TONSILLECTOMY, ADENOIDECTOMY, COMBINED      1954       Current Outpatient Medications   Medication     acetaminophen (TYLENOL) 500 MG tablet     aspirin-dipyridamole ER (AGGRENOX)  MG 12 hr capsule     Calcium Carbonate-Vitamin D (CALCIUM 500 + D) 500-125 MG-UNIT TABS     ferrous sulfate (FEROSUL) 325 (65 Fe) MG tablet     lactulose (CHRONULAC) 10 GM/15ML solution     losartan-hydrochlorothiazide (HYZAAR) 50-12.5 MG tablet     magnesium hydroxide (MILK OF MAGNESIA) 400 MG/5ML suspension     mirtazapine (REMERON) 7.5 MG tablet     Multiple Vitamin (MULTI-VITAMINS) TABS     Multiple Vitamins-Minerals (OCUVITE ADULT 50+) CAPS     order for DME     oxyCODONE (ROXICODONE) 5 MG tablet     ranitidine (ZANTAC) 150 MG tablet     No current facility-administered medications for this visit.           Allergies   Allergen Reactions     Lansoprazole Other (See Comments) and Unknown     Patient states that medication didn't work for her.  Daughter states she got delusional, water did not taste right  Other  reaction(s): Other - Describe In Comment Field  Patient states that medication didn't work for her.     Lisinopril Cough       Family History   Problem Relation Age of Onset     Other - See Comments Mother         Stroke,Had a CVA age 85     Cancer Father         Cancer,Brain tumor, age 56     Blood Disease Sister         Blood Disease,Leukemia and     Cancer Sister         Cancer, kidney cancer     Blood Disease Sister         Blood Disease,Chronic lymphocytic leukemia       Social History     Socioeconomic History     Marital status:      Spouse name: None     Number of children: None     Years of education: None     Highest education level: None   Occupational History     None   Social Needs     Financial resource strain: None     Food insecurity:     Worry: None     Inability: None     Transportation needs:     Medical: None     Non-medical: None   Tobacco Use     Smoking status: Never Smoker     Smokeless tobacco: Never Used   Substance and Sexual Activity     Alcohol use: No     Alcohol/week: 0.0 oz     Drug use: No     Sexual activity: Not Currently   Lifestyle     Physical activity:     Days per week: None     Minutes per session: None     Stress: None   Relationships     Social connections:     Talks on phone: None     Gets together: None     Attends Judaism service: None     Active member of club or organization: None     Attends meetings of clubs or organizations: None     Relationship status: None     Intimate partner violence:     Fear of current or ex partner: None     Emotionally abused: None     Physically abused: None     Forced sexual activity: None   Other Topics Concern     Parent/sibling w/ CABG, MI or angioplasty before 65F 55M? Not Asked   Social History Narrative    Nonsmoker. .  Lives alone in a house.   Continues to drive.   2 grown kids, one in [place]       ROS: 10 point ROS neg other than the symptoms noted above in the HPI.  EXAM  Constitutional: healthy, alert and no  distress    Psychiatric: mentation appears normal and affect normal/bright    VASCULAR:  -Dorsalis pedis pulse +1/4 b/l (biphasic on doppler bilaterally on 04/22/2019)  -Posterior tibial pulse +1/4 b/l (biphasic on doppler bilaterally on 04/22/2019)  -Hair growth Absent to b/l anterior legs and ankles  -Moderate 3+ to 4+ pitting edema to bilateral legs  -Telangiectasias to bilateral feet, ankles and legs  NEURO:  -Light touch sensation intact to b/l plantar forefoot  DERM:  -Skin temperature cool to bilateral legs and feet  -Skin thin, shiny, atrophic to bilateral feet and legs  -Toenails elongated, dystrophic and discolored x 10  ---Incurvation to the bilateral border of the bilateral hallux  ---Mild erythema and edema to the nail borders  ---No open wounds and no drainage  ---No severe erythema, no ascending erythema, no calor, no purulence no openings of skin, no malodor, no other SOI.    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 Treatment options discussed with no educational barriers noted.  -Nails debrided x 10 without incident  ---Slant back to bilateral borders of bilateral hallux (left RIGHT hallux toenail a little long)  ---Patient still had tenderness to the bilateral borders  of the bilateral hallux post debridement but improved from pre debridement  ---Explained the alternative (matrixectomy), but this would require diligent, daily dressing changes and monitoring for SOI.She will continue with conservative treatment at this time.  -Previously recommended compression socks -- patient will need to discuss starting Lasix with her PCP.   ---She has an appointment scheduled with her PCP on 12/13/2019. Patient instructed to discuss her bilateral leg edema and pain with her PCP.  ---Patient instructed to elevate her legs as much as possible (her daughter plans on taking the patient shopping soon for a new, recliner chair.  -Patient in agreement with the above treatment plan and all of patient's questions were answered.      RTC 63+ days for bilateral hallux bilateral border slant back and toenail debridement        Sally Arshad DPM

## 2019-12-09 NOTE — TELEPHONE ENCOUNTER
Rani is requesting the prescription for Losartan/Hydrochlorothiazide on 12/5/2019 due to [facility] not [facility].   Yazmin Kaur LPN on 12/9/2019 at 1:03 PM

## 2019-12-12 NOTE — TELEPHONE ENCOUNTER
Losartan -hydrochlorothiazide Rx on 12/2/19 needs to be transferred to [facility]  [facility].    Rx sent to [facility].    Prescription approved per [facility] Refill Protocol.  Sabra Warner RN............................ 12/12/2019 12:33 PM

## 2019-12-13 NOTE — NURSING NOTE
"Coming in for a one month medication check up    Chief Complaint   Patient presents with     Recheck Medication     one month       Initial /64   Pulse 72   Temp 98.6  F (37  C) (Tympanic)   Resp 16   Wt 51.3 kg (113 lb)   SpO2 98%   BMI 20.02 kg/m   Estimated body mass index is 20.02 kg/m  as calculated from the following:    Height as of 12/3/19: 1.6 m (5' 3\").    Weight as of this encounter: 51.3 kg (113 lb).  Medication Reconciliation: complete    Monisha Gamboa LPN  "

## 2019-12-13 NOTE — PROGRESS NOTES
SUBJECTIVE:   Tenisha Cagle is a 86 year old female who presents to clinic today for the following health issues:    HPI  Follow up on pain.  It is basically the same in low back and hip.  Here with daughter who says the increase in the narcotics has helped her sleep better and function better.  Has not had any falls.  Uses a cane all the time.   Daughter stopped her remeron, noted significant confusion on it.  Daughter wants to do a prednisone burst again, helps with her hip the most.    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     Priority: Medium     Controlled substance agreement signed 3/8/19 03/08/2019     Priority: Medium     Class: Chronic     Arthritis of shoulder region, left, degenerative 01/07/2019     Priority: Medium     Memory loss 05/25/2018     Priority: Medium     Atrial fibrillation with RVR (H) 01/25/2018     Priority: Medium     Hiatal hernia 01/17/2018     Priority: Medium     Overview:   large, mostly intrathoracic       Essential hypertension 01/17/2018     Priority: Medium     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/27/2017     Priority: Medium     Benign skin lesion of nose 09/27/2017     Priority: Medium     AK (actinic keratosis) 07/10/2017     Priority: Medium     SK (seborrheic keratosis) 07/10/2017     Priority: Medium     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) 06/15/2017     Priority: Medium     Abnormal weight loss 04/18/2016     Priority: Medium     Anemia 04/18/2016     Priority: Medium     Visual changes 04/18/2016     Priority: Medium     Osteoarthritis of spine with radiculopathy, lumbar region 02/03/2016     Priority: Medium     History of TIA (transient ischemic attack) 02/02/2016     Priority: Medium     Numbness and tingling of right leg 02/02/2016     Priority: Medium     Radicular leg pain 02/02/2016     Priority: Medium     Basal cell carcinoma of right cheek 11/18/2015      Priority: Medium     Alvarado's cyst of knee 02/20/2014     Priority: Medium     Paresthesia of right leg 02/20/2014     Priority: Medium     Right knee DJD 02/20/2014     Priority: Medium     Cystocele 08/23/2013     Priority: Medium     Pancreatic mass 09/18/2012     Priority: Medium     Overview:   Possible, abnormal CT        Cerebral aneurysm, nonruptured 12/20/2011     Priority: Medium     Diverticulosis of colon 05/13/2011     Priority: Medium     Chronic lymphocytic leukemia (H) 04/14/2011     Priority: Medium     Overview:   WBC stable in the 30,000       Past Surgical History:   Procedure Laterality Date     CHOLECYSTECTOMY      No Comments Provided     COLONOSCOPY      2007,Sigmoid diverticulosis     ENDOSCOPY UPPER, COLONOSCOPY, COMBINED      5/5/11,Hiatal hernia, gastric gland hyperplasia in antrum     LAPAROSCOPIC TUBAL LIGATION      No Comments Provided     OTHER SURGICAL HISTORY      1986,[id number],BIOPSY BREAST,Right     OTHER SURGICAL HISTORY      [id number],PREMALIG/BENIGN SKIN LESION EXCISION,R side of nose, face and chest wall/Basal Cell Cancer Excision     TONSILLECTOMY, ADENOIDECTOMY, COMBINED      1954     Social History     Tobacco Use     Smoking status: Never Smoker     Smokeless tobacco: Never Used   Substance Use Topics     Alcohol use: No     Alcohol/week: 0.0 standard drinks     Current Outpatient Medications   Medication Sig Dispense Refill     acetaminophen (TYLENOL) 500 MG tablet Take 1,000 mg by mouth every 6 hours as needed       aspirin-dipyridamole ER (AGGRENOX)  MG 12 hr capsule TAKE 1 CAPSULE TWICE A  capsule 3     Calcium Carbonate-Vitamin D (CALCIUM 500 + D) 500-125 MG-UNIT TABS Take 1 tablet by mouth daily       ferrous sulfate (FEROSUL) 325 (65 Fe) MG tablet Take 1 tablet (325 mg) by mouth daily (with breakfast) 30 tablet 3     lactulose (CHRONULAC) 10 GM/15ML solution Take 30 mLs (20 g) by mouth 2 times daily as needed for constipation 1000 mL 11      losartan-hydrochlorothiazide (HYZAAR) 50-12.5 MG tablet Take 1 tablet by mouth daily 90 tablet 3     magnesium hydroxide (MILK OF MAGNESIA) 400 MG/5ML suspension Take 15 mLs by mouth At Bedtime       Multiple Vitamin (MULTI-VITAMINS) TABS Take 1 tablet by mouth daily       Multiple Vitamins-Minerals (OCUVITE ADULT 50+) CAPS Take 1 capsule by mouth daily       order for DME Equipment being ordered: pillow with a hole in the center 1 Device 0     oxyCODONE (ROXICODONE) 5 MG tablet Take 1 tablet (5 mg) by mouth every 4 hours as needed for pain 150 tablet 0     ranitidine (ZANTAC) 150 MG tablet Take 1 tablet (150 mg) by mouth 2 times daily as needed for heartburn 180 tablet 3     Allergies   Allergen Reactions     Lansoprazole Other (See Comments) and Unknown     Patient states that medication didn't work for her.  Daughter states she got delusional, water did not taste right  Other reaction(s): Other - Describe In Comment Field  Patient states that medication didn't work for her.     Lisinopril Cough       Review of Systems   Constitutional: Negative for fatigue and fever.   Cardiovascular: Positive for peripheral edema. Negative for chest pain.   Musculoskeletal: Positive for arthralgias and back pain.        OBJECTIVE:     /64   Pulse 72   Temp 98.6  F (37  C) (Tympanic)   Resp 16   Wt 51.3 kg (113 lb)   SpO2 98%   BMI 20.02 kg/m    Body mass index is 20.02 kg/m .  Physical Exam  Constitutional:       Appearance: Normal appearance.   Cardiovascular:      Rate and Rhythm: Normal rate and regular rhythm.   Musculoskeletal:      Comments: 2+ lower extremity edema, bilateral.  No skin breakdown.  Negative straight leg raise.    Neurological:      Mental Status: She is alert.         Diagnostic Test Results:  none     ASSESSMENT/PLAN:         (I10) Essential hypertension  Comment: stable  Plan: losartan-hydrochlorothiazide (HYZAAR) 50-12.5         MG tablet        refilled    (M53.3) Sacroiliac joint  pain  Comment: stable  Plan: oxyCODONE (ROXICODONE) 5 MG tablet        Refilled, #150 given with follow up in 4 weeks    (M47.26) Osteoarthritis of spine with radiculopathy, lumbar region  Comment:    Plan: oxyCODONE (ROXICODONE) 5 MG tablet                 Godwin Kapoor MD  [facility]

## 2019-12-20 NOTE — PATIENT INSTRUCTIONS
Elevated legs up at the level of your heart 10-15 minutes at a time 4 times daily.     Increase water intake.      Constipation - Encouraged increase of water and apple, pear and prune juice to decrease symptoms and discomfort.  Use age appropriate over the counter medications such as stool softeners or miralax for constipation relief.  Encouraged soft stools. Return to clinic with change/worsening of symptoms.

## 2019-12-20 NOTE — NURSING NOTE
Chief Complaint   Patient presents with     Gastrointestinal Problem     Shaking         Medication Reconciliation: complete    Sandy Dean, LPN

## 2019-12-20 NOTE — PROGRESS NOTES
Nursing Notes:   Sandy Dean LPN  12/20/2019  2:40 PM  Signed  Chief Complaint   Patient presents with     Gastrointestinal Problem     Shaking         Medication Reconciliation: complete    Sandy Dean LPN      HPI:    Tenisha Cagle is a 86 year old female who presents for belly pain.  Patient presents with her daughter.  Patient chronically taking a narcotic pain medication.  Patient takes lactulose 1 tablespoon twice daily in order to keep her bowels soft and regular.  The daughter states that they have forgotten to take the lactulose medication for the last 3 to 4 days.  Yesterday she developed increased belly pain.  They felt like her belly was more swollen than normal.  She took lactulose this past evening.  She then had a larger bowel movement this morning.  Had some mild belly pain this morning however it has resolved as of this afternoon.  No fevers, chills, nausea, vomiting, diarrhea, blood in stool or urine, urinary symptoms.  Keeping food and fluids down.  Drinks a minimal amount of water.  Has not taken her lactulose this morning.    They have noticed that her lower extremities have been swollen bilaterally over the last few weeks.  Does not elevate her legs.    Patient constantly struggles with pain in her mid and lower back, hips and knees.  Taking oxycodone for the discomfort.  Not using ice or heat.    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)     4/14/2011     Diaphragmatic hernia without obstruction or gangrene     large, mostly intrathoracic     Diverticulosis of large intestine without perforation or abscess without bleeding     5/13/2011     Dysthymic disorder     No Comments Provided     Essential (primary) hypertension     No Comments Provided     Female climacteric state     Menopausal age 56      Gastritis without bleeding     03,Treated with PrevPac     Nonruptured cerebral aneurysm     2011     Osteoarthritis     No Comments Provided     Other fecal abnormalities     Recurrent loose stool     Other lymphoid leukemia not having achieved remission (H)     CLL, stable WBC 30,000     Other specified bacterial intestinal infections     2003, H. pylori gastritis treated with Prevpac     Other specified enthesopathies of unspecified lower limb, excluding foot     No Comments Provided     Peptic ulcer without hemorrhage or perforation     No Comments Provided     Personal history of other diseases of the digestive system (CODE)     2011     Personal history of other medical treatment (CODE)     , vaginal deliveries     Pure hypercholesterolemia     No Comments Provided     Sepsis (H)     child,Hospitalized as a child for blood poisoning     Sleep disorder     2011     Transient cerebral ischemic attack     2011       Past Surgical History:   Procedure Laterality Date     CHOLECYSTECTOMY      No Comments Provided     COLONOSCOPY      ,Sigmoid diverticulosis     ENDOSCOPY UPPER, COLONOSCOPY, COMBINED      11,Hiatal hernia, gastric gland hyperplasia in antrum     LAPAROSCOPIC TUBAL LIGATION      No Comments Provided     OTHER SURGICAL HISTORY      ,2050,BIOPSY BREAST,Right     OTHER SURGICAL HISTORY      [id number],PREMALIG/BENIGN SKIN LESION EXCISION,R side of nose, face and chest wall/Basal Cell Cancer Excision     TONSILLECTOMY, ADENOIDECTOMY, COMBINED             Family History   Problem Relation Age of Onset     Other - See Comments Mother         Stroke,Had a CVA age 85     Cancer Father         Cancer,Brain tumor, age 56     Blood Disease Sister         Blood Disease,Leukemia and     Cancer Sister         Cancer, kidney cancer     Blood Disease Sister         Blood Disease,Chronic lymphocytic leukemia       Social History     Tobacco Use     Smoking status:  Never Smoker     Smokeless tobacco: Never Used   Substance Use Topics     Alcohol use: No     Alcohol/week: 0.0 standard drinks       Current Outpatient Medications   Medication Sig Dispense Refill     acetaminophen (TYLENOL) 500 MG tablet Take 1,000 mg by mouth every 6 hours as needed       aspirin-dipyridamole ER (AGGRENOX)  MG 12 hr capsule TAKE 1 CAPSULE TWICE A  capsule 3     Calcium Carbonate-Vitamin D (CALCIUM 500 + D) 500-125 MG-UNIT TABS Take 1 tablet by mouth daily       ferrous sulfate (FEROSUL) 325 (65 Fe) MG tablet Take 1 tablet (325 mg) by mouth daily (with breakfast) 30 tablet 3     lactulose (CHRONULAC) 10 GM/15ML solution Take 30 mLs (20 g) by mouth 2 times daily as needed for constipation 1000 mL 11     losartan-hydrochlorothiazide (HYZAAR) 50-12.5 MG tablet Take 1 tablet by mouth daily 90 tablet 3     magnesium hydroxide (MILK OF MAGNESIA) 400 MG/5ML suspension Take 15 mLs by mouth At Bedtime       Multiple Vitamin (MULTI-VITAMINS) TABS Take 1 tablet by mouth daily       Multiple Vitamins-Minerals (OCUVITE ADULT 50+) CAPS Take 1 capsule by mouth daily       order for DME Equipment being ordered: pillow with a hole in the center 1 Device 0     oxyCODONE (ROXICODONE) 5 MG tablet Take 1 tablet (5 mg) by mouth every 4 hours as needed for pain 150 tablet 0     predniSONE (DELTASONE) 10 MG tablet Take 1 tablet (10 mg) by mouth daily for 15 days 15 tablet 0     ranitidine (ZANTAC) 150 MG tablet Take 1 tablet (150 mg) by mouth 2 times daily as needed for heartburn 180 tablet 3       Allergies   Allergen Reactions     Lansoprazole Other (See Comments) and Unknown     Patient states that medication didn't work for her.  Daughter states she got delusional, water did not taste right  Other reaction(s): Other - Describe In Comment Field  Patient states that medication didn't work for her.     Lisinopril Cough       REVIEW OF SYSTEMS:  Refer to HPI.    EXAM:   Vitals:    /60 (BP Location:  Right arm, Patient Position: Sitting, Cuff Size: Adult Regular)   Pulse 84   Temp 99.2  F (37.3  C)   Resp 20   Wt 50.2 kg (110 lb 9.6 oz)   SpO2 98%   BMI 19.59 kg/m      General Appearance: Pleasant, alert, appropriate appearance for age. No acute distress  Chest/Respiratory Exam: Normal chest wall and respirations. Clear to auscultation.  Cardiovascular Exam: Regular rate and rhythm. S1, S2, no murmur, click, gallop, or rubs.  Gastrointestinal Exam: Soft, no masses or organomegaly. Normal BS x 4.  Mild amount of abdominal pain with palpation of the left lower quadrant, left upper quadrant and right upper quadrant.  No rebound tenderness or guarding appreciated.  No CVA tenderness to palpation.  Musculoskeletal Exam: Back is straight, full ROM of upper and lower extremities.  Mild pain with palpation throughout the mid and lower back.  No pinpoint spinal pain with palpation.  No bruising or swelling appreciated.  2+ pedal edema bilaterally.  No bruising or erythema appreciated.  No open wounds appreciated.  Skin: no rash or abnormalities  Neurologic Exam: Nonfocal, normal gross motor, tone coordination and no tremor.  Psychiatric Exam: Alert and oriented - appropriate affect.    PHQ Depression Screen  PHQ-9 SCORE 5/15/2017 8/30/2018 4/9/2019   PHQ-9 Total Score 0 0 0       ASSESSMENT AND PLAN:      ICD-10-CM    1. Slow transit constipation K59.01    2. Osteoarthritis of spine with radiculopathy, lumbar region M47.26    3. Bilateral lower extremity edema R60.0        Bilateral lower extremity edema: Stressed need to increase water intake.  Elevated legs up at the level of your heart 10-15 minutes at a time 4 times daily.   Encouraged to either wrap her legs in Ace wraps or get compression stockings.  Return to clinic with change/worsening of symptoms.   Encouraged to decrease salt intake.    Increase water intake.      Constipation - Encouraged increase of water and apple, pear and prune juice to decrease  symptoms and discomfort.  Use age appropriate over the counter medications such as stool softeners or miralax for constipation relief.  Encouraged soft stools. Return to clinic with change/worsening of symptoms.   Encourage routine use of the lactulose twice daily.  Can add an extra tablespoon if needed for increased constipation.    Osteoarthritis: Continue medications as previously prescribed.  Encouraged to use a warm heating pad on the back for discomfort. Return to clinic with change/worsening of symptoms.     Patient Instructions   Elevated legs up at the level of your heart 10-15 minutes at a time 4 times daily.     Increase water intake.      Constipation - Encouraged increase of water and apple, pear and prune juice to decrease symptoms and discomfort.  Use age appropriate over the counter medications such as stool softeners or miralax for constipation relief.  Encouraged soft stools. Return to clinic with change/worsening of symptoms.            Danna Torres PA-C PA-C..................12/20/2019 2:36 PM

## 2019-12-27 NOTE — TELEPHONE ENCOUNTER
Routing refill request to provider for review/approval because:  Drug not on the [facility] refill protocol     LOV: 12/13/19  Oma Garibay RN on 12/27/2019 at 4:05 PM

## 2020-01-01 ENCOUNTER — OFFICE VISIT (OUTPATIENT)
Dept: FAMILY MEDICINE | Facility: OTHER | Age: 85
End: 2020-01-01
Attending: FAMILY MEDICINE
Payer: MEDICARE

## 2020-01-01 ENCOUNTER — APPOINTMENT (OUTPATIENT)
Dept: OCCUPATIONAL THERAPY | Facility: OTHER | Age: 85
DRG: 552 | End: 2020-01-01
Attending: FAMILY MEDICINE
Payer: MEDICARE

## 2020-01-01 ENCOUNTER — MEDICAL CORRESPONDENCE (OUTPATIENT)
Dept: HEALTH INFORMATION MANAGEMENT | Facility: OTHER | Age: 85
End: 2020-01-01

## 2020-01-01 ENCOUNTER — TELEPHONE (OUTPATIENT)
Dept: FAMILY MEDICINE | Facility: OTHER | Age: 85
End: 2020-01-01

## 2020-01-01 ENCOUNTER — APPOINTMENT (OUTPATIENT)
Dept: GENERAL RADIOLOGY | Facility: OTHER | Age: 85
DRG: 552 | End: 2020-01-01
Attending: EMERGENCY MEDICINE
Payer: MEDICARE

## 2020-01-01 ENCOUNTER — NURSE TRIAGE (OUTPATIENT)
Dept: FAMILY MEDICINE | Facility: OTHER | Age: 85
End: 2020-01-01

## 2020-01-01 ENCOUNTER — APPOINTMENT (OUTPATIENT)
Dept: PHYSICAL THERAPY | Facility: OTHER | Age: 85
DRG: 552 | End: 2020-01-01
Attending: INTERNAL MEDICINE
Payer: MEDICARE

## 2020-01-01 ENCOUNTER — APPOINTMENT (OUTPATIENT)
Dept: GENERAL RADIOLOGY | Facility: OTHER | Age: 85
DRG: 690 | End: 2020-01-01
Attending: FAMILY MEDICINE
Payer: MEDICARE

## 2020-01-01 ENCOUNTER — APPOINTMENT (OUTPATIENT)
Dept: GENERAL RADIOLOGY | Facility: OTHER | Age: 85
End: 2020-01-01
Attending: FAMILY MEDICINE
Payer: MEDICARE

## 2020-01-01 ENCOUNTER — APPOINTMENT (OUTPATIENT)
Dept: PHYSICAL THERAPY | Facility: OTHER | Age: 85
DRG: 552 | End: 2020-01-01
Attending: FAMILY MEDICINE
Payer: MEDICARE

## 2020-01-01 ENCOUNTER — APPOINTMENT (OUTPATIENT)
Dept: OCCUPATIONAL THERAPY | Facility: OTHER | Age: 85
End: 2020-01-01
Payer: MEDICARE

## 2020-01-01 ENCOUNTER — HOSPITAL ENCOUNTER (OUTPATIENT)
Facility: OTHER | Age: 85
Setting detail: OBSERVATION
Discharge: NURSING, REHAB OR LONG-TERM CARE | End: 2020-02-10
Attending: FAMILY MEDICINE | Admitting: FAMILY MEDICINE
Payer: MEDICARE

## 2020-01-01 ENCOUNTER — APPOINTMENT (OUTPATIENT)
Dept: OCCUPATIONAL THERAPY | Facility: OTHER | Age: 85
DRG: 552 | End: 2020-01-01
Attending: INTERNAL MEDICINE
Payer: MEDICARE

## 2020-01-01 ENCOUNTER — OFFICE VISIT (OUTPATIENT)
Dept: FAMILY MEDICINE | Facility: OTHER | Age: 85
End: 2020-01-01
Attending: PHYSICIAN ASSISTANT
Payer: MEDICARE

## 2020-01-01 ENCOUNTER — HOSPITAL ENCOUNTER (OUTPATIENT)
Dept: GENERAL RADIOLOGY | Facility: OTHER | Age: 85
End: 2020-03-03
Attending: PHYSICIAN ASSISTANT
Payer: MEDICARE

## 2020-01-01 ENCOUNTER — HOSPITAL ENCOUNTER (OUTPATIENT)
Dept: GENERAL RADIOLOGY | Facility: OTHER | Age: 85
End: 2020-04-28
Attending: FAMILY MEDICINE
Payer: MEDICARE

## 2020-01-01 ENCOUNTER — HOSPITAL ENCOUNTER (OUTPATIENT)
Dept: CT IMAGING | Facility: OTHER | Age: 85
DRG: 552 | End: 2020-05-28
Attending: INTERNAL MEDICINE
Payer: MEDICARE

## 2020-01-01 ENCOUNTER — NURSING HOME VISIT (OUTPATIENT)
Dept: GERIATRICS | Facility: OTHER | Age: 85
End: 2020-01-01
Attending: NURSE PRACTITIONER
Payer: MEDICARE

## 2020-01-01 ENCOUNTER — APPOINTMENT (OUTPATIENT)
Dept: CT IMAGING | Facility: OTHER | Age: 85
End: 2020-01-01
Attending: PHYSICIAN ASSISTANT
Payer: MEDICARE

## 2020-01-01 ENCOUNTER — OFFICE VISIT (OUTPATIENT)
Dept: PODIATRY | Facility: OTHER | Age: 85
End: 2020-01-01
Attending: PODIATRIST
Payer: MEDICARE

## 2020-01-01 ENCOUNTER — PATIENT OUTREACH (OUTPATIENT)
Dept: CARE COORDINATION | Facility: CLINIC | Age: 85
End: 2020-01-01

## 2020-01-01 ENCOUNTER — OFFICE VISIT (OUTPATIENT)
Dept: ORTHOPEDICS | Facility: OTHER | Age: 85
End: 2020-01-01
Attending: ORTHOPAEDIC SURGERY
Payer: MEDICARE

## 2020-01-01 ENCOUNTER — APPOINTMENT (OUTPATIENT)
Dept: SPEECH THERAPY | Facility: OTHER | Age: 85
DRG: 690 | End: 2020-01-01
Attending: FAMILY MEDICINE
Payer: MEDICARE

## 2020-01-01 ENCOUNTER — TRANSFERRED RECORDS (OUTPATIENT)
Dept: HEALTH INFORMATION MANAGEMENT | Facility: OTHER | Age: 85
End: 2020-01-01

## 2020-01-01 ENCOUNTER — APPOINTMENT (OUTPATIENT)
Dept: GENERAL RADIOLOGY | Facility: OTHER | Age: 85
End: 2020-01-01
Attending: PHYSICIAN ASSISTANT
Payer: MEDICARE

## 2020-01-01 ENCOUNTER — APPOINTMENT (OUTPATIENT)
Dept: PHYSICAL THERAPY | Facility: OTHER | Age: 85
End: 2020-01-01
Payer: MEDICARE

## 2020-01-01 ENCOUNTER — APPOINTMENT (OUTPATIENT)
Dept: PHYSICAL THERAPY | Facility: OTHER | Age: 85
End: 2020-01-01
Attending: FAMILY MEDICINE
Payer: MEDICARE

## 2020-01-01 ENCOUNTER — TELEPHONE (OUTPATIENT)
Dept: INTERNAL MEDICINE | Facility: OTHER | Age: 85
End: 2020-01-01

## 2020-01-01 ENCOUNTER — APPOINTMENT (OUTPATIENT)
Dept: OCCUPATIONAL THERAPY | Facility: OTHER | Age: 85
End: 2020-01-01
Attending: FAMILY MEDICINE
Payer: MEDICARE

## 2020-01-01 ENCOUNTER — HOSPITAL ENCOUNTER (INPATIENT)
Facility: OTHER | Age: 85
LOS: 4 days | Discharge: HOME | DRG: 690 | End: 2020-04-07
Attending: FAMILY MEDICINE | Admitting: FAMILY MEDICINE
Payer: MEDICARE

## 2020-01-01 ENCOUNTER — APPOINTMENT (OUTPATIENT)
Dept: SPEECH THERAPY | Facility: OTHER | Age: 85
DRG: 690 | End: 2020-01-01
Payer: MEDICARE

## 2020-01-01 ENCOUNTER — HOSPITAL ENCOUNTER (INPATIENT)
Facility: OTHER | Age: 85
LOS: 2 days | Discharge: HOME HEALTH | DRG: 552 | End: 2020-05-28
Attending: EMERGENCY MEDICINE | Admitting: INTERNAL MEDICINE
Payer: MEDICARE

## 2020-01-01 ENCOUNTER — APPOINTMENT (OUTPATIENT)
Dept: LAB | Facility: OTHER | Age: 85
End: 2020-01-01
Attending: PHYSICIAN ASSISTANT
Payer: MEDICARE

## 2020-01-01 ENCOUNTER — APPOINTMENT (OUTPATIENT)
Dept: PHYSICAL THERAPY | Facility: OTHER | Age: 85
DRG: 690 | End: 2020-01-01
Payer: MEDICARE

## 2020-01-01 ENCOUNTER — HOSPITAL ENCOUNTER (EMERGENCY)
Facility: OTHER | Age: 85
Discharge: HOME | End: 2020-05-19
Attending: FAMILY MEDICINE | Admitting: FAMILY MEDICINE
Payer: MEDICARE

## 2020-01-01 ENCOUNTER — APPOINTMENT (OUTPATIENT)
Dept: CT IMAGING | Facility: OTHER | Age: 85
DRG: 552 | End: 2020-01-01
Attending: EMERGENCY MEDICINE
Payer: MEDICARE

## 2020-01-01 ENCOUNTER — ANESTHESIA EVENT (OUTPATIENT)
Dept: MEDSURG UNIT | Facility: OTHER | Age: 85
DRG: 552 | End: 2020-01-01
Payer: MEDICARE

## 2020-01-01 ENCOUNTER — HOSPITAL ENCOUNTER (OUTPATIENT)
Dept: GENERAL RADIOLOGY | Facility: OTHER | Age: 85
End: 2020-02-20
Attending: FAMILY MEDICINE
Payer: MEDICARE

## 2020-01-01 ENCOUNTER — APPOINTMENT (OUTPATIENT)
Dept: OCCUPATIONAL THERAPY | Facility: OTHER | Age: 85
DRG: 690 | End: 2020-01-01
Attending: FAMILY MEDICINE
Payer: MEDICARE

## 2020-01-01 ENCOUNTER — VIRTUAL VISIT (OUTPATIENT)
Dept: INTERNAL MEDICINE | Facility: OTHER | Age: 85
End: 2020-01-01
Attending: NURSE PRACTITIONER
Payer: MEDICARE

## 2020-01-01 ENCOUNTER — APPOINTMENT (OUTPATIENT)
Dept: GENERAL RADIOLOGY | Facility: OTHER | Age: 85
DRG: 690 | End: 2020-01-01
Attending: INTERNAL MEDICINE
Payer: MEDICARE

## 2020-01-01 ENCOUNTER — APPOINTMENT (OUTPATIENT)
Dept: ULTRASOUND IMAGING | Facility: OTHER | Age: 85
End: 2020-01-01
Attending: PHYSICIAN ASSISTANT
Payer: MEDICARE

## 2020-01-01 ENCOUNTER — HOSPITAL ENCOUNTER (OUTPATIENT)
Facility: OTHER | Age: 85
Setting detail: OBSERVATION
Discharge: HOME | End: 2020-01-31
Attending: PHYSICIAN ASSISTANT | Admitting: INTERNAL MEDICINE
Payer: MEDICARE

## 2020-01-01 ENCOUNTER — APPOINTMENT (OUTPATIENT)
Dept: PHYSICAL THERAPY | Facility: OTHER | Age: 85
End: 2020-01-01
Attending: PHYSICIAN ASSISTANT
Payer: MEDICARE

## 2020-01-01 ENCOUNTER — TELEPHONE (OUTPATIENT)
Dept: FAMILY MEDICINE | Facility: OTHER | Age: 85
End: 2020-01-01
Payer: MEDICARE

## 2020-01-01 ENCOUNTER — OFFICE VISIT (OUTPATIENT)
Dept: INTERNAL MEDICINE | Facility: OTHER | Age: 85
End: 2020-01-01
Attending: FAMILY MEDICINE
Payer: MEDICARE

## 2020-01-01 ENCOUNTER — ANESTHESIA (OUTPATIENT)
Dept: MEDSURG UNIT | Facility: OTHER | Age: 85
DRG: 552 | End: 2020-01-01
Payer: MEDICARE

## 2020-01-01 VITALS
SYSTOLIC BLOOD PRESSURE: 120 MMHG | RESPIRATION RATE: 20 BRPM | WEIGHT: 108 LBS | TEMPERATURE: 99.7 F | OXYGEN SATURATION: 98 % | DIASTOLIC BLOOD PRESSURE: 56 MMHG | BODY MASS INDEX: 19.13 KG/M2 | HEART RATE: 68 BPM

## 2020-01-01 VITALS
TEMPERATURE: 97.1 F | HEIGHT: 66 IN | HEART RATE: 72 BPM | SYSTOLIC BLOOD PRESSURE: 169 MMHG | DIASTOLIC BLOOD PRESSURE: 78 MMHG | OXYGEN SATURATION: 97 % | WEIGHT: 103.6 LBS | RESPIRATION RATE: 17 BRPM | BODY MASS INDEX: 16.65 KG/M2

## 2020-01-01 VITALS
SYSTOLIC BLOOD PRESSURE: 140 MMHG | RESPIRATION RATE: 18 BRPM | OXYGEN SATURATION: 99 % | WEIGHT: 106 LBS | TEMPERATURE: 97.1 F | BODY MASS INDEX: 18.78 KG/M2 | HEART RATE: 65 BPM | DIASTOLIC BLOOD PRESSURE: 60 MMHG

## 2020-01-01 VITALS
HEART RATE: 80 BPM | OXYGEN SATURATION: 96 % | RESPIRATION RATE: 20 BRPM | HEIGHT: 63 IN | TEMPERATURE: 97.8 F | DIASTOLIC BLOOD PRESSURE: 50 MMHG | SYSTOLIC BLOOD PRESSURE: 150 MMHG | WEIGHT: 107.1 LBS | BODY MASS INDEX: 18.98 KG/M2

## 2020-01-01 VITALS
OXYGEN SATURATION: 98 % | TEMPERATURE: 98.7 F | RESPIRATION RATE: 20 BRPM | DIASTOLIC BLOOD PRESSURE: 58 MMHG | SYSTOLIC BLOOD PRESSURE: 136 MMHG | WEIGHT: 105 LBS | BODY MASS INDEX: 18.6 KG/M2 | HEART RATE: 60 BPM

## 2020-01-01 VITALS
SYSTOLIC BLOOD PRESSURE: 130 MMHG | OXYGEN SATURATION: 99 % | DIASTOLIC BLOOD PRESSURE: 72 MMHG | HEART RATE: 73 BPM | TEMPERATURE: 98.1 F | WEIGHT: 106 LBS | RESPIRATION RATE: 16 BRPM | BODY MASS INDEX: 18.78 KG/M2

## 2020-01-01 VITALS
DIASTOLIC BLOOD PRESSURE: 76 MMHG | RESPIRATION RATE: 16 BRPM | TEMPERATURE: 98.6 F | HEART RATE: 78 BPM | OXYGEN SATURATION: 99 % | SYSTOLIC BLOOD PRESSURE: 142 MMHG

## 2020-01-01 VITALS
HEART RATE: 70 BPM | DIASTOLIC BLOOD PRESSURE: 64 MMHG | RESPIRATION RATE: 16 BRPM | TEMPERATURE: 98.5 F | SYSTOLIC BLOOD PRESSURE: 100 MMHG | OXYGEN SATURATION: 97 %

## 2020-01-01 VITALS
HEART RATE: 68 BPM | HEIGHT: 66 IN | BODY MASS INDEX: 16.29 KG/M2 | OXYGEN SATURATION: 97 % | TEMPERATURE: 98.3 F | WEIGHT: 101.4 LBS | RESPIRATION RATE: 16 BRPM | SYSTOLIC BLOOD PRESSURE: 163 MMHG | DIASTOLIC BLOOD PRESSURE: 78 MMHG

## 2020-01-01 VITALS
WEIGHT: 109.25 LBS | RESPIRATION RATE: 22 BRPM | BODY MASS INDEX: 19.35 KG/M2 | SYSTOLIC BLOOD PRESSURE: 130 MMHG | OXYGEN SATURATION: 96 % | TEMPERATURE: 97.8 F | HEART RATE: 72 BPM | DIASTOLIC BLOOD PRESSURE: 80 MMHG

## 2020-01-01 VITALS
HEIGHT: 66 IN | TEMPERATURE: 97.6 F | BODY MASS INDEX: 15.75 KG/M2 | OXYGEN SATURATION: 98 % | WEIGHT: 98 LBS | RESPIRATION RATE: 19 BRPM | SYSTOLIC BLOOD PRESSURE: 151 MMHG | HEART RATE: 69 BPM | DIASTOLIC BLOOD PRESSURE: 136 MMHG

## 2020-01-01 VITALS
SYSTOLIC BLOOD PRESSURE: 152 MMHG | DIASTOLIC BLOOD PRESSURE: 62 MMHG | BODY MASS INDEX: 16.95 KG/M2 | HEART RATE: 66 BPM | WEIGHT: 105 LBS | RESPIRATION RATE: 17 BRPM | TEMPERATURE: 97.4 F | OXYGEN SATURATION: 99 %

## 2020-01-01 VITALS
OXYGEN SATURATION: 95 % | WEIGHT: 104 LBS | DIASTOLIC BLOOD PRESSURE: 70 MMHG | HEART RATE: 82 BPM | SYSTOLIC BLOOD PRESSURE: 130 MMHG | BODY MASS INDEX: 18.43 KG/M2 | TEMPERATURE: 98.5 F | HEIGHT: 63 IN

## 2020-01-01 VITALS
DIASTOLIC BLOOD PRESSURE: 69 MMHG | SYSTOLIC BLOOD PRESSURE: 139 MMHG | RESPIRATION RATE: 14 BRPM | WEIGHT: 109.35 LBS | OXYGEN SATURATION: 96 % | BODY MASS INDEX: 17.57 KG/M2 | HEIGHT: 66 IN | TEMPERATURE: 97.4 F | HEART RATE: 66 BPM

## 2020-01-01 VITALS — WEIGHT: 101 LBS | BODY MASS INDEX: 16.3 KG/M2

## 2020-01-01 VITALS — TEMPERATURE: 98.6 F

## 2020-01-01 DIAGNOSIS — F03.91 DEMENTIA WITH BEHAVIORAL DISTURBANCE, UNSPECIFIED DEMENTIA TYPE: Primary | ICD-10-CM

## 2020-01-01 DIAGNOSIS — N30.00 ACUTE CYSTITIS WITHOUT HEMATURIA: ICD-10-CM

## 2020-01-01 DIAGNOSIS — M47.26 OSTEOARTHRITIS OF SPINE WITH RADICULOPATHY, LUMBAR REGION: Primary | ICD-10-CM

## 2020-01-01 DIAGNOSIS — F11.20 OPIOID DEPENDENCE, UNCOMPLICATED (H): ICD-10-CM

## 2020-01-01 DIAGNOSIS — I48.0 PAROXYSMAL ATRIAL FIBRILLATION (H): ICD-10-CM

## 2020-01-01 DIAGNOSIS — I50.9 HEART FAILURE, UNSPECIFIED HF CHRONICITY, UNSPECIFIED HEART FAILURE TYPE (H): ICD-10-CM

## 2020-01-01 DIAGNOSIS — I48.0 PAROXYSMAL A-FIB (H): ICD-10-CM

## 2020-01-01 DIAGNOSIS — M16.12 ARTHRITIS OF LEFT HIP: ICD-10-CM

## 2020-01-01 DIAGNOSIS — I10 ESSENTIAL (PRIMARY) HYPERTENSION: ICD-10-CM

## 2020-01-01 DIAGNOSIS — M79.89 SWELLING OF LOWER EXTREMITY: ICD-10-CM

## 2020-01-01 DIAGNOSIS — M79.675 PAIN OF LEFT GREAT TOE: ICD-10-CM

## 2020-01-01 DIAGNOSIS — M25.551 HIP PAIN, RIGHT: ICD-10-CM

## 2020-01-01 DIAGNOSIS — H83.09 LABYRINTHITIS, UNSPECIFIED LATERALITY: ICD-10-CM

## 2020-01-01 DIAGNOSIS — R60.0 BILATERAL LOWER EXTREMITY EDEMA: ICD-10-CM

## 2020-01-01 DIAGNOSIS — R33.8 ACUTE URINARY RETENTION: ICD-10-CM

## 2020-01-01 DIAGNOSIS — R63.4 ABNORMAL WEIGHT LOSS: ICD-10-CM

## 2020-01-01 DIAGNOSIS — L60.3 ONYCHODYSTROPHY: ICD-10-CM

## 2020-01-01 DIAGNOSIS — Z86.73 HISTORY OF TIA (TRANSIENT ISCHEMIC ATTACK): ICD-10-CM

## 2020-01-01 DIAGNOSIS — I50.33 ACUTE ON CHRONIC DIASTOLIC HEART FAILURE (H): ICD-10-CM

## 2020-01-01 DIAGNOSIS — D50.0 IRON DEFICIENCY ANEMIA DUE TO CHRONIC BLOOD LOSS: ICD-10-CM

## 2020-01-01 DIAGNOSIS — I48.91 ATRIAL FIBRILLATION WITH RVR (H): ICD-10-CM

## 2020-01-01 DIAGNOSIS — R10.32 LLQ ABDOMINAL PAIN: ICD-10-CM

## 2020-01-01 DIAGNOSIS — I73.9 PERIPHERAL VASCULAR DISEASE (H): ICD-10-CM

## 2020-01-01 DIAGNOSIS — K86.89 PANCREATIC MASS: ICD-10-CM

## 2020-01-01 DIAGNOSIS — R39.9 URINARY SYMPTOM OR SIGN: ICD-10-CM

## 2020-01-01 DIAGNOSIS — Z86.73 PERSONAL HISTORY OF TRANSIENT CEREBRAL ISCHEMIA: ICD-10-CM

## 2020-01-01 DIAGNOSIS — D64.9 ANEMIA, UNSPECIFIED TYPE: ICD-10-CM

## 2020-01-01 DIAGNOSIS — R26.89 IMPAIRED GAIT AND MOBILITY: ICD-10-CM

## 2020-01-01 DIAGNOSIS — R33.9 URINARY RETENTION: ICD-10-CM

## 2020-01-01 DIAGNOSIS — L60.0 INGROWING NAIL: Primary | ICD-10-CM

## 2020-01-01 DIAGNOSIS — R20.0 NUMBNESS AND TINGLING OF RIGHT LEG: ICD-10-CM

## 2020-01-01 DIAGNOSIS — M47.26 OSTEOARTHRITIS OF SPINE WITH RADICULOPATHY, LUMBAR REGION: ICD-10-CM

## 2020-01-01 DIAGNOSIS — R05.9 COUGH: ICD-10-CM

## 2020-01-01 DIAGNOSIS — C91.10 CHRONIC LYMPHOCYTIC LEUKEMIA (H): ICD-10-CM

## 2020-01-01 DIAGNOSIS — D50.8 OTHER IRON DEFICIENCY ANEMIA: ICD-10-CM

## 2020-01-01 DIAGNOSIS — Z79.899 ENCOUNTER FOR LONG-TERM (CURRENT) USE OF OTHER MEDICATIONS: ICD-10-CM

## 2020-01-01 DIAGNOSIS — I50.9 CHF (CONGESTIVE HEART FAILURE) (H): ICD-10-CM

## 2020-01-01 DIAGNOSIS — I10 ESSENTIAL HYPERTENSION: ICD-10-CM

## 2020-01-01 DIAGNOSIS — I87.2 EDEMA OF RIGHT LOWER EXTREMITY DUE TO PERIPHERAL VENOUS INSUFFICIENCY: ICD-10-CM

## 2020-01-01 DIAGNOSIS — F03.91 DEMENTIA WITH BEHAVIORAL DISTURBANCE, UNSPECIFIED DEMENTIA TYPE: ICD-10-CM

## 2020-01-01 DIAGNOSIS — Z87.440 PERSONAL HISTORY OF URINARY TRACT INFECTION: ICD-10-CM

## 2020-01-01 DIAGNOSIS — M25.562 ARTHRALGIA OF BOTH KNEES: Primary | ICD-10-CM

## 2020-01-01 DIAGNOSIS — F03.90 DEMENTIA WITHOUT BEHAVIORAL DISTURBANCE, UNSPECIFIED DEMENTIA TYPE: ICD-10-CM

## 2020-01-01 DIAGNOSIS — C44.320 SQUAMOUS CELL CARCINOMA OF FACE: ICD-10-CM

## 2020-01-01 DIAGNOSIS — C91.10 CHRONIC LYMPHOCYTIC LEUKEMIA (H): Primary | ICD-10-CM

## 2020-01-01 DIAGNOSIS — R41.3 AMNESIA: ICD-10-CM

## 2020-01-01 DIAGNOSIS — F41.8 SITUATIONAL ANXIETY: ICD-10-CM

## 2020-01-01 DIAGNOSIS — M79.674 PAIN OF RIGHT GREAT TOE: ICD-10-CM

## 2020-01-01 DIAGNOSIS — E87.1 HYPONATREMIA: ICD-10-CM

## 2020-01-01 DIAGNOSIS — I50.33 ACUTE ON CHRONIC DIASTOLIC CONGESTIVE HEART FAILURE (H): ICD-10-CM

## 2020-01-01 DIAGNOSIS — R12 HEARTBURN: ICD-10-CM

## 2020-01-01 DIAGNOSIS — M16.11 PRIMARY OSTEOARTHRITIS OF RIGHT HIP: Primary | ICD-10-CM

## 2020-01-01 DIAGNOSIS — F11.20 NARCOTIC DEPENDENCE (H): ICD-10-CM

## 2020-01-01 DIAGNOSIS — M16.10 DEGENERATIVE JOINT DISEASE OF PELVIC REGION: ICD-10-CM

## 2020-01-01 DIAGNOSIS — E87.1 HYPONATREMIA: Primary | ICD-10-CM

## 2020-01-01 DIAGNOSIS — M54.10 RADICULAR LEG PAIN: Primary | ICD-10-CM

## 2020-01-01 DIAGNOSIS — M15.9 OSTEOARTHRITIS OF MULTIPLE JOINTS, UNSPECIFIED OSTEOARTHRITIS TYPE: ICD-10-CM

## 2020-01-01 DIAGNOSIS — Z79.891 ADMISSION FOR LONG-TERM OPIATE ANALGESIC USE: ICD-10-CM

## 2020-01-01 DIAGNOSIS — M16.11 PRIMARY OSTEOARTHRITIS OF RIGHT HIP: ICD-10-CM

## 2020-01-01 DIAGNOSIS — M70.61 TROCHANTERIC BURSITIS OF RIGHT HIP: ICD-10-CM

## 2020-01-01 DIAGNOSIS — D63.0 ANEMIA IN NEOPLASTIC DISEASE: ICD-10-CM

## 2020-01-01 DIAGNOSIS — J20.9 ACUTE BRONCHITIS, UNSPECIFIED ORGANISM: Primary | ICD-10-CM

## 2020-01-01 DIAGNOSIS — D50.9 IRON DEFICIENCY ANEMIA, UNSPECIFIED IRON DEFICIENCY ANEMIA TYPE: ICD-10-CM

## 2020-01-01 DIAGNOSIS — M47.26 OTHER SPONDYLOSIS WITH RADICULOPATHY, LUMBAR REGION: Primary | ICD-10-CM

## 2020-01-01 DIAGNOSIS — I48.0 PAROXYSMAL A-FIB (H): Primary | ICD-10-CM

## 2020-01-01 DIAGNOSIS — R41.3 MEMORY LOSS: ICD-10-CM

## 2020-01-01 DIAGNOSIS — R41.0 DISORIENTATION: ICD-10-CM

## 2020-01-01 DIAGNOSIS — F41.8 SITUATIONAL ANXIETY: Primary | ICD-10-CM

## 2020-01-01 DIAGNOSIS — I48.91 ATRIAL FIBRILLATION, UNSPECIFIED TYPE (H): ICD-10-CM

## 2020-01-01 DIAGNOSIS — I78.1 TELANGIECTASIAS: ICD-10-CM

## 2020-01-01 DIAGNOSIS — R20.2 NUMBNESS AND TINGLING OF RIGHT LEG: ICD-10-CM

## 2020-01-01 DIAGNOSIS — R41.0 DELIRIUM: ICD-10-CM

## 2020-01-01 DIAGNOSIS — R33.9 RETENTION OF URINE, UNSPECIFIED: ICD-10-CM

## 2020-01-01 DIAGNOSIS — R13.10 DYSPHAGIA: Primary | ICD-10-CM

## 2020-01-01 DIAGNOSIS — L03.116 LEFT LEG CELLULITIS: ICD-10-CM

## 2020-01-01 DIAGNOSIS — R42 VERTIGO: ICD-10-CM

## 2020-01-01 DIAGNOSIS — M25.561 ARTHRALGIA OF BOTH KNEES: Primary | ICD-10-CM

## 2020-01-01 DIAGNOSIS — I87.2 EDEMA OF LEFT LOWER EXTREMITY DUE TO PERIPHERAL VENOUS INSUFFICIENCY: ICD-10-CM

## 2020-01-01 DIAGNOSIS — I16.0 HYPERTENSIVE URGENCY: ICD-10-CM

## 2020-01-01 DIAGNOSIS — M25.552 PAIN OF LEFT HIP JOINT: Primary | ICD-10-CM

## 2020-01-01 DIAGNOSIS — G89.4 CHRONIC PAIN SYNDROME: ICD-10-CM

## 2020-01-01 DIAGNOSIS — Z79.899 CONTROLLED SUBSTANCE AGREEMENT SIGNED: ICD-10-CM

## 2020-01-01 DIAGNOSIS — R10.31 RLQ ABDOMINAL PAIN: ICD-10-CM

## 2020-01-01 DIAGNOSIS — M16.12 PRIMARY OSTEOARTHRITIS OF LEFT HIP: ICD-10-CM

## 2020-01-01 DIAGNOSIS — H83.09: ICD-10-CM

## 2020-01-01 DIAGNOSIS — R53.83 LETHARGY: ICD-10-CM

## 2020-01-01 DIAGNOSIS — M53.3 SACROILIAC JOINT PAIN: ICD-10-CM

## 2020-01-01 DIAGNOSIS — R07.9 CHEST PAIN, UNSPECIFIED TYPE: ICD-10-CM

## 2020-01-01 DIAGNOSIS — B37.0 THRUSH: Primary | ICD-10-CM

## 2020-01-01 LAB
ABO + RH BLD: NORMAL
ABO + RH BLD: NORMAL
ALBUMIN SERPL-MCNC: 2.8 G/DL (ref 3.5–5.7)
ALBUMIN SERPL-MCNC: 2.9 G/DL (ref 3.5–5.7)
ALBUMIN SERPL-MCNC: 3.4 G/DL (ref 3.5–5.7)
ALBUMIN SERPL-MCNC: 3.5 G/DL (ref 3.5–5.7)
ALBUMIN SERPL-MCNC: 4 G/DL (ref 3.5–5.7)
ALBUMIN UR-MCNC: 10 MG/DL
ALBUMIN UR-MCNC: 30 MG/DL
ALBUMIN UR-MCNC: NEGATIVE MG/DL
ALP SERPL-CCNC: 31 U/L (ref 34–104)
ALP SERPL-CCNC: 35 U/L (ref 34–104)
ALP SERPL-CCNC: 39 U/L (ref 34–104)
ALP SERPL-CCNC: 45 U/L (ref 34–104)
ALP SERPL-CCNC: 47 U/L (ref 34–104)
ALT SERPL W P-5'-P-CCNC: 11 U/L (ref 7–52)
ALT SERPL W P-5'-P-CCNC: 11 U/L (ref 7–52)
ALT SERPL W P-5'-P-CCNC: 12 U/L (ref 7–52)
ALT SERPL W P-5'-P-CCNC: 7 U/L (ref 7–52)
ALT SERPL W P-5'-P-CCNC: 8 U/L (ref 7–52)
ANION GAP SERPL CALCULATED.3IONS-SCNC: 10 MMOL/L (ref 3–14)
ANION GAP SERPL CALCULATED.3IONS-SCNC: 10 MMOL/L (ref 3–14)
ANION GAP SERPL CALCULATED.3IONS-SCNC: 5 MMOL/L (ref 3–14)
ANION GAP SERPL CALCULATED.3IONS-SCNC: 5 MMOL/L (ref 3–14)
ANION GAP SERPL CALCULATED.3IONS-SCNC: 6 MMOL/L (ref 3–14)
ANION GAP SERPL CALCULATED.3IONS-SCNC: 6 MMOL/L (ref 3–14)
ANION GAP SERPL CALCULATED.3IONS-SCNC: 7 MMOL/L (ref 3–14)
ANION GAP SERPL CALCULATED.3IONS-SCNC: 8 MMOL/L (ref 3–14)
ANION GAP SERPL CALCULATED.3IONS-SCNC: 9 MMOL/L (ref 3–14)
ANION GAP SERPL CALCULATED.3IONS-SCNC: 9 MMOL/L (ref 3–14)
APPEARANCE UR: ABNORMAL
APPEARANCE UR: CLEAR
APTT PPP: 31 SEC (ref 22–37)
AST SERPL W P-5'-P-CCNC: 11 U/L (ref 13–39)
AST SERPL W P-5'-P-CCNC: 14 U/L (ref 13–39)
AST SERPL W P-5'-P-CCNC: 14 U/L (ref 13–39)
AST SERPL W P-5'-P-CCNC: 16 U/L (ref 13–39)
AST SERPL W P-5'-P-CCNC: 16 U/L (ref 13–39)
BACTERIA #/AREA URNS HPF: ABNORMAL /HPF
BACTERIA SPEC CULT: ABNORMAL
BACTERIA SPEC CULT: NORMAL
BASOPHILS # BLD AUTO: 0 10E9/L (ref 0–0.2)
BASOPHILS # BLD AUTO: 0.1 10E9/L (ref 0–0.2)
BASOPHILS NFR BLD AUTO: 0.2 %
BASOPHILS NFR BLD AUTO: 0.3 %
BASOPHILS NFR BLD AUTO: 0.3 %
BASOPHILS NFR BLD AUTO: 0.4 %
BILIRUB SERPL-MCNC: 0.1 MG/DL (ref 0.3–1)
BILIRUB SERPL-MCNC: 0.3 MG/DL (ref 0.3–1)
BILIRUB SERPL-MCNC: 0.4 MG/DL (ref 0.3–1)
BILIRUB UR QL STRIP: NEGATIVE
BLD GP AB SCN SERPL QL: NORMAL
BLD PROD TYP BPU: NORMAL
BLD PROD TYP BPU: NORMAL
BLD UNIT ID BPU: 0
BLOOD BANK CMNT PATIENT-IMP: NORMAL
BLOOD PRODUCT CODE: NORMAL
BPU ID: NORMAL
BUN SERPL-MCNC: 15 MG/DL (ref 7–25)
BUN SERPL-MCNC: 18 MG/DL (ref 7–25)
BUN SERPL-MCNC: 22 MG/DL (ref 7–25)
BUN SERPL-MCNC: 25 MG/DL (ref 7–25)
BUN SERPL-MCNC: 27 MG/DL (ref 7–25)
BUN SERPL-MCNC: 27 MG/DL (ref 7–25)
BUN SERPL-MCNC: 28 MG/DL (ref 7–25)
BUN SERPL-MCNC: 29 MG/DL (ref 7–25)
BUN SERPL-MCNC: 30 MG/DL (ref 7–25)
BUN SERPL-MCNC: 32 MG/DL (ref 7–25)
BUN SERPL-MCNC: 32 MG/DL (ref 7–25)
BUN SERPL-MCNC: 33 MG/DL (ref 7–25)
CALCIUM SERPL-MCNC: 10.3 MG/DL (ref 8.6–10.3)
CALCIUM SERPL-MCNC: 7.8 MG/DL (ref 8.6–10.3)
CALCIUM SERPL-MCNC: 7.9 MG/DL (ref 8.6–10.3)
CALCIUM SERPL-MCNC: 8.3 MG/DL (ref 8.6–10.3)
CALCIUM SERPL-MCNC: 8.6 MG/DL (ref 8.6–10.3)
CALCIUM SERPL-MCNC: 8.7 MG/DL (ref 8.6–10.3)
CALCIUM SERPL-MCNC: 8.9 MG/DL (ref 8.6–10.3)
CALCIUM SERPL-MCNC: 9 MG/DL (ref 8.6–10.3)
CALCIUM SERPL-MCNC: 9.2 MG/DL (ref 8.6–10.3)
CALCIUM SERPL-MCNC: 9.5 MG/DL (ref 8.6–10.3)
CHLORIDE SERPL-SCNC: 102 MMOL/L (ref 98–107)
CHLORIDE SERPL-SCNC: 86 MMOL/L (ref 98–107)
CHLORIDE SERPL-SCNC: 91 MMOL/L (ref 98–107)
CHLORIDE SERPL-SCNC: 91 MMOL/L (ref 98–107)
CHLORIDE SERPL-SCNC: 92 MMOL/L (ref 98–107)
CHLORIDE SERPL-SCNC: 93 MMOL/L (ref 98–107)
CHLORIDE SERPL-SCNC: 94 MMOL/L (ref 98–107)
CHLORIDE SERPL-SCNC: 97 MMOL/L (ref 98–107)
CHLORIDE SERPL-SCNC: 97 MMOL/L (ref 98–107)
CHLORIDE SERPL-SCNC: 98 MMOL/L (ref 98–107)
CHLORIDE SERPL-SCNC: 98 MMOL/L (ref 98–107)
CHLORIDE SERPL-SCNC: 99 MMOL/L (ref 98–107)
CK SERPL-CCNC: 92 U/L (ref 30–223)
CO2 SERPL-SCNC: 23 MMOL/L (ref 21–31)
CO2 SERPL-SCNC: 24 MMOL/L (ref 21–31)
CO2 SERPL-SCNC: 25 MMOL/L (ref 21–31)
CO2 SERPL-SCNC: 26 MMOL/L (ref 21–31)
CO2 SERPL-SCNC: 27 MMOL/L (ref 21–31)
CO2 SERPL-SCNC: 28 MMOL/L (ref 21–31)
CO2 SERPL-SCNC: 28 MMOL/L (ref 21–31)
CO2 SERPL-SCNC: 29 MMOL/L (ref 21–31)
CO2 SERPL-SCNC: 30 MMOL/L (ref 21–31)
CO2 SERPL-SCNC: 31 MMOL/L (ref 21–31)
COLOR UR AUTO: ABNORMAL
COLOR UR AUTO: NORMAL
COLOR UR AUTO: NORMAL
COLOR UR AUTO: YELLOW
CREAT SERPL-MCNC: 0.75 MG/DL (ref 0.6–1.2)
CREAT SERPL-MCNC: 0.85 MG/DL (ref 0.6–1.2)
CREAT SERPL-MCNC: 0.95 MG/DL (ref 0.6–1.2)
CREAT SERPL-MCNC: 0.99 MG/DL (ref 0.6–1.2)
CREAT SERPL-MCNC: 1.02 MG/DL (ref 0.6–1.2)
CREAT SERPL-MCNC: 1.04 MG/DL (ref 0.6–1.2)
CREAT SERPL-MCNC: 1.07 MG/DL (ref 0.6–1.2)
CREAT SERPL-MCNC: 1.11 MG/DL (ref 0.6–1.2)
CREAT SERPL-MCNC: 1.13 MG/DL (ref 0.6–1.2)
CREAT SERPL-MCNC: 1.15 MG/DL (ref 0.6–1.2)
CREAT SERPL-MCNC: 1.17 MG/DL (ref 0.6–1.2)
CREAT SERPL-MCNC: 1.29 MG/DL (ref 0.6–1.2)
CRP SERPL-MCNC: 1.1 MG/L
DIFFERENTIAL METHOD BLD: ABNORMAL
EOSINOPHIL # BLD AUTO: 0 10E9/L (ref 0–0.7)
EOSINOPHIL # BLD AUTO: 0.1 10E9/L (ref 0–0.7)
EOSINOPHIL NFR BLD AUTO: 0 %
EOSINOPHIL NFR BLD AUTO: 0.1 %
EOSINOPHIL NFR BLD AUTO: 0.3 %
EOSINOPHIL NFR BLD AUTO: 0.5 %
EOSINOPHIL NFR BLD AUTO: 0.5 %
EOSINOPHIL NFR BLD AUTO: 1.3 %
ERYTHROCYTE [DISTWIDTH] IN BLOOD BY AUTOMATED COUNT: 13.7 % (ref 10–15)
ERYTHROCYTE [DISTWIDTH] IN BLOOD BY AUTOMATED COUNT: 14.1 % (ref 10–15)
ERYTHROCYTE [DISTWIDTH] IN BLOOD BY AUTOMATED COUNT: 14.2 % (ref 10–15)
ERYTHROCYTE [DISTWIDTH] IN BLOOD BY AUTOMATED COUNT: 14.2 % (ref 10–15)
ERYTHROCYTE [DISTWIDTH] IN BLOOD BY AUTOMATED COUNT: 14.6 % (ref 10–15)
ERYTHROCYTE [DISTWIDTH] IN BLOOD BY AUTOMATED COUNT: 14.6 % (ref 10–15)
ERYTHROCYTE [DISTWIDTH] IN BLOOD BY AUTOMATED COUNT: 15.2 % (ref 10–15)
ERYTHROCYTE [DISTWIDTH] IN BLOOD BY AUTOMATED COUNT: 15.5 % (ref 10–15)
ERYTHROCYTE [DISTWIDTH] IN BLOOD BY AUTOMATED COUNT: 15.6 % (ref 10–15)
ERYTHROCYTE [DISTWIDTH] IN BLOOD BY AUTOMATED COUNT: 15.6 % (ref 10–15)
ERYTHROCYTE [DISTWIDTH] IN BLOOD BY AUTOMATED COUNT: 15.7 % (ref 10–15)
ERYTHROCYTE [DISTWIDTH] IN BLOOD BY AUTOMATED COUNT: 15.9 % (ref 10–15)
ERYTHROCYTE [SEDIMENTATION RATE] IN BLOOD BY WESTERGREN METHOD: 11 MM/H (ref 1–15)
FLUAV+FLUBV RNA SPEC QL NAA+PROBE: NEGATIVE
GFR SERPL CREATININE-BSD FRML MDRD: 39 ML/MIN/{1.73_M2}
GFR SERPL CREATININE-BSD FRML MDRD: 44 ML/MIN/{1.73_M2}
GFR SERPL CREATININE-BSD FRML MDRD: 45 ML/MIN/{1.73_M2}
GFR SERPL CREATININE-BSD FRML MDRD: 46 ML/MIN/{1.73_M2}
GFR SERPL CREATININE-BSD FRML MDRD: 47 ML/MIN/{1.73_M2}
GFR SERPL CREATININE-BSD FRML MDRD: 49 ML/MIN/{1.73_M2}
GFR SERPL CREATININE-BSD FRML MDRD: 50 ML/MIN/{1.73_M2}
GFR SERPL CREATININE-BSD FRML MDRD: 51 ML/MIN/{1.73_M2}
GFR SERPL CREATININE-BSD FRML MDRD: 53 ML/MIN/{1.73_M2}
GFR SERPL CREATININE-BSD FRML MDRD: 56 ML/MIN/{1.73_M2}
GFR SERPL CREATININE-BSD FRML MDRD: 63 ML/MIN/{1.73_M2}
GFR SERPL CREATININE-BSD FRML MDRD: 73 ML/MIN/{1.73_M2}
GLUCOSE SERPL-MCNC: 100 MG/DL (ref 70–105)
GLUCOSE SERPL-MCNC: 108 MG/DL (ref 70–105)
GLUCOSE SERPL-MCNC: 108 MG/DL (ref 70–105)
GLUCOSE SERPL-MCNC: 111 MG/DL (ref 70–105)
GLUCOSE SERPL-MCNC: 118 MG/DL (ref 70–105)
GLUCOSE SERPL-MCNC: 122 MG/DL (ref 70–105)
GLUCOSE SERPL-MCNC: 147 MG/DL (ref 70–105)
GLUCOSE SERPL-MCNC: 84 MG/DL (ref 70–105)
GLUCOSE SERPL-MCNC: 90 MG/DL (ref 70–105)
GLUCOSE SERPL-MCNC: 94 MG/DL (ref 70–105)
GLUCOSE SERPL-MCNC: 95 MG/DL (ref 70–105)
GLUCOSE SERPL-MCNC: 98 MG/DL (ref 70–105)
GLUCOSE UR STRIP-MCNC: NEGATIVE MG/DL
HCT VFR BLD AUTO: 19.7 % (ref 35–47)
HCT VFR BLD AUTO: 21.8 % (ref 35–47)
HCT VFR BLD AUTO: 23 % (ref 35–47)
HCT VFR BLD AUTO: 25.2 % (ref 35–47)
HCT VFR BLD AUTO: 25.3 % (ref 35–47)
HCT VFR BLD AUTO: 25.3 % (ref 35–47)
HCT VFR BLD AUTO: 27.9 % (ref 35–47)
HCT VFR BLD AUTO: 28.6 % (ref 35–47)
HCT VFR BLD AUTO: 29.4 % (ref 35–47)
HCT VFR BLD AUTO: 31.3 % (ref 35–47)
HCT VFR BLD AUTO: 31.8 % (ref 35–47)
HCT VFR BLD AUTO: 35.3 % (ref 35–47)
HEMOCCULT STL QL: POSITIVE
HGB BLD-MCNC: 10.1 G/DL (ref 11.7–15.7)
HGB BLD-MCNC: 11.4 G/DL (ref 11.7–15.7)
HGB BLD-MCNC: 6.2 G/DL (ref 11.7–15.7)
HGB BLD-MCNC: 6.8 G/DL (ref 11.7–15.7)
HGB BLD-MCNC: 7.2 G/DL (ref 11.7–15.7)
HGB BLD-MCNC: 7.4 G/DL (ref 11.7–15.7)
HGB BLD-MCNC: 7.7 G/DL (ref 11.7–15.7)
HGB BLD-MCNC: 8 G/DL (ref 11.7–15.7)
HGB BLD-MCNC: 8 G/DL (ref 11.7–15.7)
HGB BLD-MCNC: 8.3 G/DL (ref 11.7–15.7)
HGB BLD-MCNC: 8.7 G/DL (ref 11.7–15.7)
HGB BLD-MCNC: 8.8 G/DL (ref 11.7–15.7)
HGB BLD-MCNC: 9 G/DL (ref 11.7–15.7)
HGB BLD-MCNC: 9.8 G/DL (ref 11.7–15.7)
HGB UR QL STRIP: ABNORMAL
HGB UR QL STRIP: ABNORMAL
HGB UR QL STRIP: NEGATIVE
HYALINE CASTS #/AREA URNS LPF: 1 /LPF (ref 0–2)
HYALINE CASTS #/AREA URNS LPF: 10 /LPF (ref 0–2)
HYALINE CASTS #/AREA URNS LPF: 6 /LPF (ref 0–2)
IMM GRANULOCYTES # BLD: 0 10E9/L (ref 0–0.4)
IMM GRANULOCYTES # BLD: 0.1 10E9/L (ref 0–0.4)
IMM GRANULOCYTES # BLD: 0.2 10E9/L (ref 0–0.4)
IMM GRANULOCYTES NFR BLD: 0.3 %
IMM GRANULOCYTES NFR BLD: 0.4 %
IMM GRANULOCYTES NFR BLD: 0.5 %
IMM GRANULOCYTES NFR BLD: 0.5 %
IMM GRANULOCYTES NFR BLD: 0.8 %
IMM GRANULOCYTES NFR BLD: 0.8 %
IMM GRANULOCYTES NFR BLD: 0.9 %
IMM GRANULOCYTES NFR BLD: 1 %
INR PPP: 0.83 (ref 0–1.3)
INR PPP: 0.85 (ref 0–1.3)
INR PPP: 0.91 (ref 0–1.3)
INTERPRETATION ECG - MUSE: NORMAL
KETONES UR STRIP-MCNC: NEGATIVE MG/DL
LACTATE BLD-SCNC: 0.7 MMOL/L (ref 0.7–2)
LACTATE BLD-SCNC: 2.1 MMOL/L (ref 0.7–2)
LACTATE BLD-SCNC: <0.4 MMOL/L (ref 0.7–2)
LACTATE SERPL-SCNC: 0.7 MMOL/L (ref 0.5–2.2)
LACTATE SERPL-SCNC: 1 MMOL/L (ref 0.5–2.2)
LEUKOCYTE ESTERASE UR QL STRIP: ABNORMAL
LEUKOCYTE ESTERASE UR QL STRIP: NEGATIVE
LYMPHOCYTES # BLD AUTO: 3.6 10E9/L (ref 0.8–5.3)
LYMPHOCYTES # BLD AUTO: 4.1 10E9/L (ref 0.8–5.3)
LYMPHOCYTES # BLD AUTO: 4.5 10E9/L (ref 0.8–5.3)
LYMPHOCYTES # BLD AUTO: 5 10E9/L (ref 0.8–5.3)
LYMPHOCYTES # BLD AUTO: 5.1 10E9/L (ref 0.8–5.3)
LYMPHOCYTES # BLD AUTO: 5.4 10E9/L (ref 0.8–5.3)
LYMPHOCYTES # BLD AUTO: 5.6 10E9/L (ref 0.8–5.3)
LYMPHOCYTES # BLD AUTO: 5.6 10E9/L (ref 0.8–5.3)
LYMPHOCYTES NFR BLD AUTO: 22.4 %
LYMPHOCYTES NFR BLD AUTO: 26.1 %
LYMPHOCYTES NFR BLD AUTO: 27.7 %
LYMPHOCYTES NFR BLD AUTO: 28 %
LYMPHOCYTES NFR BLD AUTO: 28.2 %
LYMPHOCYTES NFR BLD AUTO: 28.3 %
LYMPHOCYTES NFR BLD AUTO: 29 %
LYMPHOCYTES NFR BLD AUTO: 44.2 %
MAGNESIUM SERPL-MCNC: 2.2 MG/DL (ref 1.9–2.7)
MAGNESIUM SERPL-MCNC: 2.4 MG/DL (ref 1.9–2.7)
MCH RBC QN AUTO: 24.4 PG (ref 26.5–33)
MCH RBC QN AUTO: 24.7 PG (ref 26.5–33)
MCH RBC QN AUTO: 25.7 PG (ref 26.5–33)
MCH RBC QN AUTO: 25.8 PG (ref 26.5–33)
MCH RBC QN AUTO: 26.8 PG (ref 26.5–33)
MCH RBC QN AUTO: 27 PG (ref 26.5–33)
MCH RBC QN AUTO: 27.2 PG (ref 26.5–33)
MCH RBC QN AUTO: 27.4 PG (ref 26.5–33)
MCH RBC QN AUTO: 27.5 PG (ref 26.5–33)
MCH RBC QN AUTO: 27.7 PG (ref 26.5–33)
MCH RBC QN AUTO: 27.7 PG (ref 26.5–33)
MCH RBC QN AUTO: 28.4 PG (ref 26.5–33)
MCHC RBC AUTO-ENTMCNC: 30.6 G/DL (ref 31.5–36.5)
MCHC RBC AUTO-ENTMCNC: 30.8 G/DL (ref 31.5–36.5)
MCHC RBC AUTO-ENTMCNC: 30.8 G/DL (ref 31.5–36.5)
MCHC RBC AUTO-ENTMCNC: 31.2 G/DL (ref 31.5–36.5)
MCHC RBC AUTO-ENTMCNC: 31.2 G/DL (ref 31.5–36.5)
MCHC RBC AUTO-ENTMCNC: 31.3 G/DL (ref 31.5–36.5)
MCHC RBC AUTO-ENTMCNC: 31.5 G/DL (ref 31.5–36.5)
MCHC RBC AUTO-ENTMCNC: 31.6 G/DL (ref 31.5–36.5)
MCHC RBC AUTO-ENTMCNC: 31.6 G/DL (ref 31.5–36.5)
MCHC RBC AUTO-ENTMCNC: 32.3 G/DL (ref 31.5–36.5)
MCHC RBC AUTO-ENTMCNC: 32.3 G/DL (ref 31.5–36.5)
MCHC RBC AUTO-ENTMCNC: 32.9 G/DL (ref 31.5–36.5)
MCV RBC AUTO: 78 FL (ref 78–100)
MCV RBC AUTO: 79 FL (ref 78–100)
MCV RBC AUTO: 82 FL (ref 78–100)
MCV RBC AUTO: 84 FL (ref 78–100)
MCV RBC AUTO: 85 FL (ref 78–100)
MCV RBC AUTO: 86 FL (ref 78–100)
MCV RBC AUTO: 86 FL (ref 78–100)
MCV RBC AUTO: 87 FL (ref 78–100)
MCV RBC AUTO: 87 FL (ref 78–100)
MCV RBC AUTO: 88 FL (ref 78–100)
MONOCYTES # BLD AUTO: 0.3 10E9/L (ref 0–1.3)
MONOCYTES # BLD AUTO: 0.7 10E9/L (ref 0–1.3)
MONOCYTES # BLD AUTO: 0.8 10E9/L (ref 0–1.3)
MONOCYTES # BLD AUTO: 1.2 10E9/L (ref 0–1.3)
MONOCYTES # BLD AUTO: 1.4 10E9/L (ref 0–1.3)
MONOCYTES # BLD AUTO: 1.6 10E9/L (ref 0–1.3)
MONOCYTES NFR BLD AUTO: 1.6 %
MONOCYTES NFR BLD AUTO: 3.3 %
MONOCYTES NFR BLD AUTO: 5.1 %
MONOCYTES NFR BLD AUTO: 6.2 %
MONOCYTES NFR BLD AUTO: 6.4 %
MONOCYTES NFR BLD AUTO: 6.5 %
MONOCYTES NFR BLD AUTO: 6.9 %
MONOCYTES NFR BLD AUTO: 7.1 %
MUCOUS THREADS #/AREA URNS LPF: PRESENT /LPF
NEUTROPHILS # BLD AUTO: 12.4 10E9/L (ref 1.6–8.3)
NEUTROPHILS # BLD AUTO: 13 10E9/L (ref 1.6–8.3)
NEUTROPHILS # BLD AUTO: 13.3 10E9/L (ref 1.6–8.3)
NEUTROPHILS # BLD AUTO: 13.7 10E9/L (ref 1.6–8.3)
NEUTROPHILS # BLD AUTO: 15.4 10E9/L (ref 1.6–8.3)
NEUTROPHILS # BLD AUTO: 4.9 10E9/L (ref 1.6–8.3)
NEUTROPHILS # BLD AUTO: 8.4 10E9/L (ref 1.6–8.3)
NEUTROPHILS # BLD AUTO: 9 10E9/L (ref 1.6–8.3)
NEUTROPHILS NFR BLD AUTO: 47.3 %
NEUTROPHILS NFR BLD AUTO: 64.2 %
NEUTROPHILS NFR BLD AUTO: 64.4 %
NEUTROPHILS NFR BLD AUTO: 64.6 %
NEUTROPHILS NFR BLD AUTO: 66.2 %
NEUTROPHILS NFR BLD AUTO: 67.4 %
NEUTROPHILS NFR BLD AUTO: 69.1 %
NEUTROPHILS NFR BLD AUTO: 69.9 %
NITRATE UR QL: NEGATIVE
NITRATE UR QL: POSITIVE
NT-PROBNP SERPL-MCNC: 1012 PG/ML (ref 0–100)
NT-PROBNP SERPL-MCNC: 576 PG/ML (ref 0–100)
NT-PROBNP SERPL-MCNC: 892 PG/ML (ref 0–100)
NUM BPU REQUESTED: 1
PAIN DRUG SCR UR W RPTD MEDS: NORMAL
PH UR STRIP: 5.5 PH (ref 5–7)
PH UR STRIP: 5.5 PH (ref 5–7)
PH UR STRIP: 7 PH (ref 5–7)
PLATELET # BLD AUTO: 293 10E9/L (ref 150–450)
PLATELET # BLD AUTO: 314 10E9/L (ref 150–450)
PLATELET # BLD AUTO: 362 10E9/L (ref 150–450)
PLATELET # BLD AUTO: 370 10E9/L (ref 150–450)
PLATELET # BLD AUTO: 374 10E9/L (ref 150–450)
PLATELET # BLD AUTO: 391 10E9/L (ref 150–450)
PLATELET # BLD AUTO: 446 10E9/L (ref 150–450)
PLATELET # BLD AUTO: 448 10E9/L (ref 150–450)
PLATELET # BLD AUTO: 454 10E9/L (ref 150–450)
PLATELET # BLD AUTO: 515 10E9/L (ref 150–450)
PLATELET # BLD AUTO: 526 10E9/L (ref 150–450)
PLATELET # BLD AUTO: 587 10E9/L (ref 150–450)
PLATELET # BLD EST: ABNORMAL 10*3/UL
POTASSIUM SERPL-SCNC: 3.3 MMOL/L (ref 3.5–5.1)
POTASSIUM SERPL-SCNC: 3.4 MMOL/L (ref 3.5–5.1)
POTASSIUM SERPL-SCNC: 3.4 MMOL/L (ref 3.5–5.1)
POTASSIUM SERPL-SCNC: 3.5 MMOL/L (ref 3.5–5.1)
POTASSIUM SERPL-SCNC: 3.5 MMOL/L (ref 3.5–5.1)
POTASSIUM SERPL-SCNC: 3.6 MMOL/L (ref 3.5–5.1)
POTASSIUM SERPL-SCNC: 3.7 MMOL/L (ref 3.5–5.1)
POTASSIUM SERPL-SCNC: 3.9 MMOL/L (ref 3.5–5.1)
POTASSIUM SERPL-SCNC: 4.2 MMOL/L (ref 3.5–5.1)
POTASSIUM SERPL-SCNC: 4.3 MMOL/L (ref 3.5–5.1)
POTASSIUM SERPL-SCNC: 4.4 MMOL/L (ref 3.5–5.1)
POTASSIUM SERPL-SCNC: 4.4 MMOL/L (ref 3.5–5.1)
PROT SERPL-MCNC: 4.4 G/DL (ref 6.4–8.9)
PROT SERPL-MCNC: 5.4 G/DL (ref 6.4–8.9)
PROT SERPL-MCNC: 5.6 G/DL (ref 6.4–8.9)
PROT SERPL-MCNC: 6.1 G/DL (ref 6.4–8.9)
PROT SERPL-MCNC: 6.5 G/DL (ref 6.4–8.9)
RBC # BLD AUTO: 2.24 10E12/L (ref 3.8–5.2)
RBC # BLD AUTO: 2.5 10E12/L (ref 3.8–5.2)
RBC # BLD AUTO: 2.91 10E12/L (ref 3.8–5.2)
RBC # BLD AUTO: 2.92 10E12/L (ref 3.8–5.2)
RBC # BLD AUTO: 2.95 10E12/L (ref 3.8–5.2)
RBC # BLD AUTO: 3.1 10E12/L (ref 3.8–5.2)
RBC # BLD AUTO: 3.36 10E12/L (ref 3.8–5.2)
RBC # BLD AUTO: 3.42 10E12/L (ref 3.8–5.2)
RBC # BLD AUTO: 3.52 10E12/L (ref 3.8–5.2)
RBC # BLD AUTO: 3.63 10E12/L (ref 3.8–5.2)
RBC # BLD AUTO: 3.68 10E12/L (ref 3.8–5.2)
RBC # BLD AUTO: 4.12 10E12/L (ref 3.8–5.2)
RBC #/AREA URNS AUTO: 0 /HPF (ref 0–2)
RBC #/AREA URNS AUTO: 1 /HPF (ref 0–2)
RBC #/AREA URNS AUTO: 1 /HPF (ref 0–2)
RBC #/AREA URNS AUTO: 26 /HPF (ref 0–2)
RBC MORPH BLD: ABNORMAL
RSV RNA SPEC NAA+PROBE: NEGATIVE
RSV RNA SPEC NAA+PROBE: NEGATIVE
SARS-COV-2 PCR COMMENT: NORMAL
SARS-COV-2 RNA SPEC QL NAA+PROBE: NEGATIVE
SARS-COV-2 RNA SPEC QL NAA+PROBE: NORMAL
SODIUM SERPL-SCNC: 124 MMOL/L (ref 134–144)
SODIUM SERPL-SCNC: 126 MMOL/L (ref 134–144)
SODIUM SERPL-SCNC: 127 MMOL/L (ref 134–144)
SODIUM SERPL-SCNC: 128 MMOL/L (ref 134–144)
SODIUM SERPL-SCNC: 130 MMOL/L (ref 134–144)
SODIUM SERPL-SCNC: 131 MMOL/L (ref 134–144)
SODIUM SERPL-SCNC: 131 MMOL/L (ref 134–144)
SODIUM SERPL-SCNC: 132 MMOL/L (ref 134–144)
SODIUM SERPL-SCNC: 137 MMOL/L (ref 134–144)
SOURCE: ABNORMAL
SOURCE: NORMAL
SP GR UR STRIP: 1.01 (ref 1–1.03)
SPECIMEN EXP DATE BLD: NORMAL
SPECIMEN SOURCE: ABNORMAL
SPECIMEN SOURCE: NORMAL
SQUAMOUS #/AREA URNS AUTO: 3 /HPF (ref 0–1)
SQUAMOUS #/AREA URNS AUTO: 3 /HPF (ref 0–1)
STREP GROUP A PCR: NOT DETECTED
TRANSFUSION STATUS PATIENT QL: NORMAL
TRANSFUSION STATUS PATIENT QL: NORMAL
TROPONIN I SERPL-MCNC: 10.8 PG/ML
TROPONIN I SERPL-MCNC: 11.3 PG/ML
TROPONIN I SERPL-MCNC: 17.9 PG/ML
TROPONIN I SERPL-MCNC: 18.2 PG/ML
TROPONIN I SERPL-MCNC: 18.4 PG/ML
TROPONIN I SERPL-MCNC: 8.4 PG/ML
UROBILINOGEN UR STRIP-MCNC: NORMAL MG/DL (ref 0–2)
WBC # BLD AUTO: 10.3 10E9/L (ref 4–11)
WBC # BLD AUTO: 11.8 10E9/L (ref 4–11)
WBC # BLD AUTO: 12.9 10E9/L (ref 4–11)
WBC # BLD AUTO: 14 10E9/L (ref 4–11)
WBC # BLD AUTO: 14.4 10E9/L (ref 4–11)
WBC # BLD AUTO: 15.5 10E9/L (ref 4–11)
WBC # BLD AUTO: 18.9 10E9/L (ref 4–11)
WBC # BLD AUTO: 19.6 10E9/L (ref 4–11)
WBC # BLD AUTO: 19.7 10E9/L (ref 4–11)
WBC # BLD AUTO: 19.8 10E9/L (ref 4–11)
WBC # BLD AUTO: 22.1 10E9/L (ref 4–11)
WBC # BLD AUTO: 9.3 10E9/L (ref 4–11)
WBC #/AREA URNS AUTO: 112 /HPF (ref 0–5)
WBC #/AREA URNS AUTO: 4 /HPF (ref 0–5)
WBC #/AREA URNS AUTO: >182 /HPF (ref 0–5)
WBC #/AREA URNS AUTO: >182 /HPF (ref 0–5)
WBC CLUMPS #/AREA URNS HPF: PRESENT /HPF
YEAST #/AREA URNS HPF: ABNORMAL /HPF

## 2020-01-01 PROCEDURE — 25000131 ZZH RX MED GY IP 250 OP 636 PS 637: Mod: GY | Performed by: FAMILY MEDICINE

## 2020-01-01 PROCEDURE — 25000132 ZZH RX MED GY IP 250 OP 250 PS 637: Mod: GY | Performed by: INTERNAL MEDICINE

## 2020-01-01 PROCEDURE — 71045 X-RAY EXAM CHEST 1 VIEW: CPT | Mod: CS

## 2020-01-01 PROCEDURE — 99285 EMERGENCY DEPT VISIT HI MDM: CPT | Mod: Z6 | Performed by: PHYSICIAN ASSISTANT

## 2020-01-01 PROCEDURE — G0463 HOSPITAL OUTPT CLINIC VISIT: HCPCS

## 2020-01-01 PROCEDURE — 96376 TX/PRO/DX INJ SAME DRUG ADON: CPT | Performed by: FAMILY MEDICINE

## 2020-01-01 PROCEDURE — 96361 HYDRATE IV INFUSION ADD-ON: CPT | Performed by: PHYSICIAN ASSISTANT

## 2020-01-01 PROCEDURE — 99495 TRANSJ CARE MGMT MOD F2F 14D: CPT | Performed by: FAMILY MEDICINE

## 2020-01-01 PROCEDURE — 80048 BASIC METABOLIC PNL TOTAL CA: CPT | Performed by: FAMILY MEDICINE

## 2020-01-01 PROCEDURE — 51702 INSERT TEMP BLADDER CATH: CPT | Performed by: FAMILY MEDICINE

## 2020-01-01 PROCEDURE — 25000132 ZZH RX MED GY IP 250 OP 250 PS 637: Mod: GY | Performed by: FAMILY MEDICINE

## 2020-01-01 PROCEDURE — 81001 URINALYSIS AUTO W/SCOPE: CPT | Performed by: INTERNAL MEDICINE

## 2020-01-01 PROCEDURE — 99222 1ST HOSP IP/OBS MODERATE 55: CPT | Mod: 25 | Performed by: SURGERY

## 2020-01-01 PROCEDURE — 25800030 ZZH RX IP 258 OP 636: Performed by: EMERGENCY MEDICINE

## 2020-01-01 PROCEDURE — 84484 ASSAY OF TROPONIN QUANT: CPT | Performed by: FAMILY MEDICINE

## 2020-01-01 PROCEDURE — 96365 THER/PROPH/DIAG IV INF INIT: CPT | Performed by: FAMILY MEDICINE

## 2020-01-01 PROCEDURE — 71046 X-RAY EXAM CHEST 2 VIEWS: CPT

## 2020-01-01 PROCEDURE — G0463 HOSPITAL OUTPT CLINIC VISIT: HCPCS | Mod: 25

## 2020-01-01 PROCEDURE — 99225 ZZC SUBSEQUENT OBSERVATION CARE,LEVEL II: CPT | Performed by: INTERNAL MEDICINE

## 2020-01-01 PROCEDURE — 97116 GAIT TRAINING THERAPY: CPT | Mod: GP

## 2020-01-01 PROCEDURE — 83880 ASSAY OF NATRIURETIC PEPTIDE: CPT | Performed by: PHYSICIAN ASSISTANT

## 2020-01-01 PROCEDURE — 36415 COLL VENOUS BLD VENIPUNCTURE: CPT | Performed by: FAMILY MEDICINE

## 2020-01-01 PROCEDURE — 81003 URINALYSIS AUTO W/O SCOPE: CPT | Performed by: PHYSICIAN ASSISTANT

## 2020-01-01 PROCEDURE — 73502 X-RAY EXAM HIP UNI 2-3 VIEWS: CPT

## 2020-01-01 PROCEDURE — 85025 COMPLETE CBC W/AUTO DIFF WBC: CPT | Performed by: PHYSICIAN ASSISTANT

## 2020-01-01 PROCEDURE — 85730 THROMBOPLASTIN TIME PARTIAL: CPT | Performed by: EMERGENCY MEDICINE

## 2020-01-01 PROCEDURE — 97165 OT EVAL LOW COMPLEX 30 MIN: CPT | Mod: GO | Performed by: OCCUPATIONAL THERAPIST

## 2020-01-01 PROCEDURE — 36415 COLL VENOUS BLD VENIPUNCTURE: CPT | Performed by: EMERGENCY MEDICINE

## 2020-01-01 PROCEDURE — 85025 COMPLETE CBC W/AUTO DIFF WBC: CPT | Performed by: EMERGENCY MEDICINE

## 2020-01-01 PROCEDURE — 25000132 ZZH RX MED GY IP 250 OP 250 PS 637: Mod: GY | Performed by: PHYSICIAN ASSISTANT

## 2020-01-01 PROCEDURE — 87631 RESP VIRUS 3-5 TARGETS: CPT | Performed by: FAMILY MEDICINE

## 2020-01-01 PROCEDURE — 93010 ELECTROCARDIOGRAM REPORT: CPT | Performed by: INTERNAL MEDICINE

## 2020-01-01 PROCEDURE — 97535 SELF CARE MNGMENT TRAINING: CPT | Mod: GO

## 2020-01-01 PROCEDURE — 99442 ZZC PHYSICIAN TELEPHONE EVALUATION 11-20 MIN: CPT | Performed by: NURSE PRACTITIONER

## 2020-01-01 PROCEDURE — 93005 ELECTROCARDIOGRAM TRACING: CPT | Performed by: PHYSICIAN ASSISTANT

## 2020-01-01 PROCEDURE — 97530 THERAPEUTIC ACTIVITIES: CPT | Mod: GO | Performed by: OCCUPATIONAL THERAPIST

## 2020-01-01 PROCEDURE — 97161 PT EVAL LOW COMPLEX 20 MIN: CPT | Mod: GP

## 2020-01-01 PROCEDURE — 25000128 H RX IP 250 OP 636: Performed by: FAMILY MEDICINE

## 2020-01-01 PROCEDURE — 97530 THERAPEUTIC ACTIVITIES: CPT | Mod: GP

## 2020-01-01 PROCEDURE — 99233 SBSQ HOSP IP/OBS HIGH 50: CPT | Performed by: INTERNAL MEDICINE

## 2020-01-01 PROCEDURE — 36415 COLL VENOUS BLD VENIPUNCTURE: CPT | Performed by: PHYSICIAN ASSISTANT

## 2020-01-01 PROCEDURE — 86850 RBC ANTIBODY SCREEN: CPT | Performed by: FAMILY MEDICINE

## 2020-01-01 PROCEDURE — 96374 THER/PROPH/DIAG INJ IV PUSH: CPT | Performed by: EMERGENCY MEDICINE

## 2020-01-01 PROCEDURE — 72170 X-RAY EXAM OF PELVIS: CPT

## 2020-01-01 PROCEDURE — 99213 OFFICE O/P EST LOW 20 MIN: CPT | Mod: 25 | Performed by: PODIATRIST

## 2020-01-01 PROCEDURE — 51798 US URINE CAPACITY MEASURE: CPT | Performed by: FAMILY MEDICINE

## 2020-01-01 PROCEDURE — 93970 EXTREMITY STUDY: CPT

## 2020-01-01 PROCEDURE — 93005 ELECTROCARDIOGRAM TRACING: CPT | Performed by: FAMILY MEDICINE

## 2020-01-01 PROCEDURE — 36410 VNPNXR 3YR/> PHY/QHP DX/THER: CPT | Performed by: NURSE ANESTHETIST, CERTIFIED REGISTERED

## 2020-01-01 PROCEDURE — 85027 COMPLETE CBC AUTOMATED: CPT | Performed by: FAMILY MEDICINE

## 2020-01-01 PROCEDURE — 82272 OCCULT BLD FECES 1-3 TESTS: CPT | Performed by: FAMILY MEDICINE

## 2020-01-01 PROCEDURE — 87088 URINE BACTERIA CULTURE: CPT | Performed by: INTERNAL MEDICINE

## 2020-01-01 PROCEDURE — 99224 ZZC SUBSEQUENT OBSERVATION CARE,LEVEL I: CPT | Performed by: FAMILY MEDICINE

## 2020-01-01 PROCEDURE — G0378 HOSPITAL OBSERVATION PER HR: HCPCS

## 2020-01-01 PROCEDURE — 99223 1ST HOSP IP/OBS HIGH 75: CPT | Performed by: FAMILY MEDICINE

## 2020-01-01 PROCEDURE — 97535 SELF CARE MNGMENT TRAINING: CPT | Mod: GO | Performed by: OCCUPATIONAL THERAPIST

## 2020-01-01 PROCEDURE — 87631 RESP VIRUS 3-5 TARGETS: CPT | Performed by: PHYSICIAN ASSISTANT

## 2020-01-01 PROCEDURE — 80048 BASIC METABOLIC PNL TOTAL CA: CPT | Performed by: INTERNAL MEDICINE

## 2020-01-01 PROCEDURE — 84132 ASSAY OF SERUM POTASSIUM: CPT | Performed by: FAMILY MEDICINE

## 2020-01-01 PROCEDURE — 96374 THER/PROPH/DIAG INJ IV PUSH: CPT | Performed by: PHYSICIAN ASSISTANT

## 2020-01-01 PROCEDURE — 25800030 ZZH RX IP 258 OP 636: Performed by: FAMILY MEDICINE

## 2020-01-01 PROCEDURE — G0180 MD CERTIFICATION HHA PATIENT: HCPCS | Performed by: FAMILY MEDICINE

## 2020-01-01 PROCEDURE — 36415 COLL VENOUS BLD VENIPUNCTURE: CPT | Mod: ZL | Performed by: PHYSICIAN ASSISTANT

## 2020-01-01 PROCEDURE — 86901 BLOOD TYPING SEROLOGIC RH(D): CPT | Performed by: FAMILY MEDICINE

## 2020-01-01 PROCEDURE — 84484 ASSAY OF TROPONIN QUANT: CPT | Performed by: EMERGENCY MEDICINE

## 2020-01-01 PROCEDURE — 11721 DEBRIDE NAIL 6 OR MORE: CPT | Performed by: PODIATRIST

## 2020-01-01 PROCEDURE — 99214 OFFICE O/P EST MOD 30 MIN: CPT | Mod: 25 | Performed by: FAMILY MEDICINE

## 2020-01-01 PROCEDURE — 99220 ZZC INITIAL OBSERVATION CARE,LEVL III: CPT | Mod: AI | Performed by: FAMILY MEDICINE

## 2020-01-01 PROCEDURE — 81001 URINALYSIS AUTO W/SCOPE: CPT | Mod: ZL | Performed by: NURSE PRACTITIONER

## 2020-01-01 PROCEDURE — 83605 ASSAY OF LACTIC ACID: CPT | Performed by: FAMILY MEDICINE

## 2020-01-01 PROCEDURE — 97530 THERAPEUTIC ACTIVITIES: CPT | Mod: GO

## 2020-01-01 PROCEDURE — 87635 SARS-COV-2 COVID-19 AMP PRB: CPT | Performed by: EMERGENCY MEDICINE

## 2020-01-01 PROCEDURE — 85027 COMPLETE CBC AUTOMATED: CPT | Performed by: INTERNAL MEDICINE

## 2020-01-01 PROCEDURE — 12000000 ZZH R&B MED SURG/OB

## 2020-01-01 PROCEDURE — 73560 X-RAY EXAM OF KNEE 1 OR 2: CPT | Mod: RT

## 2020-01-01 PROCEDURE — 80048 BASIC METABOLIC PNL TOTAL CA: CPT | Mod: ZL | Performed by: PHYSICIAN ASSISTANT

## 2020-01-01 PROCEDURE — 20610 DRAIN/INJ JOINT/BURSA W/O US: CPT | Mod: LT

## 2020-01-01 PROCEDURE — 81003 URINALYSIS AUTO W/O SCOPE: CPT | Performed by: FAMILY MEDICINE

## 2020-01-01 PROCEDURE — 99214 OFFICE O/P EST MOD 30 MIN: CPT | Performed by: NURSE PRACTITIONER

## 2020-01-01 PROCEDURE — 87086 URINE CULTURE/COLONY COUNT: CPT | Mod: ZL | Performed by: FAMILY MEDICINE

## 2020-01-01 PROCEDURE — 99213 OFFICE O/P EST LOW 20 MIN: CPT | Performed by: FAMILY MEDICINE

## 2020-01-01 PROCEDURE — 96376 TX/PRO/DX INJ SAME DRUG ADON: CPT | Performed by: EMERGENCY MEDICINE

## 2020-01-01 PROCEDURE — 85018 HEMOGLOBIN: CPT | Performed by: FAMILY MEDICINE

## 2020-01-01 PROCEDURE — 85025 COMPLETE CBC W/AUTO DIFF WBC: CPT | Performed by: FAMILY MEDICINE

## 2020-01-01 PROCEDURE — 97161 PT EVAL LOW COMPLEX 20 MIN: CPT | Mod: GP | Performed by: PHYSICAL THERAPIST

## 2020-01-01 PROCEDURE — 86140 C-REACTIVE PROTEIN: CPT | Performed by: FAMILY MEDICINE

## 2020-01-01 PROCEDURE — 99285 EMERGENCY DEPT VISIT HI MDM: CPT | Mod: 25 | Performed by: FAMILY MEDICINE

## 2020-01-01 PROCEDURE — 20610 DRAIN/INJ JOINT/BURSA W/O US: CPT | Mod: RT | Performed by: FAMILY MEDICINE

## 2020-01-01 PROCEDURE — 81001 URINALYSIS AUTO W/SCOPE: CPT | Mod: ZL | Performed by: FAMILY MEDICINE

## 2020-01-01 PROCEDURE — 85652 RBC SED RATE AUTOMATED: CPT | Performed by: FAMILY MEDICINE

## 2020-01-01 PROCEDURE — 99310 SBSQ NF CARE HIGH MDM 45: CPT | Performed by: NURSE PRACTITIONER

## 2020-01-01 PROCEDURE — 84484 ASSAY OF TROPONIN QUANT: CPT | Mod: ZL | Performed by: PHYSICIAN ASSISTANT

## 2020-01-01 PROCEDURE — 81003 URINALYSIS AUTO W/O SCOPE: CPT | Mod: ZL | Performed by: PHYSICIAN ASSISTANT

## 2020-01-01 PROCEDURE — 86900 BLOOD TYPING SEROLOGIC ABO: CPT | Performed by: FAMILY MEDICINE

## 2020-01-01 PROCEDURE — 99232 SBSQ HOSP IP/OBS MODERATE 35: CPT | Performed by: FAMILY MEDICINE

## 2020-01-01 PROCEDURE — 92611 MOTION FLUOROSCOPY/SWALLOW: CPT | Mod: GN

## 2020-01-01 PROCEDURE — 99214 OFFICE O/P EST MOD 30 MIN: CPT | Performed by: FAMILY MEDICINE

## 2020-01-01 PROCEDURE — 87651 STREP A DNA AMP PROBE: CPT | Performed by: PHYSICIAN ASSISTANT

## 2020-01-01 PROCEDURE — 99441 ZZC PHYSICIAN TELEPHONE EVALUATION 5-10 MIN: CPT | Performed by: FAMILY MEDICINE

## 2020-01-01 PROCEDURE — 99214 OFFICE O/P EST MOD 30 MIN: CPT | Performed by: PHYSICIAN ASSISTANT

## 2020-01-01 PROCEDURE — 74230 X-RAY XM SWLNG FUNCJ C+: CPT

## 2020-01-01 PROCEDURE — 25500064 ZZH RX 255 OP 636: Performed by: RADIOLOGY

## 2020-01-01 PROCEDURE — 99212 OFFICE O/P EST SF 10 MIN: CPT | Mod: 25 | Performed by: FAMILY MEDICINE

## 2020-01-01 PROCEDURE — 99284 EMERGENCY DEPT VISIT MOD MDM: CPT | Mod: Z6 | Performed by: FAMILY MEDICINE

## 2020-01-01 PROCEDURE — 99284 EMERGENCY DEPT VISIT MOD MDM: CPT | Mod: 25 | Performed by: FAMILY MEDICINE

## 2020-01-01 PROCEDURE — 73502 X-RAY EXAM HIP UNI 2-3 VIEWS: CPT | Mod: TC

## 2020-01-01 PROCEDURE — 96375 TX/PRO/DX INJ NEW DRUG ADDON: CPT | Performed by: FAMILY MEDICINE

## 2020-01-01 PROCEDURE — 72100 X-RAY EXAM L-S SPINE 2/3 VWS: CPT

## 2020-01-01 PROCEDURE — 25000128 H RX IP 250 OP 636: Performed by: PHYSICIAN ASSISTANT

## 2020-01-01 PROCEDURE — 99285 EMERGENCY DEPT VISIT HI MDM: CPT | Mod: 25 | Performed by: PHYSICIAN ASSISTANT

## 2020-01-01 PROCEDURE — 87086 URINE CULTURE/COLONY COUNT: CPT | Performed by: INTERNAL MEDICINE

## 2020-01-01 PROCEDURE — 85610 PROTHROMBIN TIME: CPT | Performed by: EMERGENCY MEDICINE

## 2020-01-01 PROCEDURE — 96374 THER/PROPH/DIAG INJ IV PUSH: CPT | Performed by: FAMILY MEDICINE

## 2020-01-01 PROCEDURE — 25000128 H RX IP 250 OP 636: Performed by: INTERNAL MEDICINE

## 2020-01-01 PROCEDURE — 99220 ZZC INITIAL OBSERVATION CARE,LEVL III: CPT | Performed by: FAMILY MEDICINE

## 2020-01-01 PROCEDURE — 80048 BASIC METABOLIC PNL TOTAL CA: CPT | Performed by: EMERGENCY MEDICINE

## 2020-01-01 PROCEDURE — 99285 EMERGENCY DEPT VISIT HI MDM: CPT | Mod: Z6 | Performed by: FAMILY MEDICINE

## 2020-01-01 PROCEDURE — 99217 ZZC OBSERVATION CARE DISCHARGE: CPT | Performed by: FAMILY MEDICINE

## 2020-01-01 PROCEDURE — 30233N1 TRANSFUSION OF NONAUTOLOGOUS RED BLOOD CELLS INTO PERIPHERAL VEIN, PERCUTANEOUS APPROACH: ICD-10-PCS | Performed by: FAMILY MEDICINE

## 2020-01-01 PROCEDURE — 80053 COMPREHEN METABOLIC PANEL: CPT | Mod: ZL | Performed by: NURSE PRACTITIONER

## 2020-01-01 PROCEDURE — 25800030 ZZH RX IP 258 OP 636: Performed by: PHYSICIAN ASSISTANT

## 2020-01-01 PROCEDURE — 80053 COMPREHEN METABOLIC PANEL: CPT | Performed by: FAMILY MEDICINE

## 2020-01-01 PROCEDURE — 70450 CT HEAD/BRAIN W/O DYE: CPT

## 2020-01-01 PROCEDURE — 36415 COLL VENOUS BLD VENIPUNCTURE: CPT | Performed by: INTERNAL MEDICINE

## 2020-01-01 PROCEDURE — 83880 ASSAY OF NATRIURETIC PEPTIDE: CPT | Performed by: FAMILY MEDICINE

## 2020-01-01 PROCEDURE — 99225 ZZC SUBSEQUENT OBSERVATION CARE,LEVEL II: CPT | Performed by: FAMILY MEDICINE

## 2020-01-01 PROCEDURE — 71045 X-RAY EXAM CHEST 1 VIEW: CPT

## 2020-01-01 PROCEDURE — 93005 ELECTROCARDIOGRAM TRACING: CPT

## 2020-01-01 PROCEDURE — 80307 DRUG TEST PRSMV CHEM ANLYZR: CPT | Mod: ZL | Performed by: FAMILY MEDICINE

## 2020-01-01 PROCEDURE — 72100 X-RAY EXAM L-S SPINE 2/3 VWS: CPT | Mod: TC

## 2020-01-01 PROCEDURE — 84484 ASSAY OF TROPONIN QUANT: CPT | Performed by: PHYSICIAN ASSISTANT

## 2020-01-01 PROCEDURE — 73700 CT LOWER EXTREMITY W/O DYE: CPT | Mod: RT

## 2020-01-01 PROCEDURE — 72131 CT LUMBAR SPINE W/O DYE: CPT

## 2020-01-01 PROCEDURE — 92526 ORAL FUNCTION THERAPY: CPT | Mod: GN

## 2020-01-01 PROCEDURE — 83735 ASSAY OF MAGNESIUM: CPT | Performed by: PHYSICIAN ASSISTANT

## 2020-01-01 PROCEDURE — 25000125 ZZHC RX 250: Performed by: FAMILY MEDICINE

## 2020-01-01 PROCEDURE — 99239 HOSP IP/OBS DSCHRG MGMT >30: CPT | Performed by: INTERNAL MEDICINE

## 2020-01-01 PROCEDURE — 85025 COMPLETE CBC W/AUTO DIFF WBC: CPT | Mod: ZL | Performed by: NURSE PRACTITIONER

## 2020-01-01 PROCEDURE — 99285 EMERGENCY DEPT VISIT HI MDM: CPT | Mod: 25 | Performed by: EMERGENCY MEDICINE

## 2020-01-01 PROCEDURE — 85025 COMPLETE CBC W/AUTO DIFF WBC: CPT | Mod: ZL | Performed by: PHYSICIAN ASSISTANT

## 2020-01-01 PROCEDURE — 83605 ASSAY OF LACTIC ACID: CPT | Performed by: PHYSICIAN ASSISTANT

## 2020-01-01 PROCEDURE — G0463 HOSPITAL OUTPT CLINIC VISIT: HCPCS | Mod: TEL

## 2020-01-01 PROCEDURE — 97116 GAIT TRAINING THERAPY: CPT | Mod: GP | Performed by: PHYSICAL THERAPIST

## 2020-01-01 PROCEDURE — 25000128 H RX IP 250 OP 636: Performed by: RADIOLOGY

## 2020-01-01 PROCEDURE — 87088 URINE BACTERIA CULTURE: CPT | Mod: ZL | Performed by: FAMILY MEDICINE

## 2020-01-01 PROCEDURE — 25000125 ZZHC RX 250: Performed by: PHYSICIAN ASSISTANT

## 2020-01-01 PROCEDURE — P9016 RBC LEUKOCYTES REDUCED: HCPCS | Performed by: FAMILY MEDICINE

## 2020-01-01 PROCEDURE — 36415 COLL VENOUS BLD VENIPUNCTURE: CPT | Mod: ZL | Performed by: NURSE PRACTITIONER

## 2020-01-01 PROCEDURE — 85610 PROTHROMBIN TIME: CPT | Performed by: PHYSICIAN ASSISTANT

## 2020-01-01 PROCEDURE — 20610 DRAIN/INJ JOINT/BURSA W/O US: CPT | Mod: RT

## 2020-01-01 PROCEDURE — 83735 ASSAY OF MAGNESIUM: CPT | Performed by: EMERGENCY MEDICINE

## 2020-01-01 PROCEDURE — 20610 DRAIN/INJ JOINT/BURSA W/O US: CPT | Mod: LT | Performed by: FAMILY MEDICINE

## 2020-01-01 PROCEDURE — 25000125 ZZHC RX 250: Performed by: RADIOLOGY

## 2020-01-01 PROCEDURE — 97116 GAIT TRAINING THERAPY: CPT | Mod: GP,XU

## 2020-01-01 PROCEDURE — 99232 SBSQ HOSP IP/OBS MODERATE 35: CPT | Performed by: INTERNAL MEDICINE

## 2020-01-01 PROCEDURE — 25000128 H RX IP 250 OP 636: Performed by: EMERGENCY MEDICINE

## 2020-01-01 PROCEDURE — 82550 ASSAY OF CK (CPK): CPT | Performed by: EMERGENCY MEDICINE

## 2020-01-01 PROCEDURE — 80053 COMPREHEN METABOLIC PANEL: CPT | Performed by: PHYSICIAN ASSISTANT

## 2020-01-01 PROCEDURE — 87040 BLOOD CULTURE FOR BACTERIA: CPT | Performed by: PHYSICIAN ASSISTANT

## 2020-01-01 PROCEDURE — 87086 URINE CULTURE/COLONY COUNT: CPT | Performed by: FAMILY MEDICINE

## 2020-01-01 PROCEDURE — 87077 CULTURE AEROBIC IDENTIFY: CPT | Performed by: FAMILY MEDICINE

## 2020-01-01 PROCEDURE — 99285 EMERGENCY DEPT VISIT HI MDM: CPT | Mod: Z6 | Performed by: EMERGENCY MEDICINE

## 2020-01-01 PROCEDURE — 86920 COMPATIBILITY TEST SPIN: CPT | Performed by: FAMILY MEDICINE

## 2020-01-01 PROCEDURE — 85610 PROTHROMBIN TIME: CPT | Performed by: FAMILY MEDICINE

## 2020-01-01 PROCEDURE — 87040 BLOOD CULTURE FOR BACTERIA: CPT | Mod: 91 | Performed by: FAMILY MEDICINE

## 2020-01-01 RX ORDER — OXYCODONE AND ACETAMINOPHEN 5; 325 MG/1; MG/1
1 TABLET ORAL EVERY 4 HOURS PRN
Status: DISCONTINUED | OUTPATIENT
Start: 2020-01-01 | End: 2020-01-01

## 2020-01-01 RX ORDER — BISACODYL 10 MG
10 SUPPOSITORY, RECTAL RECTAL DAILY PRN
Status: DISCONTINUED | OUTPATIENT
Start: 2020-01-01 | End: 2020-01-01 | Stop reason: HOSPADM

## 2020-01-01 RX ORDER — FUROSEMIDE 20 MG
20 TABLET ORAL DAILY
Qty: 5 TABLET | Refills: 0 | Status: ON HOLD | OUTPATIENT
Start: 2020-01-01 | End: 2020-01-01

## 2020-01-01 RX ORDER — OXYCODONE HYDROCHLORIDE 5 MG/1
2.5-5 TABLET ORAL EVERY 4 HOURS PRN
Qty: 150 TABLET | Refills: 0 | Status: SHIPPED | OUTPATIENT
Start: 2020-01-01 | End: 2020-01-01

## 2020-01-01 RX ORDER — PREDNISONE 10 MG/1
10 TABLET ORAL EVERY OTHER DAY
Status: DISCONTINUED | OUTPATIENT
Start: 2020-01-01 | End: 2020-01-01

## 2020-01-01 RX ORDER — ONDANSETRON 4 MG/1
4 TABLET, ORALLY DISINTEGRATING ORAL EVERY 6 HOURS PRN
Status: DISCONTINUED | OUTPATIENT
Start: 2020-01-01 | End: 2020-01-01 | Stop reason: HOSPADM

## 2020-01-01 RX ORDER — LIDOCAINE 50 MG/G
1 PATCH TOPICAL EVERY 24 HOURS
Qty: 30 PATCH | Refills: 3 | Status: SHIPPED | OUTPATIENT
Start: 2020-01-01

## 2020-01-01 RX ORDER — AMOXICILLIN AND CLAVULANATE POTASSIUM 500; 125 MG/1; MG/1
1 TABLET, FILM COATED ORAL 2 TIMES DAILY
Qty: 14 TABLET | Refills: 0 | Status: SHIPPED | OUTPATIENT
Start: 2020-01-01 | End: 2020-01-01

## 2020-01-01 RX ORDER — MORPHINE SULFATE 2 MG/ML
1-2 INJECTION, SOLUTION INTRAMUSCULAR; INTRAVENOUS ONCE
Status: DISCONTINUED | OUTPATIENT
Start: 2020-01-01 | End: 2020-01-01

## 2020-01-01 RX ORDER — SODIUM CHLORIDE AND POTASSIUM CHLORIDE 150; 900 MG/100ML; MG/100ML
INJECTION, SOLUTION INTRAVENOUS CONTINUOUS
Status: DISCONTINUED | OUTPATIENT
Start: 2020-01-01 | End: 2020-01-01

## 2020-01-01 RX ORDER — LORAZEPAM 0.5 MG/1
TABLET ORAL
Qty: 1 TABLET | Refills: 0 | Status: ON HOLD | OUTPATIENT
Start: 2020-01-01 | End: 2020-01-01

## 2020-01-01 RX ORDER — POTASSIUM CHLORIDE 7.45 MG/ML
10 INJECTION INTRAVENOUS
Status: DISCONTINUED | OUTPATIENT
Start: 2020-01-01 | End: 2020-01-01 | Stop reason: HOSPADM

## 2020-01-01 RX ORDER — FENTANYL CITRATE 50 UG/ML
50 INJECTION, SOLUTION INTRAMUSCULAR; INTRAVENOUS ONCE
Status: COMPLETED | OUTPATIENT
Start: 2020-01-01 | End: 2020-01-01

## 2020-01-01 RX ORDER — AMOXICILLIN 500 MG/1
500 CAPSULE ORAL EVERY 12 HOURS SCHEDULED
Status: DISCONTINUED | OUTPATIENT
Start: 2020-01-01 | End: 2020-01-01 | Stop reason: HOSPADM

## 2020-01-01 RX ORDER — ACETAMINOPHEN 325 MG/1
650 TABLET ORAL EVERY 4 HOURS PRN
Status: DISCONTINUED | OUTPATIENT
Start: 2020-01-01 | End: 2020-01-01

## 2020-01-01 RX ORDER — FUROSEMIDE 20 MG
20 TABLET ORAL DAILY PRN
Qty: 10 TABLET | Refills: 0 | Status: SHIPPED | OUTPATIENT
Start: 2020-01-01 | End: 2020-01-01

## 2020-01-01 RX ORDER — ASPIRIN AND DIPYRIDAMOLE 25; 200 MG/1; MG/1
1 CAPSULE, EXTENDED RELEASE ORAL 2 TIMES DAILY
Status: DISCONTINUED | OUTPATIENT
Start: 2020-01-01 | End: 2020-01-01 | Stop reason: HOSPADM

## 2020-01-01 RX ORDER — ACETAMINOPHEN 325 MG/1
975 TABLET ORAL 4 TIMES DAILY
Status: DISCONTINUED | OUTPATIENT
Start: 2020-01-01 | End: 2020-01-01 | Stop reason: HOSPADM

## 2020-01-01 RX ORDER — FUROSEMIDE 20 MG
20 TABLET ORAL 2 TIMES DAILY
Qty: 30 TABLET | Refills: 0 | COMMUNITY
Start: 2020-01-01

## 2020-01-01 RX ORDER — SODIUM CHLORIDE 9 MG/ML
INJECTION, SOLUTION INTRAVENOUS CONTINUOUS
Status: DISCONTINUED | OUTPATIENT
Start: 2020-01-01 | End: 2020-01-01

## 2020-01-01 RX ORDER — MECLIZINE HCL 12.5 MG 12.5 MG/1
25 TABLET ORAL 3 TIMES DAILY PRN
Qty: 50 TABLET | Refills: 0 | Status: SHIPPED | OUTPATIENT
Start: 2020-01-01 | End: 2020-01-01

## 2020-01-01 RX ORDER — QUETIAPINE FUMARATE 25 MG/1
50 TABLET, FILM COATED ORAL DAILY
Status: DISCONTINUED | OUTPATIENT
Start: 2020-01-01 | End: 2020-01-01 | Stop reason: HOSPADM

## 2020-01-01 RX ORDER — PREDNISONE 10 MG/1
10 TABLET ORAL EVERY OTHER DAY
COMMUNITY
Start: 2020-01-01

## 2020-01-01 RX ORDER — LACTULOSE 20 G/30ML
20 SOLUTION ORAL 2 TIMES DAILY PRN
Status: DISCONTINUED | OUTPATIENT
Start: 2020-01-01 | End: 2020-01-01 | Stop reason: HOSPADM

## 2020-01-01 RX ORDER — OXYCODONE HYDROCHLORIDE 5 MG/1
5-10 TABLET ORAL EVERY 4 HOURS PRN
Status: DISCONTINUED | OUTPATIENT
Start: 2020-01-01 | End: 2020-01-01 | Stop reason: HOSPADM

## 2020-01-01 RX ORDER — NALOXONE HYDROCHLORIDE 0.4 MG/ML
.1-.4 INJECTION, SOLUTION INTRAMUSCULAR; INTRAVENOUS; SUBCUTANEOUS
Status: DISCONTINUED | OUTPATIENT
Start: 2020-01-01 | End: 2020-01-01

## 2020-01-01 RX ORDER — LORAZEPAM 0.5 MG/1
0.5 TABLET ORAL EVERY 6 HOURS PRN
Qty: 30 TABLET | Refills: 5 | Status: SHIPPED | OUTPATIENT
Start: 2020-01-01

## 2020-01-01 RX ORDER — POTASSIUM CHLORIDE 1500 MG/1
20-40 TABLET, EXTENDED RELEASE ORAL
Status: DISCONTINUED | OUTPATIENT
Start: 2020-01-01 | End: 2020-01-01 | Stop reason: HOSPADM

## 2020-01-01 RX ORDER — OXYCODONE AND ACETAMINOPHEN 5; 325 MG/1; MG/1
1 TABLET ORAL ONCE
Status: COMPLETED | OUTPATIENT
Start: 2020-01-01 | End: 2020-01-01

## 2020-01-01 RX ORDER — LIDOCAINE HYDROCHLORIDE 10 MG/ML
5 INJECTION, SOLUTION EPIDURAL; INFILTRATION; INTRACAUDAL; PERINEURAL ONCE
Status: COMPLETED | OUTPATIENT
Start: 2020-01-01 | End: 2020-01-01

## 2020-01-01 RX ORDER — MECLIZINE HCL 12.5 MG 12.5 MG/1
25 TABLET ORAL 3 TIMES DAILY PRN
Qty: 50 TABLET | Refills: 0 | Status: ON HOLD | OUTPATIENT
Start: 2020-01-01 | End: 2020-01-01

## 2020-01-01 RX ORDER — FAMOTIDINE 20 MG/1
20 TABLET, FILM COATED ORAL ONCE
Status: COMPLETED | OUTPATIENT
Start: 2020-01-01 | End: 2020-01-01

## 2020-01-01 RX ORDER — HYDRALAZINE HYDROCHLORIDE 20 MG/ML
10 INJECTION INTRAMUSCULAR; INTRAVENOUS ONCE
Status: COMPLETED | OUTPATIENT
Start: 2020-01-01 | End: 2020-01-01

## 2020-01-01 RX ORDER — CLONIDINE HYDROCHLORIDE 0.1 MG/1
0.2 TABLET ORAL ONCE
Status: COMPLETED | OUTPATIENT
Start: 2020-01-01 | End: 2020-01-01

## 2020-01-01 RX ORDER — MECLIZINE HCL 12.5 MG 12.5 MG/1
25 TABLET ORAL ONCE
Status: COMPLETED | OUTPATIENT
Start: 2020-01-01 | End: 2020-01-01

## 2020-01-01 RX ORDER — QUETIAPINE FUMARATE 25 MG/1
12.5 TABLET, FILM COATED ORAL AT BEDTIME
DISCHARGE
Start: 2020-01-01 | End: 2020-01-01

## 2020-01-01 RX ORDER — PREDNISONE 10 MG/1
10 TABLET ORAL EVERY OTHER DAY
Status: ON HOLD | COMMUNITY
End: 2020-01-01

## 2020-01-01 RX ORDER — OXYCODONE HYDROCHLORIDE 5 MG/1
5 TABLET ORAL EVERY 4 HOURS PRN
Qty: 150 TABLET | Refills: 0 | Status: ON HOLD | OUTPATIENT
Start: 2020-01-01 | End: 2020-01-01

## 2020-01-01 RX ORDER — SUCRALFATE 1 G/1
1 TABLET ORAL
Status: DISCONTINUED | OUTPATIENT
Start: 2020-01-01 | End: 2020-01-01 | Stop reason: HOSPADM

## 2020-01-01 RX ORDER — AZITHROMYCIN 250 MG/1
TABLET, FILM COATED ORAL
Qty: 6 TABLET | Refills: 0 | Status: SHIPPED | OUTPATIENT
Start: 2020-01-01 | End: 2020-01-01

## 2020-01-01 RX ORDER — NALOXONE HYDROCHLORIDE 0.4 MG/ML
.1-.4 INJECTION, SOLUTION INTRAMUSCULAR; INTRAVENOUS; SUBCUTANEOUS
Status: DISCONTINUED | OUTPATIENT
Start: 2020-01-01 | End: 2020-01-01 | Stop reason: HOSPADM

## 2020-01-01 RX ORDER — AMOXICILLIN 500 MG/1
500 CAPSULE ORAL 3 TIMES DAILY
Status: DISCONTINUED | OUTPATIENT
Start: 2020-01-01 | End: 2020-01-01

## 2020-01-01 RX ORDER — SODIUM CHLORIDE 9 MG/ML
INJECTION, SOLUTION INTRAVENOUS CONTINUOUS
Status: DISCONTINUED | OUTPATIENT
Start: 2020-01-01 | End: 2020-01-01 | Stop reason: HOSPADM

## 2020-01-01 RX ORDER — PREDNISONE 10 MG/1
10 TABLET ORAL EVERY OTHER DAY
COMMUNITY
End: 2020-01-01

## 2020-01-01 RX ORDER — CEFTRIAXONE SODIUM 1 G/50ML
1 INJECTION, SOLUTION INTRAVENOUS EVERY 24 HOURS
Status: DISCONTINUED | OUTPATIENT
Start: 2020-01-01 | End: 2020-01-01

## 2020-01-01 RX ORDER — FUROSEMIDE 20 MG
20 TABLET ORAL DAILY PRN
Qty: 20 TABLET | Refills: 0 | Status: SHIPPED | OUTPATIENT
Start: 2020-01-01 | End: 2020-01-01

## 2020-01-01 RX ORDER — PREDNISONE 10 MG/1
10 TABLET ORAL EVERY OTHER DAY
Qty: 45 TABLET | Refills: 0 | Status: SHIPPED | OUTPATIENT
Start: 2020-01-01 | End: 2020-01-01

## 2020-01-01 RX ORDER — NITROGLYCERIN 0.4 MG/1
0.4 TABLET SUBLINGUAL EVERY 5 MIN PRN
Status: DISCONTINUED | OUTPATIENT
Start: 2020-01-01 | End: 2020-01-01

## 2020-01-01 RX ORDER — OXYCODONE HYDROCHLORIDE 5 MG/1
5 TABLET ORAL EVERY 4 HOURS PRN
Status: DISCONTINUED | OUTPATIENT
Start: 2020-01-01 | End: 2020-01-01

## 2020-01-01 RX ORDER — ACETAMINOPHEN 325 MG/1
975 TABLET ORAL 3 TIMES DAILY
Status: DISCONTINUED | OUTPATIENT
Start: 2020-01-01 | End: 2020-01-01

## 2020-01-01 RX ORDER — CLONIDINE HYDROCHLORIDE 0.1 MG/1
0.1 TABLET ORAL 2 TIMES DAILY
Qty: 20 TABLET | Refills: 0 | Status: ON HOLD | OUTPATIENT
Start: 2020-01-01 | End: 2020-01-01

## 2020-01-01 RX ORDER — HYDROCHLOROTHIAZIDE 12.5 MG/1
12.5 CAPSULE ORAL ONCE
Status: COMPLETED | OUTPATIENT
Start: 2020-01-01 | End: 2020-01-01

## 2020-01-01 RX ORDER — LACTULOSE 20 G/30ML
20 SOLUTION ORAL 2 TIMES DAILY PRN
DISCHARGE
Start: 2020-01-01

## 2020-01-01 RX ORDER — FUROSEMIDE 20 MG
20 TABLET ORAL DAILY
Qty: 30 TABLET | Refills: 0 | Status: SHIPPED | OUTPATIENT
Start: 2020-01-01 | End: 2020-01-01

## 2020-01-01 RX ORDER — PREDNISONE 10 MG/1
10 TABLET ORAL DAILY
Qty: 15 TABLET | Refills: 0 | Status: ON HOLD | OUTPATIENT
Start: 2020-01-01 | End: 2020-01-01

## 2020-01-01 RX ORDER — ACETAMINOPHEN 325 MG/1
975 TABLET ORAL 3 TIMES DAILY
Status: DISCONTINUED | OUTPATIENT
Start: 2020-01-01 | End: 2020-01-01 | Stop reason: HOSPADM

## 2020-01-01 RX ORDER — FUROSEMIDE 20 MG
20 TABLET ORAL DAILY
Status: DISCONTINUED | OUTPATIENT
Start: 2020-01-01 | End: 2020-01-01 | Stop reason: HOSPADM

## 2020-01-01 RX ORDER — BUPIVACAINE HYDROCHLORIDE 5 MG/ML
6 INJECTION, SOLUTION EPIDURAL; INTRACAUDAL ONCE
Status: COMPLETED | OUTPATIENT
Start: 2020-01-01 | End: 2020-01-01

## 2020-01-01 RX ORDER — LOSARTAN POTASSIUM 50 MG/1
100 TABLET ORAL DAILY
Status: DISCONTINUED | OUTPATIENT
Start: 2020-01-01 | End: 2020-01-01 | Stop reason: HOSPADM

## 2020-01-01 RX ORDER — CEFTRIAXONE SODIUM 1 G/50ML
1 INJECTION, SOLUTION INTRAVENOUS ONCE
Status: COMPLETED | OUTPATIENT
Start: 2020-01-01 | End: 2020-01-01

## 2020-01-01 RX ORDER — AMOXICILLIN 875 MG
875 TABLET ORAL 2 TIMES DAILY
Qty: 20 TABLET | Refills: 0 | Status: SHIPPED | OUTPATIENT
Start: 2020-01-01 | End: 2020-01-01

## 2020-01-01 RX ORDER — QUETIAPINE FUMARATE 25 MG/1
25 TABLET, FILM COATED ORAL DAILY
Status: DISCONTINUED | OUTPATIENT
Start: 2020-01-01 | End: 2020-01-01

## 2020-01-01 RX ORDER — CLONIDINE HYDROCHLORIDE 0.1 MG/1
0.1 TABLET ORAL 2 TIMES DAILY
Qty: 20 TABLET | Refills: 0 | Status: SHIPPED | OUTPATIENT
Start: 2020-01-01 | End: 2020-01-01

## 2020-01-01 RX ORDER — LANOLIN ALCOHOL/MO/W.PET/CERES
400 CREAM (GRAM) TOPICAL DAILY
COMMUNITY

## 2020-01-01 RX ORDER — PREDNISONE 5 MG/1
5 TABLET ORAL EVERY OTHER DAY
Status: DISCONTINUED | OUTPATIENT
Start: 2020-01-01 | End: 2020-01-01

## 2020-01-01 RX ORDER — LIDOCAINE 40 MG/G
CREAM TOPICAL
Status: DISCONTINUED | OUTPATIENT
Start: 2020-01-01 | End: 2020-01-01 | Stop reason: HOSPADM

## 2020-01-01 RX ORDER — PREDNISONE 10 MG/1
10 TABLET ORAL DAILY
Status: DISCONTINUED | OUTPATIENT
Start: 2020-01-01 | End: 2020-01-01 | Stop reason: HOSPADM

## 2020-01-01 RX ORDER — OXYCODONE HYDROCHLORIDE 5 MG/1
5-10 TABLET ORAL EVERY 6 HOURS PRN
Qty: 100 TABLET | Refills: 0 | Status: SHIPPED | OUTPATIENT
Start: 2020-01-01

## 2020-01-01 RX ORDER — LOSARTAN POTASSIUM AND HYDROCHLOROTHIAZIDE 12.5; 5 MG/1; MG/1
0.5 TABLET ORAL DAILY
Status: ON HOLD | COMMUNITY
End: 2020-01-01

## 2020-01-01 RX ORDER — TRIAMCINOLONE ACETONIDE 40 MG/ML
40 INJECTION, SUSPENSION INTRA-ARTICULAR; INTRAMUSCULAR ONCE
Status: COMPLETED | OUTPATIENT
Start: 2020-01-01 | End: 2020-01-01

## 2020-01-01 RX ORDER — METOPROLOL TARTRATE 25 MG/1
12.5 TABLET, FILM COATED ORAL 2 TIMES DAILY
Qty: 180 TABLET | Refills: 3 | Status: SHIPPED | OUTPATIENT
Start: 2020-01-01 | End: 2020-01-01

## 2020-01-01 RX ORDER — ONDANSETRON 2 MG/ML
4 INJECTION INTRAMUSCULAR; INTRAVENOUS EVERY 6 HOURS PRN
Status: DISCONTINUED | OUTPATIENT
Start: 2020-01-01 | End: 2020-01-01 | Stop reason: HOSPADM

## 2020-01-01 RX ORDER — METHYLPREDNISOLONE 4 MG/1
4 TABLET ORAL 3 TIMES DAILY
Status: DISCONTINUED | OUTPATIENT
Start: 2020-01-01 | End: 2020-01-01 | Stop reason: HOSPADM

## 2020-01-01 RX ORDER — LACTULOSE 10 G/15ML
15-30 SOLUTION ORAL AT BEDTIME
Status: ON HOLD | COMMUNITY
End: 2020-01-01

## 2020-01-01 RX ORDER — TROLAMINE SALICYLATE 10 G/100G
CREAM TOPICAL 3 TIMES DAILY
Status: ON HOLD | COMMUNITY
End: 2020-01-01

## 2020-01-01 RX ORDER — ACETAMINOPHEN 650 MG/1
650 SUPPOSITORY RECTAL EVERY 4 HOURS PRN
Status: DISCONTINUED | OUTPATIENT
Start: 2020-01-01 | End: 2020-01-01

## 2020-01-01 RX ORDER — MORPHINE SULFATE 2 MG/ML
2 INJECTION, SOLUTION INTRAMUSCULAR; INTRAVENOUS ONCE
Status: COMPLETED | OUTPATIENT
Start: 2020-01-01 | End: 2020-01-01

## 2020-01-01 RX ORDER — FUROSEMIDE 20 MG
1 TABLET ORAL DAILY
Status: ON HOLD | COMMUNITY
Start: 2020-01-01 | End: 2020-01-01

## 2020-01-01 RX ORDER — DOCUSATE SODIUM 100 MG/1
100 CAPSULE, LIQUID FILLED ORAL EVERY MORNING
COMMUNITY

## 2020-01-01 RX ORDER — TRIAMCINOLONE ACETONIDE 40 MG/ML
80 INJECTION, SUSPENSION INTRA-ARTICULAR; INTRAMUSCULAR ONCE
Status: COMPLETED | OUTPATIENT
Start: 2020-01-01 | End: 2020-01-01

## 2020-01-01 RX ORDER — FENTANYL CITRATE 50 UG/ML
50 INJECTION, SOLUTION INTRAMUSCULAR; INTRAVENOUS
Status: DISCONTINUED | OUTPATIENT
Start: 2020-01-01 | End: 2020-01-01

## 2020-01-01 RX ORDER — QUETIAPINE FUMARATE 50 MG/1
50 TABLET, FILM COATED ORAL DAILY
Qty: 30 TABLET | Refills: 3 | Status: SHIPPED | OUTPATIENT
Start: 2020-01-01 | End: 2020-01-01

## 2020-01-01 RX ORDER — FUROSEMIDE 20 MG
20 TABLET ORAL ONCE
Status: COMPLETED | OUTPATIENT
Start: 2020-01-01 | End: 2020-01-01

## 2020-01-01 RX ORDER — PANTOPRAZOLE SODIUM 40 MG/1
40 TABLET, DELAYED RELEASE ORAL
Status: DISCONTINUED | OUTPATIENT
Start: 2020-01-01 | End: 2020-01-01 | Stop reason: HOSPADM

## 2020-01-01 RX ORDER — MULTIPLE VITAMINS W/ MINERALS TAB 9MG-400MCG
1 TAB ORAL DAILY
Status: DISCONTINUED | OUTPATIENT
Start: 2020-01-01 | End: 2020-01-01 | Stop reason: HOSPADM

## 2020-01-01 RX ORDER — OXYCODONE HYDROCHLORIDE 5 MG/1
5 TABLET ORAL EVERY 6 HOURS PRN
Qty: 150 TABLET | Refills: 0 | Status: ON HOLD
Start: 2020-01-01 | End: 2020-01-01

## 2020-01-01 RX ORDER — SCOLOPAMINE TRANSDERMAL SYSTEM 1 MG/1
1 PATCH, EXTENDED RELEASE TRANSDERMAL
Status: DISCONTINUED | OUTPATIENT
Start: 2020-01-01 | End: 2020-01-01 | Stop reason: HOSPADM

## 2020-01-01 RX ORDER — ACETAMINOPHEN 500 MG
1000 TABLET ORAL 3 TIMES DAILY
COMMUNITY
Start: 2020-01-01

## 2020-01-01 RX ORDER — ASPIRIN AND DIPYRIDAMOLE 25; 200 MG/1; MG/1
1 CAPSULE, EXTENDED RELEASE ORAL 2 TIMES DAILY
Status: DISCONTINUED | OUTPATIENT
Start: 2020-01-01 | End: 2020-01-01

## 2020-01-01 RX ORDER — PREDNISONE 10 MG/1
10 TABLET ORAL ONCE
Status: COMPLETED | OUTPATIENT
Start: 2020-01-01 | End: 2020-01-01

## 2020-01-01 RX ORDER — OXYCODONE HYDROCHLORIDE 5 MG/1
2.5-5 TABLET ORAL EVERY 4 HOURS PRN
Qty: 24 TABLET | Refills: 0 | Status: SHIPPED | OUTPATIENT
Start: 2020-01-01 | End: 2020-01-01

## 2020-01-01 RX ORDER — ACETAMINOPHEN 325 MG/1
975 TABLET ORAL 3 TIMES DAILY
Status: ON HOLD | DISCHARGE
Start: 2020-01-01 | End: 2020-01-01

## 2020-01-01 RX ORDER — OXYCODONE HYDROCHLORIDE 5 MG/1
2.5-5 TABLET ORAL EVERY 4 HOURS PRN
Qty: 20 TABLET | Refills: 0 | Status: SHIPPED | OUTPATIENT
Start: 2020-01-01 | End: 2020-01-01

## 2020-01-01 RX ORDER — FAMOTIDINE 20 MG/1
20 TABLET, FILM COATED ORAL DAILY PRN
Status: DISCONTINUED | OUTPATIENT
Start: 2020-01-01 | End: 2020-01-01 | Stop reason: HOSPADM

## 2020-01-01 RX ORDER — QUETIAPINE FUMARATE 25 MG/1
25 TABLET, FILM COATED ORAL ONCE
Status: COMPLETED | OUTPATIENT
Start: 2020-01-01 | End: 2020-01-01

## 2020-01-01 RX ORDER — OXYCODONE HYDROCHLORIDE 5 MG/1
5-10 TABLET ORAL EVERY 6 HOURS PRN
Qty: 100 TABLET | Refills: 0 | Status: SHIPPED | OUTPATIENT
Start: 2020-01-01 | End: 2020-01-01

## 2020-01-01 RX ORDER — AMOXICILLIN 500 MG/1
500 CAPSULE ORAL 2 TIMES DAILY
Qty: 10 CAPSULE | Refills: 0 | Status: SHIPPED | OUTPATIENT
Start: 2020-01-01 | End: 2020-01-01

## 2020-01-01 RX ORDER — OXYCODONE HYDROCHLORIDE 5 MG/1
5 TABLET ORAL ONCE
Status: COMPLETED | OUTPATIENT
Start: 2020-01-01 | End: 2020-01-01

## 2020-01-01 RX ORDER — LOSARTAN POTASSIUM 50 MG/1
50 TABLET ORAL ONCE
Status: COMPLETED | OUTPATIENT
Start: 2020-01-01 | End: 2020-01-01

## 2020-01-01 RX ORDER — BARIUM SULFATE 400 MG/ML
SUSPENSION ORAL ONCE
Status: COMPLETED | OUTPATIENT
Start: 2020-01-01 | End: 2020-01-01

## 2020-01-01 RX ORDER — AMOXICILLIN 500 MG/1
500 CAPSULE ORAL EVERY 12 HOURS
Qty: 10 CAPSULE | Refills: 0 | Status: SHIPPED | OUTPATIENT
Start: 2020-01-01 | End: 2020-01-01

## 2020-01-01 RX ORDER — MORPHINE SULFATE 2 MG/ML
1 INJECTION, SOLUTION INTRAMUSCULAR; INTRAVENOUS
Status: DISCONTINUED | OUTPATIENT
Start: 2020-01-01 | End: 2020-01-01

## 2020-01-01 RX ORDER — CALCIUM CARBONATE 500 MG/1
500 TABLET, CHEWABLE ORAL
Status: DISCONTINUED | OUTPATIENT
Start: 2020-01-01 | End: 2020-01-01 | Stop reason: HOSPADM

## 2020-01-01 RX ORDER — NYSTATIN 100000/ML
500000 SUSPENSION, ORAL (FINAL DOSE FORM) ORAL 4 TIMES DAILY
Qty: 140 ML | Refills: 0 | Status: SHIPPED | OUTPATIENT
Start: 2020-01-01 | End: 2020-01-01

## 2020-01-01 RX ORDER — SUCRALFATE 1 G/1
1 TABLET ORAL
Qty: 120 TABLET | Refills: 3 | Status: SHIPPED | OUTPATIENT
Start: 2020-01-01

## 2020-01-01 RX ORDER — LIDOCAINE 50 MG/G
1 PATCH TOPICAL EVERY 24 HOURS
Status: DISCONTINUED | OUTPATIENT
Start: 2020-01-01 | End: 2020-01-01 | Stop reason: HOSPADM

## 2020-01-01 RX ORDER — ASPIRIN 81 MG/1
324 TABLET, CHEWABLE ORAL ONCE
Status: DISCONTINUED | OUTPATIENT
Start: 2020-01-01 | End: 2020-01-01

## 2020-01-01 RX ORDER — LORAZEPAM 2 MG/ML
0.5 INJECTION INTRAMUSCULAR ONCE
Status: COMPLETED | OUTPATIENT
Start: 2020-01-01 | End: 2020-01-01

## 2020-01-01 RX ORDER — ONDANSETRON 2 MG/ML
4 INJECTION INTRAMUSCULAR; INTRAVENOUS EVERY 6 HOURS PRN
Status: DISCONTINUED | OUTPATIENT
Start: 2020-01-01 | End: 2020-01-01

## 2020-01-01 RX ORDER — LOSARTAN POTASSIUM 100 MG/1
100 TABLET ORAL DAILY
DISCHARGE
Start: 2020-01-01 | End: 2020-01-01

## 2020-01-01 RX ORDER — ASPIRIN AND DIPYRIDAMOLE 25; 200 MG/1; MG/1
CAPSULE, EXTENDED RELEASE ORAL
Qty: 180 CAPSULE | Refills: 3 | Status: ON HOLD | OUTPATIENT
Start: 2020-01-01 | End: 2020-01-01

## 2020-01-01 RX ORDER — FUROSEMIDE 10 MG/ML
20 INJECTION INTRAMUSCULAR; INTRAVENOUS ONCE
Status: COMPLETED | OUTPATIENT
Start: 2020-01-01 | End: 2020-01-01

## 2020-01-01 RX ORDER — ACETAMINOPHEN 500 MG
1000 TABLET ORAL EVERY 8 HOURS PRN
Status: ON HOLD | COMMUNITY
End: 2020-01-01

## 2020-01-01 RX ORDER — IBUPROFEN 200 MG
200 TABLET ORAL EVERY 6 HOURS PRN
Status: DISCONTINUED | OUTPATIENT
Start: 2020-01-01 | End: 2020-01-01 | Stop reason: HOSPADM

## 2020-01-01 RX ORDER — PANTOPRAZOLE SODIUM 40 MG/1
40 TABLET, DELAYED RELEASE ORAL
Qty: 30 TABLET | Refills: 3 | Status: SHIPPED | OUTPATIENT
Start: 2020-01-01

## 2020-01-01 RX ORDER — CEFTRIAXONE SODIUM 1 G/50ML
1 INJECTION, SOLUTION INTRAVENOUS DAILY
Status: DISCONTINUED | OUTPATIENT
Start: 2020-01-01 | End: 2020-01-01 | Stop reason: HOSPADM

## 2020-01-01 RX ORDER — METOPROLOL TARTRATE 25 MG/1
12.5 TABLET, FILM COATED ORAL 2 TIMES DAILY
Qty: 180 TABLET | Refills: 3 | Status: SHIPPED | OUTPATIENT
Start: 2020-01-01

## 2020-01-01 RX ORDER — MV-MN/OM3/DHA/EPA/FISH/LUT/ZEA 250-5-1 MG
1 CAPSULE ORAL DAILY
Status: DISCONTINUED | OUTPATIENT
Start: 2020-01-01 | End: 2020-01-01 | Stop reason: CLARIF

## 2020-01-01 RX ORDER — LIDOCAINE HYDROCHLORIDE 10 MG/ML
4 INJECTION, SOLUTION INFILTRATION; PERINEURAL ONCE
Status: COMPLETED | OUTPATIENT
Start: 2020-01-01 | End: 2020-01-01

## 2020-01-01 RX ORDER — METHYLPREDNISOLONE ACETATE 80 MG/ML
80 INJECTION, SUSPENSION INTRA-ARTICULAR; INTRALESIONAL; INTRAMUSCULAR; SOFT TISSUE ONCE
Status: COMPLETED | OUTPATIENT
Start: 2020-01-01 | End: 2020-01-01

## 2020-01-01 RX ORDER — METOPROLOL TARTRATE 25 MG/1
12.5 TABLET, FILM COATED ORAL 2 TIMES DAILY
DISCHARGE
Start: 2020-01-01 | End: 2020-01-01

## 2020-01-01 RX ORDER — FUROSEMIDE 20 MG
20 TABLET ORAL DAILY
Qty: 5 TABLET | Refills: 0 | Status: SHIPPED | OUTPATIENT
Start: 2020-01-01 | End: 2020-01-01

## 2020-01-01 RX ORDER — BENZONATATE 200 MG/1
200 CAPSULE ORAL 3 TIMES DAILY PRN
Qty: 30 CAPSULE | Refills: 1 | Status: SHIPPED | OUTPATIENT
Start: 2020-01-01 | End: 2020-01-01

## 2020-01-01 RX ORDER — ACETAMINOPHEN 325 MG/1
650 TABLET ORAL EVERY 4 HOURS PRN
Status: DISCONTINUED | OUTPATIENT
Start: 2020-01-01 | End: 2020-01-01 | Stop reason: HOSPADM

## 2020-01-01 RX ORDER — LACTULOSE 10 G/15ML
30 SOLUTION ORAL; RECTAL 2 TIMES DAILY PRN
COMMUNITY
Start: 2020-01-01 | End: 2020-01-01

## 2020-01-01 RX ORDER — LOSARTAN POTASSIUM 50 MG/1
50 TABLET ORAL DAILY
Status: DISCONTINUED | OUTPATIENT
Start: 2020-01-01 | End: 2020-01-01

## 2020-01-01 RX ORDER — LOSARTAN POTASSIUM 50 MG/1
50 TABLET ORAL DAILY
Status: DISCONTINUED | OUTPATIENT
Start: 2020-01-01 | End: 2020-01-01 | Stop reason: HOSPADM

## 2020-01-01 RX ORDER — LOSARTAN POTASSIUM 50 MG/1
50 TABLET ORAL DAILY
DISCHARGE
Start: 2020-01-01 | End: 2020-01-01

## 2020-01-01 RX ORDER — SODIUM CHLORIDE 1 G/1
1 TABLET ORAL 3 TIMES DAILY
Qty: 90 TABLET | Refills: 0 | Status: SHIPPED | OUTPATIENT
Start: 2020-01-01 | End: 2020-01-01

## 2020-01-01 RX ORDER — PREDNISONE 10 MG/1
10 TABLET ORAL EVERY OTHER DAY
Qty: 45 TABLET | Refills: 3 | Status: ON HOLD | OUTPATIENT
Start: 2020-01-01 | End: 2020-01-01

## 2020-01-01 RX ORDER — ASPIRIN AND DIPYRIDAMOLE 25; 200 MG/1; MG/1
CAPSULE, EXTENDED RELEASE ORAL
Qty: 180 CAPSULE | Refills: 3 | Status: CANCELLED | OUTPATIENT
Start: 2020-01-01

## 2020-01-01 RX ORDER — FUROSEMIDE 20 MG
20 TABLET ORAL DAILY PRN
Qty: 90 TABLET | Refills: 1 | Status: ON HOLD | OUTPATIENT
Start: 2020-01-01 | End: 2020-01-01

## 2020-01-01 RX ADMIN — METHYLPREDNISOLONE 4 MG: 4 TABLET ORAL at 14:31

## 2020-01-01 RX ADMIN — MULTIPLE VITAMINS W/ MINERALS TAB 1 TABLET: TAB at 11:44

## 2020-01-01 RX ADMIN — PREDNISONE 5 MG: 5 TABLET ORAL at 08:27

## 2020-01-01 RX ADMIN — METOPROLOL 12.5 MG: 25 TABLET ORAL at 22:12

## 2020-01-01 RX ADMIN — LOSARTAN POTASSIUM 100 MG: 50 TABLET, FILM COATED ORAL at 09:11

## 2020-01-01 RX ADMIN — CALCIUM CARBONATE 500 MG (1,250 MG)-VITAMIN D3 200 UNIT TABLET 1 TABLET: at 09:08

## 2020-01-01 RX ADMIN — CEFTRIAXONE SODIUM 1 G: 1 INJECTION, SOLUTION INTRAVENOUS at 21:40

## 2020-01-01 RX ADMIN — FENTANYL CITRATE 50 MCG: 50 INJECTION INTRAMUSCULAR; INTRAVENOUS at 02:07

## 2020-01-01 RX ADMIN — OXYCODONE HYDROCHLORIDE 5 MG: 5 TABLET ORAL at 03:45

## 2020-01-01 RX ADMIN — METHYLPREDNISOLONE 4 MG: 4 TABLET ORAL at 05:12

## 2020-01-01 RX ADMIN — OXYCODONE HYDROCHLORIDE 2.5 MG: 5 TABLET ORAL at 03:07

## 2020-01-01 RX ADMIN — FENTANYL CITRATE 50 MCG: 50 INJECTION INTRAMUSCULAR; INTRAVENOUS at 21:57

## 2020-01-01 RX ADMIN — BUPIVACAINE HYDROCHLORIDE 6 ML: 5 INJECTION, SOLUTION EPIDURAL; INTRACAUDAL at 14:12

## 2020-01-01 RX ADMIN — OXYCODONE HYDROCHLORIDE 10 MG: 5 TABLET ORAL at 07:39

## 2020-01-01 RX ADMIN — MULTIPLE VITAMINS W/ MINERALS TAB 1 TABLET: TAB at 10:21

## 2020-01-01 RX ADMIN — CALCIUM CARBONATE 500 MG (1,250 MG)-VITAMIN D3 200 UNIT TABLET 1 TABLET: at 08:13

## 2020-01-01 RX ADMIN — ASPIRIN AND EXTENDED-RELEASE DIPYRIDAMOLE 1 CAPSULE: 25; 200 CAPSULE ORAL at 10:11

## 2020-01-01 RX ADMIN — OXYCODONE HYDROCHLORIDE 5 MG: 5 TABLET ORAL at 05:03

## 2020-01-01 RX ADMIN — OXYCODONE HYDROCHLORIDE 5 MG: 5 TABLET ORAL at 04:31

## 2020-01-01 RX ADMIN — METOPROLOL 12.5 MG: 25 TABLET ORAL at 10:34

## 2020-01-01 RX ADMIN — LORAZEPAM 0.5 MG: 2 INJECTION, SOLUTION INTRAMUSCULAR; INTRAVENOUS at 06:28

## 2020-01-01 RX ADMIN — ACETAMINOPHEN 975 MG: 325 TABLET ORAL at 11:23

## 2020-01-01 RX ADMIN — ACETAMINOPHEN 975 MG: 325 TABLET, FILM COATED ORAL at 22:35

## 2020-01-01 RX ADMIN — FENTANYL CITRATE 50 MCG: 50 INJECTION, SOLUTION INTRAMUSCULAR; INTRAVENOUS at 11:05

## 2020-01-01 RX ADMIN — SUCRALFATE 1 G: 1 TABLET ORAL at 08:27

## 2020-01-01 RX ADMIN — SUCRALFATE 1 G: 1 TABLET ORAL at 11:26

## 2020-01-01 RX ADMIN — SUCRALFATE 1 G: 1 TABLET ORAL at 11:20

## 2020-01-01 RX ADMIN — OXYCODONE HYDROCHLORIDE 10 MG: 5 TABLET ORAL at 12:45

## 2020-01-01 RX ADMIN — OXYCODONE HYDROCHLORIDE 10 MG: 5 TABLET ORAL at 02:38

## 2020-01-01 RX ADMIN — METOPROLOL 12.5 MG: 25 TABLET ORAL at 21:14

## 2020-01-01 RX ADMIN — CEFTRIAXONE SODIUM 1 G: 1 INJECTION, SOLUTION INTRAVENOUS at 22:42

## 2020-01-01 RX ADMIN — METOPROLOL 12.5 MG: 25 TABLET ORAL at 09:28

## 2020-01-01 RX ADMIN — ASPIRIN AND EXTENDED-RELEASE DIPYRIDAMOLE 1 CAPSULE: 25; 200 CAPSULE ORAL at 10:09

## 2020-01-01 RX ADMIN — OXYCODONE HYDROCHLORIDE 10 MG: 5 TABLET ORAL at 23:48

## 2020-01-01 RX ADMIN — ACETAMINOPHEN 975 MG: 325 TABLET, FILM COATED ORAL at 14:42

## 2020-01-01 RX ADMIN — OXYCODONE HYDROCHLORIDE 5 MG: 5 TABLET ORAL at 12:24

## 2020-01-01 RX ADMIN — ACETAMINOPHEN 975 MG: 325 TABLET, FILM COATED ORAL at 22:53

## 2020-01-01 RX ADMIN — SODIUM CHLORIDE: 9 INJECTION, SOLUTION INTRAVENOUS at 13:01

## 2020-01-01 RX ADMIN — SUCRALFATE 1 G: 1 TABLET ORAL at 16:23

## 2020-01-01 RX ADMIN — LACTULOSE 20 G: 20 SOLUTION ORAL at 17:28

## 2020-01-01 RX ADMIN — ACETAMINOPHEN 975 MG: 325 TABLET, FILM COATED ORAL at 21:36

## 2020-01-01 RX ADMIN — METOPROLOL 12.5 MG: 25 TABLET ORAL at 09:20

## 2020-01-01 RX ADMIN — PREDNISONE 10 MG: 10 TABLET ORAL at 10:10

## 2020-01-01 RX ADMIN — ACETAMINOPHEN 975 MG: 325 TABLET ORAL at 11:26

## 2020-01-01 RX ADMIN — OXYCODONE HYDROCHLORIDE 10 MG: 5 TABLET ORAL at 19:38

## 2020-01-01 RX ADMIN — TRIAMCINOLONE ACETONIDE 40 MG: 40 INJECTION, SUSPENSION INTRA-ARTICULAR; INTRAMUSCULAR at 08:35

## 2020-01-01 RX ADMIN — OXYCODONE HYDROCHLORIDE 5 MG: 5 TABLET ORAL at 06:50

## 2020-01-01 RX ADMIN — METOPROLOL 12.5 MG: 25 TABLET ORAL at 10:10

## 2020-01-01 RX ADMIN — CALCIUM CARBONATE 500 MG (1,250 MG)-VITAMIN D3 200 UNIT TABLET 1 TABLET: at 18:11

## 2020-01-01 RX ADMIN — METHYLPREDNISOLONE 4 MG: 4 TABLET ORAL at 06:10

## 2020-01-01 RX ADMIN — ACETAMINOPHEN 975 MG: 325 TABLET, FILM COATED ORAL at 15:13

## 2020-01-01 RX ADMIN — LOSARTAN POTASSIUM 100 MG: 50 TABLET, FILM COATED ORAL at 08:42

## 2020-01-01 RX ADMIN — QUETIAPINE 12.5 MG: 25 TABLET ORAL at 22:35

## 2020-01-01 RX ADMIN — FUROSEMIDE 20 MG: 20 TABLET ORAL at 09:33

## 2020-01-01 RX ADMIN — ACETAMINOPHEN ORAL SOLUTION 650 MG: 650 SOLUTION ORAL at 21:38

## 2020-01-01 RX ADMIN — OXYCODONE HYDROCHLORIDE 5 MG: 5 TABLET ORAL at 17:49

## 2020-01-01 RX ADMIN — METOPROLOL 12.5 MG: 25 TABLET ORAL at 09:32

## 2020-01-01 RX ADMIN — ONDANSETRON 4 MG: 4 TABLET, ORALLY DISINTEGRATING ORAL at 17:48

## 2020-01-01 RX ADMIN — SUCRALFATE 1 G: 1 TABLET ORAL at 09:05

## 2020-01-01 RX ADMIN — FUROSEMIDE 20 MG: 20 TABLET ORAL at 09:05

## 2020-01-01 RX ADMIN — LACTULOSE 20 G: 20 SOLUTION ORAL at 10:11

## 2020-01-01 RX ADMIN — FENTANYL CITRATE 50 MCG: 50 INJECTION, SOLUTION INTRAMUSCULAR; INTRAVENOUS at 19:06

## 2020-01-01 RX ADMIN — OXYCODONE HYDROCHLORIDE 5 MG: 5 TABLET ORAL at 16:32

## 2020-01-01 RX ADMIN — QUETIAPINE 12.5 MG: 25 TABLET ORAL at 22:53

## 2020-01-01 RX ADMIN — METOPROLOL 12.5 MG: 25 TABLET ORAL at 21:57

## 2020-01-01 RX ADMIN — SUCRALFATE 1 G: 1 TABLET ORAL at 11:23

## 2020-01-01 RX ADMIN — CALCIUM CARBONATE 500 MG (1,250 MG)-VITAMIN D3 200 UNIT TABLET 1 TABLET: at 11:45

## 2020-01-01 RX ADMIN — MORPHINE SULFATE 1 MG: 2 INJECTION, SOLUTION INTRAMUSCULAR; INTRAVENOUS at 01:52

## 2020-01-01 RX ADMIN — ACETAMINOPHEN 975 MG: 325 TABLET, FILM COATED ORAL at 21:16

## 2020-01-01 RX ADMIN — OXYCODONE HYDROCHLORIDE 5 MG: 5 TABLET ORAL at 21:42

## 2020-01-01 RX ADMIN — ASPIRIN AND EXTENDED-RELEASE DIPYRIDAMOLE 1 CAPSULE: 25; 200 CAPSULE ORAL at 10:21

## 2020-01-01 RX ADMIN — ACETAMINOPHEN 975 MG: 325 TABLET, FILM COATED ORAL at 06:13

## 2020-01-01 RX ADMIN — LIDOCAINE HYDROCHLORIDE 4 ML: 10 INJECTION, SOLUTION INFILTRATION; PERINEURAL at 14:12

## 2020-01-01 RX ADMIN — CLONIDINE HYDROCHLORIDE 0.2 MG: 0.1 TABLET ORAL at 17:24

## 2020-01-01 RX ADMIN — ACETAMINOPHEN 975 MG: 325 TABLET ORAL at 21:57

## 2020-01-01 RX ADMIN — OXYCODONE HYDROCHLORIDE 10 MG: 5 TABLET ORAL at 16:08

## 2020-01-01 RX ADMIN — CALCIUM CARBONATE 500 MG (1,250 MG)-VITAMIN D3 200 UNIT TABLET 1 TABLET: at 17:52

## 2020-01-01 RX ADMIN — OXYCODONE HYDROCHLORIDE 5 MG: 5 TABLET ORAL at 01:34

## 2020-01-01 RX ADMIN — ASPIRIN AND EXTENDED-RELEASE DIPYRIDAMOLE 1 CAPSULE: 25; 200 CAPSULE ORAL at 09:11

## 2020-01-01 RX ADMIN — ACETAMINOPHEN 975 MG: 325 TABLET, FILM COATED ORAL at 06:56

## 2020-01-01 RX ADMIN — METOPROLOL 12.5 MG: 25 TABLET ORAL at 09:05

## 2020-01-01 RX ADMIN — SODIUM CHLORIDE, PRESERVATIVE FREE: 5 INJECTION INTRAVENOUS at 20:08

## 2020-01-01 RX ADMIN — SUCRALFATE 1 G: 1 TABLET ORAL at 21:35

## 2020-01-01 RX ADMIN — CALCIUM CARBONATE 500 MG (1,250 MG)-VITAMIN D3 200 UNIT TABLET 1 TABLET: at 07:28

## 2020-01-01 RX ADMIN — OXYCODONE HYDROCHLORIDE 5 MG: 5 TABLET ORAL at 20:29

## 2020-01-01 RX ADMIN — OXYCODONE HYDROCHLORIDE 5 MG: 5 TABLET ORAL at 20:58

## 2020-01-01 RX ADMIN — OXYCODONE HYDROCHLORIDE 10 MG: 5 TABLET ORAL at 07:45

## 2020-01-01 RX ADMIN — ACETAMINOPHEN 975 MG: 325 TABLET ORAL at 07:39

## 2020-01-01 RX ADMIN — MAGNESIUM HYDROXIDE 15 ML: 400 SUSPENSION ORAL at 10:41

## 2020-01-01 RX ADMIN — FENTANYL CITRATE 50 MCG: 50 INJECTION, SOLUTION INTRAMUSCULAR; INTRAVENOUS at 14:40

## 2020-01-01 RX ADMIN — METOPROLOL 12.5 MG: 25 TABLET ORAL at 10:47

## 2020-01-01 RX ADMIN — ACETAMINOPHEN 975 MG: 325 TABLET ORAL at 14:31

## 2020-01-01 RX ADMIN — AMOXICILLIN 500 MG: 500 CAPSULE ORAL at 20:46

## 2020-01-01 RX ADMIN — PANTOPRAZOLE SODIUM 40 MG: 40 TABLET, DELAYED RELEASE ORAL at 07:21

## 2020-01-01 RX ADMIN — SUCRALFATE 1 G: 1 TABLET ORAL at 17:20

## 2020-01-01 RX ADMIN — FUROSEMIDE 20 MG: 20 TABLET ORAL at 09:20

## 2020-01-01 RX ADMIN — MAGNESIUM HYDROXIDE 15 ML: 400 SUSPENSION ORAL at 22:54

## 2020-01-01 RX ADMIN — OXYCODONE AND ACETAMINOPHEN 1 TABLET: 5; 325 TABLET ORAL at 07:16

## 2020-01-01 RX ADMIN — FENTANYL CITRATE 50 MCG: 50 INJECTION, SOLUTION INTRAMUSCULAR; INTRAVENOUS at 11:48

## 2020-01-01 RX ADMIN — ACETAMINOPHEN 975 MG: 325 TABLET, FILM COATED ORAL at 23:15

## 2020-01-01 RX ADMIN — OXYCODONE HYDROCHLORIDE 5 MG: 5 TABLET ORAL at 21:22

## 2020-01-01 RX ADMIN — SUCRALFATE 1 G: 1 TABLET ORAL at 23:15

## 2020-01-01 RX ADMIN — OXYCODONE HYDROCHLORIDE 5 MG: 5 TABLET ORAL at 00:28

## 2020-01-01 RX ADMIN — ASPIRIN AND EXTENDED-RELEASE DIPYRIDAMOLE 1 CAPSULE: 25; 200 CAPSULE ORAL at 21:17

## 2020-01-01 RX ADMIN — SUCRALFATE 1 G: 1 TABLET ORAL at 21:29

## 2020-01-01 RX ADMIN — CALCIUM CARBONATE 500 MG: 500 TABLET ORAL at 15:45

## 2020-01-01 RX ADMIN — LOSARTAN POTASSIUM 50 MG: 50 TABLET, FILM COATED ORAL at 07:20

## 2020-01-01 RX ADMIN — OXYCODONE HYDROCHLORIDE 5 MG: 5 TABLET ORAL at 03:48

## 2020-01-01 RX ADMIN — ACETAMINOPHEN 975 MG: 325 TABLET, FILM COATED ORAL at 14:24

## 2020-01-01 RX ADMIN — POTASSIUM CHLORIDE AND SODIUM CHLORIDE: 900; 150 INJECTION, SOLUTION INTRAVENOUS at 02:16

## 2020-01-01 RX ADMIN — ACETAMINOPHEN 975 MG: 325 TABLET ORAL at 14:18

## 2020-01-01 RX ADMIN — LOSARTAN POTASSIUM 100 MG: 50 TABLET, FILM COATED ORAL at 08:32

## 2020-01-01 RX ADMIN — SODIUM CHLORIDE: 9 INJECTION, SOLUTION INTRAVENOUS at 03:30

## 2020-01-01 RX ADMIN — SUCRALFATE 1 G: 1 TABLET ORAL at 07:22

## 2020-01-01 RX ADMIN — ACETAMINOPHEN 650 MG: 325 TABLET, FILM COATED ORAL at 07:44

## 2020-01-01 RX ADMIN — SUCRALFATE 1 G: 1 TABLET ORAL at 16:02

## 2020-01-01 RX ADMIN — METOPROLOL 12.5 MG: 25 TABLET ORAL at 09:08

## 2020-01-01 RX ADMIN — PANTOPRAZOLE SODIUM 40 MG: 40 TABLET, DELAYED RELEASE ORAL at 08:05

## 2020-01-01 RX ADMIN — CALCIUM CARBONATE 500 MG (1,250 MG)-VITAMIN D3 200 UNIT TABLET 1 TABLET: at 17:23

## 2020-01-01 RX ADMIN — ACETAMINOPHEN 975 MG: 325 TABLET, FILM COATED ORAL at 05:03

## 2020-01-01 RX ADMIN — METHYLPREDNISOLONE ACETATE 80 MG: 80 INJECTION, SUSPENSION INTRA-ARTICULAR; INTRALESIONAL; INTRAMUSCULAR; SOFT TISSUE at 11:59

## 2020-01-01 RX ADMIN — SODIUM CHLORIDE 250 ML: 9 INJECTION, SOLUTION INTRAVENOUS at 20:56

## 2020-01-01 RX ADMIN — OXYCODONE HYDROCHLORIDE 2.5 MG: 5 TABLET ORAL at 09:17

## 2020-01-01 RX ADMIN — METOPROLOL 12.5 MG: 25 TABLET ORAL at 21:29

## 2020-01-01 RX ADMIN — OXYCODONE HYDROCHLORIDE 5 MG: 5 TABLET ORAL at 09:22

## 2020-01-01 RX ADMIN — OXYCODONE HYDROCHLORIDE 5 MG: 5 TABLET ORAL at 15:50

## 2020-01-01 RX ADMIN — MORPHINE SULFATE 2 MG: 2 INJECTION, SOLUTION INTRAMUSCULAR; INTRAVENOUS at 05:29

## 2020-01-01 RX ADMIN — LIDOCAINE 1 PATCH: 50 PATCH TOPICAL at 11:48

## 2020-01-01 RX ADMIN — CALCIUM CARBONATE 500 MG (1,250 MG)-VITAMIN D3 200 UNIT TABLET 1 TABLET: at 08:32

## 2020-01-01 RX ADMIN — ACETAMINOPHEN 975 MG: 325 TABLET, FILM COATED ORAL at 06:04

## 2020-01-01 RX ADMIN — METHYLPREDNISOLONE 4 MG: 4 TABLET ORAL at 05:04

## 2020-01-01 RX ADMIN — METOPROLOL 12.5 MG: 25 TABLET ORAL at 22:53

## 2020-01-01 RX ADMIN — OXYCODONE HYDROCHLORIDE 10 MG: 5 TABLET ORAL at 04:37

## 2020-01-01 RX ADMIN — LACTULOSE 20 G: 20 SOLUTION ORAL at 22:36

## 2020-01-01 RX ADMIN — ACETAMINOPHEN 975 MG: 325 TABLET, FILM COATED ORAL at 22:11

## 2020-01-01 RX ADMIN — ACETAMINOPHEN 975 MG: 325 TABLET ORAL at 07:36

## 2020-01-01 RX ADMIN — OXYCODONE HYDROCHLORIDE 5 MG: 5 TABLET ORAL at 02:53

## 2020-01-01 RX ADMIN — METOPROLOL 12.5 MG: 25 TABLET ORAL at 22:35

## 2020-01-01 RX ADMIN — METHYLPREDNISOLONE 4 MG: 4 TABLET ORAL at 21:29

## 2020-01-01 RX ADMIN — SUCRALFATE 1 G: 1 TABLET ORAL at 16:31

## 2020-01-01 RX ADMIN — OXYCODONE HYDROCHLORIDE 10 MG: 5 TABLET ORAL at 15:32

## 2020-01-01 RX ADMIN — SUCRALFATE 1 G: 1 TABLET ORAL at 21:22

## 2020-01-01 RX ADMIN — OXYCODONE HYDROCHLORIDE 5 MG: 5 TABLET ORAL at 11:41

## 2020-01-01 RX ADMIN — METOPROLOL 12.5 MG: 25 TABLET ORAL at 10:11

## 2020-01-01 RX ADMIN — ACETAMINOPHEN 975 MG: 325 TABLET ORAL at 21:22

## 2020-01-01 RX ADMIN — ACETAMINOPHEN 975 MG: 325 TABLET, FILM COATED ORAL at 21:14

## 2020-01-01 RX ADMIN — METHYLPREDNISOLONE 4 MG: 4 TABLET ORAL at 14:18

## 2020-01-01 RX ADMIN — MULTIPLE VITAMINS W/ MINERALS TAB 1 TABLET: TAB at 09:08

## 2020-01-01 RX ADMIN — SUCRALFATE 1 G: 1 TABLET ORAL at 08:05

## 2020-01-01 RX ADMIN — METHYLPREDNISOLONE 4 MG: 4 TABLET ORAL at 04:37

## 2020-01-01 RX ADMIN — PANTOPRAZOLE SODIUM 40 MG: 40 TABLET, DELAYED RELEASE ORAL at 10:47

## 2020-01-01 RX ADMIN — SODIUM CHLORIDE 1000 ML: 9 INJECTION, SOLUTION INTRAVENOUS at 14:26

## 2020-01-01 RX ADMIN — SODIUM CHLORIDE: 9 INJECTION, SOLUTION INTRAVENOUS at 11:49

## 2020-01-01 RX ADMIN — ASPIRIN AND EXTENDED-RELEASE DIPYRIDAMOLE 1 CAPSULE: 25; 200 CAPSULE ORAL at 21:14

## 2020-01-01 RX ADMIN — AMOXICILLIN 500 MG: 500 CAPSULE ORAL at 05:08

## 2020-01-01 RX ADMIN — PANTOPRAZOLE SODIUM 40 MG: 40 TABLET, DELAYED RELEASE ORAL at 07:39

## 2020-01-01 RX ADMIN — PREDNISONE 10 MG: 10 TABLET ORAL at 10:21

## 2020-01-01 RX ADMIN — MULTIPLE VITAMINS W/ MINERALS TAB 1 TABLET: TAB at 10:11

## 2020-01-01 RX ADMIN — OXYCODONE AND ACETAMINOPHEN 1 TABLET: 5; 325 TABLET ORAL at 03:28

## 2020-01-01 RX ADMIN — SUCRALFATE 1 G: 1 TABLET ORAL at 12:21

## 2020-01-01 RX ADMIN — HYDROCHLOROTHIAZIDE 12.5 MG: 12.5 CAPSULE, GELATIN COATED ORAL at 07:21

## 2020-01-01 RX ADMIN — QUETIAPINE FUMARATE 50 MG: 25 TABLET ORAL at 16:31

## 2020-01-01 RX ADMIN — SUCRALFATE 1 G: 1 TABLET ORAL at 07:36

## 2020-01-01 RX ADMIN — METHYLPREDNISOLONE 4 MG: 4 TABLET ORAL at 21:57

## 2020-01-01 RX ADMIN — IBUPROFEN 200 MG: 200 TABLET, FILM COATED ORAL at 23:49

## 2020-01-01 RX ADMIN — LIDOCAINE HYDROCHLORIDE 5 ML: 10 INJECTION, SOLUTION EPIDURAL; INFILTRATION; INTRACAUDAL; PERINEURAL at 08:34

## 2020-01-01 RX ADMIN — PREDNISONE 10 MG: 10 TABLET ORAL at 10:11

## 2020-01-01 RX ADMIN — PANTOPRAZOLE SODIUM 40 MG: 40 TABLET, DELAYED RELEASE ORAL at 09:20

## 2020-01-01 RX ADMIN — FUROSEMIDE 20 MG: 20 TABLET ORAL at 10:34

## 2020-01-01 RX ADMIN — METOPROLOL 12.5 MG: 25 TABLET ORAL at 21:22

## 2020-01-01 RX ADMIN — LOSARTAN POTASSIUM 50 MG: 50 TABLET, FILM COATED ORAL at 10:09

## 2020-01-01 RX ADMIN — FENTANYL CITRATE 50 MCG: 50 INJECTION INTRAMUSCULAR; INTRAVENOUS at 19:02

## 2020-01-01 RX ADMIN — PREDNISONE 10 MG: 10 TABLET ORAL at 09:33

## 2020-01-01 RX ADMIN — MAGNESIUM HYDROXIDE 15 ML: 400 SUSPENSION ORAL at 21:21

## 2020-01-01 RX ADMIN — PANTOPRAZOLE SODIUM 40 MG: 40 TABLET, DELAYED RELEASE ORAL at 09:05

## 2020-01-01 RX ADMIN — OXYCODONE HYDROCHLORIDE 5 MG: 5 TABLET ORAL at 16:26

## 2020-01-01 RX ADMIN — SUCRALFATE 1 G: 1 TABLET ORAL at 11:19

## 2020-01-01 RX ADMIN — OXYCODONE HYDROCHLORIDE 5 MG: 5 TABLET ORAL at 07:27

## 2020-01-01 RX ADMIN — QUETIAPINE 12.5 MG: 25 TABLET ORAL at 21:14

## 2020-01-01 RX ADMIN — HYDRALAZINE HYDROCHLORIDE 10 MG: 20 INJECTION INTRAMUSCULAR; INTRAVENOUS at 01:55

## 2020-01-01 RX ADMIN — ACETAMINOPHEN 975 MG: 325 TABLET, FILM COATED ORAL at 05:09

## 2020-01-01 RX ADMIN — ACETAMINOPHEN 975 MG: 325 TABLET, FILM COATED ORAL at 13:29

## 2020-01-01 RX ADMIN — CEFTRIAXONE SODIUM 1 G: 1 INJECTION, SOLUTION INTRAVENOUS at 08:25

## 2020-01-01 RX ADMIN — ACETAMINOPHEN 975 MG: 325 TABLET ORAL at 08:05

## 2020-01-01 RX ADMIN — CALCIUM CARBONATE 500 MG (1,250 MG)-VITAMIN D3 200 UNIT TABLET 1 TABLET: at 17:06

## 2020-01-01 RX ADMIN — AMOXICILLIN 500 MG: 500 CAPSULE ORAL at 21:36

## 2020-01-01 RX ADMIN — METOPROLOL 12.5 MG: 25 TABLET ORAL at 09:27

## 2020-01-01 RX ADMIN — ACETAMINOPHEN 975 MG: 325 TABLET, FILM COATED ORAL at 13:43

## 2020-01-01 RX ADMIN — MAGNESIUM HYDROXIDE 15 ML: 400 SUSPENSION ORAL at 22:35

## 2020-01-01 RX ADMIN — ACETAMINOPHEN 975 MG: 325 TABLET ORAL at 11:48

## 2020-01-01 RX ADMIN — PREDNISONE 10 MG: 10 TABLET ORAL at 09:08

## 2020-01-01 RX ADMIN — SCOPALAMINE 1 PATCH: 1 PATCH, EXTENDED RELEASE TRANSDERMAL at 17:50

## 2020-01-01 RX ADMIN — ACETAMINOPHEN 975 MG: 325 TABLET, FILM COATED ORAL at 14:23

## 2020-01-01 RX ADMIN — FUROSEMIDE 20 MG: 10 INJECTION, SOLUTION INTRAVENOUS at 11:52

## 2020-01-01 RX ADMIN — METOPROLOL 12.5 MG: 25 TABLET ORAL at 10:01

## 2020-01-01 RX ADMIN — AMOXICILLIN 500 MG: 500 CAPSULE ORAL at 08:27

## 2020-01-01 RX ADMIN — SUCRALFATE 1 G: 1 TABLET ORAL at 16:38

## 2020-01-01 RX ADMIN — PANTOPRAZOLE SODIUM 40 MG: 40 TABLET, DELAYED RELEASE ORAL at 08:28

## 2020-01-01 RX ADMIN — QUETIAPINE FUMARATE 25 MG: 25 TABLET ORAL at 16:02

## 2020-01-01 RX ADMIN — LOSARTAN POTASSIUM 50 MG: 50 TABLET, FILM COATED ORAL at 16:32

## 2020-01-01 RX ADMIN — SUCRALFATE 1 G: 1 TABLET ORAL at 07:39

## 2020-01-01 RX ADMIN — MAGNESIUM HYDROXIDE 15 ML: 400 SUSPENSION ORAL at 13:30

## 2020-01-01 RX ADMIN — ACETAMINOPHEN 975 MG: 325 TABLET, FILM COATED ORAL at 14:07

## 2020-01-01 RX ADMIN — ACETAMINOPHEN 975 MG: 325 TABLET ORAL at 04:37

## 2020-01-01 RX ADMIN — SUCRALFATE 1 G: 1 TABLET ORAL at 21:57

## 2020-01-01 RX ADMIN — FAMOTIDINE 20 MG: 20 TABLET ORAL at 07:16

## 2020-01-01 RX ADMIN — BARIUM SULFATE 0.5 ML: 400 SUSPENSION ORAL at 14:33

## 2020-01-01 RX ADMIN — ACETAMINOPHEN 975 MG: 325 TABLET ORAL at 15:49

## 2020-01-01 RX ADMIN — METOPROLOL 12.5 MG: 25 TABLET ORAL at 23:15

## 2020-01-01 RX ADMIN — QUETIAPINE 12.5 MG: 25 TABLET ORAL at 21:17

## 2020-01-01 RX ADMIN — MAGNESIUM HYDROXIDE 15 ML: 400 SUSPENSION ORAL at 21:14

## 2020-01-01 RX ADMIN — ACETAMINOPHEN 975 MG: 325 TABLET, FILM COATED ORAL at 06:50

## 2020-01-01 RX ADMIN — SODIUM CHLORIDE 500 ML: 9 INJECTION, SOLUTION INTRAVENOUS at 15:12

## 2020-01-01 RX ADMIN — METHYLPREDNISOLONE 4 MG: 4 TABLET ORAL at 13:31

## 2020-01-01 RX ADMIN — PANTOPRAZOLE SODIUM 40 MG: 40 TABLET, DELAYED RELEASE ORAL at 07:36

## 2020-01-01 RX ADMIN — ASPIRIN AND EXTENDED-RELEASE DIPYRIDAMOLE 1 CAPSULE: 25; 200 CAPSULE ORAL at 11:45

## 2020-01-01 RX ADMIN — FUROSEMIDE 20 MG: 20 TABLET ORAL at 07:21

## 2020-01-01 RX ADMIN — SODIUM CHLORIDE: 9 INJECTION, SOLUTION INTRAVENOUS at 11:10

## 2020-01-01 RX ADMIN — METOPROLOL 12.5 MG: 25 TABLET ORAL at 03:29

## 2020-01-01 RX ADMIN — METOPROLOL 12.5 MG: 25 TABLET ORAL at 21:35

## 2020-01-01 RX ADMIN — OXYCODONE HYDROCHLORIDE 10 MG: 5 TABLET ORAL at 00:06

## 2020-01-01 RX ADMIN — LIDOCAINE 1 PATCH: 50 PATCH TOPICAL at 11:23

## 2020-01-01 RX ADMIN — LACTULOSE 20 G: 20 SOLUTION ORAL at 14:57

## 2020-01-01 RX ADMIN — BISACODYL SUPPOSITORY 10 MG: 10 SUPPOSITORY RECTAL at 07:41

## 2020-01-01 RX ADMIN — NITROGLYCERIN 0.4 MG: 0.4 TABLET SUBLINGUAL at 01:54

## 2020-01-01 RX ADMIN — IOHEXOL 2 ML: 240 INJECTION, SOLUTION INTRATHECAL; INTRAVASCULAR; INTRAVENOUS; ORAL at 14:12

## 2020-01-01 RX ADMIN — MULTIPLE VITAMINS W/ MINERALS TAB 1 TABLET: TAB at 10:09

## 2020-01-01 RX ADMIN — TRIAMCINOLONE ACETONIDE 80 MG: 40 INJECTION, SUSPENSION INTRA-ARTICULAR; INTRAMUSCULAR at 14:12

## 2020-01-01 RX ADMIN — PREDNISONE 10 MG: 10 TABLET ORAL at 07:16

## 2020-01-01 RX ADMIN — QUETIAPINE FUMARATE 25 MG: 25 TABLET ORAL at 18:13

## 2020-01-01 RX ADMIN — ASPIRIN AND EXTENDED-RELEASE DIPYRIDAMOLE 1 CAPSULE: 25; 200 CAPSULE ORAL at 22:35

## 2020-01-01 RX ADMIN — OXYCODONE HYDROCHLORIDE 10 MG: 5 TABLET ORAL at 20:09

## 2020-01-01 RX ADMIN — SUCRALFATE 1 G: 1 TABLET ORAL at 16:46

## 2020-01-01 RX ADMIN — METHYLPREDNISOLONE 4 MG: 4 TABLET ORAL at 21:22

## 2020-01-01 RX ADMIN — OXYCODONE HYDROCHLORIDE 5 MG: 5 TABLET ORAL at 11:20

## 2020-01-01 RX ADMIN — SUCRALFATE 1 G: 1 TABLET ORAL at 09:20

## 2020-01-01 RX ADMIN — METOPROLOL 12.5 MG: 25 TABLET ORAL at 08:42

## 2020-01-01 RX ADMIN — OXYCODONE HYDROCHLORIDE 5 MG: 5 TABLET ORAL at 16:43

## 2020-01-01 RX ADMIN — OXYCODONE HYDROCHLORIDE 10 MG: 5 TABLET ORAL at 03:46

## 2020-01-01 RX ADMIN — ASPIRIN AND EXTENDED-RELEASE DIPYRIDAMOLE 1 CAPSULE: 25; 200 CAPSULE ORAL at 22:53

## 2020-01-01 RX ADMIN — MECLIZINE 25 MG: 12.5 TABLET ORAL at 16:19

## 2020-01-01 RX ADMIN — ACETAMINOPHEN 975 MG: 325 TABLET, FILM COATED ORAL at 05:55

## 2020-01-01 RX ADMIN — OXYCODONE HYDROCHLORIDE 5 MG: 5 TABLET ORAL at 17:06

## 2020-01-01 RX ADMIN — SUCRALFATE 1 G: 1 TABLET ORAL at 12:12

## 2020-01-01 RX ADMIN — SODIUM PHOSPHATE, DIBASIC AND SODIUM PHOSPHATE, MONOBASIC 1 ENEMA: 7; 19 ENEMA RECTAL at 15:26

## 2020-01-01 RX ADMIN — IBUPROFEN 200 MG: 200 TABLET, FILM COATED ORAL at 17:20

## 2020-01-01 RX ADMIN — MAGNESIUM HYDROXIDE 15 ML: 400 SUSPENSION ORAL at 10:33

## 2020-01-01 RX ADMIN — ACETAMINOPHEN 975 MG: 325 TABLET ORAL at 21:29

## 2020-01-01 RX ADMIN — LIDOCAINE 1 PATCH: 50 PATCH TOPICAL at 11:26

## 2020-01-01 RX ADMIN — OXYCODONE HYDROCHLORIDE 2.5 MG: 5 TABLET ORAL at 11:44

## 2020-01-01 ASSESSMENT — ENCOUNTER SYMPTOMS
COLOR CHANGE: 1
ABDOMINAL DISTENTION: 1
WEAKNESS: 0
TROUBLE SWALLOWING: 0
MYALGIAS: 1
SLEEP DISTURBANCE: 1
SPEECH DIFFICULTY: 0
SLEEP DISTURBANCE: 1
AGITATION: 1
RESPIRATORY NEGATIVE: 1
WHEEZING: 0
ABDOMINAL PAIN: 0
CONSTIPATION: 0
ACTIVITY CHANGE: 1
PSYCHIATRIC NEGATIVE: 1
FEVER: 0
BACK PAIN: 0
BACK PAIN: 0
NECK STIFFNESS: 0
FACIAL SWELLING: 0
VOMITING: 0
FEVER: 0
AGITATION: 1
SHORTNESS OF BREATH: 0
DYSURIA: 0
EYES NEGATIVE: 1
SHORTNESS OF BREATH: 0
COLOR CHANGE: 0
SORE THROAT: 0
FATIGUE: 1
MYALGIAS: 0
FATIGUE: 1
CHEST TIGHTNESS: 0
CHILLS: 0
ARTHRALGIAS: 1
UNEXPECTED WEIGHT CHANGE: 1
EYES NEGATIVE: 1
DIFFICULTY URINATING: 1
WEAKNESS: 1
FEVER: 0
COUGH: 0
NECK PAIN: 0
BACK PAIN: 1
CONFUSION: 1
ACTIVITY CHANGE: 1
DIARRHEA: 0
BACK PAIN: 1
ARTHRALGIAS: 1
ARTHRALGIAS: 1
CONFUSION: 1
SEIZURES: 0
STRIDOR: 0
TREMORS: 0
HEADACHES: 0
ABDOMINAL PAIN: 0
VOMITING: 0
NAUSEA: 0
ACTIVITY CHANGE: 0
LIGHT-HEADEDNESS: 0
RESPIRATORY NEGATIVE: 1
DIFFICULTY URINATING: 1
FREQUENCY: 1
NAUSEA: 0
DIZZINESS: 1
BACK PAIN: 1
FREQUENCY: 0
EYE PAIN: 0
ABDOMINAL PAIN: 1
FATIGUE: 1
NECK PAIN: 1
FACIAL ASYMMETRY: 0
SLEEP DISTURBANCE: 1
DIARRHEA: 0
APPETITE CHANGE: 0
FEVER: 0
RHINORRHEA: 0
BACK PAIN: 1
HALLUCINATIONS: 0
COUGH: 0
DIZZINESS: 0
WEAKNESS: 0
SHORTNESS OF BREATH: 0

## 2020-01-01 ASSESSMENT — ANXIETY QUESTIONNAIRES
3. WORRYING TOO MUCH ABOUT DIFFERENT THINGS: NOT AT ALL
1. FEELING NERVOUS, ANXIOUS, OR ON EDGE: NOT AT ALL
IF YOU CHECKED OFF ANY PROBLEMS ON THIS QUESTIONNAIRE, HOW DIFFICULT HAVE THESE PROBLEMS MADE IT FOR YOU TO DO YOUR WORK, TAKE CARE OF THINGS AT HOME, OR GET ALONG WITH OTHER PEOPLE: NOT DIFFICULT AT ALL
6. BECOMING EASILY ANNOYED OR IRRITABLE: NOT AT ALL
1. FEELING NERVOUS, ANXIOUS, OR ON EDGE: NOT AT ALL
7. FEELING AFRAID AS IF SOMETHING AWFUL MIGHT HAPPEN: NOT AT ALL
6. BECOMING EASILY ANNOYED OR IRRITABLE: NOT AT ALL
GAD7 TOTAL SCORE: 0
5. BEING SO RESTLESS THAT IT IS HARD TO SIT STILL: NOT AT ALL
3. WORRYING TOO MUCH ABOUT DIFFERENT THINGS: NOT AT ALL
7. FEELING AFRAID AS IF SOMETHING AWFUL MIGHT HAPPEN: NOT AT ALL
2. NOT BEING ABLE TO STOP OR CONTROL WORRYING: NOT AT ALL
5. BEING SO RESTLESS THAT IT IS HARD TO SIT STILL: NOT AT ALL
2. NOT BEING ABLE TO STOP OR CONTROL WORRYING: NOT AT ALL
GAD7 TOTAL SCORE: 0

## 2020-01-01 ASSESSMENT — ACTIVITIES OF DAILY LIVING (ADL)
ADLS_ACUITY_SCORE: 21
TRANSFERRING: 2-->ASSISTIVE PERSON
COGNITION: 1 - ATTENTION OR MEMORY DEFICITS
RETIRED_COMMUNICATION: 0-->UNDERSTANDS/COMMUNICATES WITHOUT DIFFICULTY
ADLS_ACUITY_SCORE: 24
ADLS_ACUITY_SCORE: 23
ADLS_ACUITY_SCORE: 24
ADLS_ACUITY_SCORE: 21
DRESS: 2-->ASSISTIVE PERSON
ADLS_ACUITY_SCORE: 21
SWALLOWING: 0-->SWALLOWS FOODS/LIQUIDS WITHOUT DIFFICULTY
ADLS_ACUITY_SCORE: 21
NUMBER_OF_TIMES_PATIENT_HAS_FALLEN_WITHIN_LAST_SIX_MONTHS: 1
ADLS_ACUITY_SCORE: 21
DRESS: 1-->ASSISTIVE EQUIPMENT
ADLS_ACUITY_SCORE: 29
ADLS_ACUITY_SCORE: 23
TOILETING: 2-->ASSISTIVE PERSON
RETIRED_EATING: 0-->INDEPENDENT
ADLS_ACUITY_SCORE: 21
COGNITION: 1 - ATTENTION OR MEMORY DEFICITS
ADLS_ACUITY_SCORE: 21
ADLS_ACUITY_SCORE: 24
RETIRED_EATING: 0-->INDEPENDENT
AMBULATION: 1-->ASSISTIVE EQUIPMENT
ADLS_ACUITY_SCORE: 24
ADLS_ACUITY_SCORE: 21
ADLS_ACUITY_SCORE: 15
ADLS_ACUITY_SCORE: 21
ADLS_ACUITY_SCORE: 24
ADLS_ACUITY_SCORE: 23
RETIRED_COMMUNICATION: 2-->DIFFICULTY UNDERSTANDING (NOT RELATED TO LANGUAGE BARRIER)
SWALLOWING: 0-->SWALLOWS FOODS/LIQUIDS WITHOUT DIFFICULTY
TOILETING: 1-->ASSISTIVE EQUIPMENT
ADLS_ACUITY_SCORE: 24
BATHING: 2-->ASSISTIVE PERSON
ADLS_ACUITY_SCORE: 24
ADLS_ACUITY_SCORE: 14.5
ADLS_ACUITY_SCORE: 15
ADLS_ACUITY_SCORE: 15
FALL_HISTORY_WITHIN_LAST_SIX_MONTHS: NO
ADLS_ACUITY_SCORE: 23
ADLS_ACUITY_SCORE: 24
AMBULATION: 1-->ASSISTIVE EQUIPMENT
ADLS_ACUITY_SCORE: 24
FALL_HISTORY_WITHIN_LAST_SIX_MONTHS: YES
TRANSFERRING: 1-->ASSISTIVE EQUIPMENT
ADLS_ACUITY_SCORE: 23
ADLS_ACUITY_SCORE: 24
BATHING: 1-->ASSISTIVE EQUIPMENT
ADLS_ACUITY_SCORE: 15
ADLS_ACUITY_SCORE: 21
ADLS_ACUITY_SCORE: 15
ADLS_ACUITY_SCORE: 21
ADLS_ACUITY_SCORE: 24

## 2020-01-01 ASSESSMENT — PAIN SCALES - GENERAL
PAINLEVEL: SEVERE PAIN (6)
PAINLEVEL: SEVERE PAIN (6)
PAINLEVEL: WORST PAIN (10)
PAINLEVEL: SEVERE PAIN (7)
PAINLEVEL: MODERATE PAIN (5)
PAINLEVEL: SEVERE PAIN (6)
PAINLEVEL: EXTREME PAIN (8)
PAINLEVEL: SEVERE PAIN (7)
PAINLEVEL: WORST PAIN (10)
PAINLEVEL: EXTREME PAIN (8)
PAINLEVEL: SEVERE PAIN (7)

## 2020-01-01 ASSESSMENT — MIFFLIN-ST. JEOR
SCORE: 999.71
SCORE: 916.7
SCORE: 926.68
SCORE: 933.03
SCORE: 889.94
SCORE: 906.72
SCORE: 894.93
SCORE: 880.87
SCORE: 914.88
SCORE: 901.28
SCORE: 952.75

## 2020-01-01 ASSESSMENT — PATIENT HEALTH QUESTIONNAIRE - PHQ9
SUM OF ALL RESPONSES TO PHQ QUESTIONS 1-9: 0
5. POOR APPETITE OR OVEREATING: NOT AT ALL
5. POOR APPETITE OR OVEREATING: NOT AT ALL
SUM OF ALL RESPONSES TO PHQ QUESTIONS 1-9: 0

## 2020-01-01 ASSESSMENT — COLUMBIA-SUICIDE SEVERITY RATING SCALE - C-SSRS
2. HAVE YOU ACTUALLY HAD ANY THOUGHTS OF KILLING YOURSELF IN THE PAST MONTH?: OTHER (SEE COMMENTS)
IS THE PATIENT NOT ABLE TO COMPLETE C-SSRS: UNABLE TO VERBALIZE
1. IN THE PAST MONTH, HAVE YOU WISHED YOU WERE DEAD OR WISHED YOU COULD GO TO SLEEP AND NOT WAKE UP?: OTHER (SEE COMMENTS)

## 2020-01-10 NOTE — LETTER
January 21, 2020      Tenisha Cagle  [address] [facility] [id number]        Dear Tenisha,     The urine testing showed only the meds you have been prescribed.      Sincerely,        Godwin Kapoor MD

## 2020-01-10 NOTE — PROGRESS NOTES
SUBJECTIVE:   Tenisha Cagle is a 86 year old female who presents to clinic today for the following health issues:    HPI  Follow up on pain meds. Leaves the home only for MD visits and [place].  Getting steroid injections next week in back hip and knees.  Daughter organizes this.  Today he pain is a little better, daughter feels it is from her prednisone.  Getting this every other day currently.  Narcotics cause a little fatigue.  Constipation, getting lactulose for this now.  Daughter reports she is sleeping a lot more lately.  Feels she is likely to die sooner rather than later and wants to continue prednisone for palliative reasons.      Last tox screen was as expected 6/3/19.    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     Priority: Medium     Controlled substance agreement signed 3/8/19 03/08/2019     Priority: Medium     Class: Chronic     Arthritis of shoulder region, left, degenerative 01/07/2019     Priority: Medium     Memory loss 05/25/2018     Priority: Medium     Atrial fibrillation with RVR (H) 01/25/2018     Priority: Medium     Hiatal hernia 01/17/2018     Priority: Medium     Overview:   large, mostly intrathoracic       Essential hypertension 01/17/2018     Priority: Medium     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/27/2017     Priority: Medium     Benign skin lesion of nose 09/27/2017     Priority: Medium     AK (actinic keratosis) 07/10/2017     Priority: Medium     SK (seborrheic keratosis) 07/10/2017     Priority: Medium     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) 06/15/2017     Priority: Medium     Abnormal weight loss 04/18/2016     Priority: Medium     Anemia 04/18/2016     Priority: Medium     Visual changes 04/18/2016     Priority: Medium     Osteoarthritis of spine with radiculopathy, lumbar region 02/03/2016     Priority: Medium     History of TIA (transient ischemic attack) 02/02/2016      Priority: Medium     Numbness and tingling of right leg 02/02/2016     Priority: Medium     Radicular leg pain 02/02/2016     Priority: Medium     Basal cell carcinoma of right cheek 11/18/2015     Priority: Medium     Alvarado's cyst of knee 02/20/2014     Priority: Medium     Paresthesia of right leg 02/20/2014     Priority: Medium     Right knee DJD 02/20/2014     Priority: Medium     Cystocele 08/23/2013     Priority: Medium     Pancreatic mass 09/18/2012     Priority: Medium     Overview:   Possible, abnormal CT        Cerebral aneurysm, nonruptured 12/20/2011     Priority: Medium     Diverticulosis of colon 05/13/2011     Priority: Medium     Chronic lymphocytic leukemia (H) 04/14/2011     Priority: Medium     Overview:   WBC stable in the 30,000       Past Surgical History:   Procedure Laterality Date     CHOLECYSTECTOMY      No Comments Provided     COLONOSCOPY      2007,Sigmoid diverticulosis     ENDOSCOPY UPPER, COLONOSCOPY, COMBINED      5/5/11,Hiatal hernia, gastric gland hyperplasia in antrum     LAPAROSCOPIC TUBAL LIGATION      No Comments Provided     OTHER SURGICAL HISTORY      1986,205093,BIOPSY BREAST,Right     OTHER SURGICAL HISTORY      [id number],PREMALIG/BENIGN SKIN LESION EXCISION,R side of nose, face and chest wall/Basal Cell Cancer Excision     TONSILLECTOMY, ADENOIDECTOMY, COMBINED      1954     Social History     Tobacco Use     Smoking status: Never Smoker     Smokeless tobacco: Never Used   Substance Use Topics     Alcohol use: No     Alcohol/week: 0.0 standard drinks     Current Outpatient Medications   Medication Sig Dispense Refill     acetaminophen (TYLENOL) 500 MG tablet Take 1,000 mg by mouth every 6 hours as needed       aspirin-dipyridamole ER (AGGRENOX)  MG 12 hr capsule TAKE 1 CAPSULE TWICE A  capsule 3     Calcium Carbonate-Vitamin D (CALCIUM 500 + D) 500-125 MG-UNIT TABS Take 1 tablet by mouth daily       ferrous sulfate (FEROSUL) 325 (65 Fe) MG tablet Take 1 tablet  (325 mg) by mouth daily (with breakfast) 30 tablet 3     lactulose (CHRONULAC) 10 GM/15ML solution TAKE 30 ML BY MOUTH TWICE DAILY AS NEEDED FOR CONSTIPATION 1892 mL 11     losartan-hydrochlorothiazide (HYZAAR) 50-12.5 MG tablet Take 1 tablet by mouth daily 90 tablet 3     magnesium hydroxide (MILK OF MAGNESIA) 400 MG/5ML suspension Take 15 mLs by mouth At Bedtime       Multiple Vitamin (MULTI-VITAMINS) TABS Take 1 tablet by mouth daily       Multiple Vitamins-Minerals (OCUVITE ADULT 50+) CAPS Take 1 capsule by mouth daily       order for DME Equipment being ordered: pillow with a hole in the center 1 Device 0     oxyCODONE (ROXICODONE) 5 MG tablet Take 1 tablet (5 mg) by mouth every 4 hours as needed for pain 150 tablet 0     ranitidine (ZANTAC) 150 MG tablet Take 1 tablet (150 mg) by mouth 2 times daily as needed for heartburn 180 tablet 3     Allergies   Allergen Reactions     Lansoprazole Other (See Comments) and Unknown     Patient states that medication didn't work for her.  Daughter states she got delusional, water did not taste right  Other reaction(s): Other - Describe In Comment Field  Patient states that medication didn't work for her.     Lisinopril Cough       Review of Systems   Constitutional: Positive for fatigue.   Musculoskeletal: Positive for arthralgias and back pain.   Neurological: Negative for weakness.        OBJECTIVE:     /72   Pulse 73   Temp 98.1  F (36.7  C) (Tympanic)   Resp 16   Wt 48.1 kg (106 lb)   SpO2 99%   BMI 18.78 kg/m    Body mass index is 18.78 kg/m .  Physical Exam  Constitutional:       Appearance: Normal appearance.   Musculoskeletal:      Comments: Moderate kyphosis   Neurological:      General: No focal deficit present.      Mental Status: She is alert.      Comments: Oriented x 2.             ASSESSMENT/PLAN:         (M47.26) Osteoarthritis of spine with radiculopathy, lumbar region  (primary encounter diagnosis)  Comment: overall approach is more endo of  life/palliative but not [facility] yet  Plan: oxyCODONE (ROXICODONE) 5 MG tablet, Drug          Screen Comprehensive , Urine with Reported Meds        (Med[facility]) (Pain Care Package)        Refilled this and agreed to another month of prednisone.  Follow up in 4 weeks    (M53.3) Sacroiliac joint pain  Comment:    Plan: oxyCODONE (ROXICODONE) 5 MG tablet, predniSONE         (DELTASONE) 10 MG tablet                 Godwin Kapoor MD  [facility]    She was not able to urinate immediately.  Ultimately did, but I cautioned her daughter against aggressive hydration as this led to hyponatremia last time.    Godwin Kapoor MD on 1/13/2020 at 7:54 AM

## 2020-01-10 NOTE — NURSING NOTE
"Coming in for a medication check up    Chief Complaint   Patient presents with     Recheck Medication     check up       Initial /72   Pulse 73   Temp 98.1  F (36.7  C) (Tympanic)   Resp 16   Wt 48.1 kg (106 lb)   SpO2 99%   BMI 18.78 kg/m   Estimated body mass index is 18.78 kg/m  as calculated from the following:    Height as of 12/3/19: 1.6 m (5' 3\").    Weight as of this encounter: 48.1 kg (106 lb).  Medication Reconciliation: complete    Monisha Gamboa LPN  "

## 2020-01-31 PROBLEM — R42 DIZZY: Status: ACTIVE | Noted: 2020-01-01

## 2020-01-31 PROBLEM — I48.0 PAROXYSMAL A-FIB (H): Status: ACTIVE | Noted: 2020-01-01

## 2020-01-31 NOTE — PROGRESS NOTES
01/31/20 1700   Signing Clinician's Name / Credentials   Signing clinician's name / credentials Slava Woods MPT   Quick Adds   Rehab Discipline PT   Quick Adds Mobility;Therapeutic Activity   Functional Transfer Training   Treatment Detail CGA with Fww   Gait Training   Symptoms Noted During/After Treatment (Gait Training) dizziness;fatigue   Treatment Detail ambulation in hallway   [place] Level (Gait Training) contact guard   Physical Assistance Level (Gait Training) 1 person + 1 person to manage equipment   Weight Bearing (Gait Training) full weight-bearing   Assistive Device (Gait Training) rolling walker   Gait Distance 150 feet   Gait Analysis Deviations decreased marko   Therapeutic Activity   Treatment Detail bed mobilities with minimal assist   Additional Documentation   PT Plan PT assessment given to provider   Total Session Time   Total Session Time (minutes) 30 minutes

## 2020-01-31 NOTE — PROGRESS NOTES
01/31/20 1700   Quick Adds   Type of Visit Initial PT Evaluation   Living Environment   Lives With child(lenny), adult   Living Arrangements house   Home Accessibility no concerns   Transportation Anticipated family or friend will provide   Self-Care   Usual Activity Tolerance moderate   Current Activity Tolerance moderate   Regular Exercise No   Equipment Currently Used at Home cane, straight   Functional Level Prior   Ambulation 1-->assistive equipment   Transferring 1-->assistive equipment   Toileting 0-->independent   Communication 0-->understands/communicates without difficulty   Swallowing 0-->swallows foods/liquids without difficulty   Cognition 0 - no cognition issues reported   Fall history within last six months no   Which of the above functional risks had a recent onset or change? none   General Information   Referring Physician Pérez   Patient/Family Goals Statement return home   Precautions/Limitations fall precautions   Weight-Bearing Status - LLE full weight-bearing   Weight-Bearing Status - RLE full weight-bearing   Cognitive Status Examination   Orientation person;place   Level of Consciousness alert   Follows Commands and Answers Questions 100% of the time   Personal Safety and Judgment intact   Pain Assessment   Patient Currently in Pain Yes, see Vital Sign flowsheet   Integumentary/Edema   Integumentary/Edema Comments edema noted in bilateral lower legs   Posture    Posture Forward head position;Protracted shoulders   Range of Motion (ROM)   ROM Comment WFL LEs   Strength   Strength Comments WFL LEs   Bed Mobility   Bed Mobility Comments minimal assist   Transfer Skills   Transfer Comments CGA with Fww   Gait   Gait Comments CGA with Fww   Balance   Balance Comments good with Fww   Sensory Examination   Sensory Perception Comments appears intact to light touch in LEs   Coordination   Coordination no deficits were identified   Clinical Impression   Criteria for Skilled Therapeutic  Intervention evaluation only   PT Diagnosis impaired mobility   Influenced by the following impairments dizziness   Functional limitations due to impairments activity tolerance   Clinical Presentation Stable/Uncomplicated   Clinical Decision Making (Complexity) Low complexity   Predicted Duration of Therapy Intervention (days/wks) 1 day   Anticipated Equipment Needs at Discharge standard cane;front wheeled walker   Anticipated Discharge Disposition Home with Assist   Risk & Benefits of therapy have been explained Yes   Patient, Family & other staff in agreement with plan of care Yes   Total Evaluation Time   Total Evaluation Time (Minutes) 15

## 2020-01-31 NOTE — ED NOTES
"Orthostatics completed and are charted in flowsheets:  Lying  203/84, HR 56  Sitting  193/92, HR 62  Standing  159/86, HR 74  Patient difficult to assess symptoms.  She said she was \"maybe a little dizzy\" from lying to sitting.  When we stood she said she knew she couldn't walk without her cane, but was unable to tell me if she was dizzy.   "

## 2020-01-31 NOTE — ED NOTES
Performed orthostatic BP per MD order.     Lying  197/71, HR 57  Sitting  208/79, HR 65  Standing  154/117, HR 86  Patient was quite symptomatic upon sitting and standing reporting dizziness and feeling like her knees were going to give out from under her. MD aware.

## 2020-01-31 NOTE — ED TRIAGE NOTES
Patient arrives via EMS from home. States she is feeling dizzy and shaking. High BPs for EMS, EMS states BP on scene was 193/ -, stood up and dropped to 152/ -, and then en route BP was 208/ -. 1/2 mile to hospital patient states she was having a headache and light sensitivity. Pain all over and to touch. Edema in bilateral lower legs. Denies any flu like symptoms, vomiting, and diarrhea. . States normal urine out put and had a hydrocodone around 0500 and 1010. EMS stroke assessment was negative.

## 2020-01-31 NOTE — ED AVS SNAPSHOT
[facility]  [address]  [place] [address]  Phone:  [phone]  Fax:  [phone]                                    Tenisha Cagle   MRN: [id number]    Department:  [facility]   Date of Visit:  1/31/2020           After Visit Summary Signature Page    I have received my discharge instructions, and my questions have been answered. I have discussed any challenges I see with this plan with the nurse or doctor.    ..........................................................................................................................................  Patient/Patient Representative Signature      ..........................................................................................................................................  Patient Representative Print Name and Relationship to Patient    ..................................................               ................................................  Date                                   Time    ..........................................................................................................................................  Reviewed by Signature/Title    ...................................................              ..............................................  Date                                               Time          22EPIC Rev 08/18

## 2020-01-31 NOTE — ED PROVIDER NOTES
History     Chief Complaint   Patient presents with     Dizziness     Shaking     This is a 86-year-old female who was brought into the ER via EMS due to dizziness weakness and fatigue.  She resides at home with the assistance of her daughter.  Her daughter reports that she is had some swelling to her ankles as well.  EMS noted that during the patient's initial evaluation she was orthostatic with the initial blood pressure of 193/p while laying down and this dropped to 152/p when she stood up.  In route they noticed her blood pressure was 208/p.  EMS reports initially the patient refused an IV.  Her daughter apparently gave her to hydrochlorothiazide with concerns of her leg swelling.  Patient is complaining of tenderness to palpation to both lower extremities.          Allergies:  Allergies   Allergen Reactions     Lansoprazole Other (See Comments) and Unknown     Patient states that medication didn't work for her.  Daughter states she got delusional, water did not taste right  Other reaction(s): Other - Describe In Comment Field  Patient states that medication didn't work for her.     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     Priority: Medium     Controlled substance agreement signed 3/8/19 03/08/2019     Priority: Medium     Class: Chronic     Arthritis of shoulder region, left, degenerative 01/07/2019     Priority: Medium     Memory loss 05/25/2018     Priority: Medium     Atrial fibrillation with RVR (H) 01/25/2018     Priority: Medium     Hiatal hernia 01/17/2018     Priority: Medium     Overview:   large, mostly intrathoracic       Essential hypertension 01/17/2018     Priority: Medium     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/27/2017     Priority: Medium     Benign skin lesion of nose 09/27/2017     Priority: Medium     AK (actinic keratosis) 07/10/2017     Priority: Medium     SK  (seborrheic keratosis) 07/10/2017     Priority: Medium     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) 06/15/2017     Priority: Medium     Abnormal weight loss 04/18/2016     Priority: Medium     Anemia 04/18/2016     Priority: Medium     Visual changes 04/18/2016     Priority: Medium     Osteoarthritis of spine with radiculopathy, lumbar region 02/03/2016     Priority: Medium     History of TIA (transient ischemic attack) 02/02/2016     Priority: Medium     Numbness and tingling of right leg 02/02/2016     Priority: Medium     Radicular leg pain 02/02/2016     Priority: Medium     Basal cell carcinoma of right cheek 11/18/2015     Priority: Medium     Alvarado's cyst of knee 02/20/2014     Priority: Medium     Paresthesia of right leg 02/20/2014     Priority: Medium     Right knee DJD 02/20/2014     Priority: Medium     Cystocele 08/23/2013     Priority: Medium     Pancreatic mass 09/18/2012     Priority: Medium     Overview:   Possible, abnormal CT        Cerebral aneurysm, nonruptured 12/20/2011     Priority: Medium     Diverticulosis of colon 05/13/2011     Priority: Medium     Chronic lymphocytic leukemia (H) 04/14/2011     Priority: Medium     Overview:   WBC stable in the 30,000          Past Medical History:    Past Medical History:   Diagnosis Date     Cardiac murmur      Cataract      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)      Diaphragmatic hernia without obstruction or gangrene      Diverticulosis of large intestine without perforation or abscess without bleeding      Dysthymic disorder      Essential (primary) hypertension      Female climacteric state      Gastritis without bleeding      Nonruptured cerebral aneurysm      Osteoarthritis      Other fecal abnormalities      Other lymphoid leukemia not having achieved remission (H)      Other specified bacterial intestinal infections      Other specified enthesopathies of unspecified lower limb,  excluding foot      Peptic ulcer without hemorrhage or perforation      Personal history of other diseases of the digestive system (CODE)      Personal history of other medical treatment (CODE)      Pure hypercholesterolemia      Sepsis (H)      Sleep disorder      Transient cerebral ischemic attack        Past Surgical History:    Past Surgical History:   Procedure Laterality Date     CHOLECYSTECTOMY      No Comments Provided     COLONOSCOPY      2007,Sigmoid diverticulosis     ENDOSCOPY UPPER, COLONOSCOPY, COMBINED      5/5/11,Hiatal hernia, gastric gland hyperplasia in antrum     LAPAROSCOPIC TUBAL LIGATION      No Comments Provided     OTHER SURGICAL HISTORY      1986,[id number],BIOPSY BREAST,Right     OTHER SURGICAL HISTORY      [id number],PREMALIG/BENIGN SKIN LESION EXCISION,R side of nose, face and chest wall/Basal Cell Cancer Excision     TONSILLECTOMY, ADENOIDECTOMY, COMBINED      1954       Family History:    Family History   Problem Relation Age of Onset     Other - See Comments Mother         Stroke,Had a CVA age 85     Cancer Father         Cancer,Brain tumor, age 56     Blood Disease Sister         Blood Disease,Leukemia and     Cancer Sister         Cancer, kidney cancer     Blood Disease Sister         Blood Disease,Chronic lymphocytic leukemia       Social History:  Marital Status:   [5]  Social History     Tobacco Use     Smoking status: Never Smoker     Smokeless tobacco: Never Used   Substance Use Topics     Alcohol use: No     Alcohol/week: 0.0 standard drinks     Drug use: No        Medications:    acetaminophen (TYLENOL) 500 MG tablet  aspirin-dipyridamole ER (AGGRENOX)  MG 12 hr capsule  Calcium Carbonate-Vitamin D (CALCIUM 500 + D) 500-125 MG-UNIT TABS  ferrous sulfate (FEROSUL) 325 (65 Fe) MG tablet  lactulose (CHRONULAC) 10 GM/15ML solution  losartan-hydrochlorothiazide (HYZAAR) 50-12.5 MG tablet  magnesium hydroxide (MILK OF MAGNESIA) 400 MG/5ML suspension  Multiple Vitamin  "(MULTI-VITAMINS) TABS  Multiple Vitamins-Minerals (OCUVITE ADULT 50+) CAPS  order for DME  oxyCODONE (ROXICODONE) 5 MG tablet  predniSONE (DELTASONE) 10 MG tablet  ranitidine (ZANTAC) 150 MG tablet          Review of Systems   Constitutional: Positive for fatigue. Negative for activity change, appetite change and fever.   HENT: Negative for drooling, facial swelling, rhinorrhea, sore throat and trouble swallowing.    Eyes: Negative for pain and visual disturbance.   Respiratory: Negative for cough, chest tightness, shortness of breath, wheezing and stridor.    Cardiovascular: Positive for leg swelling. Negative for chest pain.   Gastrointestinal: Negative for abdominal pain, constipation, diarrhea, nausea and vomiting.   Genitourinary: Negative for dysuria, frequency and urgency.   Musculoskeletal: Negative for back pain, neck pain and neck stiffness.   Skin: Negative for color change.   Neurological: Positive for dizziness and weakness. Negative for tremors, seizures, facial asymmetry, speech difficulty, light-headedness and headaches.       Physical Exam   BP: (!) 179/72  Heart Rate: 61  Temp: 97.4  F (36.3  C)  Resp: 22  Height: 167.6 cm (5' 6\")  Weight: 49.6 kg (109 lb 5.6 oz)  SpO2: 100 %      Physical Exam  Constitutional:       General: She is not in acute distress.     Appearance: She is not ill-appearing, toxic-appearing or diaphoretic.   HENT:      Head: No raccoon eyes or Woodward's sign.      Jaw: No trismus.      Right Ear: No drainage or tenderness.      Left Ear: No drainage or tenderness.      Nose: Nose normal.   Eyes:      General: No scleral icterus.     Extraocular Movements: Extraocular movements intact.      Right eye: Normal extraocular motion and no nystagmus.      Left eye: Normal extraocular motion and no nystagmus.      Pupils: Pupils are equal, round, and reactive to light.      Right eye: Pupil is reactive and not sluggish.      Left eye: Pupil is reactive and not sluggish.      " Funduscopic exam:     Right eye: No AV nicking, arteriolar narrowing or papilledema. Red reflex present.         Left eye: No AV nicking, arteriolar narrowing or papilledema. Red reflex present.  Neck:      Musculoskeletal: Normal range of motion. Normal range of motion. No neck rigidity, pain with movement, spinous process tenderness or muscular tenderness.      Vascular: No JVD.      Trachea: No tracheal deviation.   Cardiovascular:      Rate and Rhythm: Normal rate and regular rhythm.   Pulmonary:      Effort: Pulmonary effort is normal. No respiratory distress.      Breath sounds: Normal breath sounds. No stridor. No wheezing.   Abdominal:      General: There is no distension.      Palpations: There is no mass.      Tenderness: There is no abdominal tenderness. There is no right CVA tenderness, left CVA tenderness, guarding or rebound.   Musculoskeletal: Normal range of motion.         General: No tenderness or deformity.   Lymphadenopathy:      Cervical: No cervical adenopathy.      Right cervical: No superficial cervical adenopathy.     Left cervical: No superficial cervical adenopathy.   Skin:     General: Skin is warm and dry.      Capillary Refill: Capillary refill takes less than 2 seconds.   Neurological:      Mental Status: She is alert and oriented to person, place, and time.      GCS: GCS eye subscore is 4. GCS verbal subscore is 5. GCS motor subscore is 6.      Motor: No tremor or seizure activity.      Coordination: Coordination normal.      Gait: Gait normal.         ED Course     EKG shows normal sinus rhythm with a short RI interval.  Moderate voltage criteria for LVH, may be normal variant.  Heart rate is 60.  This is unchanged from previous EKG on 11/1/2019.           Results for orders placed or performed during the hospital encounter of 01/31/20 (from the past 24 hour(s))   Influenza A and B and RSV PCR   Result Value Ref Range    Specimen Description Nasopharyngeal     Influenza A PCR  Negative NEG^Negative    Influenza B PCR Negative NEG^Negative    Resp Syncytial Virus Negative NEG^Negative   Group A Streptococcus PCR Throat Swab   Result Value Ref Range    Specimen Description Throat     Strep Group A PCR Not Detected NDET^Not Detected   CBC with platelets differential   Result Value Ref Range    WBC 10.3 4.0 - 11.0 10e9/L    RBC Count 3.63 (L) 3.8 - 5.2 10e12/L    Hemoglobin 9.8 (L) 11.7 - 15.7 g/dL    Hematocrit 31.8 (L) 35.0 - 47.0 %    MCV 88 78 - 100 fl    MCH 27.0 26.5 - 33.0 pg    MCHC 30.8 (L) 31.5 - 36.5 g/dL    RDW 15.7 (H) 10.0 - 15.0 %    Platelet Count 293 150 - 450 10e9/L    Diff Method Automated Method     % Neutrophils 47.3 %    % Lymphocytes 44.2 %    % Monocytes 6.5 %    % Eosinophils 1.3 %    % Basophils 0.4 %    % Immature Granulocytes 0.3 %    Absolute Neutrophil 4.9 1.6 - 8.3 10e9/L    Absolute Lymphocytes 4.5 0.8 - 5.3 10e9/L    Absolute Monocytes 0.7 0.0 - 1.3 10e9/L    Absolute Eosinophils 0.1 0.0 - 0.7 10e9/L    Absolute Basophils 0.0 0.0 - 0.2 10e9/L    Abs Immature Granulocytes 0.0 0 - 0.4 10e9/L   INR   Result Value Ref Range    INR 0.91 0 - 1.3   Comprehensive metabolic panel   Result Value Ref Range    Sodium 137 134 - 144 mmol/L    Potassium 3.5 3.5 - 5.1 mmol/L    Chloride 102 98 - 107 mmol/L    Carbon Dioxide 28 21 - 31 mmol/L    Anion Gap 7 3 - 14 mmol/L    Glucose 95 70 - 105 mg/dL    Urea Nitrogen 25 7 - 25 mg/dL    Creatinine 1.07 0.60 - 1.20 mg/dL    GFR Estimate 49 (L) >60 mL/min/[1.73_m2]    GFR Estimate If Black 59 (L) >60 mL/min/[1.73_m2]    Calcium 8.6 8.6 - 10.3 mg/dL    Bilirubin Total 0.3 0.3 - 1.0 mg/dL    Albumin 3.5 3.5 - 5.7 g/dL    Protein Total 5.6 (L) 6.4 - 8.9 g/dL    Alkaline Phosphatase 31 (L) 34 - 104 U/L    ALT 7 7 - 52 U/L    AST 14 13 - 39 U/L   Troponin GH   Result Value Ref Range    Troponin 10.8 <34.0 pg/mL   Lactic acid   Result Value Ref Range    Lactic Acid 1.0 0.5 - 2.2 mmol/L   Nt probnp inpatient (BNP)   Result Value Ref  Range    N-Terminal Pro BNP Inpatient 576 (H) 0 - 100 pg/mL   Magnesium   Result Value Ref Range    Magnesium 2.4 1.9 - 2.7 mg/dL   EKG 12-lead, tracing only   Result Value Ref Range    Interpretation ECG Click View Image link to view waveform and result    UA reflex to Microscopic   Result Value Ref Range    Color Urine Light Yellow     Appearance Urine Clear     Glucose Urine Negative NEG^Negative mg/dL    Bilirubin Urine Negative NEG^Negative    Ketones Urine Negative NEG^Negative mg/dL    Specific Gravity Urine 1.013 1.003 - 1.035    Blood Urine Negative NEG^Negative    pH Urine 7.0 5.0 - 7.0 pH    Protein Albumin Urine Negative NEG^Negative mg/dL    Urobilinogen mg/dL Normal 0.0 - 2.0 mg/dL    Nitrite Urine Negative NEG^Negative    Leukocyte Esterase Urine Negative NEG^Negative    Source Midstream Urine    XR Chest Port 1 View    Narrative    XR CHEST PORT 1 VW    HISTORY: 86 yearsFemale weakness    TECHNIQUE: A single view of the chest was performed    COMPARISON: 1/25/2018    FINDINGS: Heart size and pulmonary vascularity are within normal  limits. Lungs are clear. No consolidating airspace opacities are  present.    A moderate to large size hiatal hernia is again seen      Impression    IMPRESSION: Clear chest    WILL LAW MD   US Lower Extremity Venous Duplex Bilateral    Narrative    US LOWER EXTREMITY VENOUS DUPLEX BILATERAL  1/31/2020 2:08 PM    History:Female, age 86 years; ;  bilateral lower extremity swelling  and tenderness    Comparison: None.    Technique: Grayscale and color Doppler ultrasound of both left and  right lower extremity deep venous structures.    Findings:   The deep venous structures are patent and fully compressible. There is  normal augmentation of flow.      Impression    Impression:  1.  No evidence of DVT.    HANY HARPER MD   CT Head w/o Contrast    Narrative    PROCEDURE: CT HEAD W/O CONTRAST   1/31/2020 2:12 PM    HISTORY:Female, age,  86 years, with history of  dizziness and elevated  blood pressures., ,    COMPARISON: CT scan the brain 1/27/2016    TECHNIQUE: CT of the brain without contrast.    FINDINGS: Ventricles and sulci are prominent in size and shape. Gray  and white matter demonstrate scattered areas of decreased density.    Low dense lesion in the ventral lateral aspect of the left posterior  fossa is increased slightly in size likely representing a 2.9 x 1.5 x  2.1 cm arachnoid cyst with mild mass effect upon the left cerebellar  hemisphere.    The bones are unremarkable. No fracture.       Impression    IMPRESSION:   No acute intracranial hemorrhage.     Slight interval enlargement of 2.9 x 1.5 x 2.1 cm arachnoid cyst  located in the left ventrolateral aspect of the posterior fossa with  slight mass effect upon the left cerebellar hemisphere. Ventricular  system is nondilated without evidence of obstructive hydrocephalus.     Stable appearance of nonspecific white matter changes suggesting small  vessel disease.    HANY HARPER MD       Medications   sodium chloride 0.9% infusion ( Intravenous Stopped 1/31/20 1758)   losartan (COZAAR) tablet 50 mg (50 mg Oral Given 1/31/20 1632)   scopolamine (TRANSDERM) 72 hr patch 1 patch (1 patch Transdermal Patch/Med Applied 1/31/20 1750)   scopolamine (TRANSDERM-SCOP) Patch in Place (has no administration in time range)   furosemide (LASIX) injection 20 mg (20 mg Intravenous Given 1/31/20 1152)   0.9% sodium chloride BOLUS (0 mLs Intravenous Stopped 1/31/20 1615)   meclizine (ANTIVERT) tablet 25 mg (25 mg Oral Given 1/31/20 1619)   oxyCODONE (ROXICODONE) tablet 5 mg (5 mg Oral Given 1/31/20 1632)   cloNIDine (CATAPRES) tablet 0.2 mg (0.2 mg Oral Given 1/31/20 1724)       Assessments & Plan (with Medical Decision Making)     I have reviewed the nursing notes.    I have reviewed the findings, diagnosis, plan and need for follow up with the patient.      Discharge Medication List as of 1/31/2020  6:55 PM      START taking  these medications    Details   cloNIDine (CATAPRES) 0.1 MG tablet Take 1 tablet (0.1 mg) by mouth 2 times daily, Disp-20 tablet, R-0, Local Print      furosemide (LASIX) 20 MG tablet Take 1 tablet (20 mg) by mouth daily for 5 days, Disp-5 tablet, R-0, Local Print      meclizine (ANTIVERT) 12.5 MG tablet Take 2 tablets (25 mg) by mouth 3 times daily as needed for dizziness, Disp-50 tablet, R-0, Local Print             Final diagnoses:   Vertigo   Viral labyrinthitis   Hypertensive urgency   CHF (congestive heart failure) (H)     Afebrile.  Vital signs stable.   Patient is orthostatic on evaluation.  Complaining of bilateral lower extremity swelling and pain.  She feels weak and fatigued.  IV established and she was given fluids.  EKG shows normal sinus rhythm with a short UT interval.  Moderate voltage criteria for LVH, may be normal variant.  Heart rate is 60.  This is unchanged from previous EKG on 11/1/2019.  Troponin is normal.  BNP is elevated at 576 with no previous for comparison.  INR is normal.  Strep and influenza tests are negative.  Blood cultures are pending.  Lactate is normal.  CBC shows normal white blood cells no left shift.  Hemoglobin is 9.8 but that is in her normal range.  Her UA shows no signs UTI.  Chest x-ray is unremarkable.  Ultrasound of bilateral lower extremities shows no signs of DVT.  CT of her head without contrast is normal.  The patient was given Lasix IV.  Her hypertension persisted and she was given oral Cozaar as well.  I had physical therapy evaluate her and she ambulated quite nicely.  However she continued to complain of dizziness weakness and fatigue.  Vertigo with possible viral hepatitis.  She was given Antivert as well as scopolamine patch.  Her hypertension continued and she was given Catapres orally.  The patient continued to complain of dizziness and fatigue despite multiple episodes of demonstrating that she can ambulate quite well.  I informed her that she could return  if her symptoms persisted for further evaluation.  However her daughter feels the patient needs to be hospitalized.  I had Dr. Vasquez come down who also evaluated the patient and informed him that the patient has no significant symptoms warranting hospitalization he did offer them observation however when they watch the videotape they decided that they would like to return home.  This seems reasonable.  Rx for Catapres, Lasix, and meclizine.  Continue to monitor symptoms return if there is any concern for further evaluation as needed.    1/31/2020   [facility] AND [facility] Slater HospitalDylan PA-C  01/31/20 2036

## 2020-02-04 NOTE — TELEPHONE ENCOUNTER
Yg VANN called stating Pt has appointment to be seen at 2:20 PM today. Pt is 86 years old and 106 pounds. Pt was seen by Ortho this morning and Nurse Merry checked BP. Readings were 78/36 (regular cuff) and 92/48 (pediatric cuff). Pt was accompanied by daughter Nona, who had to be somewhere else at noon. Yg requesting triage RN contact Pt for further assessment.     Per chart review, Pt has diagnosis of A-fib with RVR and history of TIA. Medications include: Clonidine 0.1 mg BID and Lasix 20 mg daily (both started 1 week ago in ED, after hypertensive emergency) and Hyzaar 50-12.5 mg daily.    Attempted reaching Melanie Sousa nurse, for more information. Left message on machine to call back.    No authorization to discuss PHI with emeka Castellano noted in chart. Attempted reaching Pt, for more information. Left message on machine to call back.    BP Readings from Last 6 Encounters:   01/31/20 139/69   01/10/20 130/72   12/20/19 132/60   12/13/19 120/64   12/03/19 (!) 145/68   11/15/19 (!) 142/54     Per review of Hypotension triage protocol, general recommendations are as follows: Systolic BP >90 (w/out sx)- go to office now, <80 (w/out sx)- go to ED now, <90 (w/ sx) - call 911.    Sabra Mejia RN .............. 2/4/2020  12:05 PM

## 2020-02-04 NOTE — TELEPHONE ENCOUNTER
Per face to face with Merry in Ortho, Pt did not c/o sx during visit. She discussed the situation with Pt's PCP, Dr. Kapoor's nurse, who consulted with Dr. Kapoor, and he recommended Pt stop clonidine and see available provider this afternoon. Dr. Nelson states Pt mentioned dizziness in visit, however Merry noted this is on problem list and Pt is taking Meclizine for this.    Attempted reaching SOO Ronquillo, with this information. Left message on machine to call back. Writer will follow-up with Yg, when she returns to her desk, to let her know today's appointment approved by PCP.     Sabra Mejia RN .............. 2/4/2020  12:25 PM

## 2020-02-04 NOTE — TELEPHONE ENCOUNTER
Per Yesy Mccray writer called and talked to patient's daughter and she stated she was going to call and cancel the patient's 240pm appointment with Yesy Mccray today. Patient's daughter stated she was going to discontinue the Rx and see how it goes. Writer advised her to bring patient into the ER if she shows or is showing any signs of dizziness, low BP still and not feeling well. Daughter stated she would.  Yg Jimenez LPN,..............2/4/2020 12:54 PM

## 2020-02-05 NOTE — TELEPHONE ENCOUNTER
"States pt was seen in ED on Friday morning and received pills. She said that the pills made her seem \"out of it\" and went she stopped giving her the medication she was \"talking gibberish\". Would like to know what to do, she said she doesn't want to bring pt to ED.    "

## 2020-02-05 NOTE — TELEPHONE ENCOUNTER
"Daughter Nona \"States pt was seen in ED on Friday morning and received pills. She said that the pills made her seem \"out of it\" and went she stopped giving her the medication she was \"talking gibberish\". Would like to know what to do, she said she doesn't want to bring pt to ED\"      Nona states Pt is shaky in the morning.  Was given \"hydrocodone\" (noted oxycodone in chart) and 2 ibuprofen at that time this morning at 0600.    States Pt was \"acting like she was high\" yesterday and was not given the Catapres.  She has also held this medication today and stopped the meclizine and furosemide as well.  States Pt's ankles are not swollen and her balance is better.  Pt also received 2 cortisone shots in her knees yesterday and was very sleepy at that time.  Pt still taking Hyzaar, prednisone, and Aggrenox.    Pt's BP this morning 185/80 and currently 136/80.  Concerned as Pt is giggling at nothing and talking gibberish.  Pt is sundowning and unable to sleep.  Pt is still jerking intermittently.  Daughter is wondering what is causing this these symptoms and would like to avoid ED and is wondering if there is any medications that can be prescribed to help Pt sleep/\"act herself\" again.     Concerned that Pt is experiencing clonidine indused delirium/possible med reaction/neuro concerns.    Will route to PCP and consult  face to face    Found out that PCP has left for the day.  Will route to [facility] RN  ....................  2/5/2020   12:07 PM      "

## 2020-02-05 NOTE — TELEPHONE ENCOUNTER
Patient's daughter notified that patient should be seen.  Daughter states that patient seems to be speaking less gibberish is now eating and is less jerky. Discussed options to have patient seen in the ED or make an appointment for today.  Daughter states she does not want to bring her into the ED because she was not admitted the last time she brought her in.  Daughter stated will think about options and decide what to do. She may wait until appointment Friday with PCP or call tomorrow.     Prema Escudero LPN on 2/5/2020 at 12:43 PM

## 2020-02-05 NOTE — TELEPHONE ENCOUNTER
Could be a medication reaction which should improve over the next few hours or something else.  Patient should be seen.

## 2020-02-06 PROBLEM — M16.10 DEGENERATIVE JOINT DISEASE OF PELVIC REGION: Status: ACTIVE | Noted: 2020-01-01

## 2020-02-06 PROBLEM — R33.9 URINARY RETENTION: Status: ACTIVE | Noted: 2020-01-01

## 2020-02-06 PROBLEM — F03.90 DEMENTIA (H): Status: ACTIVE | Noted: 2020-01-01

## 2020-02-06 PROBLEM — F03.918 DEMENTIA WITH BEHAVIORAL DISTURBANCE (H): Status: ACTIVE | Noted: 2020-01-01

## 2020-02-06 NOTE — ASSESSMENT & PLAN NOTE
Taking Hyzaar at home with recent elevation of blood pressure  Have been prescribed some clonidine which may have caused some behavioral issues and some low blood pressures at time  For now we will stop the hydrochlorothiazide due to hyponatremia.  Continue Cozaar, monitor pressures, hold clonidine for now  Blood pressure remains elevated, will add beta blocker, follow renal function

## 2020-02-06 NOTE — ED NOTES
Patient continues to be restless and moaning due to back pain. Updated MD. Order for iv morphine given.

## 2020-02-06 NOTE — ED NOTES
Pt will be admitted under observation, video offered and handout given, dtr states she recently watched video.

## 2020-02-06 NOTE — ED NOTES
Pt resting in bed, pills given with some difficulty swallowing.  Refusing applesauce or food at this time.  Had burst of rapid a-fib at a rate in the 170's.

## 2020-02-06 NOTE — ASSESSMENT & PLAN NOTE
Urinary retention in the ED with Ledezma catheter placement  May be contributing to her irritability with her dementia  Monitor, may need to pull catheter to see if she continues to retain

## 2020-02-06 NOTE — ED TRIAGE NOTES
"Pt arrives via [facility] with change in mental status.  Pt has dementia.  Was recently hospitalized at [facility] and was started on clonidine and meclizine for dizziness.  Since then per her daughter \"she's been going down hill.\"  Per daughter today she just couldn't \"talk right\" she was able to walk to the BR with a walker earlier today and now cannot. Pt lives at home with daughter.    "

## 2020-02-06 NOTE — ASSESSMENT & PLAN NOTE
White blood cell count is consistently elevated as ER today, no other symptoms  Outpatient management

## 2020-02-06 NOTE — PROGRESS NOTES
:    Met with patients daughter in room and she stated she lives with her mom and takes care of her.  She does not not work outside the home.  Daughter stated she feels she cannot take care of her at home unless she is ambulatory.  I gave her SNF options and she was receptive and chose [facility] from list.  I made referral and left message for Ni at .  Daughter is aware it would beprivate pay and would need $3,500 down.  Daughter stated patient has $20,000 in a CD and she could cash that money in.  I also gave her Medical assistace paperwork to start filling out and a list of assisted livings for future.    Pt/family was given the Medicare Compare list for SNF, with associated star ratings to assist with choice for referrals/discharge planning Yes    Education was given to pt/family that star ratings are updated/maintained by Medicare and can be reviewed by visiting www.medicare.gov Yes    GWEN Ramirez on 2/6/2020 at 1:38 PM

## 2020-02-06 NOTE — ASSESSMENT & PLAN NOTE
Living at home with daughter with ongoing memory issues.  Recently with hallucinations, at times getting agitated and daughter notes she has not slept for the past 2 nights  No new medications, no signs of infection.  In ER with better distention and urinary retention, catheter placed  At this time worsening dementia, possibly exacerbated by the urinary retention and recent new meds for hypertension  We will start low-dose Seroquel at at bedtime for sleep.  Avoid other sedating daytime meds  PT/OT consult with  to help with placement issues

## 2020-02-06 NOTE — ED PROVIDER NOTES
History     Chief Complaint   Patient presents with     Altered Mental Status     HPI  Tenisha Cagle is a 86 year old female who arrives via EMS with daughter from home.  She has had a decline in her mobility with chronic left hip pain c/w OA, but today it seemed that she got stuck and couldn't walk.  No falls, and no acute pain in her hips.  Daughter also reports that for the past three nights she has not been able to sleep and has been up talking gibberish - she wants to know what is causing this.  Long hx of cognitive decline at least for 2 years since a left forearm surgery for melanoma.  Lastly she wants to know why her mom is so hypertensive.  With her poor mobility, she would get up to walk to the BR anymore so her daughter put attends on her but she wouldn't go to the bathroom.  Patient is reporting lower abdominal pain and back pain which is chronic for patient.      Allergies:  Allergies   Allergen Reactions     Lansoprazole Other (See Comments) and Unknown     Patient states that medication didn't work for her.  Daughter states she got delusional, water did not taste right  Other reaction(s): Other - Describe In Comment Field  Patient states that medication didn't work for her.     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     Priority: Medium     Controlled substance agreement signed 3/8/19 03/08/2019     Priority: Medium     Class: Chronic     Arthritis of shoulder region, left, degenerative 01/07/2019     Priority: Medium     Memory loss 05/25/2018     Priority: Medium     Atrial fibrillation with RVR (H) 01/25/2018     Priority: Medium     Hiatal hernia 01/17/2018     Priority: Medium     Overview:   large, mostly intrathoracic       Essential hypertension 01/17/2018     Priority: Medium      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/27/2017     Priority: Medium     Benign skin lesion of nose 09/27/2017     Priority: Medium     AK (actinic keratosis) 07/10/2017     Priority: Medium     SK (seborrheic keratosis) 07/10/2017     Priority: Medium     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) 06/15/2017     Priority: Medium     Abnormal weight loss 04/18/2016     Priority: Medium     Anemia 04/18/2016     Priority: Medium     Visual changes 04/18/2016     Priority: Medium     Osteoarthritis of spine with radiculopathy, lumbar region 02/03/2016     Priority: Medium     History of TIA (transient ischemic attack) 02/02/2016     Priority: Medium     Numbness and tingling of right leg 02/02/2016     Priority: Medium     Radicular leg pain 02/02/2016     Priority: Medium     Basal cell carcinoma of right cheek 11/18/2015     Priority: Medium     Alvarado's cyst of knee 02/20/2014     Priority: Medium     Paresthesia of right leg 02/20/2014     Priority: Medium     Right knee DJD 02/20/2014     Priority: Medium     Cystocele 08/23/2013     Priority: Medium     Pancreatic mass 09/18/2012     Priority: Medium     Overview:   Possible, abnormal CT        Cerebral aneurysm, nonruptured 12/20/2011     Priority: Medium     Diverticulosis of colon 05/13/2011     Priority: Medium     Chronic lymphocytic leukemia (H) 04/14/2011     Priority: Medium     Overview:   WBC stable in the 30,000          Past Medical History:    Past Medical History:   Diagnosis Date     Cardiac murmur      Cataract      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)      Diaphragmatic hernia without obstruction or gangrene      Diverticulosis of large intestine without perforation or abscess without bleeding      Dysthymic disorder      Essential (primary) hypertension      Female climacteric state      Gastritis without bleeding      Nonruptured cerebral aneurysm      Osteoarthritis      Other  fecal abnormalities      Other lymphoid leukemia not having achieved remission (H)      Other specified bacterial intestinal infections      Other specified enthesopathies of unspecified lower limb, excluding foot      Peptic ulcer without hemorrhage or perforation      Personal history of other diseases of the digestive system (CODE)      Personal history of other medical treatment (CODE)      Pure hypercholesterolemia      Sepsis (H)      Sleep disorder      Transient cerebral ischemic attack        Past Surgical History:    Past Surgical History:   Procedure Laterality Date     CHOLECYSTECTOMY      No Comments Provided     COLONOSCOPY      2007,Sigmoid diverticulosis     ENDOSCOPY UPPER, COLONOSCOPY, COMBINED      5/5/11,Hiatal hernia, gastric gland hyperplasia in antrum     LAPAROSCOPIC TUBAL LIGATION      No Comments Provided     OTHER SURGICAL HISTORY      1986,[id number],BIOPSY BREAST,Right     OTHER SURGICAL HISTORY      [id number],PREMALIG/BENIGN SKIN LESION EXCISION,R side of nose, face and chest wall/Basal Cell Cancer Excision     TONSILLECTOMY, ADENOIDECTOMY, COMBINED      1954       Family History:    Family History   Problem Relation Age of Onset     Other - See Comments Mother         Stroke,Had a CVA age 85     Cancer Father         Cancer,Brain tumor, age 56     Blood Disease Sister         Blood Disease,Leukemia and     Cancer Sister         Cancer, kidney cancer     Blood Disease Sister         Blood Disease,Chronic lymphocytic leukemia       Social History:  Marital Status:   [5]  Social History     Tobacco Use     Smoking status: Never Smoker     Smokeless tobacco: Never Used   Substance Use Topics     Alcohol use: No     Alcohol/week: 0.0 standard drinks     Drug use: No        Medications:    aspirin-dipyridamole ER (AGGRENOX)  MG 12 hr capsule  cloNIDine (CATAPRES) 0.1 MG tablet  furosemide (LASIX) 20 MG tablet  losartan-hydrochlorothiazide (HYZAAR) 50-12.5 MG tablet  meclizine  "(ANTIVERT) 12.5 MG tablet  oxyCODONE (ROXICODONE) 5 MG tablet  predniSONE (DELTASONE) 10 MG tablet  ranitidine (ZANTAC) 150 MG tablet  acetaminophen (TYLENOL) 500 MG tablet  Calcium Carbonate-Vitamin D (CALCIUM 500 + D) 500-125 MG-UNIT TABS  lactulose (CHRONULAC) 10 GM/15ML solution  magnesium hydroxide (MILK OF MAGNESIA) 400 MG/5ML suspension  Multiple Vitamin (MULTI-VITAMINS) TABS  Multiple Vitamins-Minerals (OCUVITE ADULT 50+) CAPS  order for DME          Review of Systems   Constitutional: Positive for activity change (declining mobility). Negative for fever.   HENT: Negative.    Eyes: Negative.    Respiratory: Negative.  Negative for cough and shortness of breath.    Cardiovascular: Negative for chest pain and leg swelling.   Gastrointestinal: Positive for abdominal distention and abdominal pain. Negative for diarrhea, nausea and vomiting.   Genitourinary: Positive for difficulty urinating.   Musculoskeletal: Positive for arthralgias, back pain, gait problem and neck pain.        Chronic musculoskeletal pains - especially her left hip.  Narcotic dependent chronic pain on oxycodone q4hrs.   Skin: Negative.    Neurological: Negative for weakness.   Psychiatric/Behavioral: Positive for agitation, confusion and sleep disturbance.       Physical Exam   BP: (!) 200/138  Pulse: 84  Heart Rate: 77  Temp: 97.4  F (36.3  C)  Resp: 20  Height: 160 cm (5' 3\")  Weight: 48.1 kg (106 lb)  SpO2: 96 %    Vitals:    02/06/20 0440 02/06/20 0500 02/06/20 0620 02/06/20 0630   BP: (!) 153/64 (!) 150/81 (!) 138/104 (!) 158/77   Pulse:  108  97   Resp: 9 17 13 (!) 34   Temp:    98.2  F (36.8  C)   TempSrc:    Tympanic   SpO2:  96%  96%   Weight:       Height:         Physical Exam  Constitutional:       General: She is in acute distress.      Appearance: She is normal weight.   HENT:      Head: Normocephalic and atraumatic.      Mouth/Throat:      Mouth: Mucous membranes are moist.   Eyes:      Extraocular Movements: Extraocular " movements intact.      Pupils: Pupils are equal, round, and reactive to light.   Neck:      Musculoskeletal: Normal range of motion.   Cardiovascular:      Rate and Rhythm: Tachycardia present. Rhythm irregular.      Heart sounds: Normal heart sounds.      Comments: Irregular rate and rhythm.  Pulmonary:      Effort: Pulmonary effort is normal.      Breath sounds: Normal breath sounds.   Abdominal:      General: Bowel sounds are normal. There is distension.      Palpations: Abdomen is soft.      Tenderness: There is abdominal tenderness (suprapubic tenderness.).   Musculoskeletal: Normal range of motion.         General: No swelling.   Skin:     General: Skin is warm and dry.   Neurological:      Mental Status: She is alert.      GCS: GCS eye subscore is 4. GCS verbal subscore is 5. GCS motor subscore is 6.      Cranial Nerves: No cranial nerve deficit, dysarthria or facial asymmetry.   Psychiatric:         Mood and Affect: Mood is anxious.         Behavior: Behavior is agitated.         Study Result     PROCEDURE: CT HEAD W/O CONTRAST   1/31/2020 2:12 PM     HISTORY:Female, age,  86 years, with history of dizziness and elevated  blood pressures., ,     COMPARISON: CT scan the brain 1/27/2016     TECHNIQUE: CT of the brain without contrast.     FINDINGS: Ventricles and sulci are prominent in size and shape. Gray  and white matter demonstrate scattered areas of decreased density.     Low dense lesion in the ventral lateral aspect of the left posterior  fossa is increased slightly in size likely representing a 2.9 x 1.5 x  2.1 cm arachnoid cyst with mild mass effect upon the left cerebellar  hemisphere.     The bones are unremarkable. No fracture.                                                                       IMPRESSION:   No acute intracranial hemorrhage.      Slight interval enlargement of 2.9 x 1.5 x 2.1 cm arachnoid cyst  located in the left ventrolateral aspect of the posterior fossa with  slight mass  effect upon the left cerebellar hemisphere. Ventricular  system is nondilated without evidence of obstructive hydrocephalus.      Stable appearance of nonspecific white matter changes suggesting small  vessel disease.     HANY HARPER MD     Study Result     US LOWER EXTREMITY VENOUS DUPLEX BILATERAL  1/31/2020 2:08 PM     History:Female, age 86 years; ;  bilateral lower extremity swelling  and tenderness     Comparison: None.     Technique: Grayscale and color Doppler ultrasound of both left and  right lower extremity deep venous structures.     Findings:   The deep venous structures are patent and fully compressible. There is  normal augmentation of flow.                                                                      Impression:  1.  No evidence of DVT.     HANY HARPER MD     Study Result  1/31/20    XR CHEST PORT 1 VW     HISTORY: 86 yearsFemale weakness     TECHNIQUE: A single view of the chest was performed     COMPARISON: 1/25/2018     FINDINGS: Heart size and pulmonary vascularity are within normal  limits. Lungs are clear. No consolidating airspace opacities are  present.     A moderate to large size hiatal hernia is again seen                                                                      IMPRESSION: Clear chest     WILL LAW MD     Study Result  11/26/19    PROCEDURE:  CT ABDOMEN PELVIS W CONTRAST     HISTORY:  diffuse abd pain, hx colitis; Abdominal pain, generalized      TECHNIQUE:  Helical CT of the abdomen and pelvis was performed  following injection of intravenous contrast.     Sagittal and coronal reformatted images were reviewed.     COMPARISON:  CT abdomen and pelvis 2/21/2019     FINDINGS:       The lung bases are clear. There is a large hiatal hernia.     There is mild intra and extrahepatic biliary ductal dilatation without  obvious obstructing stone or mass, relatively similar to prior  studies. Otherwise, the liver, spleen, pancreas and adrenal glands  are  unremarkable. There has been prior cholecystectomy.     Multiple renal cysts are present. Kidneys are otherwise intact.     The bowel is normal in caliber. The appendix is unremarkable. There is  moderate stool in the colon. No sites of bowel wall thickening are  seen.      The aorta is normal in size with scattered atherosclerotic  calcifications.     No free fluid, free air or adenopathy is present.       No suspicious osseous lesions are identified.                                                                      IMPRESSION:    1. No acute findings.  2. Moderate stool in the colon.  3. Large hiatal hernia.     VICKY PERALTA MD       ED Course     ED Course as of Feb 06 1235   Thu Feb 06, 2020   0725 Hospitalist contacted who prefers to have social service evaluate for placement rather than admit. Went to meet with leena to update her and she is now in afib with RVR, rate 140s-150s.      0731 HR now 90s-100s without intervention. Morning meds given      0835  unable to assist in placement at this time, will transfer to observation, Dr Adler accepting        Procedures          EKG:  Sinus rhythm at 95 bpm with PACs and PVCs, LVH by volts, and non-specific ST abnormalities.    Critical Care time:  none               Results for orders placed or performed during the hospital encounter of 02/06/20 (from the past 24 hour(s))   CBC with platelets differential   Result Value Ref Range    WBC 22.1 (H) 4.0 - 11.0 10e9/L    RBC Count 4.12 3.8 - 5.2 10e12/L    Hemoglobin 11.4 (L) 11.7 - 15.7 g/dL    Hematocrit 35.3 35.0 - 47.0 %    MCV 86 78 - 100 fl    MCH 27.7 26.5 - 33.0 pg    MCHC 32.3 31.5 - 36.5 g/dL    RDW 15.5 (H) 10.0 - 15.0 %    Platelet Count 362 150 - 450 10e9/L    Diff Method Automated Method     % Neutrophils 69.9 %    % Lymphocytes 22.4 %    % Monocytes 7.1 %    % Eosinophils 0.0 %    % Basophils 0.2 %    % Immature Granulocytes 0.4 %    Absolute Neutrophil 15.4 (H) 1.6 - 8.3 10e9/L     Absolute Lymphocytes 5.0 0.8 - 5.3 10e9/L    Absolute Monocytes 1.6 (H) 0.0 - 1.3 10e9/L    Absolute Eosinophils 0.0 0.0 - 0.7 10e9/L    Absolute Basophils 0.0 0.0 - 0.2 10e9/L    Abs Immature Granulocytes 0.1 0 - 0.4 10e9/L   INR   Result Value Ref Range    INR 0.85 0 - 1.3   Comprehensive metabolic panel   Result Value Ref Range    Sodium 130 (L) 134 - 144 mmol/L    Potassium 3.4 (L) 3.5 - 5.1 mmol/L    Chloride 91 (L) 98 - 107 mmol/L    Carbon Dioxide 29 21 - 31 mmol/L    Anion Gap 10 3 - 14 mmol/L    Glucose 118 (H) 70 - 105 mg/dL    Urea Nitrogen 27 (H) 7 - 25 mg/dL    Creatinine 1.29 (H) 0.60 - 1.20 mg/dL    GFR Estimate 39 (L) >60 mL/min/[1.73_m2]    GFR Estimate If Black 47 (L) >60 mL/min/[1.73_m2]    Calcium 10.3 8.6 - 10.3 mg/dL    Bilirubin Total 0.4 0.3 - 1.0 mg/dL    Albumin 4.0 3.5 - 5.7 g/dL    Protein Total 6.5 6.4 - 8.9 g/dL    Alkaline Phosphatase 35 34 - 104 U/L    ALT 11 7 - 52 U/L    AST 16 13 - 39 U/L   Troponin GH   Result Value Ref Range    Troponin 17.9 <34.0 pg/mL   Nt probnp inpatient (BNP)   Result Value Ref Range    N-Terminal Pro BNP Inpatient 892 (H) 0 - 100 pg/mL   UA reflex to Microscopic and Culture   Result Value Ref Range    Color Urine Yellow     Appearance Urine Clear     Glucose Urine Negative NEG^Negative mg/dL    Bilirubin Urine Negative NEG^Negative    Ketones Urine Negative NEG^Negative mg/dL    Specific Gravity Urine 1.012 1.003 - 1.035    Blood Urine Negative NEG^Negative    pH Urine 7.0 5.0 - 7.0 pH    Protein Albumin Urine Negative NEG^Negative mg/dL    Urobilinogen mg/dL Normal 0.0 - 2.0 mg/dL    Nitrite Urine Negative NEG^Negative    Leukocyte Esterase Urine Negative NEG^Negative    Source Catheterized Urine    XR Pelvis and Hip Left 2 Views    Narrative    PROCEDURE INFORMATION:   Exam: XR Left Hip with Pelvis when Performed   Exam date and time: 2/6/2020 4:35 AM   Age: 86 years old   Clinical indication: Hip pain; Left hip; Additional info: Increase in chronic    pelvis and left hip pain.     TECHNIQUE:   Imaging protocol: XR Left hip with pelvis when performed.   Views: 2 or 3 views.     COMPARISON:   CR XR HIP LEFT 2-3 VIEWS 8/30/2018 3:08 PM     FINDINGS:   Bones/joints: osseous structures of the pelvis without an acute process. rami   are intact. Sacroiliac joints without separation/diastases/fracture. Iliac   bones unremarkable/noncontributory Mild degenerative changes within the hips   Degenerative changes within the visualized portions of the caudal aspect of the   lumbar spine. trabecular stress markings normal. intertrochanteric region   normal. No displacement or rotation. Acetabulum without fracture. Joint space   appears normal.   Soft tissues: Unremarkable.       Impression    IMPRESSION:   No fracture.     THIS DOCUMENT HAS BEEN ELECTRONICALLY SIGNED BY BRANDIE MCNEILL MD   XR Lumbar Spine 2/3 Views    Narrative    PROCEDURE INFORMATION:   Exam: XR Lumbosacral Spine, 2 or 3 Views   Exam date and time: 2/6/2020 4:35 AM   Age: 86 years old   Clinical indication: Low back pain; Additional info: Chronic lbp and left hip   pain now increased with impaired mobility.     TECHNIQUE:   Imaging protocol: XR of the lumbosacral spine, 2 or 3 views.     COMPARISON:   CR XR HIP LEFT 2-3 VIEWS 8/30/2018 3:08 PM     FINDINGS:   Vertebrae: Severe diffuse degenerative disc disease reflected as severe   decrease in disc space height and anterior endplate osteophytosis. No   spondylolisthesis No pars defect. No fracture  Soft tissues: Normal.       Impression    IMPRESSION:   Severe diffuse degenerative disc disease.     THIS DOCUMENT HAS BEEN ELECTRONICALLY SIGNED BY BRANDIE MCNEILL MD   CBC with platelets differential   Result Value Ref Range    WBC 19.6 (H) 4.0 - 11.0 10e9/L    RBC Count 3.68 (L) 3.8 - 5.2 10e12/L    Hemoglobin 10.1 (L) 11.7 - 15.7 g/dL    Hematocrit 31.3 (L) 35.0 - 47.0 %    MCV 85 78 - 100 fl    MCH 27.4 26.5 - 33.0 pg    MCHC 32.3 31.5 - 36.5 g/dL    RDW  15.6 (H) 10.0 - 15.0 %    Platelet Count 314 150 - 450 10e9/L    Diff Method PENDING    Basic metabolic panel   Result Value Ref Range    Sodium 131 (L) 134 - 144 mmol/L    Potassium 3.3 (L) 3.5 - 5.1 mmol/L    Chloride 94 (L) 98 - 107 mmol/L    Carbon Dioxide 30 21 - 31 mmol/L    Anion Gap 7 3 - 14 mmol/L    Glucose 108 (H) 70 - 105 mg/dL    Urea Nitrogen 28 (H) 7 - 25 mg/dL    Creatinine 1.17 0.60 - 1.20 mg/dL    GFR Estimate 44 (L) >60 mL/min/[1.73_m2]    GFR Estimate If Black 53 (L) >60 mL/min/[1.73_m2]    Calcium 9.5 8.6 - 10.3 mg/dL   Lactic acid   Result Value Ref Range    Lactic Acid 0.7 0.5 - 2.2 mmol/L   CRP inflammation   Result Value Ref Range    CRP Inflammation 1.1 (H) <0.5 mg/L   Erythrocyte sedimentation rate auto   Result Value Ref Range    Sed Rate 11 1 - 15 mm/h   Occult blood stool   Result Value Ref Range    Occult Blood Positive (A) NEG^Negative       Medications   sodium chloride 0.9% infusion ( Intravenous New Bag 2/6/20 0330)   nitroGLYcerin (NITROSTAT) sublingual tablet 0.4 mg (0.4 mg Sublingual Given 2/6/20 0154)   morphine (PF) injection 1 mg (1 mg Intravenous Given 2/6/20 0152)   furosemide (LASIX) tablet 20 mg (has no administration in time range)   losartan (COZAAR) tablet 50 mg (has no administration in time range)   hydrochlorothiazide (MICROZIDE) capsule 12.5 mg (has no administration in time range)   predniSONE (DELTASONE) tablet 10 mg (has no administration in time range)   famotidine (PEPCID) tablet 20 mg (has no administration in time range)   oxyCODONE-acetaminophen (PERCOCET) 5-325 MG per tablet 1 tablet (has no administration in time range)   hydrALAZINE (APRESOLINE) injection 10 mg (10 mg Intravenous Given 2/6/20 0155)   oxyCODONE-acetaminophen (PERCOCET) 5-325 MG per tablet 1 tablet (1 tablet Oral Given 2/6/20 0328)   morphine (PF) injection 2 mg (2 mg Intravenous Given 2/6/20 9169)     4:11 AM long discussion with patient and her daughter.  Her daughter wants a three day  hospitalization that will allow transfer to a NH.  We discussed that right now I don't find a specific reason to hospitalize her and we have just rechecked her temp and it is normal.  We discussed that I think her wbc elevation, and htn and electrolyte abnormalities and her BNP elevation from comparison are all due to her urinary retention.  She appears much more calm now that she has had her oxycodone and discussed that narcotics can make people weak and fall and confused.  I reviewed recent imagining reports with normal aorta caliber in late November.  We discussed that we don't have a bed available in our hospital as we are on divert and the daughter is not as interested in transfer to [place].  I suggested that we get an x-ray of her pelvis and left hip, recheck her labs in a couple hours, and keep checking her temp; then have  see her this morning to answer questions about NH admission from the ED.  The daughter is agreeable with plan.    6:25 AM labs returning with decreased Hgb to 10.1 from 11.4 and uncertain cause.  No obvious bleeding.  Abd is NT.  Lumbar spine and pelvis x-ray show concern for constipation.  MOR is NT and rectum is empty without obvious gross blood.  Renal function improved as expected with urinary retention relieved.  CRP is mildly elevated and Lactic acid level is normal.  ESR is pending.    6:43 AM I ordered her usual morning medications sans her aggrenox as stool guaiac did return positive.  I will sign out patient to Dr. Saenz at shift change.  Bobby Cooper MD      Assessments & Plan (with Medical Decision Making)   86 year old female cared for at home by her daughter with hx of progressive dementia over the past 2+ years and progressive decline in mobility with narcotic dependent chronic pain syndrome presenting with acute urinary retention and hypertensive urgency most c/w pain response; but also more acute worsening of mobility impairment and now  non-ambulatory.  Her pain and BP are lowered by Ledezma placement with >750ml of UO and morphine, and then given a percocet 5/325 pill which does improve her agitation and concern for narcotic withdrawal.  She also receives SL NTG and hydralazine 10mg IV with good lowering of her BP.  She is having worsening cognitive decline versus delirium with oxycodone likely contributing.   Repeat labs show decrease in hgb without obvious source.  No gross blood on MOR but stool guaiac is positive.      I have reviewed the nursing notes.    I have reviewed the findings, diagnosis, plan and need for follow up with the patient.       New Prescriptions    No medications on file       Final diagnoses:   Impaired gait and mobility   Acute urinary retention - >750ml   Paroxysmal A-fib (H)   Chronic pain syndrome   Narcotic dependence (H)   Memory loss   Delirium       2/6/2020   [facility]     Bobby Cooper MD  02/06/20 0651       Kalina Saenz MD  02/06/20 0796

## 2020-02-06 NOTE — ED NOTES
Rectal exam done by Md due to seeing constipation on x-ray and drop in HGB. Stool sent for occult testing. Patient confused but pleasant. Patient does get restless and attempts to climb out of bed. Ledezma in place. Iv fluids infusing.

## 2020-02-06 NOTE — PROGRESS NOTES
02/06/20 1400   Signing Clinician's Name / Credentials   Signing clinician's name / credentials Bill Quezada, SPT   Quick Adds   Rehab Discipline PT   Functional Transfer Training   Treatment Detail CGA for sit to stand, increased pain   Symptoms Noted During/After Treatment fatigue;increased pain   Gait Training   Symptoms Noted During/After Treatment (Gait Training) fatigue;increased pain   Treatment Detail Ambulation out to lopez and back in, total of 50 ft, painful throughout, CGA  of 2 and FWW, walker repeatedly pushed far out in front of patient   [place] Level (Gait Training) contact guard   Physical Assistance Level (Gait Training) 2 person assist   Weight Bearing (Gait Training) full weight-bearing   Assistive Device (Gait Training) rolling walker   Gait Distance 50 ft   Gait Analysis Deviations decreased marko   Therapeutic Activity   Treatment Detail mod assist of 2 with supine to sit   Symptoms Noted During/After Treatment Increased pain   Additional Documentation   PT Plan Continue POC

## 2020-02-06 NOTE — PLAN OF CARE
Discharge Planner PT   Patient plan for discharge: [place], [place] following that  Current status: Decline in functional mobility compared to relatively recent baseline  Barriers to return to prior living situation: Requires increased assist, unsafe ambulating independently  Recommendations for discharge: [facility]  Rationale for recommendations: Pt family seems receptive to this plan, appropriate given recent decline       Entered by: Bill Quezada 02/06/2020 3:01 PM

## 2020-02-06 NOTE — H&P
[facility]    History and Physical - Hospitalist Service       Date of Admission:  2/6/2020    Assessment & Plan   Tenisha Cagle is a 86 year old female admitted on 2/6/2020. She presents emergency room from home via EMS after episode of increased confusion at home.  Daughter who is with her states that patient has dementia which is been worsening, especially over the past 2 days with confusion, hallucinations.  Daughter is concerned about her ability to care for her mother at home and of her mother safety.  In the emergency department with elevated blood pressure, but normal temperature.  Work-up is showing no signs of any infectious etiology although she does have acute urinary retention and a Ledezma catheter has been placed.  Imaging of the head, chest, pelvis, spine are showing no pathologic issues other than degenerative changes.  There is no infection seen and no signs of any acute brain issues.  Is not clear whether her change in mental status could be precipitated by the urinary retention or whether this is a progression.  Patient will be registered observation and will order PT/OT consult with social service to evaluate for help either at home or placement.  Because of the increased hallucinations and disorientation will order low-dose Seroquel to aid in her sleep at night and behavior issues during the day.  Hopefully can arrange disposition within the next 24 hours and therefore will make her observation status.    Dementia with behavioral disturbance (H)  Living at home with daughter with ongoing memory issues.  Recently with hallucinations, at times getting agitated and daughter notes she has not slept for the past 2 nights  No new medications, no signs of infection.  In ER with better distention and urinary retention, catheter placed  At this time worsening dementia, possibly exacerbated by the urinary retention.  We will start low-dose Seroquel at at bedtime for sleep.  Avoid  other sedating daytime meds  PT/OT consult with  to help with placement issues    Essential hypertension  Taking Hyzaar at home with recent elevation of blood pressure  Have been prescribed some clonidine which may have caused some behavioral issues and some low blood pressures at time  For now we will stop the hydrochlorothiazide due to hyponatremia.  Continue Cozaar, monitor pressures, hold clonidine for now    History of TIA (transient ischemic attack)  No obvious residual, may be contributing to her dementia    Paroxysmal A-fib (H)  Currently in A. fib, with episode of RVR in the ER which is resolved  Monitor, consider adding beta-blocker for rate control if blood pressures remain high    Urinary retention  Urinary retention in the ED with Ledezma catheter placement  May be contributing to her irritability with her dementia  Monitor, may need to pull catheter to see if she continues to retain    Chronic lymphocytic leukemia (H)  White blood cell count is consistently elevated as ER today, no other symptoms  Outpatient management      Degenerative joint disease of pelvic region  Complaining of pain in her hips and knees with imaging showing degenerative disease  Has been started on prednisone as an outpatient and will continue this.  Scheduled Tylenol ordered for better pain control  Has been taking oxycodone and will drop the dosage and monitor for adequate pain relief    Hiatal hernia  Noted on CT imaging and chest x-ray.  Has been taking H2 blocker and will continue if necessary         Diet: Regular Diet Adult    DVT Prophylaxis: Low Risk/Ambulatory with no VTE prophylaxis indicated  Ledezma Catheter: in place, indication: Retention  Code Status: DNR/DNI      Disposition Plan   Expected discharge: 1 to 2 days, recommended to To be determined once safe disposition plan/ TCU bed available and Mental status improved.  Entered: Pop Adler MD 02/06/2020, 12:34 PM     The patient's care was  discussed with the Patient's Family.    Pop Adler MD  [facility]    ______________________________________________________________________    Chief Complaint   86-year-old female presenting from home with increased confusion    History is obtained from the electronic health record, emergency department physician and patient's daughter    History of Present Illness   Tenisha Cagle is a 86 year old female who presents via EMS from home.  Patient has dementia and living with her daughter, son-in-law with help occasionally from a friend.  Daughter notes that patient's confusion is gotten worse and over the past day or so she has not been able to get up and use her walker as usual.  She has had increasing confusion, with hallucinations and daughter's concern about her safety and that she may try to climb out of bed and fall.  There is been no episodes of nausea or vomiting, no fever, no cough, shortness of breath.  Patient does have a history of atrial fibrillation with no complaints.  She is recently had elevated blood pressure and has been seen with the addition of clonidine which at times is caused her pressures to go too low.  This was stopped several days ago.  In addition has been prescribed some meclizine for dizziness which knocked her out during the day.  Is been no complaints of urinary problems although daughter notes she is not urinating much.  Patient complains of hip pain especially with ambulation.  Is been no falls or obvious head injuries.  Patient has a history of TIA but no residual but does take Aggrenox on a regular basis.    Review of Systems    Review of systems not obtained due to patient factors - confusion    Past Medical History    I have reviewed this patient's medical history and updated it with pertinent information if needed.   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)     2011     Diaphragmatic hernia without obstruction or gangrene     large, mostly intrathoracic     Diverticulosis of large intestine without perforation or abscess without bleeding     2011     Dysthymic disorder     No Comments Provided     Essential (primary) hypertension     No Comments Provided     Female climacteric state     Menopausal age 56     Gastritis without bleeding     03,Treated with PrevPac     Nonruptured cerebral aneurysm     2011     Osteoarthritis     No Comments Provided     Other fecal abnormalities     Recurrent loose stool     Other lymphoid leukemia not having achieved remission (H)     CLL, stable WBC 30,000     Other specified bacterial intestinal infections     2003, H. pylori gastritis treated with Prevpac     Other specified enthesopathies of unspecified lower limb, excluding foot     No Comments Provided     Peptic ulcer without hemorrhage or perforation     No Comments Provided     Personal history of other diseases of the digestive system (CODE)     2011     Personal history of other medical treatment (CODE)     , vaginal deliveries     Pure hypercholesterolemia     No Comments Provided     Sepsis (H)     child,Hospitalized as a child for blood poisoning     Sleep disorder     2011     Transient cerebral ischemic attack     2011       Past Surgical History   I have reviewed this patient's surgical history and updated it with pertinent information if needed.  Past Surgical History:   Procedure Laterality Date     CHOLECYSTECTOMY      No Comments Provided     COLONOSCOPY      ,Sigmoid diverticulosis     ENDOSCOPY UPPER, COLONOSCOPY, COMBINED      11,Hiatal hernia, gastric gland hyperplasia in antrum     LAPAROSCOPIC TUBAL LIGATION      No Comments Provided     OTHER SURGICAL HISTORY      ,2050,BIOPSY BREAST,Right     OTHER SURGICAL HISTORY       [id number],PREMALIG/BENIGN SKIN LESION EXCISION,R side of nose, face and chest wall/Basal Cell Cancer Excision     TONSILLECTOMY, ADENOIDECTOMY, COMBINED      1954       Social History   I have reviewed this patient's social history and updated it with pertinent information if needed.  Social History     Tobacco Use     Smoking status: Never Smoker     Smokeless tobacco: Never Used   Substance Use Topics     Alcohol use: No     Alcohol/week: 0.0 standard drinks     Drug use: No       Family History   I have reviewed this patient's family history and updated it with pertinent information if needed.   Family History   Problem Relation Age of Onset     Other - See Comments Mother         Stroke,Had a CVA age 85     Cancer Father         Cancer,Brain tumor, age 56     Blood Disease Sister         Blood Disease,Leukemia and     Cancer Sister         Cancer, kidney cancer     Blood Disease Sister         Blood Disease,Chronic lymphocytic leukemia       Prior to Admission Medications   Prior to Admission Medications   Prescriptions Last Dose Informant Patient Reported? Taking?   Calcium Carbonate-Vitamin D (CALCIUM 500 + D) 500-125 MG-UNIT TABS Unknown at Unknown time  Yes No   Sig: Take 1 tablet by mouth daily   Multiple Vitamin (MULTI-VITAMINS) TABS Unknown at Unknown time  Yes No   Sig: Take 1 tablet by mouth daily   Multiple Vitamins-Minerals (OCUVITE ADULT 50+) CAPS Unknown at Unknown time  Yes No   Sig: Take 1 capsule by mouth daily   acetaminophen (TYLENOL) 500 MG tablet Unknown at Unknown time  Yes No   Sig: Take 1,000 mg by mouth every 6 hours as needed   aspirin-dipyridamole ER (AGGRENOX)  MG 12 hr capsule 2/6/2020 at 0800  No Yes   Sig: TAKE 1 CAPSULE TWICE A DAY   cloNIDine (CATAPRES) 0.1 MG tablet Past Week at Unknown time  No Yes   Sig: Take 1 tablet (0.1 mg) by mouth 2 times daily   furosemide (LASIX) 20 MG tablet Past Week at Unknown time  No Yes   Sig: Take 1 tablet (20 mg) by mouth daily   lactulose  (CHRONULAC) 10 GM/15ML solution   No No   Sig: TAKE 30 ML BY MOUTH TWICE DAILY AS NEEDED FOR CONSTIPATION   losartan-hydrochlorothiazide (HYZAAR) 50-12.5 MG tablet 2/6/2020 at 0800  No Yes   Sig: Take 1 tablet by mouth daily   magnesium hydroxide (MILK OF MAGNESIA) 400 MG/5ML suspension Unknown at Unknown time  Yes No   Sig: Take 15 mLs by mouth At Bedtime   meclizine (ANTIVERT) 12.5 MG tablet Past Week at Unknown time  No Yes   Sig: Take 2 tablets (25 mg) by mouth 3 times daily as needed for dizziness   order for DME   No No   Sig: Equipment being ordered: pillow with a hole in the center   oxyCODONE (ROXICODONE) 5 MG tablet 2/6/2020 at 1800  No Yes   Sig: Take 1 tablet (5 mg) by mouth every 4 hours as needed for pain   predniSONE (DELTASONE) 10 MG tablet Past Month at 0800  No Yes   Sig: Take 1 tablet (10 mg) by mouth daily   ranitidine (ZANTAC) 150 MG tablet 2/6/2020 at 0800  No Yes   Sig: Take 1 tablet (150 mg) by mouth 2 times daily as needed for heartburn      Facility-Administered Medications: None     Allergies   Allergies   Allergen Reactions     Lansoprazole Other (See Comments) and Unknown     Patient states that medication didn't work for her.  Daughter states she got delusional, water did not taste right  Other reaction(s): Other - Describe In Comment Field  Patient states that medication didn't work for her.     Lisinopril Cough       Physical Exam   Vital Signs: Temp: 97.6  F (36.4  C) Temp src: Tympanic BP: (!) 157/65 Pulse: 80 Heart Rate: 80 Resp: 12 SpO2: 94 % O2 Device: None (Room air)    Weight: 106 lbs 0 oz    Constitutional: Thin lady lying in bed in no apparent distress.  She is alert, mumbling but answering questions but overall seems confused  Eyes: Lids and lashes normal, pupils equal, round and reactive to light, extra ocular muscles intact, sclera clear, conjunctiva normal  ENT: Normocephalic, without obvious abnormality, atraumatic, sinuses nontender on palpation, external ears without  lesions, oral pharynx with moist mucous membranes, tonsils without erythema or exudates, gums normal and good dentition.  Hematologic / Lymphatic: no cervical lymphadenopathy and no supraclavicular lymphadenopathy  Respiratory: No increased work of breathing, good air exchange, clear to auscultation bilaterally, no crackles or wheezing  Cardiovascular: irregularly irregular rhythm and no edema  GI: normal bowel sounds, soft, non-distended and non-tender  Skin: no bruising or bleeding, normal skin color, texture, turgor and no redness, warmth, or swelling  Musculoskeletal: no lower extremity pitting edema present  there is no redness, warmth, or swelling of the joints  Neurologic: Awake, alert, oriented to name, place and time.  Cranial nerves II-XII are grossly intact.    Data   Data reviewed today: I reviewed all medications, new labs and imaging results over the last 24 hours. I personally reviewed the head CT image(s) showing No signs of bleed or other acute problems.    Results for orders placed or performed during the hospital encounter of 02/06/20 (from the past 24 hour(s))   CBC with platelets differential   Result Value Ref Range    WBC 22.1 (H) 4.0 - 11.0 10e9/L    RBC Count 4.12 3.8 - 5.2 10e12/L    Hemoglobin 11.4 (L) 11.7 - 15.7 g/dL    Hematocrit 35.3 35.0 - 47.0 %    MCV 86 78 - 100 fl    MCH 27.7 26.5 - 33.0 pg    MCHC 32.3 31.5 - 36.5 g/dL    RDW 15.5 (H) 10.0 - 15.0 %    Platelet Count 362 150 - 450 10e9/L    Diff Method Automated Method     % Neutrophils 69.9 %    % Lymphocytes 22.4 %    % Monocytes 7.1 %    % Eosinophils 0.0 %    % Basophils 0.2 %    % Immature Granulocytes 0.4 %    Absolute Neutrophil 15.4 (H) 1.6 - 8.3 10e9/L    Absolute Lymphocytes 5.0 0.8 - 5.3 10e9/L    Absolute Monocytes 1.6 (H) 0.0 - 1.3 10e9/L    Absolute Eosinophils 0.0 0.0 - 0.7 10e9/L    Absolute Basophils 0.0 0.0 - 0.2 10e9/L    Abs Immature Granulocytes 0.1 0 - 0.4 10e9/L   INR   Result Value Ref Range    INR 0.85 0 -  1.3   Comprehensive metabolic panel   Result Value Ref Range    Sodium 130 (L) 134 - 144 mmol/L    Potassium 3.4 (L) 3.5 - 5.1 mmol/L    Chloride 91 (L) 98 - 107 mmol/L    Carbon Dioxide 29 21 - 31 mmol/L    Anion Gap 10 3 - 14 mmol/L    Glucose 118 (H) 70 - 105 mg/dL    Urea Nitrogen 27 (H) 7 - 25 mg/dL    Creatinine 1.29 (H) 0.60 - 1.20 mg/dL    GFR Estimate 39 (L) >60 mL/min/[1.73_m2]    GFR Estimate If Black 47 (L) >60 mL/min/[1.73_m2]    Calcium 10.3 8.6 - 10.3 mg/dL    Bilirubin Total 0.4 0.3 - 1.0 mg/dL    Albumin 4.0 3.5 - 5.7 g/dL    Protein Total 6.5 6.4 - 8.9 g/dL    Alkaline Phosphatase 35 34 - 104 U/L    ALT 11 7 - 52 U/L    AST 16 13 - 39 U/L   Troponin GH   Result Value Ref Range    Troponin 17.9 <34.0 pg/mL   Nt probnp inpatient (BNP)   Result Value Ref Range    N-Terminal Pro BNP Inpatient 892 (H) 0 - 100 pg/mL   UA reflex to Microscopic and Culture   Result Value Ref Range    Color Urine Yellow     Appearance Urine Clear     Glucose Urine Negative NEG^Negative mg/dL    Bilirubin Urine Negative NEG^Negative    Ketones Urine Negative NEG^Negative mg/dL    Specific Gravity Urine 1.012 1.003 - 1.035    Blood Urine Negative NEG^Negative    pH Urine 7.0 5.0 - 7.0 pH    Protein Albumin Urine Negative NEG^Negative mg/dL    Urobilinogen mg/dL Normal 0.0 - 2.0 mg/dL    Nitrite Urine Negative NEG^Negative    Leukocyte Esterase Urine Negative NEG^Negative    Source Catheterized Urine    XR Pelvis and Hip Left 2 Views    Narrative    PROCEDURE INFORMATION:   Exam: XR Left Hip with Pelvis when Performed   Exam date and time: 2/6/2020 4:35 AM   Age: 86 years old   Clinical indication: Hip pain; Left hip; Additional info: Increase in chronic   pelvis and left hip pain.     TECHNIQUE:   Imaging protocol: XR Left hip with pelvis when performed.   Views: 2 or 3 views.     COMPARISON:   CR XR HIP LEFT 2-3 VIEWS 8/30/2018 3:08 PM     FINDINGS:   Bones/joints: osseous structures of the pelvis without an acute process.  rami   are intact. Sacroiliac joints without separation/diastases/fracture. Iliac   bones unremarkable/noncontributory Mild degenerative changes within the hips   Degenerative changes within the visualized portions of the caudal aspect of the   lumbar spine. trabecular stress markings normal. intertrochanteric region   normal. No displacement or rotation. Acetabulum without fracture. Joint space   appears normal.   Soft tissues: Unremarkable.       Impression    IMPRESSION:   No fracture.     THIS DOCUMENT HAS BEEN ELECTRONICALLY SIGNED BY BRANDIE MCNEILL MD   XR Lumbar Spine 2/3 Views    Narrative    PROCEDURE INFORMATION:   Exam: XR Lumbosacral Spine, 2 or 3 Views   Exam date and time: 2/6/2020 4:35 AM   Age: 86 years old   Clinical indication: Low back pain; Additional info: Chronic lbp and left hip   pain now increased with impaired mobility.     TECHNIQUE:   Imaging protocol: XR of the lumbosacral spine, 2 or 3 views.     COMPARISON:   CR XR HIP LEFT 2-3 VIEWS 8/30/2018 3:08 PM     FINDINGS:   Vertebrae: Severe diffuse degenerative disc disease reflected as severe   decrease in disc space height and anterior endplate osteophytosis. No   spondylolisthesis No pars defect. No fracture  Soft tissues: Normal.       Impression    IMPRESSION:   Severe diffuse degenerative disc disease.     THIS DOCUMENT HAS BEEN ELECTRONICALLY SIGNED BY BRANDIE MCNEILL MD   CBC with platelets differential   Result Value Ref Range    WBC 19.6 (H) 4.0 - 11.0 10e9/L    RBC Count 3.68 (L) 3.8 - 5.2 10e12/L    Hemoglobin 10.1 (L) 11.7 - 15.7 g/dL    Hematocrit 31.3 (L) 35.0 - 47.0 %    MCV 85 78 - 100 fl    MCH 27.4 26.5 - 33.0 pg    MCHC 32.3 31.5 - 36.5 g/dL    RDW 15.6 (H) 10.0 - 15.0 %    Platelet Count 314 150 - 450 10e9/L    Diff Method Automated Method     % Neutrophils 66.2 %    % Lymphocytes 26.1 %    % Monocytes 6.9 %    % Eosinophils 0.1 %    % Basophils 0.2 %    % Immature Granulocytes 0.5 %    Absolute Neutrophil 13.0 (H)  1.6 - 8.3 10e9/L    Absolute Lymphocytes 5.1 0.8 - 5.3 10e9/L    Absolute Monocytes 1.4 (H) 0.0 - 1.3 10e9/L    Absolute Eosinophils 0.0 0.0 - 0.7 10e9/L    Absolute Basophils 0.0 0.0 - 0.2 10e9/L    Abs Immature Granulocytes 0.1 0 - 0.4 10e9/L   Basic metabolic panel   Result Value Ref Range    Sodium 131 (L) 134 - 144 mmol/L    Potassium 3.3 (L) 3.5 - 5.1 mmol/L    Chloride 94 (L) 98 - 107 mmol/L    Carbon Dioxide 30 21 - 31 mmol/L    Anion Gap 7 3 - 14 mmol/L    Glucose 108 (H) 70 - 105 mg/dL    Urea Nitrogen 28 (H) 7 - 25 mg/dL    Creatinine 1.17 0.60 - 1.20 mg/dL    GFR Estimate 44 (L) >60 mL/min/[1.73_m2]    GFR Estimate If Black 53 (L) >60 mL/min/[1.73_m2]    Calcium 9.5 8.6 - 10.3 mg/dL   Lactic acid   Result Value Ref Range    Lactic Acid 0.7 0.5 - 2.2 mmol/L   CRP inflammation   Result Value Ref Range    CRP Inflammation 1.1 (H) <0.5 mg/L   Erythrocyte sedimentation rate auto   Result Value Ref Range    Sed Rate 11 1 - 15 mm/h   Occult blood stool   Result Value Ref Range    Occult Blood Positive (A) NEG^Negative

## 2020-02-06 NOTE — PLAN OF CARE
"Pt unable to answer majority of question due to severe Dementia. Pt shows grimace of pain during repositioning, particular movement of the LE - oxycodone 2.5mg given. LS are clear throughout. BS are active x 4 - tenderness in LLQ. Ledezma is in place and draining appropriately. NS running in right arm AC. Patient attempts to respond and can follow simple commands but is inconsistent. Swallows water from lid of cup without difficulty/complication - small sips. Takes pills whole with pudding (not apple saucse)  but sometimes chews them. Speech is incoherent mumbles.     BP (!) 157/65   Pulse 80   Temp 97.6  F (36.4  C) (Tympanic)   Resp 12   Ht 1.6 m (5' 3\")   Wt 48.1 kg (106 lb)   SpO2 94%   BMI 18.78 kg/m      "

## 2020-02-06 NOTE — PROGRESS NOTES
02/06/20 1400   Quick Adds   Type of Visit Initial PT Evaluation   Living Environment   Lives With child(lenny), adult   Living Arrangements house   Home Accessibility no concerns   Transportation Anticipated family or friend will provide   Self-Care   Usual Activity Tolerance fair   Current Activity Tolerance poor   Equipment Currently Used at Home walker, rolling   Functional Level Prior   Ambulation 1-->assistive equipment   Transferring 1-->assistive equipment   Toileting 1-->assistive equipment   Bathing 1-->assistive equipment   Communication 2-->difficulty understanding (not related to language barrier)   Swallowing 0-->swallows foods/liquids without difficulty   Cognition 2 - difficulty with organizing thoughts   Fall history within last six months no   Which of the above functional risks had a recent onset or change? none   General Information   Referring Physician Nayely   Patient/Family Goals Statement return home   Pertinent History of Current Problem (include personal factors and/or comorbidities that impact the POC) Pt family reports recent decline and increase in confusion   Precautions/Limitations fall precautions   Weight-Bearing Status - LLE full weight-bearing   Weight-Bearing Status - RLE full weight-bearing   Cognitive Status Examination   Orientation person   Level of Consciousness confused   Follows Commands and Answers Questions able to follow single-step instructions;50% of the time   Personal Safety and Judgment impaired   Memory impaired   Pain Assessment   Patient Currently in Pain Yes, see Vital Sign flowsheet   Posture    Posture Forward head position;Kyphosis   Range of Motion (ROM)   ROM Comment WFL BLE   Strength   Strength Comments Diminished BLE strength exhibited    Bed Mobility   Bed Mobility Comments mod assist of 2 for supine to sit   Transfer Skills   Transfer Comments CGA for sit to stand   Gait   Gait Comments CGA of 2 with assist with keeping FWW close, walked 50 ft and  reported significant pain, and limited strength   General Therapy Interventions   Planned Therapy Interventions balance training;transfer training;gait training   Clinical Impression   Criteria for Skilled Therapeutic Intervention yes, treatment indicated   PT Diagnosis impaired mobility   Influenced by the following impairments Weakness, diminished endurance, cognitive declinle   Functional limitations due to impairments Ambulation, transfers   Clinical Presentation Stable/Uncomplicated   Clinical Decision Making (Complexity) Low complexity   Therapy Frequency Daily   Predicted Duration of Therapy Intervention (days/wks) 1-2 days   Anticipated Equipment Needs at Discharge front wheeled walker   Anticipated Discharge Disposition Other (see comments)  (Uncertain at this time)   Risk & Benefits of therapy have been explained Yes   Patient, Family & other staff in agreement with plan of care Yes

## 2020-02-06 NOTE — PHARMACY-ADMISSION MEDICATION HISTORY
Pharmacy -- Admission Medication Reconciliation    Prior to admission (PTA) medications were reviewed and the patient's PTA medication list was updated.    Sources Consulted: [facility] scripts, daughter    The reliability of this Medication Reconciliation is: Reliability: Reliable    The following significant changes were made:  Milk of Magnesia REMOVED - no longer uses  Ocuvite REMOVED - no longer uses  Ranitidine REMOVED - no longer uses  Prednisone CHANGED to every other day per daughter - primary MD aware of this dosing per last OV - daughter giving this for palliative care   Lactulose CHANGED from PRN to 15-30 mL at bedtime  Hyzaar 50-12.5 CHANGED from 1 tablet daily to 1/2 tablet daily - daughter states she has been taking this dose for over a year  Aspercreme ADDED - daughter uses on hip/back/knees TID       Note: meclizine, clonidine and furosemide are new prescriptions that were recently prescribed on 2/1/20.  Patient's daughter gave two doses of furosemide 20 mg (2/1 and 2/3) and stopping giving patient the clonidine and meclizine on 2/3/20 as well due to excessive somnolence/hallucinations/tremors.  Note: Daughter states patient takes oxycodone 5 mg five times daily and uses 2-3 grams of acetaminophen daily as well.    In addition, the patient's allergies were reviewed with the patient and updated as follows:   Allergies: Lansoprazole and Lisinopril    The pharmacist has reviewed with the patient that all personal medications should be removed from the building or locked in the belongings safe.  Patient shall only take medications ordered by the physician and administered by the nursing staff.       Medication barriers identified: underlying dementia - patient unreliable, daughter helps with medications - sets up in a pill box; polypharmacy - although daughter states she stopped using [facility] because she could never get in touch with anyone   Medication adherence concerns: daughter stopped giving new  prescriptions clonidine, meclizine and furosemide due to side effects, taking prednisone every other day - MD aware   Understanding of emergency medications: uses oxycodone 5 times daily    Kalina Sweeney [facility], 2/6/2020,  4:10 PM

## 2020-02-06 NOTE — PROGRESS NOTES
:    MD notified us to possibly assist with discharge planning needs.  I called JENNIFER Carreno and apparently she lives with daughter and has a high white count.  It was mentioned that patient may need to be admitted.   will continue to follow for discharge planning needs when known.    GWNE Ramirez on 2/6/2020 at 9:03 AM

## 2020-02-06 NOTE — ASSESSMENT & PLAN NOTE
Currently in A. fib, with episode of RVR in the ER which is resolved  No more episodes of RVR.  Will add low-dose metoprolol in addition to her Cozaar for better blood pressure control

## 2020-02-06 NOTE — PROGRESS NOTES
" NSG ADMISSION NOTE    Patient admitted to room 357 at approximately 1045 via cart from emergency room. Patient was accompanied by nurse.     Verbal SBAR report received from Ev prior to patient arrival.     Patient trasferred to bed via Slip sheet Patient alert and oriented X 1. Pain is controlled with current analgesics.  Medication(s) being used: narcotics .  . Admission vital signs: Blood pressure (!) 157/65, pulse 80, temperature 97.6  F (36.4  C), temperature source Tympanic, resp. rate 12, height 1.6 m (5' 3\"), weight 48.1 kg (106 lb), SpO2 94 %, not currently breastfeeding. Patient was oriented to plan of care, call light, bed controls, tv, telephone, bathroom and visiting hours.     Risk Assessment    The following safety risks were identified during admission: fall. Yellow risk band applied: YES.     Skin Initial Assessment    This writer admitted this patient and completed a full skin assessment and Antoni score in the Adult PCS flowsheet. Appropriate interventions initiated as needed.     Secondary skin check completed by .    Antoni Risk Assessment  Sensory Perception: 2-->very limited  Moisture: 2-->very moist  Activity: 3-->walks occasionally  Mobility: 2-->very limited  Nutrition: 3-->adequate  Friction and Shear: 2-->potential problem  Antoni Score: 14  Bed Support Surface: [employer] IsoAir (Range)  Reassessed using Bed Algorithm: Yes  Antoni Intervention(s) Implemented: draw sheets, repositioned/turned q2hr    Education    Patient has a [place] to Observation order: Yes  Observation education completed and documented: Yes      Yusuf Pulido RN    "

## 2020-02-06 NOTE — ASSESSMENT & PLAN NOTE
Complaining of pain in her hips and knees with imaging showing degenerative disease  Has been started on prednisone as an outpatient and will continue this.  Scheduled Tylenol ordered for better pain control  Has been taking oxycodone and will drop the dosage and monitor for adequate pain relief

## 2020-02-06 NOTE — PHARMACY-CONSULT NOTE
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)     Numbness and tingling of right leg     Osteoarthritis of spine with radiculopathy, lumbar region     Pancreatic mass     Paresthesia of right leg     Radicular leg pain     Right knee DJD     SK (seborrheic keratosis)     Benign skin lesion of nose     Visual changes     Memory loss     Atrial fibrillation with RVR (H)     Arthritis of shoulder region, left, degenerative     Controlled substance agreement signed 3/8/19     Chondrodermatitis nodularis chronica helicis, right     Dizzy     Paroxysmal A-fib (H)     Dementia with behavioral disturbance (H)     Degenerative joint disease of pelvic region     Urinary retention     Dementia (H)        Relevant Labs:  Recent Labs   Lab Test 02/06/20  0605 02/06/20  0142   WBC 19.6* 22.1*   HGB 10.1* 11.4*    362        CrCl: 26 mL/min      Intake/Output Summary (Last 24 hours) at 2/6/2020 1126  Last data filed at 2/6/2020 0900  Gross per 24 hour   Intake --   Output 1090 ml   Net -1090 ml          Per Renal Dose Adjustment Protocol, will adjust:  Famotidine 20 mg to Q24H prn      Will continue to follow and make adjustments accordingly. Thank You.    Jaky Jackson [facility] ....................  2/6/2020   11:26 AM

## 2020-02-07 NOTE — PLAN OF CARE
Discharge Planner OT   Patient plan for discharge: pt not able to state due to cognition   Current status: Pt was alert, willing to transfer up to chair for breakfast. Pt needed max assist to move supine to sit, min assist of 2 with FWW to stand and take a few steps with FWW, pt complained of pain with standing but unable to fully localize. Pt needed assist to set up food tray but was able to feed self independently.   Barriers to return to prior living situation: cognition and level of assist needed for functional mobility and self cares   Recommendations for discharge: pt would greatly benefit from on-going PT/OT at short term rehab   Rationale for recommendations: see above        Entered by: Sabra Linda 02/07/2020 12:49 PM

## 2020-02-07 NOTE — PROGRESS NOTES
:    Called Ni at [facility] and left voicemail requesting update on referral that was made yesterday.  will continue to follow.     GAY Price on 2/7/2020 at 9:52 AM    Addendum:    Called Ni again and left another voicemail. Provided update that patient is ready to discharge today. Requested call back.     Called main line at [facility]. Per [facility] staff, Ni is in today and will call back.      will continue to follow.     GAY Price on 2/7/2020 at 12:02 PM    Addendum:    Received call back from Ni at [facility]. Ni reports they are unable to accept patient, as they do not have a room available in memory care. She also reports that they are unable to place patient in their long term care general population, as their only opening right now would be in a shared room that would not be appropriate for patient.     Called patient's daughter, Nona and left voicemail with update. Requested call back to discuss further discharge planning.     GAY Price on 2/7/2020 at 12:24 PM    Addendum:    Received call back from patient's daughter, Nona. Notified her that [facility] declined and offered other local options. Nona would like referral made to [facility]. Notified her that they also have a policy of $3500 payment upfront. Nona reports that she may not have access to patient's CD until Tuesday, and could make the payment Tuesday. Informed Nona that [facility] would be notified of this, and hopefully they are willing to accept patient tomorrow with plans to be paid $3500 on Tuesday.     Faxed referral to [facility]. Called Kristine at [facility] and left voicemail notifying her of referral. Also notified her of patient's daughter's plans to make payment on Tuesday. Anticipated discharge tomorrow. Requested she review referral and call this writer back by 1530 or call RN station after 1530 to notify if they are  able to accept patient.      will continue to follow.     GAY Price on 2/7/2020 at 2:41 PM

## 2020-02-07 NOTE — PROGRESS NOTES
SAFETY CHECKLIST  ID Bands and Risk clasps correct and in place (DNR, Fall risk, Allergy, Latex, Limb):  Yes  All Lines Reconciled and labeled correctly: Yes  Whiteboard updated:Yes  Environmental interventions (bed/chair alarm on, call light, side rails, restraints, sitter....): Yes    Janet Kothari RN on 2/7/2020 at 7:50 AM

## 2020-02-07 NOTE — PHARMACY - DISCHARGE MEDICATION RECONCILIATION
Pharmacy:  Discharge Counseling and Medication Reconciliation    Tenisha Cagle  [address] [place] [address]  [phone] (home)   86 year old female  PCP: Godwin Kapoor    Allergies: Lansoprazole and Lisinopril    Did not meet with patient at time of discharge as she will have all medications managed for her by [facility] nursing staff.    Discharge Medication Reconciliation:    It has been determined that the patient has an adequate supply of medications available or which can be obtained from the patient's preferred pharmacy, which has been confirmed as: [facility] [An updated medication list will be faxed to the patient's pharmacy.]    Thank you for the consult.    Dorothy Sosa [facility]........February 7, 2020 12:55 PM

## 2020-02-07 NOTE — PLAN OF CARE
Discharge Planner OT   Patient plan for discharge: pt unable to state due to cognition   Current status: Pt anxious, able to follow simple directions 75% of time, needed physical assist to direct walker for ambulation, very unsteady and shaky, not oriented to place, time, situation   Barriers to return to prior living situation: cognition and weakness   Recommendations for discharge: on-going therapies at [facility]   Rationale for recommendations: see above        Entered by: Sabra Linda 02/07/2020 8:58 AM

## 2020-02-07 NOTE — PROGRESS NOTES
02/06/20 0847   Quick Adds   Type of Visit Initial Occupational Therapy Evaluation   Living Environment   Lives With child(lenny), adult   Living Arrangements house   Self-Care   Usual Activity Tolerance moderate   Current Activity Tolerance poor   Cognitive Status Examination   Orientation not oriented to person, place or time   Level of Consciousness alert   Follows Commands (Cognition) 50-74% accuracy   Memory impaired   Attention Distractible during evaluation   Organization/Problem Solving Problem solving impaired   Range of Motion (ROM)   ROM Quick Adds No deficits were identified   Mobility   Bed Mobility Bed mobility skill: Supine to sit   Bed Mobility Skill: Supine to Sit   Level of [place]: Supine/Sit maximum assist (25% patients effort)   Physical Assist/Nonphysical Assist: Supine/Sit 2 persons   Transfer Skill: Bed to Chair/Chair to Bed   Level of [place]: Bed to Chair minimum assist (75% patients effort)   Physical Assist/Nonphysical Assist: Bed to Chair 2 persons;verbal cues   Transfer Skill: Sit to Stand   Level of [place]: Sit/Stand minimum assist (75% patients effort)   Physical Assist/Nonphysical Assist: Sit/Stand 2 persons   Clinical Impression   Criteria for Skilled Therapeutic Interventions Met yes, treatment indicated   OT Diagnosis functional weakness   Influenced by the following impairments confusion, weakness, unsteady    Assessment of Occupational Performance 1-3 Performance Deficits   Identified Performance Deficits mobility and self care    Clinical Decision Making (Complexity) Low complexity   Therapy Frequency Daily   Predicted Duration of Therapy Intervention (days/wks) 1-2 days    Anticipated Equipment Needs at Discharge   (uncertain)   Anticipated Discharge Disposition Transitional Care Facility   Risks and Benefits of Treatment have been explained. Yes   Patient, Family & other staff in agreement with plan of care Yes   Total Evaluation Time   Total Evaluation Time  (Minutes) 45

## 2020-02-07 NOTE — PLAN OF CARE
"Pt is disorientated x4. Speech is scattered and illogical. Scattered bruising throughout+2 edema BLE. Pt has facial grimacing while being repositioned, PRN given and effective. Pt has chronic back pain. Pt has facial grimacing on palpation. Skin is intact. VSS. BP (!) 165/67   Pulse 81   Temp 99.2  F (37.3  C) (Tympanic)   Resp 16   Ht 1.6 m (5' 3\")   Wt 48.6 kg (107 lb 1.6 oz)   SpO2 96%   BMI 18.97 kg/m    LS diminished throughout. HR irregular. BS active. Abdomen is tender on palpation. Pt is repositioned q2h. Adequate output. Will continue to monitor.    Pt was able to take pills whole in beginning of shift. Throughout shift Pt would chew on medications and pocket them in checks. Pt was able to take crushed pills diluted in water or pudding.     Janet Kothari RN on 2/7/2020 at 5:50 AM    "

## 2020-02-07 NOTE — PROGRESS NOTES
[facility]    Medicine Progress Note - Hospitalist Service       Date of Admission:  2/6/2020  Assessment & Plan    86-year-old female admitted from home with increased confusion with underlying dementia.  It worsened recently with the addition of blood pressure medicines including clonidine and Lasix.  They are also noted to have urinary retention and had a Ledezma catheter placed.  On the medical floor continues to be confused.  Has been seen and evaluated by PT/OT and currently  working on nursing home placement.  Today we will add metoprolol for better blood pressure control.  Continue to monitor for need for Ledezma catheter.    Dementia with behavioral disturbance (H)  Living at home with daughter with ongoing memory issues.  Recently with hallucinations, at times getting agitated and daughter notes she has not slept for the past 2 nights  No new medications, no signs of infection.  In ER with better distention and urinary retention, catheter placed  At this time worsening dementia, possibly exacerbated by the urinary retention and recent new meds for hypertension  We will start low-dose Seroquel at at bedtime for sleep.  Avoid other sedating daytime meds  PT/OT consult with  to help with placement issues    Essential hypertension  Taking Hyzaar at home with recent elevation of blood pressure  Have been prescribed some clonidine which may have caused some behavioral issues and some low blood pressures at time  For now we will stop the hydrochlorothiazide due to hyponatremia.  Continue Cozaar, monitor pressures, hold clonidine for now  Blood pressure remains elevated, will add beta blocker, follow renal function    History of TIA (transient ischemic attack)  No obvious residual, may be contributing to her dementia    Paroxysmal A-fib (H)  Currently in A. fib, with episode of RVR in the ER which is resolved  No more episodes of RVR.  Will add low-dose metoprolol in  addition to her Cozaar for better blood pressure control    Urinary retention  Urinary retention in the ED with Ledezma catheter placement  May be contributing to her irritability with her dementia  Monitor, may need to pull catheter to see if she continues to retain    Chronic lymphocytic leukemia (H)  White blood cell count is consistently elevated as ER today, no other symptoms  Outpatient management      Degenerative joint disease of pelvic region  Complaining of pain in her hips and knees with imaging showing degenerative disease  Has been started on prednisone as an outpatient and will continue this.  Scheduled Tylenol ordered for better pain control  Has been taking oxycodone and will drop the dosage and monitor for adequate pain relief    Hiatal hernia  Noted on CT imaging and chest x-ray.  Has been taking H2 blocker and will continue if necessary          Diet: Regular Diet Adult    DVT Prophylaxis: Observation status  Ledezma Catheter: in place, indication: Retention  Code Status: DNR/DNI      Disposition Plan   Expected discharge: today or tomorrow, recommended to transitional care unit once safe disposition plan/ TCU bed available.  Entered: Pop Adler MD 02/07/2020, 8:10 AM       The patient's care was discussed with the Patient's Family.    Pop Adler MD  Hospitalist Service  [facility]    ______________________________________________________________________    Interval History   Continues to be confused overnight.  No change in symptoms, not complain of any new pain.  Breathing well denying chest pain, shortness of breath.  No issues with eating.  Has been seen by physical therapy with recommendation of nursing home placement,  working on this    Data reviewed today: I reviewed all medications, new labs and imaging results over the last 24 hours. I personally reviewed no images or EKG's today.    Physical Exam   Vital Signs: Temp: 98.5  F (36.9  C)  Temp src: Tympanic BP: (!) 186/81 Pulse: 81 Heart Rate: 81 Resp: 16 SpO2: 94 % O2 Device: None (Room air)    Weight: 107 lbs 1.6 oz  Constitutional: awake, alert, cooperative, no apparent distress and appears stated age  Respiratory: No increased work of breathing, good air exchange, clear to auscultation bilaterally, no crackles or wheezing  Cardiovascular: regular rate and rhythm  GI: normal bowel sounds, soft, non-distended and non-tender  Neurologic: Confused as to place and time.  No focal deficits, moving arms and legs without issues  Neuropsychiatric: General: calm and poor eye contact  Level of consciousness: drowsy  Affect: normal and pleasant  Orientation: oriented only to self    Data   Recent Labs   Lab 02/07/20  0515 02/06/20  0605 02/06/20  0142 01/31/20  1115   WBC  --  19.6* 22.1* 10.3   HGB  --  10.1* 11.4* 9.8*   MCV  --  85 86 88   PLT  --  314 362 293   INR  --   --  0.85 0.91   NA  --  131* 130* 137   POTASSIUM 3.6 3.3* 3.4* 3.5   CHLORIDE  --  94* 91* 102   CO2  --  30 29 28   BUN  --  28* 27* 25   CR  --  1.17 1.29* 1.07   ANIONGAP  --  7 10 7   MATI  --  9.5 10.3 8.6   GLC  --  108* 118* 95   ALBUMIN  --   --  4.0 3.5   PROTTOTAL  --   --  6.5 5.6*   BILITOTAL  --   --  0.4 0.3   ALKPHOS  --   --  35 31*   ALT  --   --  11 7   AST  --   --  16 14

## 2020-02-07 NOTE — PLAN OF CARE
Pt is resting in bed comfortably with call light in reach. No new changes. PRN pain med given for facial grimacing and moaning while repositioning, effective. VSS. Will continue to monitor.    Janet Kothari RN on 2/7/2020 at 8:03 AM

## 2020-02-07 NOTE — DISCHARGE SUMMARY
[facility]  Hospitalist Discharge Summary       Date of Admission:  2/6/2020  Date of Discharge:  2/7/2020  Discharging Provider: Pop Adler MD      Discharge Diagnoses   Active Problems:    Dementia with behavioral disturbance (H)    History of TIA (transient ischemic attack)    Essential hypertension    Paroxysmal A-fib (H)    Urinary retention    Hiatal hernia    Chronic lymphocytic leukemia (H)    Degenerative joint disease of pelvic region        Follow-ups Needed After Discharge   Follow-up Appointments     Follow Up and recommended labs and tests      Follow up with Nursing home physician.  No follow up labs or test are   needed.Monitor blood pressures             Unresulted Labs Ordered in the Past 30 Days of this Admission     No orders found for last 31 day(s).      These results will be followed up by Nursing home provider    Discharge Disposition   Discharged to short-term care facility  Condition at discharge: Satisfactory    Hospital Course   Dementia with behavioral disturbance (H)  Living at home with daughter with ongoing memory issues.  Recently with hallucinations, at times getting agitated and daughter notes she has not slept for the past 2 nights  No new medications, no signs of infection.  In ER with better distention and urinary retention, catheter placed  At this time worsening dementia, possibly exacerbated by the urinary retention and recent new meds for hypertension  Slept well with low-dose Seroquel at bedtime.  Mental status improving with stoppage of clonidine and Antivert.  Evaluated by PT and OT with recommendation for TCU placement  At the time of this note,  looking for placement options    Essential hypertension  Taking Hyzaar at home with recent elevation of blood pressure  Have been prescribed some clonidine which may have caused some behavioral issues and some low blood pressures at time  For now we will stop the hydrochlorothiazide  due to hyponatremia.  Continue Cozaar, monitor pressures, clonidine held, started beta-blocker with improvement of blood pressures.    History of TIA (transient ischemic attack)  No obvious residual, may be contributing to her dementia    Paroxysmal A-fib (H)  Currently in A. fib, with episode of RVR in the ER which is resolved  No more episodes of RVR.  Will add low-dose metoprolol in addition to her Cozaar for better blood pressure control    Urinary retention  Urinary retention in the ED with Ledezma catheter placement  May be contributing to her irritability with her dementia  Monitor, as she becomes more alert would expect that the catheter can be removed, potentially at the nursing home    Chronic lymphocytic leukemia (H)  White blood cell count is consistently elevated as ER today, no other symptoms  Outpatient management      Degenerative joint disease of pelvic region  Complaining of pain in her hips and knees with imaging showing degenerative disease  Has been started on prednisone as an outpatient and will continue this.  Scheduled Tylenol ordered for better pain control  Has been taking oxycodone and will drop the dosage and monitor for adequate pain relief    Hiatal hernia  Noted on CT imaging and chest x-ray.  Has been taking H2 blocker and will continue if necessary        Consultations This Hospital Stay   SOCIAL WORK IP CONSULT  SOCIAL WORK IP CONSULT  PHYSICAL THERAPY ADULT IP CONSULT  OCCUPATIONAL THERAPY ADULT IP CONSULT  SOCIAL WORK IP CONSULT  PHYSICAL THERAPY ADULT IP CONSULT  OCCUPATIONAL THERAPY ADULT IP CONSULT    Code Status   DNR/DNI    Time Spent on this Encounter   I, Pop Adler MD, personally saw the patient today and spent less than or equal to 30 minutes discharging this patient.       Pop Adler MD  [facility]  ______________________________________________________________________    Physical Exam   Vital Signs: Temp: 96.9  F (36.1  C) Temp  src: Tympanic BP: (!) 149/66 Pulse: 81 Heart Rate: 84 Resp: 16 SpO2: 94 % O2 Device: None (Room air)    Weight: 107 lbs 1.6 oz  Constitutional: Sleepy but awakens and is alert.  Still somewhat confused but overall better.  Appears to be no respiratory difficulty  Respiratory: No increased work of breathing, good air exchange, clear to auscultation bilaterally, no crackles or wheezing  Cardiovascular: irregularly irregular rhythm  GI: normal bowel sounds, soft, non-distended and non-tender  Musculoskeletal: Some arthritic changes in her hands and feet.  Mild pain with movement of her hips       Primary Care Physician   Godwin Kapoor    Discharge Orders      General info for SNF    Length of Stay Estimate: Short Term Care: Estimated # of Days <30  Condition at Discharge: Stable  Level of care:skilled   Rehabilitation Potential: Fair  Admission H&P remains valid and up-to-date: Yes  Recent Chemotherapy: N/A  Use Nursing Home Standing Orders: Yes     Mantoux instructions    Give two-step Mantoux (PPD) Per Facility Policy Yes     Reason for your hospital stay    Came in with increased confusion and sleeping, likely due to recent meds and changes     Ledezma catheter    To straight gravity drainage. May remove catheter in next few day. Monitor for retention and replace if necessary     Follow Up and recommended labs and tests    Follow up with Nursing home physician.  No follow up labs or test are needed.Monitor blood pressures     Activity - Up with nursing assistance     DNR/DNI     Physical Therapy Adult Consult    Evaluate and treat as clinically indicated.    Reason:  Weakness, desire to go home     Occupational Therapy Adult Consult    Evaluate and treat as clinically indicated.    Reason:  Weakness, desire to go home     Advance Diet as Tolerated    Follow this diet upon discharge: Orders Placed This Encounter      Regular Diet Adult       Significant Results and Procedures   Most Recent 3 CBC's:  Recent Labs   Lab  Test 02/06/20  0605 02/06/20  0142 01/31/20  1115   WBC 19.6* 22.1* 10.3   HGB 10.1* 11.4* 9.8*   MCV 85 86 88    362 293     Most Recent 3 BMP's:  Recent Labs   Lab Test 02/07/20  0515 02/06/20  0605 02/06/20  0142 01/31/20  1115   NA  --  131* 130* 137   POTASSIUM 3.6 3.3* 3.4* 3.5   CHLORIDE  --  94* 91* 102   CO2  --  30 29 28   BUN  --  28* 27* 25   CR  --  1.17 1.29* 1.07   ANIONGAP  --  7 10 7   MATI  --  9.5 10.3 8.6   GLC  --  108* 118* 95   ,   Results for orders placed or performed during the hospital encounter of 02/06/20   XR Pelvis and Hip Left 2 Views    Narrative    PROCEDURE INFORMATION:   Exam: XR Left Hip with Pelvis when Performed   Exam date and time: 2/6/2020 4:35 AM   Age: 86 years old   Clinical indication: Hip pain; Left hip; Additional info: Increase in chronic   pelvis and left hip pain.     TECHNIQUE:   Imaging protocol: XR Left hip with pelvis when performed.   Views: 2 or 3 views.     COMPARISON:   CR XR HIP LEFT 2-3 VIEWS 8/30/2018 3:08 PM     FINDINGS:   Bones/joints: osseous structures of the pelvis without an acute process. rami   are intact. Sacroiliac joints without separation/diastases/fracture. Iliac   bones unremarkable/noncontributory Mild degenerative changes within the hips   Degenerative changes within the visualized portions of the caudal aspect of the   lumbar spine. trabecular stress markings normal. intertrochanteric region   normal. No displacement or rotation. Acetabulum without fracture. Joint space   appears normal.   Soft tissues: Unremarkable.       Impression    IMPRESSION:   No fracture.     THIS DOCUMENT HAS BEEN ELECTRONICALLY SIGNED BY BRANDIE MCNEILL MD   XR Lumbar Spine 2/3 Views    Narrative    PROCEDURE INFORMATION:   Exam: XR Lumbosacral Spine, 2 or 3 Views   Exam date and time: 2/6/2020 4:35 AM   Age: 86 years old   Clinical indication: Low back pain; Additional info: Chronic lbp and left hip   pain now increased with impaired mobility.      TECHNIQUE:   Imaging protocol: XR of the lumbosacral spine, 2 or 3 views.     COMPARISON:   CR XR HIP LEFT 2-3 VIEWS 8/30/2018 3:08 PM     FINDINGS:   Vertebrae: Severe diffuse degenerative disc disease reflected as severe   decrease in disc space height and anterior endplate osteophytosis. No   spondylolisthesis No pars defect. No fracture  Soft tissues: Normal.       Impression    IMPRESSION:   Severe diffuse degenerative disc disease.     THIS DOCUMENT HAS BEEN ELECTRONICALLY SIGNED BY BRANDIE MCNEILL MD       Discharge Medications   Current Discharge Medication List      START taking these medications    Details   losartan (COZAAR) 50 MG tablet Take 1 tablet (50 mg) by mouth daily    Associated Diagnoses: Essential (primary) hypertension      metoprolol tartrate (LOPRESSOR) 25 MG tablet Take 0.5 tablets (12.5 mg) by mouth 2 times daily    Associated Diagnoses: Paroxysmal atrial fibrillation (H); Essential (primary) hypertension      QUEtiapine (SEROQUEL) 25 MG tablet Take 0.5 tablets (12.5 mg) by mouth At Bedtime    Associated Diagnoses: Disorientation         CONTINUE these medications which have CHANGED    Details   acetaminophen (TYLENOL) 325 MG tablet Take 3 tablets (975 mg) by mouth 3 times daily    Associated Diagnoses: Osteoarthritis of spine with radiculopathy, lumbar region      lactulose 20 GM/30ML SOLN Take 30 mLs (20 g) by mouth 2 times daily as needed    Associated Diagnoses: Chronic pain syndrome      magnesium hydroxide (MILK OF MAGNESIA) 400 MG/5ML suspension Take 15 mLs by mouth At Bedtime    Associated Diagnoses: Chronic pain syndrome      oxyCODONE (ROXICODONE) 5 MG tablet Take 0.5-1 tablets (2.5-5 mg) by mouth every 4 hours as needed for moderate to severe pain  Qty: 20 tablet, Refills: 0    Associated Diagnoses: Osteoarthritis of spine with radiculopathy, lumbar region         CONTINUE these medications which have NOT CHANGED    Details   aspirin-dipyridamole ER (AGGRENOX)  MG 12  hr capsule TAKE 1 CAPSULE TWICE A DAY  Qty: 180 capsule, Refills: 3    Associated Diagnoses: History of TIA (transient ischemic attack)      Calcium Carbonate-Vitamin D (CALCIUM 500 + D) 500-125 MG-UNIT TABS Take 1 tablet by mouth daily      Multiple Vitamins-Minerals (WOMENS MULTIVITAMIN  PLUS) TABS Take 1 tablet by mouth every morning      predniSONE (DELTASONE) 10 MG tablet Take 10 mg by mouth every other day      trolamine salicylate (ASPERCREME) 10 % external cream Apply topically 3 times daily Apply to hip, back, knees      order for DME Equipment being ordered: pillow with a hole in the center  Qty: 1 Device, Refills: 0    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  Daughter states she got delusional, water did not taste right     Lisinopril Cough

## 2020-02-07 NOTE — PHARMACY - DISCHARGE MEDICATION RECONCILIATION
[facility]  Part of [facility]  [address] [place] [address]    February 7, 2020    Dear Pharmacist,    Your customer, Tenisha Cagle, born on 9/7/1933, was recently discharged from [facility].  We have updated her medication list and want to alert you to the following:       Review of your medicines      START taking      Dose / Directions   losartan 50 MG tablet  Commonly known as:  COZAAR  Used for:  Essential (primary) hypertension      Dose:  50 mg  Start taking on:  February 8, 2020  Take 1 tablet (50 mg) by mouth daily  Refills:  0     metoprolol tartrate 25 MG tablet  Commonly known as:  LOPRESSOR  Used for:  Paroxysmal atrial fibrillation (H), Essential (primary) hypertension      Dose:  12.5 mg  Take 0.5 tablets (12.5 mg) by mouth 2 times daily  Refills:  0     QUEtiapine 25 MG tablet  Commonly known as:  SEROquel  Used for:  Disorientation      Dose:  12.5 mg  Take 0.5 tablets (12.5 mg) by mouth At Bedtime  Refills:  0        CONTINUE these medicines which may have CHANGED, or have new prescriptions. If we are uncertain of the size of tablets/capsules you have at home, strength may be listed as something that might have changed.      Dose / Directions   acetaminophen 325 MG tablet  Commonly known as:  TYLENOL  This may have changed:      medication strength    how much to take    when to take this    reasons to take this  Used for:  Osteoarthritis of spine with radiculopathy, lumbar region      Dose:  975 mg  Take 3 tablets (975 mg) by mouth 3 times daily  Refills:  0     lactulose 20 GM/30ML Soln  This may have changed:      medication strength    See the new instructions.    Another medication with the same name was removed. Continue taking this medication, and follow the directions you see here.  Used for:  Chronic pain syndrome      Dose:  20 g  Take 30 mLs (20 g) by mouth 2 times daily as needed  Refills:  0     oxyCODONE 5 MG  tablet  Commonly known as:  ROXICODONE  This may have changed:      how much to take    reasons to take this  Used for:  Osteoarthritis of spine with radiculopathy, lumbar region      Dose:  2.5-5 mg  Take 0.5-1 tablets (2.5-5 mg) by mouth every 4 hours as needed for moderate to severe pain  Quantity:  20 tablet  Refills:  0        CONTINUE these medicines which have NOT CHANGED      Dose / Directions   aspirin-dipyridamole ER  MG 12 hr capsule  Commonly known as:  AGGRENOX  Used for:  History of TIA (transient ischemic attack)      TAKE 1 CAPSULE TWICE A DAY  Quantity:  180 capsule  Refills:  3     Calcium 500 + D 500-125 MG-UNIT Tabs  Generic drug:  Calcium Carbonate-Vitamin D      Dose:  1 tablet  Take 1 tablet by mouth daily  Refills:  0     magnesium hydroxide 400 MG/5ML suspension  Commonly known as:  MILK OF MAGNESIA  Used for:  Chronic pain syndrome      Dose:  15 mL  Take 15 mLs by mouth At Bedtime  Refills:  0     order for DME  Used for:  Chondrodermatitis nodularis chronica helicis, right      Equipment being ordered: pillow with a hole in the center  Quantity:  1 Device  Refills:  0     predniSONE 10 MG tablet  Commonly known as:  DELTASONE      Dose:  10 mg  Take 10 mg by mouth every other day  Refills:  0     trolamine salicylate 10 % external cream  Commonly known as:  ASPERCREME      Apply topically 3 times daily Apply to hip, back, knees  Refills:  0     WOMENS MULTIvitamin  PLUS Tabs      Dose:  1 tablet  Take 1 tablet by mouth every morning  Refills:  0        STOP taking    cloNIDine 0.1 MG tablet  Commonly known as:  CATAPRES        furosemide 20 MG tablet  Commonly known as:  LASIX        losartan-hydrochlorothiazide 50-12.5 MG tablet  Commonly known as:  HYZAAR        meclizine 12.5 MG tablet  Commonly known as:  ANTIVERT              Where to get your medicines      Some of these will need a paper prescription and others can be bought over the counter. Ask your nurse if you have  questions.    Bring a paper prescription for each of these medications    oxyCODONE 5 MG tablet         We also reviewed Tenisha Cagle's allergy list and updated it as needed:  Allergies: Lansoprazole and Lisinopril    Thank you for continuing to care for Tenisha Cagle.  We look forward to working together with you in the future.    Sincerely,  Dorothy Sosa, [facility] and [facility]

## 2020-02-07 NOTE — PROGRESS NOTES
:    Completed PAS screening for the [facility].  The confirmation number is [id number].  Faxed a copy to the [facility].     GWEN Martin on 2/7/2020 at 2:49 PM

## 2020-02-07 NOTE — PLAN OF CARE
Pt is resting in bed with call light in reach. VSS.   Ledezma in place. Adequate output. LS diminished. BS active. HR irregular at times. Scattered bruising noted throughout. Pain 0 on FACES scale. Repositioned q2h. Bed alarm on. Will continue to monitor.    Janet Kothari RN on 2/7/2020 at 8:12 AM

## 2020-02-07 NOTE — PROGRESS NOTES
Sitter was removed from bedside. Pt is resting peacefully in bed with call light in reach. Bed alarm on middle setting. 3/4 side rails up. Room near nurses station. Will continue to monitor.    Janet Kothari RN on 2/7/2020 at 12:12 AM

## 2020-02-07 NOTE — PROGRESS NOTES
Kristine called from [facility] and verbalized that they would clinically be able to take the patient however not until patient would be able to provide $3500.00 down payment. Per Kristine, patients family may not be able to provide down payment until Tuesday. Primary RN updated.   Ruby Vivas, RN on 2/7/2020 at 4:46 PM

## 2020-02-07 NOTE — PROGRESS NOTES
SAFETY CHECKLIST  ID Bands and Risk clasps correct and in place (DNR, Fall risk, Allergy, Latex, Limb):  Yes  All Lines Reconciled and labeled correctly: Yes  Whiteboard updated:Yes  Environmental interventions (bed/chair alarm on, call light, side rails, restraints, sitter....): Yes    Kaela Steel RN on 2/7/2020 at 7:15 AM

## 2020-02-07 NOTE — PLAN OF CARE
VSS. No hallucinations noted, but patient continues to have confusion. Speech clear. Unable to get up on own. Ax2 for transfers. Chronic back pain managed with PRN Oxy and scheduled Tylenol. Appetite fair. Ledezma intact. Will continue to monitor and intervene as needed. Kaela Steel RN on 2/7/2020 at 2:51 PM    Patient extremely constipated. Abd distended and hard. Lactulose administered @ 1500. Fleet's enema also administered. Patient also drank 8 oz of prune juice. Sat on BSC and had one very small, round ball of stool, along with some mucous, most likely from enema. Will attempt to get patient up again to BSC. Kaela Steel RN on 2/7/2020 at 4:51 PM    Patient was able to have a very large brown, semi-hard BM. Patient states she feels much better at this time Kaela Steel RN on 2/7/2020 at 6:47 PM

## 2020-02-08 NOTE — PROGRESS NOTES
Phone call received last evening and note given to charge nurse in regards to [facility] placement. Note stated that Kristine at [facility] expressed that now they are not able to take patient back over the weekend due to not being staffed and patient being private pay. Called Kristine 2/8/2020 at 0755 and left message to confirm. Will await a return phone call and update family.  Ruby Vivas RN on 2/8/2020 at 8:02 AM      Kristine returned phone call. Stated yes, they can accept patient but not until Monday. With patient being private pay, they need to have  payment given to appropriate staff at business office during the week. Will have social work contact Kristine Monday AM to arrange discharge.   Ruby Vivas RN on 2/8/2020 at 8:09 AM    Daughter Nona updated with discharge plans. Daughter is requesting that if additional medications are ordered for pain, avoid Ibuprofen as it gives her upset stomach. Also requesting that xrays are done to her back to ensure no acute injury from her slipping out of chair the other night. Primary RN notified.   Ruby Vivas RN on 2/8/2020 at 8:45 AM

## 2020-02-08 NOTE — PLAN OF CARE
"Up to bathroom feels urge to void, pulling at catheter and trying to \"open\" her urethra to void, explained several times that she has a catheter, tubing was kinked and was adjusted by TINO with release of moderate amount of urine. Crying out in pain, yelling \"owe, owe, ouch, my back hurts so bad\" was given Oxy 5 mg with scheduled Tylenol and a hot pack to low back. Had several Large BM's this NOC shift, abdomen is much less firm than at beginning of shift. BP remains elevated but was taken directly after ambulation back from bathroom and while yelling out in pain. Did verbally update Dr. Faustin last PM of elevated BP with no new orders noted.    BP (!) 186/80   Pulse 77   Temp 98.7  F (37.1  C) (Tympanic)   Resp 20   Ht 1.6 m (5' 3\")   Wt 48.6 kg (107 lb 1.6 oz)   SpO2 92%   BMI 18.97 kg/m      Florecita Hernández RN on 2/8/2020 at 7:00 AM    "

## 2020-02-08 NOTE — PLAN OF CARE
"Resting quietly at this time with no concerns, no changes in assessment.     BP (!) 151/65   Pulse 77   Temp 97.8  F (36.6  C) (Tympanic)   Resp 16   Ht 1.6 m (5' 3\")   Wt 48.6 kg (107 lb 1.6 oz)   SpO2 95%   BMI 18.97 kg/m      Florecita Hernández RN on 2/8/2020 at 2:19 AM    "

## 2020-02-08 NOTE — PLAN OF CARE
"Tylenol and PRN Maylin seem to help decrease patient's pain today. Pt is able to sleep in between PRN doses and patient's body expression is relaxed. With activity pt says \"ouch\" and that she is in pain but once at rest is able to relax. Pt is disoriented x3, unable to state year, town she is in, and why she is in the hospital. Lung sounds clear. Abdomen is flat/soft/non tender and active. BP (!) 173/70   Pulse 77   Temp 97.3  F (36.3  C) (Tympanic)   Resp 12   Ht 1.6 m (5' 3\")   Wt 48.6 kg (107 lb 1.6 oz)   SpO2 95%   BMI 18.97 kg/m     "

## 2020-02-08 NOTE — PLAN OF CARE
"Continues to report pain to middle of back that is chronic per daughter, describes as an ache but unable to rate with numeric scale, states \"ouch, ouch, with movement, moans and moves carefully, would rate 8 with movement and 6 at rest using FACES. Up ambulating to bathroom, very unsteady on feet, especially when turning around. Had another large bowel movement. Was given Oxy 5 mg PRN at this time.     Florecita Hernández RN on 2/8/2020 at 12:32 AM    "

## 2020-02-08 NOTE — PLAN OF CARE
"Crying in pain to low back, was given PRN oxy 5 mg and a hot pack, states the heat feels good. Had medium BM with several small hard, round stool pieces. Ambulated to and from the bathroom with 2A and a steady gait, very confused believes she is in [place], continues to report urge to void, with multiple explanations that she has a catheter. Was at one point fearful due to \"being left alone\" provided reassurance and this resolved.    BP (!) 174/71   Pulse 81   Temp 98.8  F (37.1  C) (Tympanic)   Resp 16   Ht 1.6 m (5' 3\")   Wt 48.6 kg (107 lb 1.6 oz)   SpO2 96%   BMI 18.97 kg/m      Florecita Hernández RN on 2/7/2020 at 8:35 PM    "

## 2020-02-08 NOTE — PROGRESS NOTES
SAFETY CHECKLIST  ID Bands and Risk clasps correct and in place (DNR, Fall risk, Allergy, Latex, Limb):  Yes  All Lines Reconciled and labeled correctly: Yes  Whiteboard updated:Yes  Environmental interventions (bed/chair alarm on, call light, side rails, restraints, sitter....): Yes    Florecita Hernández RN on 2/7/2020 at 7:18 PM

## 2020-02-08 NOTE — PLAN OF CARE
Discharge Planner PT   Patient plan for discharge: SNF   Current status: Requires Assist of 1-2 form all transfers and ambulation   Barriers to return to prior living situation: Weakness, pain, fatigue   Recommendations for discharge: SNF   Rationale for recommendations: Patient requires continued skilled assist from transfers and mobility        Entered by: Dylan Vazquez 02/08/2020 1:06 PM

## 2020-02-08 NOTE — PROGRESS NOTES
[facility]    Medicine Progress Note - Hospitalist Service       Date of Admission:  2/6/2020  Assessment & Plan     Dementia with behavioral disturbance (H)  Living at home with daughter with ongoing memory issues.  Recently with hallucinations, at times getting agitated and daughter notes she has not slept for the past 2 nights  No new medications, no signs of infection.  In ER with better distention and urinary retention, catheter placed    Continue with PT and OT.    Appreciate  assistance in finding placement.    Essential hypertension  Blood pressures continue to be elevated.  Will increase losartan to 100 mg daily and continue to monitor.    History of TIA (transient ischemic attack)  No obvious residual, may be contributing to her dementia    Paroxysmal A-fib (H)  Rate controlled with metoprolol.    Urinary retention  Urinary retention in the ED with Ledezma catheter placement  May be contributing to her irritability with her dementia  Monitor, may need to pull catheter to see if she continues to retain    Chronic lymphocytic leukemia (H)  White blood cell count is consistently elevated as ER today, no other symptoms  Outpatient management      Degenerative joint disease of pelvic region  Complaining of pain in her hips and knees with imaging showing degenerative disease  Has been started on prednisone as an outpatient and will continue this.  Scheduled Tylenol ordered for better pain control  Has been taking oxycodone and will drop the dosage and monitor for adequate pain relief    Hiatal hernia  Noted on CT imaging and chest x-ray.  Has been taking H2 blocker and will continue if necessary         Diet: Regular Diet Adult  Advance Diet as Tolerated    Ledezma Catheter: in place, indication: Retention  Code Status: DNR/DNI      Disposition Plan   Expected discharge: 1-2 days, recommended to SNF once adequate pain management/ tolerating PO medications and BP  controlled.  Entered: Arnulfo Botello MD 02/08/2020, 7:59 AM       The patient's care was discussed with the Patient.    Arnulfo Botello MD  Hospitalist Service  [facility]    ______________________________________________________________________    Interval History   Continues to complain of pain in her right low back and pelvis.  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  She is had no nausea, chest pain, shortness of breath.  Urinary catheter is not bothersome to her.  She does not complain of any diarrhea or constipation.    Data reviewed today: I reviewed all medications, new labs and imaging results over the last 24 hours.    Physical Exam   Vital Signs: Temp: 98.7  F (37.1  C) Temp src: Tympanic BP: (!) 186/80 Pulse: 77 Heart Rate: 77 Resp: 20 SpO2: 92 % O2 Device: None (Room air)    Weight: 107 lbs 1.6 oz  General Appearance: Appears alert, complaining of low back pain  Respiratory: Clear without wheezing  Cardiovascular: Irregularly irregular without murmurs  GI: Soft, nontender, nondistended.  Skin: No bruising noted.    Data   Recent Labs   Lab 02/08/20  0512 02/07/20  0515 02/06/20  0605 02/06/20  0142   WBC  --   --  19.6* 22.1*   HGB  --   --  10.1* 11.4*   MCV  --   --  85 86   PLT  --   --  314 362   INR  --   --   --  0.85   NA  --   --  131* 130*   POTASSIUM 3.4* 3.6 3.3* 3.4*   CHLORIDE  --   --  94* 91*   CO2  --   --  30 29   BUN  --   --  28* 27*   CR  --   --  1.17 1.29*   ANIONGAP  --   --  7 10   MATI  --   --  9.5 10.3   GLC  --   --  108* 118*   ALBUMIN  --   --   --  4.0   PROTTOTAL  --   --   --  6.5   BILITOTAL  --   --   --  0.4   ALKPHOS  --   --   --  35   ALT  --   --   --  11   AST  --   --   --  16

## 2020-02-08 NOTE — PROGRESS NOTES
Spoke with on call , Dorian. She contacted [facility] and business office is not open until Monday AM. If they are able to accept credit cards, they would not be able to accept this way of payment until Monday. They would accept a check as payment over weekend. Daughter updated and she will contact her brother to arrange payment. They will let charge RN know 2/8/19 AM what they chose.   Ruby Vivas RN on 2/7/2020 at 6:17 PM

## 2020-02-08 NOTE — PLAN OF CARE
Discharge Planner OT   Patient plan for discharge: Short term rehab stay at SNF  Current status: Pt's dtr present during therapy session and supportive of pt. Pt transfers with min assist in bed, with walker mob, and at recliner. She has confusion and decreased memory, fatigues with activity.  Barriers to return to prior living situation: weakness, decline in performance of ADL  Recommendations for discharge: short term rehab for strengthening, improved safety with ADL  Rationale for recommendations: Pt weakened       Entered by: Jenny Bower 02/08/2020 4:00 PM

## 2020-02-09 NOTE — PROGRESS NOTES
[facility]    Medicine Progress Note - Hospitalist Service       Date of Admission:  2/6/2020  Assessment & Plan   Dementia with behavioral disturbance (H)  Living at home with daughter with ongoing memory issues, unable to continue caring for her at home.  She will be discharged to [facility] nursing [place] tomorrow for long-term care.    Essential hypertension  Losartan increased to 100 mg daily yesterday, blood pressures have improved today.    History of TIA (transient ischemic attack)  No obvious residual, may be contributing to her dementia    Paroxysmal A-fib (H)  Rate controlled with metoprolol.    Urinary retention  We will pull catheter today to see if she is able to urinate on her own.    Chronic lymphocytic leukemia (H)      Degenerative joint disease of pelvic region  Complaining of pain in her hips and knees with imaging showing degenerative disease  X-rays repeated yesterday without any new findings.  Continue PT and OT.         Diet: Regular Diet Adult  Advance Diet as Tolerated    DVT Prophylaxis: Low Risk/Ambulatory with no VTE prophylaxis indicated  Ledezma Catheter: in place, indication: Retention  Code Status: DNR/DNI      Disposition Plan   Expected discharge: Tomorrow, recommended to transitional care unit once adequate pain management/ tolerating PO medications.  Entered: Arnulfo Botello MD 02/09/2020, 12:42 PM       The patient's care was discussed with the Patient.    Arnulfo Botello MD  Hospitalist Service  [facility]    ______________________________________________________________________    Interval History   Still confused.  Unsure is why she is in the hospital.  Complains of low back pain.  No nausea or vomiting.  Ate breakfast and lunch today.    Data reviewed today: I reviewed all medications, new labs and imaging results over the last 24 hours.    Physical Exam   Vital Signs: Temp: 97.5  F (36.4  C) Temp src: Tympanic BP: (!) 146/62 Pulse: 77  Heart Rate: 68 Resp: 18 SpO2: 94 % O2 Device: None (Room air)    Weight: 107 lbs 1.6 oz  General Appearance: Pleasantly confused.  Nontoxic-appearing.  Respiratory: Clear anteriorly.  GI: Soft, nondistended.  Positive bowel sounds.  Skin: No edema.    Data   Recent Labs   Lab 02/09/20  0525 02/08/20  0512 02/07/20  0515 02/06/20  0605 02/06/20  0142   WBC  --   --   --  19.6* 22.1*   HGB  --   --   --  10.1* 11.4*   MCV  --   --   --  85 86   PLT  --   --   --  314 362   INR  --   --   --   --  0.85   NA  --   --   --  131* 130*   POTASSIUM 3.6 3.4* 3.6 3.3* 3.4*   CHLORIDE  --   --   --  94* 91*   CO2  --   --   --  30 29   BUN  --   --   --  28* 27*   CR  --   --   --  1.17 1.29*   ANIONGAP  --   --   --  7 10   MATI  --   --   --  9.5 10.3   GLC  --   --   --  108* 118*   ALBUMIN  --   --   --   --  4.0   PROTTOTAL  --   --   --   --  6.5   BILITOTAL  --   --   --   --  0.4   ALKPHOS  --   --   --   --  35   ALT  --   --   --   --  11   AST  --   --   --   --  16

## 2020-02-09 NOTE — PLAN OF CARE
"Uneventful shift. Pt is confused but is redirectable and has been pleasant. +2 edema to BLE. Pt is expected to discharge to [facility] tomorrow. Daughter present in room. BP (!) 168/52   Pulse 77   Temp 98.8  F (37.1  C) (Tympanic)   Resp 18   Ht 1.6 m (5' 3\")   Wt 48.6 kg (107 lb 1.6 oz)   SpO2 94%   BMI 18.97 kg/m     "

## 2020-02-09 NOTE — PLAN OF CARE
"Alert and oriented only to self, smiling on assessment, calm and cooperative.     LS clear, able to give a moderately strong and deep cough on command, no cough or shortness of breath noted.     Edema to BLE 1-2+ has been unchanged, no skin issues noted, scattered bruising throughout from falls, lab draw, and IV's.     BS active, no loose stools so far this shift, less discomfort today in abdomen and is flat and non-tender. Ledezma is patent with adequate output of clear, yellow, urine.     BP (!) 173/74   Pulse 77   Temp 97.2  F (36.2  C) (Tympanic)   Resp 16   Ht 1.6 m (5' 3\")   Wt 48.6 kg (107 lb 1.6 oz)   SpO2 94%   BMI 18.97 kg/m      Florecita Hernández RN on 2/9/2020 at 3:16 AM        "

## 2020-02-09 NOTE — PLAN OF CARE
Discharge Planner PT   Patient plan for discharge: Not stated    Current status: Requires assist for transfers and ambulation   Barriers to return to prior living situation: Weakness, fatigue  Recommendations for discharge: Assisted living   Rationale for recommendations: Patient requires assist for transfers and ambulation        Entered by: Dylan Vazquez 02/09/2020 12:03 PM

## 2020-02-09 NOTE — PLAN OF CARE
Discharge Planner OT   Patient plan for discharge: Pt to discharge to [facility]  Current status: Pt continues to have decreased cognitive status, disoriented and confused, and requires assist for safety with self cares and functional mobility. She completes sit/stand transfers with min A to CGA during ADL, fatigues easily.   Barriers to return to prior living situation: Decreased cognitive status  Recommendations for discharge: SNF  Rationale for recommendations: Pt requires 24 hour supervision for safety d/t decreased cognitive status       Entered by: Jenny Bower 02/09/2020 2:09 PM

## 2020-02-09 NOTE — PROGRESS NOTES
SAFETY CHECKLIST  ID Bands and Risk clasps correct and in place (DNR, Fall risk, Allergy, Latex, Limb):  Yes  All Lines Reconciled and labeled correctly: Yes  Whiteboard updated:Yes  Environmental interventions (bed/chair alarm on, call light, side rails, restraints, sitter....): Yes    Florecita Hernández RN on 2/8/2020 at 7:21 PM

## 2020-02-10 NOTE — PLAN OF CARE
Patient slept intermittently overnight. Remains confused, but mostly pleasant and cooperative. Good urine output from [place]. Blood pressure initially high on assessment, but came down with scheduled medications and rest. Otherwise no acute events overnight.

## 2020-02-10 NOTE — PROGRESS NOTES
PT/OT got patient up into the recliner with assist of one and walker/belt..  Breakfast tray setup.  Patient can feed herself. Swallows pills whole with water.

## 2020-02-10 NOTE — PLAN OF CARE
Occupational Therapy Discharge Summary    Reason for therapy discharge:    Discharged to transitional care facility.    Progress towards therapy goal(s). See goals on Care Plan in [facility] electronic health record for goal details.  Goals partially met.  Barriers to achieving goals:   discharge from facility.    Therapy recommendation(s):    Continued therapy is recommended.  Rationale/Recommendations:  on going PT/OT at [facility] to maximize level of independence needed to return home with daughter as previous.

## 2020-02-10 NOTE — PLAN OF CARE
Physical Therapy Discharge Summary    Reason for therapy discharge:    Discharged to transitional care facility.    Progress towards therapy goal(s). See goals on Care Plan in [facility] electronic health record for goal details.  Goals partially met.  Barriers to achieving goals:   discharge from facility.    Therapy recommendation(s):    Continued therapy is recommended.  Rationale/Recommendations:  to promote patient's highest level of functional mobility.

## 2020-02-10 NOTE — PROGRESS NOTES
:    Called Kristine at [facility] and left voicemail with discharge update. Patient is ready to discharge today. Requested call back to schedule transport time.  will continue to follow.     GAY Price on 2/10/2020 at 9:14 AM    Addendum:    Received call back from Kristine at [facility]. Kristine reports that they are able to transport patient around [phone]. Kristine also reports that patient's daughter will need to have $3500 payment ready.     Called patient's daughterNona and provided discharge update. She confirmed that she will have a check for $3500 for [facility] today.     Called Kristine and confirmed that Nona plans to make payment today.     Notified Charge RN of discharge plans.     No further needs at this time.     GAY Price on 2/10/2020 at 9:49 AM

## 2020-02-10 NOTE — PROGRESS NOTES
SAFETY CHECKLIST  ID Bands and Risk clasps correct and in place (DNR, Fall risk, Allergy, Latex, Limb):  Yes  All Lines Reconciled and labeled correctly: Yes  Whiteboard updated:Yes  Environmental interventions (bed/chair alarm on, call light, side rails, restraints, sitter....): Yes

## 2020-02-10 NOTE — PROGRESS NOTES
Patient is unable to sign and understand moon letter due to being confused.................Gisela Camara on 2/10/2020 at 8:38 AM

## 2020-02-10 NOTE — DISCHARGE SUMMARY
[facility]  Hospitalist Discharge Summary       Date of Admission:  2/6/2020  Date of Discharge:  2/10/2020  Discharging Provider: Arnulfo Botello MD      Discharge Diagnoses   dementia    Follow-ups Needed After Discharge   Follow-up Appointments     Follow Up and recommended labs and tests      Follow up with Nursing home physician.  No follow up labs or test are   needed.Monitor blood pressures         Follow Up and recommended labs and tests      As scheduled             Discharge Disposition   Discharged to nursing home  Condition at discharge: Stable    Hospital Course   Dementia with behavioral disturbance (H)  Living at home with daughter with ongoing memory issues, unable to continue caring for her at home.  She is being discharged to [facility] today.    Essential hypertension  Losartan increased to 100 mg daily during her stay, blood pressures have improved will continue.    History of TIA (transient ischemic attack)  No obvious residual, may be contributing to her dementia    Paroxysmal A-fib (H)  Rate controlled with metoprolol.    Urinary retention  We will pull catheter today to see if she is able to urinate on her own.    Chronic lymphocytic leukemia (H)      Degenerative joint disease of pelvic region  Complaining of pain in her hips and knees with imaging showing degenerative disease  X-rays repeated yesterday without any new findings.  Will need to try and decrease oxycodone use a sthat could also contribute to her dementia.      Consultations This Hospital Stay   SOCIAL WORK IP CONSULT  SOCIAL WORK IP CONSULT  PHYSICAL THERAPY ADULT IP CONSULT  OCCUPATIONAL THERAPY ADULT IP CONSULT  SOCIAL WORK IP CONSULT  PHYSICAL THERAPY ADULT IP CONSULT  OCCUPATIONAL THERAPY ADULT IP CONSULT    Code Status   DNR/DNI    Time Spent on this Encounter   I, Arnulfo Botello MD, personally saw the patient today and spent less than or equal to 30 minutes discharging this patient.       Arnulfo Botello  MD  [facility]  ______________________________________________________________________    Physical Exam   Vital Signs: Temp: 98.4  F (36.9  C) Temp src: Tympanic BP: (!) 162/68   Heart Rate: 77 Resp: 18 SpO2: 94 % O2 Device: None (Room air)    Weight: 107 lbs 1.6 oz  General Appearance: Forgetful but appears comfortable  Respiratory: clear, no wheezing.  Cardiovascular: irregularly irregular rhythm  GI: normal bowel sounds, soft, non-distended and non-tender  Musculoskeletal: Some arthritic changes in her hands and feet.  Mild pain with movement of her hips       Primary Care Physician   Godwin Kapoor    Discharge Orders      General info for SNF    Length of Stay Estimate: Short Term Care: Estimated # of Days <30  Condition at Discharge: Stable  Level of care:skilled   Rehabilitation Potential: Fair  Admission H&P remains valid and up-to-date: Yes  Recent Chemotherapy: N/A  Use Nursing Home Standing Orders: Yes     Mantoux instructions    Give two-step Mantoux (PPD) Per Facility Policy Yes     Reason for your hospital stay    Came in with increased confusion and sleeping, likely due to recent meds and changes     Ledezma catheter    To straight gravity drainage. May remove catheter in next few day. Monitor for retention and replace if necessary     Follow Up and recommended labs and tests    Follow up with Nursing home physician.  No follow up labs or test are needed.Monitor blood pressures     Activity - Up with nursing assistance     General info for SNF    Length of Stay Estimate: Long Term Care  Condition at Discharge: Stable  Level of care:skilled   Rehabilitation Potential: Fair  Admission H&P remains valid and up-to-date: Yes  Recent Chemotherapy: N/A  Use Nursing Home Standing Orders: Yes     Mantoux instructions    Give two-step Mantoux (PPD) Per Facility Policy Yes     Reason for your hospital stay    dementia     Follow Up and recommended labs and tests    As scheduled     Activity -  Up with nursing assistance     DNR/DNI     Physical Therapy Adult Consult    Evaluate and treat as clinically indicated.    Reason:  Weakness, desire to go home     Occupational Therapy Adult Consult    Evaluate and treat as clinically indicated.    Reason:  Weakness, desire to go home     Fall precautions     Advance Diet as Tolerated    Follow this diet upon discharge: Orders Placed This Encounter      Regular Diet Adult       Significant Results and Procedures   Results for orders placed or performed during the hospital encounter of 02/06/20   XR Pelvis and Hip Left 2 Views     Status: None    Narrative    PROCEDURE INFORMATION:   Exam: XR Left Hip with Pelvis when Performed   Exam date and time: 2/6/2020 4:35 AM   Age: 86 years old   Clinical indication: Hip pain; Left hip; Additional info: Increase in chronic   pelvis and left hip pain.     TECHNIQUE:   Imaging protocol: XR Left hip with pelvis when performed.   Views: 2 or 3 views.     COMPARISON:   CR XR HIP LEFT 2-3 VIEWS 8/30/2018 3:08 PM     FINDINGS:   Bones/joints: osseous structures of the pelvis without an acute process. rami   are intact. Sacroiliac joints without separation/diastases/fracture. Iliac   bones unremarkable/noncontributory Mild degenerative changes within the hips   Degenerative changes within the visualized portions of the caudal aspect of the   lumbar spine. trabecular stress markings normal. intertrochanteric region   normal. No displacement or rotation. Acetabulum without fracture. Joint space   appears normal.   Soft tissues: Unremarkable.       Impression    IMPRESSION:   No fracture.     THIS DOCUMENT HAS BEEN ELECTRONICALLY SIGNED BY BRADNIE MCNEILL MD   XR Lumbar Spine 2/3 Views     Status: None    Narrative    PROCEDURE INFORMATION:   Exam: XR Lumbosacral Spine, 2 or 3 Views   Exam date and time: 2/6/2020 4:35 AM   Age: 86 years old   Clinical indication: Low back pain; Additional info: Chronic lbp and left hip   pain now  increased with impaired mobility.     TECHNIQUE:   Imaging protocol: XR of the lumbosacral spine, 2 or 3 views.     COMPARISON:   CR XR HIP LEFT 2-3 VIEWS 8/30/2018 3:08 PM     FINDINGS:   Vertebrae: Severe diffuse degenerative disc disease reflected as severe   decrease in disc space height and anterior endplate osteophytosis. No   spondylolisthesis No pars defect. No fracture  Soft tissues: Normal.       Impression    IMPRESSION:   Severe diffuse degenerative disc disease.     THIS DOCUMENT HAS BEEN ELECTRONICALLY SIGNED BY BRANDIE MCNEILL MD   X-ray lumbar spine 2-3 views*     Status: None    Narrative    PROCEDURE: XR LUMBAR SPINE 2-3 VIEWS 2/8/2020 10:11 AM    HISTORY: fall, continued right lumbar pain    COMPARISONS: February 6, 2020    TECHNIQUE: AP and lateral    FINDINGS: There is scoliosis with severe degenerative changes  throughout the lumbar spine. All the lumbar discs are decreased height  with anterolateral osteophytes. Severe lumbar facet joint degenerative  changes are noted. Comparison with previous examination reveals no  significant interval change         Impression    IMPRESSION: Scoliosis and severe degenerative changes in the lumbar  spine without change    CALLY BARRIENTOS MD   XR Pelvis 1/2 Views     Status: None    Narrative    PROCEDURE: XR PELVIS 1/2 VW 2/8/2020 10:13 AM    HISTORY: fall, continued right side pain    COMPARISONS: None.    TECHNIQUE: 2 views    FINDINGS: The pelvis is intact. The sacrum and sacroiliac joints  appear normal. Both proximal femurs are intact.         Impression    IMPRESSION: No pelvic or hip fracture.    CALLY BARRIENTOS MD   CBC with platelets differential     Status: Abnormal   Result Value Ref Range    WBC 22.1 (H) 4.0 - 11.0 10e9/L    RBC Count 4.12 3.8 - 5.2 10e12/L    Hemoglobin 11.4 (L) 11.7 - 15.7 g/dL    Hematocrit 35.3 35.0 - 47.0 %    MCV 86 78 - 100 fl    MCH 27.7 26.5 - 33.0 pg    MCHC 32.3 31.5 - 36.5 g/dL    RDW 15.5 (H) 10.0 - 15.0 %     Platelet Count 362 150 - 450 10e9/L    Diff Method Automated Method     % Neutrophils 69.9 %    % Lymphocytes 22.4 %    % Monocytes 7.1 %    % Eosinophils 0.0 %    % Basophils 0.2 %    % Immature Granulocytes 0.4 %    Absolute Neutrophil 15.4 (H) 1.6 - 8.3 10e9/L    Absolute Lymphocytes 5.0 0.8 - 5.3 10e9/L    Absolute Monocytes 1.6 (H) 0.0 - 1.3 10e9/L    Absolute Eosinophils 0.0 0.0 - 0.7 10e9/L    Absolute Basophils 0.0 0.0 - 0.2 10e9/L    Abs Immature Granulocytes 0.1 0 - 0.4 10e9/L   INR     Status: None   Result Value Ref Range    INR 0.85 0 - 1.3   Comprehensive metabolic panel     Status: Abnormal   Result Value Ref Range    Sodium 130 (L) 134 - 144 mmol/L    Potassium 3.4 (L) 3.5 - 5.1 mmol/L    Chloride 91 (L) 98 - 107 mmol/L    Carbon Dioxide 29 21 - 31 mmol/L    Anion Gap 10 3 - 14 mmol/L    Glucose 118 (H) 70 - 105 mg/dL    Urea Nitrogen 27 (H) 7 - 25 mg/dL    Creatinine 1.29 (H) 0.60 - 1.20 mg/dL    GFR Estimate 39 (L) >60 mL/min/[1.73_m2]    GFR Estimate If Black 47 (L) >60 mL/min/[1.73_m2]    Calcium 10.3 8.6 - 10.3 mg/dL    Bilirubin Total 0.4 0.3 - 1.0 mg/dL    Albumin 4.0 3.5 - 5.7 g/dL    Protein Total 6.5 6.4 - 8.9 g/dL    Alkaline Phosphatase 35 34 - 104 U/L    ALT 11 7 - 52 U/L    AST 16 13 - 39 U/L   Troponin GH     Status: None   Result Value Ref Range    Troponin 17.9 <34.0 pg/mL   UA reflex to Microscopic and Culture     Status: None   Result Value Ref Range    Color Urine Yellow     Appearance Urine Clear     Glucose Urine Negative NEG^Negative mg/dL    Bilirubin Urine Negative NEG^Negative    Ketones Urine Negative NEG^Negative mg/dL    Specific Gravity Urine 1.012 1.003 - 1.035    Blood Urine Negative NEG^Negative    pH Urine 7.0 5.0 - 7.0 pH    Protein Albumin Urine Negative NEG^Negative mg/dL    Urobilinogen mg/dL Normal 0.0 - 2.0 mg/dL    Nitrite Urine Negative NEG^Negative    Leukocyte Esterase Urine Negative NEG^Negative    Source Catheterized Urine    Nt probnp inpatient (BNP)      Status: Abnormal   Result Value Ref Range    N-Terminal Pro BNP Inpatient 892 (H) 0 - 100 pg/mL   CBC with platelets differential     Status: Abnormal   Result Value Ref Range    WBC 19.6 (H) 4.0 - 11.0 10e9/L    RBC Count 3.68 (L) 3.8 - 5.2 10e12/L    Hemoglobin 10.1 (L) 11.7 - 15.7 g/dL    Hematocrit 31.3 (L) 35.0 - 47.0 %    MCV 85 78 - 100 fl    MCH 27.4 26.5 - 33.0 pg    MCHC 32.3 31.5 - 36.5 g/dL    RDW 15.6 (H) 10.0 - 15.0 %    Platelet Count 314 150 - 450 10e9/L    Diff Method Automated Method     % Neutrophils 66.2 %    % Lymphocytes 26.1 %    % Monocytes 6.9 %    % Eosinophils 0.1 %    % Basophils 0.2 %    % Immature Granulocytes 0.5 %    Absolute Neutrophil 13.0 (H) 1.6 - 8.3 10e9/L    Absolute Lymphocytes 5.1 0.8 - 5.3 10e9/L    Absolute Monocytes 1.4 (H) 0.0 - 1.3 10e9/L    Absolute Eosinophils 0.0 0.0 - 0.7 10e9/L    Absolute Basophils 0.0 0.0 - 0.2 10e9/L    Abs Immature Granulocytes 0.1 0 - 0.4 10e9/L   Basic metabolic panel     Status: Abnormal   Result Value Ref Range    Sodium 131 (L) 134 - 144 mmol/L    Potassium 3.3 (L) 3.5 - 5.1 mmol/L    Chloride 94 (L) 98 - 107 mmol/L    Carbon Dioxide 30 21 - 31 mmol/L    Anion Gap 7 3 - 14 mmol/L    Glucose 108 (H) 70 - 105 mg/dL    Urea Nitrogen 28 (H) 7 - 25 mg/dL    Creatinine 1.17 0.60 - 1.20 mg/dL    GFR Estimate 44 (L) >60 mL/min/[1.73_m2]    GFR Estimate If Black 53 (L) >60 mL/min/[1.73_m2]    Calcium 9.5 8.6 - 10.3 mg/dL   Lactic acid     Status: None   Result Value Ref Range    Lactic Acid 0.7 0.5 - 2.2 mmol/L   CRP inflammation     Status: Abnormal   Result Value Ref Range    CRP Inflammation 1.1 (H) <0.5 mg/L   Erythrocyte sedimentation rate auto     Status: None   Result Value Ref Range    Sed Rate 11 1 - 15 mm/h   Occult blood stool     Status: Abnormal   Result Value Ref Range    Occult Blood Positive (A) NEG^Negative   Potassium     Status: None   Result Value Ref Range    Potassium 3.6 3.5 - 5.1 mmol/L   Potassium     Status: Abnormal    Result Value Ref Range    Potassium 3.4 (L) 3.5 - 5.1 mmol/L   Potassium     Status: None   Result Value Ref Range    Potassium 3.6 3.5 - 5.1 mmol/L      Discharge Medications   Current Discharge Medication List      START taking these medications    Details   !! losartan (COZAAR) 100 MG tablet Take 1 tablet (100 mg) by mouth daily    Associated Diagnoses: Essential hypertension      !! losartan (COZAAR) 50 MG tablet Take 1 tablet (50 mg) by mouth daily    Associated Diagnoses: Essential (primary) hypertension      metoprolol tartrate (LOPRESSOR) 25 MG tablet Take 0.5 tablets (12.5 mg) by mouth 2 times daily    Associated Diagnoses: Paroxysmal atrial fibrillation (H); Essential (primary) hypertension      QUEtiapine (SEROQUEL) 25 MG tablet Take 0.5 tablets (12.5 mg) by mouth At Bedtime    Associated Diagnoses: Disorientation       !! - Potential duplicate medications found. Please discuss with provider.      CONTINUE these medications which have CHANGED    Details   acetaminophen (TYLENOL) 325 MG tablet Take 3 tablets (975 mg) by mouth 3 times daily    Associated Diagnoses: Osteoarthritis of spine with radiculopathy, lumbar region      lactulose 20 GM/30ML SOLN Take 30 mLs (20 g) by mouth 2 times daily as needed    Associated Diagnoses: Chronic pain syndrome      magnesium hydroxide (MILK OF MAGNESIA) 400 MG/5ML suspension Take 15 mLs by mouth At Bedtime    Associated Diagnoses: Chronic pain syndrome      oxyCODONE (ROXICODONE) 5 MG tablet Take 0.5-1 tablets (2.5-5 mg) by mouth every 4 hours as needed for moderate to severe pain  Qty: 20 tablet, Refills: 0    Associated Diagnoses: Osteoarthritis of spine with radiculopathy, lumbar region         CONTINUE these medications which have NOT CHANGED    Details   aspirin-dipyridamole ER (AGGRENOX)  MG 12 hr capsule TAKE 1 CAPSULE TWICE A DAY  Qty: 180 capsule, Refills: 3    Associated Diagnoses: History of TIA (transient ischemic attack)      Calcium  Carbonate-Vitamin D (CALCIUM 500 + D) 500-125 MG-UNIT TABS Take 1 tablet by mouth daily      Multiple Vitamins-Minerals (WOMENS MULTIVITAMIN  PLUS) TABS Take 1 tablet by mouth every morning      predniSONE (DELTASONE) 10 MG tablet Take 10 mg by mouth every other day      trolamine salicylate (ASPERCREME) 10 % external cream Apply topically 3 times daily Apply to hip, back, knees      order for DME Equipment being ordered: pillow with a hole in the center  Qty: 1 Device, Refills: 0    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  Daughter states she got delusional, water did not taste right     Lisinopril Cough     Lactose

## 2020-02-10 NOTE — PROGRESS NOTES
Gave nurse to nurse to Cooper at the [facility].  Their  picked her up at 1035.  Darien in place.  Packet given to .

## 2020-02-12 NOTE — PROGRESS NOTES
CHIEF COMPLAINT: Bilateral Knee Pain    PROBLEMS:   Patient has no noted problems.    PATIENT REPORTED MEDICATIONS:  HYDROCODONE-ACETAMINOPHEN TABLET (HYDROCODONE-ACETAMINOPHEN TABS)   HYZAAR TABLET (LOSARTAN POTASSIUM-HCTZ TABS)   AGGRENOX CAPSULE EXTENDED RELEASE 12 HOUR (ASPIRIN-DIPYRIDAMOLE XR12H-CAP)     PATIENT REPORTED ALLERGIES:  LISINOPRIL (LISINOPRIL) (ModerateReaction: No reaction details noted)      HISTORY OF PRESENT ILLNESS:    Reason For Evaluation:   Bilateral Knee Pain    History:  Tenisha comes in with bilateral knee pain.   She is here for repeat injection at this time.  The last set of injections were done a couple of months back.   She has had a good response.  She would also like to have her left hip ordered up as well.   She had fluoroscopic injection that did well for her.    PAST MEDICAL HISTORY:    Hypertension  Arthritis     SOCIAL HISTORY:     Alcohol Use - No   Tobacco Use - Never    PHYSICAL EXAMINATION:    Examination of both knees shows varus deformity, crepitation with flexion and extension.  Neurovascular exam is otherwise intact.    ASSESSMENT:    Bilateral knee arthrosis    PROCEDURES:   Risks and benefits of the procedure were reviewed with the patient. Informed consent was obtained. Blood sugar risks for our diabetic patients were also discussed.    Following informed consent and sterile preparation the patient's bilateral knees were injected with 4 cc 1% Lidocaine and 40 mg Kenalog under sterile conditions.   Patient did tolerate the procedure well.    PLAN:   Following injections stated above, repeat injections in the future as appropriate and necessary at this point.    Dictated by Say Nelson M.D.  D:  02/04/2020  T:   02/07/2020    Copy to:  Godwin Kapoor MD     This report was created using voice recording software and computer-generated templates. Although every effort has been made to review for and eliminate errors, some errors may still occur.

## 2020-02-14 NOTE — PROGRESS NOTES
Visit Date:   02/13/2020      CHIEF COMPLAINT:  Hospital followup.      HISTORY OF PRESENT ILLNESS:  The patient was admitted to [facility] from 02/06-02/10.  She was admitted with a TIA and worsening of dementia.  She lives with her daughter and her daughter had noticed progressive decline over the week prior to admission.  Just prior to hospitalization, she did have some medication changes with blood pressure meds.  Clonidine was placed on hold.  Her blood pressure was running high in the hospital, so losartan was increased and metoprolol was started as well.  She has done well with that change.  She was also having some urinary retention.  A catheter was placed and it has been discontinued since she has been at the skilled nursing facility.  She has had postvoid residuals and is voiding without difficulty.  She does have degenerative joint disease with pain in her hips and knees.  She had stable x-rays during hospitalization.  It was recommended by hospitalist to decrease her oxycodone due to dementia.  She does have chronic lymphocytic leukemia with a stable white blood cell count of 22,000 at discharge.  Her hemoglobin was 10.1 grams per deciliter.  Sodium 130, BUN 27, creatinine 1.29.  She also had a positive Hemoccult while in the emergency room.  She is on prednisone and Aggrenox.  No history of peptic ulcer disease.  Her daughter is requesting for nursing staff to schedule her pain medications.        PAST MEDICAL HISTORY, PAST SURGICAL HISTORY, ALLERGIES, MEDICATIONS, RISK FACTORS, SOCIAL HISTORY:  All reviewed.      REVIEW OF SYSTEMS:  A 12-point complete review of systems was discussed with nursing staff and resident.  See HPI for positive findings, otherwise unremarkable.      PHYSICAL EXAMINATION:  /72 P 74 R 16 T 98.4  lb  GENERAL:  A pleasant female sitting in therapy using the arm bike, alert and pleasant with underlying dementia.   SKIN:  Pink.   HEENT:  Sclerae anicteric.   Mucous membranes moist.   NECK:  Supple without adenopathy.  No thyromegaly.   LUNGS:  Fields clear to auscultation.   CARDIOVASCULAR:  Regular rate and rhythm.   ABDOMEN:  Soft and without masses, tenderness and organomegaly.   EXTREMITIES:  Without edema.      ASSESSMENT:   1.  Transient ischemic attack.   2.  Dementia with behaviors.   3.  Urinary retention.   4.  DJD of hips and knees.   5.  Hypertension.   6.  Chronic lymphocytic leukemia.   7.  Anemia.   8.  Positive Hemoccult.      PLAN:  We will increase her metoprolol to 1 tablet twice daily as she needs better blood pressure control.  I have asked that nursing staff discuss with her primary physician about scheduling her pain medications.  She is on opioid medication at this time.  She did have a positive Hemoccult while in the hospital and is prednisone and Aggrenox.  We will plan to check a CBC in 1 week and monitor for GI bleed.  In the meantime, start on omeprazole 20 mg twice daily for 6 weeks and once daily.         RADHA LEVINE NP             D: 2020   T: 2020   MT: EMELINA      Name:     MANJIT HERNANDES   MRN:      [id number]        Account:      [id number]   :      1933           Visit Date:   2020      Document: [id number]       cc: Luisa rico Eleanor        Godwin Kapoor MD

## 2020-02-17 NOTE — TELEPHONE ENCOUNTER
I will sign the discharge papers, but they must keep a follow up appointment in order to get home care approved and for any further pain medication.  Temporary supply of Percocet sent to [facility]

## 2020-02-17 NOTE — TELEPHONE ENCOUNTER
The patient is being discharged today. They are wondering if another provider will address this and also christopher would like an Rx for Oxycodone for the patient.although Jessica did one on Feb 14 th. The discharge paperwork is in your in box.  Florecita Holloway LPN..................2/17/2020   11:06 AM

## 2020-02-17 NOTE — TELEPHONE ENCOUNTER
Spoke to Natalia at the [facility] and notified her of below.  Natalia confirms understanding.  Natalia would like the discharge orders to be faxed to [phone].  Sheila Carl LPN 2/17/2020   3:01 PM

## 2020-02-17 NOTE — TELEPHONE ENCOUNTER
GINGER Lawson from [facility] is needing discharge papers for this patient to be discharged today.  I did tell her [facility] is out till tomorrow and wondering if someone else can provide this.  Thanks, Angelique Mcwilliams on 2/17/2020 at 10:52 AM

## 2020-02-19 NOTE — TELEPHONE ENCOUNTER
They would like verbal orders for home care nursing assessment.   Pt was discharged from [facility] on 2/18/2020

## 2020-02-19 NOTE — TELEPHONE ENCOUNTER
[facility] health notified of verbal orders.    Hanna Zarco LPN on 2/19/2020 at 2:08 PM     detailed exam

## 2020-02-20 NOTE — NURSING NOTE
"Chief Complaint   Patient presents with     Hospital F/U     HTN, Dizziness     Dysuria         Initial /56   Pulse 68   Temp 99.7  F (37.6  C) (Temporal)   Resp 20   Wt 49 kg (108 lb)   SpO2 98%   BMI 19.13 kg/m   Estimated body mass index is 19.13 kg/m  as calculated from the following:    Height as of 2/6/20: 1.6 m (5' 3\").    Weight as of this encounter: 49 kg (108 lb).    Medication Reconciliation: complete      Norma J. Gosselin, LPN  "

## 2020-02-20 NOTE — PROGRESS NOTES
SUBJECTIVE:                                                      Patient presents for Hospital Followup Visit:    Hospital:  [facility]      Date of Admission: 2/6/20   Date of Discharge: 2/10/20  Transitional Care Management Phone Call: No, nurse was at house this morning    Reason(s) for Admission: HTN, Dizziness            Problems taking medications regularly:  None       Medication changes since discharge: None       Problems adhering to non-medication therapy:  None    Summary of hospitalization:  [facility] discharge summary reviewed  Diagnostic Tests/Treatments reviewed.  Follow up needed: none  Other Healthcare Providers Involved in Patient s Care:         Physical Therapy  Update since discharge: stable.     Dysuria:  Has been having urinary frequency and urgency throughout the day and night.  Has also been complaining of abdominal pain and pain with urination.  No fever.    Cough:  Symptoms have been present since Saturday.  Productive of yellow sputum without blood.  Associated with nasal congestion.  No rhinorrhea or shortness of breath.    Osteoarthritis:  Taking Oxycodone 2.5-5 mg every 4 hours as needed for pain.  Did not receive this medication regularly while at rehab and so symptoms were uncontrolled.  Seems to have improved now that she is once again living with her daughter and receiving the medication regularly.     Urinary Retention:  Bladder scan completed and normal.  Catheter removed around 2/12.  Has had urinary symptoms as above.    Lower Extremity Swelling:  Has been present and worsening since taken off the Lasix.  No associated shortness of breath.  Has a history of diastolic dysfunction, seen on echocardiogram in 2017.  Had been controlled with Lasix as needed about every other week in the past.     ROS:  Constitutional, HEENT, cardiovascular, pulmonary, gi and gu systems are negative, except as otherwise noted.    OBJECTIVE:                                                       GENERAL APPEARANCE: alert and no distress  HENT: mouth without ulcers or lesions  RESP: lungs clear to auscultation - no rales, rhonchi or wheezes, diminished breat sounds over lung base bilaterally  CV: regular rates and rhythm, normal S1 S2, no S3 or S4 and no murmur, click or rub  ABDOMEN: soft, nontender, without hepatosplenomegaly or masses and bowel sounds normal  MS: Non-pitting pretibial edema of lower extremity bilaterally  NEURO: speech normal  PSYCH: affect normal/bright    ASSESSMENT/PLAN:                                                      1. Osteoarthritis of Spine with Radiculopathy, Lumbar Region  Previously well-controlled with Oxycodone 2.5-5 mg every 4 hours as needed.   reviewed and appropriate.  Refill provided.  Cautioned on adverse effects from medication and need to use lowest dose as infrequently as possible.    2. Acute Cystitis without Hematuria  Urinalysis indicative of infection.  Start course of Augmentin for treatment.  Urine culture ordered.  Plan to adjust antibiotics as indicated pending results.    3. Urinary Retention  Resolved.  Continue to monitor.    4. Swelling of Lower Extremity  Echocardiogram reviewed.  Resume Lasix for use as needed for lower extremity swelling.  Discussed that this medication likely does not need daily administration.    5. Cough  CXR without evidence of pneumonia.  Suspect secondary to viral illness.  Encouraged conservative management.  Return for reevaluation if symptoms worsening or failing to improve.    Post Discharge Medication Reconciliation: discharge medications reconciled and changed, per note/orders (see AVS).  Issues to address: No issues identified.  Plan of care communicated with patient and family      Type of Medical Decision Making Face-to-Face Visit within 7 Days Face-to-Face Visit within 14 days   Moderate Complexity [id number]   High Complexity [id number]

## 2020-02-21 NOTE — TELEPHONE ENCOUNTER
Requesting orders for  2x a week for 2 weeks  1x a week for 3 weeks  For gait, balance and strength

## 2020-02-21 NOTE — TELEPHONE ENCOUNTER
Left a detailed message and said if they had any questions to call back.  Nancy Maynard LPN on 2/21/2020 at 3:24 PM

## 2020-02-24 NOTE — NURSING NOTE
"Chief Complaint   Patient presents with     Toenail     Trimming       Initial /64 (BP Location: Left arm, Patient Position: Sitting, Cuff Size: Child)   Pulse 70   Temp 98.5  F (36.9  C) (Tympanic)   Resp 16   SpO2 97%  Estimated body mass index is 19.13 kg/m  as calculated from the following:    Height as of 2/6/20: 1.6 m (5' 3\").    Weight as of 2/20/20: 49 kg (108 lb).  Medication Reconciliation: tabitha Pompa  "

## 2020-02-24 NOTE — LETTER
2/24/2020        RE: Tenisha Cagle  [address] [facility] [address]        Chief complaint: Patient presents with:  Toenail: Trimming      History of Present Illness: This 86 year old female is seen with her daughter for follow-up management of elongated and painful bilateral hallux bilateral border ingrown toenails. The patient and the patient's daughter stated the patient had some increased pain from the slant back of the bilateral borders of the bilateral hallux, so she would like the hallux toenails cut more straight across today.    Patient was hospitalized last week and she has had a lot of lower extremity edema since prior to the hospitalization. The patient was given seven tablets of Lasix, but the daughter thinks the patient should be on Lasix and se would like to know if the patient should be on Lasix.      The patient prefers to wear her tennis shoes around her house, but she has had so much edema lately that she has been unable to put her shoes on her feet. She does not wear compression socks. She has a new recliner chair and she tries to elevate her feet in her recliner. No further pedal complaints today.       Patient lives in [place] and she says it has been difficult to find a provider to care for her nails. She previously had a nurse to trim them. She also had her beauty salon try to trim them, but they told her that her nails curved in so they couldn't reach them as far as they needed to go. The patient would like them trimmed today. No further pedal complaints today.       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)     Numbness and tingling of right leg     Osteoarthritis of spine with radiculopathy, lumbar region     Pancreatic mass     Paresthesia of right leg     Radicular leg pain     Right knee DJD     SK (seborrheic keratosis)     Benign skin lesion of nose     Visual changes     Memory loss     Atrial fibrillation with RVR (H)     Arthritis of shoulder region, left, degenerative     Controlled substance agreement signed 3/8/19     Chondrodermatitis nodularis chronica helicis, right     Dizzy     Paroxysmal A-fib (H)     Dementia with behavioral disturbance (H)     Degenerative joint disease of pelvic region     Urinary retention     Dementia (H)       Past Surgical History:   Procedure Laterality Date     CHOLECYSTECTOMY      No Comments Provided     COLONOSCOPY      2007,Sigmoid diverticulosis     ENDOSCOPY UPPER, COLONOSCOPY, COMBINED      5/5/11,Hiatal hernia, gastric gland hyperplasia in antrum     LAPAROSCOPIC TUBAL LIGATION      No Comments Provided     OTHER SURGICAL HISTORY      1986,[id number],BIOPSY BREAST,Right     OTHER SURGICAL HISTORY      [id number],PREMALIG/BENIGN SKIN LESION EXCISION,R side of nose, face and chest wall/Basal Cell Cancer Excision     TONSILLECTOMY, ADENOIDECTOMY, COMBINED      1954       Current Outpatient Medications   Medication     acetaminophen (TYLENOL) 325 MG tablet     amoxicillin-clavulanate 500-125 MG PO tablet     aspirin-dipyridamole ER (AGGRENOX)  MG 12 hr capsule     Calcium Carbonate-Vitamin D (CALCIUM 500 + D) 500-125 MG-UNIT TABS     furosemide 20 MG PO tablet     furosemide 20 MG PO tablet     lactulose 20 GM/30ML SOLN     lactulose encephalopathy 10 GM/15ML PO SOLUTION     losartan (COZAAR) 100 MG tablet     losartan (COZAAR) 50 MG tablet     magnesium  hydroxide (MILK OF MAGNESIA) 400 MG/5ML suspension     metoprolol tartrate (LOPRESSOR) 25 MG tablet     Multiple Vitamins-Minerals (WOMENS MULTIVITAMIN  PLUS) TABS     order for DME     oxyCODONE 5 MG PO tablet     predniSONE (DELTASONE) 10 MG tablet     QUEtiapine (SEROQUEL) 25 MG tablet     trolamine salicylate (ASPERCREME) 10 % external cream     No current facility-administered medications for this visit.           Allergies   Allergen Reactions     Lansoprazole Other (See Comments) and Unknown     Patient states that medication didn't work for her.  Daughter states she got delusional, water did not taste right     Lisinopril Cough     Lactose        Family History   Problem Relation Age of Onset     Other - See Comments Mother         Stroke,Had a CVA age 85     Cancer Father         Cancer,Brain tumor, age 56     Blood Disease Sister         Blood Disease,Leukemia and     Cancer Sister         Cancer, kidney cancer     Blood Disease Sister         Blood Disease,Chronic lymphocytic leukemia       Social History     Socioeconomic History     Marital status:      Spouse name: None     Number of children: None     Years of education: None     Highest education level: None   Occupational History     None   Social Needs     Financial resource strain: None     Food insecurity:     Worry: None     Inability: None     Transportation needs:     Medical: None     Non-medical: None   Tobacco Use     Smoking status: Never Smoker     Smokeless tobacco: Never Used   Substance and Sexual Activity     Alcohol use: No     Alcohol/week: 0.0 oz     Drug use: No     Sexual activity: Not Currently   Lifestyle     Physical activity:     Days per week: None     Minutes per session: None     Stress: None   Relationships     Social connections:     Talks on phone: None     Gets together: None     Attends [employer] service: None     Active member of club or organization: None     Attends meetings of clubs or organizations: None      Relationship status: None     Intimate partner violence:     Fear of current or ex partner: None     Emotionally abused: None     Physically abused: None     Forced sexual activity: None   Other Topics Concern     Parent/sibling w/ CABG, MI or angioplasty before 65F 55M? Not Asked   Social History Narrative    Nonsmoker. .  Lives alone in a house.   Continues to drive.   2 grown kids, one in [place]       ROS: 10 point ROS neg other than the symptoms noted above in the HPI.  EXAM  Constitutional: healthy, alert and no distress    Psychiatric: mentation appears normal and affect normal/bright    VASCULAR:  -Dorsalis pedis pulse +1/4 b/l (biphasic on doppler bilaterally on 04/22/2019)  -Posterior tibial pulse +1/4 b/l (biphasic on doppler bilaterally on 04/22/2019)  -Hair growth Absent to b/l anterior legs and ankles  -Moderate 3+ to 4+ pitting edema to bilateral legs  -Telangiectasias to bilateral feet, ankles and legs  NEURO:  -Light touch sensation intact to b/l plantar forefoot  DERM:  -Skin temperature cool to bilateral legs and feet  -Skin thin, shiny, atrophic to bilateral feet and legs    -Toenails elongated, dystrophic and discolored x 10  ---Incurvation to the bilateral border of the bilateral hallux  ---Mild erythema and edema to the nail borders  ---No open wounds and no drainage  ---No severe erythema, no ascending erythema, no calor, no purulence no openings of skin, no malodor, no other SOI.    -Mild erythematous circular superficial areas of skin to the bilateral mid to mid distal anterior calf and dorsal lateral foot with no open wounds at this time  MSK:  -Pain on palpation to bilateral hallux bilateral borders (R>L)  -Muscle strength of ankles +5/5 for dorsiflexion, plantarflexion, ABDUction and ADDuction b/l    ============================================================    ASSESSMENT:  (L60.0) Ingrowing nail  (primary encounter diagnosis)    (L60.3) Onychodystrophy    (M79.594) Pain of  right great toe    (M79.675) Pain of left great toe    (I78.1) Telangiectasias    (I87.2,  R60.0) Edema of left lower extremity due to peripheral venous insufficiency    (I87.2,  R60.0) Edema of right lower extremity due to peripheral venous insufficiency    (I73.9) Peripheral vascular disease (H)      PLAN:  -Patient evaluated and examined. Treatment options discussed with no educational barriers noted.  -Nails debrided x 10 without incident  ---Mild slant back to bilateral borders of bilateral hallux with less slant back per patient request  ---Patient still had tenderness to the bilateral borders of the bilateral hallux post debridement but improved from pre debridement    ---Patient instructed to elevate her legs as much as possible (her daughter plans on taking the patient shopping soon for a new, recliner chair.  ---Patient's daughter asked if the patient can be placed on Lasix. This is left to the decision of the PCP. She did have moderate edema to her bilateral lower extremities and feet with pre-ulcerative redness to the bilateral legs and dorsal feet without open wounds today. She is advised to elevate her feet and discuss this concern with her PCP to see if he thinks she is a good candidate for Lasix.    --Will send this note to patient's PCP to inform him of the patient's concern regarding Lasix. She was given a short-term dose upon being discharged from the hospital and she thinks it has helped make a difference.    -Patient in agreement with the above treatment plan and all of patient's questions were answered.      RTC 63+ days for bilateral hallux bilateral border slant back and toenail debridement        Sally Arshad DPM      Sincerely,        Sally Arshad DPM

## 2020-02-24 NOTE — PROGRESS NOTES
Chief complaint: Patient presents with:  Toenail: Trimming      History of Present Illness: This 86 year old female is seen with her daughter for follow-up management of elongated and painful bilateral hallux bilateral border ingrown toenails. The patient and the patient's daughter stated the patient had some increased pain from the slant back of the bilateral borders of the bilateral hallux, so she would like the hallux toenails cut more straight across today.    Patient was hospitalized last week and she has had a lot of lower extremity edema since prior to the hospitalization. The patient was given seven tablets of Lasix, but the daughter thinks the patient should be on Lasix and se would like to know if the patient should be on Lasix.      The patient prefers to wear her tennis shoes around her house, but she has had so much edema lately that she has been unable to put her shoes on her feet. She does not wear compression socks. She has a new recliner chair and she tries to elevate her feet in her recliner. No further pedal complaints today.       Patient lives in [place] and she says it has been difficult to find a provider to care for her nails. She previously had a nurse to trim them. She also had her beauty salon try to trim them, but they told her that her nails curved in so they couldn't reach them as far as they needed to go. The patient would like them trimmed today. No further pedal complaints today.       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)     Numbness and tingling of right leg     Osteoarthritis of spine with radiculopathy, lumbar region     Pancreatic mass     Paresthesia of right leg     Radicular leg pain     Right knee DJD     SK (seborrheic keratosis)     Benign skin lesion of nose     Visual changes     Memory loss     Atrial fibrillation with RVR (H)     Arthritis of shoulder region, left, degenerative     Controlled substance agreement signed 3/8/19     Chondrodermatitis nodularis chronica helicis, right     Dizzy     Paroxysmal A-fib (H)     Dementia with behavioral disturbance (H)     Degenerative joint disease of pelvic region     Urinary retention     Dementia (H)       Past Surgical History:   Procedure Laterality Date     CHOLECYSTECTOMY      No Comments Provided     COLONOSCOPY      2007,Sigmoid diverticulosis     ENDOSCOPY UPPER, COLONOSCOPY, COMBINED      5/5/11,Hiatal hernia, gastric gland hyperplasia in antrum     LAPAROSCOPIC TUBAL LIGATION      No Comments Provided     OTHER SURGICAL HISTORY      1986,[id number],BIOPSY BREAST,Right     OTHER SURGICAL HISTORY      [id number],PREMALIG/BENIGN SKIN LESION EXCISION,R side of nose, face and chest wall/Basal Cell Cancer Excision     TONSILLECTOMY, ADENOIDECTOMY, COMBINED      1954       Current Outpatient Medications   Medication     acetaminophen (TYLENOL) 325 MG tablet     amoxicillin-clavulanate 500-125 MG PO tablet     aspirin-dipyridamole ER (AGGRENOX)  MG 12 hr capsule     Calcium Carbonate-Vitamin D (CALCIUM 500 + D) 500-125 MG-UNIT TABS     furosemide 20 MG PO tablet     furosemide 20 MG PO tablet     lactulose 20 GM/30ML SOLN     lactulose encephalopathy 10 GM/15ML PO SOLUTION     losartan (COZAAR) 100 MG tablet     losartan (COZAAR) 50 MG tablet     magnesium hydroxide (MILK OF MAGNESIA) 400 MG/5ML suspension     metoprolol tartrate (LOPRESSOR) 25  MG tablet     Multiple Vitamins-Minerals (WOMENS MULTIVITAMIN  PLUS) TABS     order for DME     oxyCODONE 5 MG PO tablet     predniSONE (DELTASONE) 10 MG tablet     QUEtiapine (SEROQUEL) 25 MG tablet     trolamine salicylate (ASPERCREME) 10 % external cream     No current facility-administered medications for this visit.           Allergies   Allergen Reactions     Lansoprazole Other (See Comments) and Unknown     Patient states that medication didn't work for her.  Daughter states she got delusional, water did not taste right     Lisinopril Cough     Lactose        Family History   Problem Relation Age of Onset     Other - See Comments Mother         Stroke,Had a CVA age 85     Cancer Father         Cancer,Brain tumor, age 56     Blood Disease Sister         Blood Disease,Leukemia and     Cancer Sister         Cancer, kidney cancer     Blood Disease Sister         Blood Disease,Chronic lymphocytic leukemia       Social History     Socioeconomic History     Marital status:      Spouse name: None     Number of children: None     Years of education: None     Highest education level: None   Occupational History     None   Social Needs     Financial resource strain: None     Food insecurity:     Worry: None     Inability: None     Transportation needs:     Medical: None     Non-medical: None   Tobacco Use     Smoking status: Never Smoker     Smokeless tobacco: Never Used   Substance and Sexual Activity     Alcohol use: No     Alcohol/week: 0.0 oz     Drug use: No     Sexual activity: Not Currently   Lifestyle     Physical activity:     Days per week: None     Minutes per session: None     Stress: None   Relationships     Social connections:     Talks on phone: None     Gets together: None     Attends Jew service: None     Active member of club or organization: None     Attends meetings of clubs or organizations: None     Relationship status: None     Intimate partner violence:     Fear of current or ex  partner: None     Emotionally abused: None     Physically abused: None     Forced sexual activity: None   Other Topics Concern     Parent/sibling w/ CABG, MI or angioplasty before 65F 55M? Not Asked   Social History Narrative    Nonsmoker. .  Lives alone in a house.   Continues to drive.   2 grown kids, one in [place]       ROS: 10 point ROS neg other than the symptoms noted above in the HPI.  EXAM  Constitutional: healthy, alert and no distress    Psychiatric: mentation appears normal and affect normal/bright    VASCULAR:  -Dorsalis pedis pulse +1/4 b/l (biphasic on doppler bilaterally on 04/22/2019)  -Posterior tibial pulse +1/4 b/l (biphasic on doppler bilaterally on 04/22/2019)  -Hair growth Absent to b/l anterior legs and ankles  -Moderate 3+ to 4+ pitting edema to bilateral legs  -Telangiectasias to bilateral feet, ankles and legs  NEURO:  -Light touch sensation intact to b/l plantar forefoot  DERM:  -Skin temperature cool to bilateral legs and feet  -Skin thin, shiny, atrophic to bilateral feet and legs    -Toenails elongated, dystrophic and discolored x 10  ---Incurvation to the bilateral border of the bilateral hallux  ---Mild erythema and edema to the nail borders  ---No open wounds and no drainage  ---No severe erythema, no ascending erythema, no calor, no purulence no openings of skin, no malodor, no other SOI.    -Mild erythematous circular superficial areas of skin to the bilateral mid to mid distal anterior calf and dorsal lateral foot with no open wounds at this time  MSK:  -Pain on palpation to bilateral hallux bilateral borders (R>L)  -Muscle strength of ankles +5/5 for dorsiflexion, plantarflexion, ABDUction and ADDuction b/l    ============================================================    ASSESSMENT:  (L60.0) Ingrowing nail  (primary encounter diagnosis)    (L60.3) Onychodystrophy    (M79.674) Pain of right great toe    (M79.675) Pain of left great toe    (I78.1) Telangiectasias    (I87.2,   R60.0) Edema of left lower extremity due to peripheral venous insufficiency    (I87.2,  R60.0) Edema of right lower extremity due to peripheral venous insufficiency    (I73.9) Peripheral vascular disease (H)      PLAN:  -Patient evaluated and examined. Treatment options discussed with no educational barriers noted.  -Nails debrided x 10 without incident  ---Mild slant back to bilateral borders of bilateral hallux with less slant back per patient request  ---Patient still had tenderness to the bilateral borders of the bilateral hallux post debridement but improved from pre debridement    ---Patient instructed to elevate her legs as much as possible (her daughter plans on taking the patient shopping soon for a new, recliner chair.  ---Patient's daughter asked if the patient can be placed on Lasix. This is left to the decision of the PCP. She did have moderate edema to her bilateral lower extremities and feet with pre-ulcerative redness to the bilateral legs and dorsal feet without open wounds today. She is advised to elevate her feet and discuss this concern with her PCP to see if he thinks she is a good candidate for Lasix.    --Will send this note to patient's PCP to inform him of the patient's concern regarding Lasix. She was given a short-term dose upon being discharged from the hospital and she thinks it has helped make a difference.    -Patient in agreement with the above treatment plan and all of patient's questions were answered.      RTC 63+ days for bilateral hallux bilateral border slant back and toenail debridement        Sally Arshad DPM

## 2020-02-26 NOTE — PROGRESS NOTES
Dr Kapoor reviewed and completed the following home care form for Tenisha Cagle  on 2/26/2020. This covers the certification period effective 2/20/2020 to 4/19/2020.    Jennifer Oswald LPN on 2/26/2020 at 10:30 AM

## 2020-02-28 NOTE — TELEPHONE ENCOUNTER
Patient is requesting refills on Prednisone and a new Rx for Metoprolol. Please send new Rx(s) if appropriate. Thanks!    Rafael Tracy RPH on 2/28/2020 at 12:09 PM

## 2020-02-28 NOTE — TELEPHONE ENCOUNTER
Please call Nona regarding patient bringing in a urine sample to learn if UTI has cleared. Thanks, [phone].

## 2020-03-02 NOTE — TELEPHONE ENCOUNTER
We would not do a ua to see if it clears, we determine improvement based solely on her symptoms.  If symptoms are gone, then no need to test.  Nearly all women her age always have bacteria in their urine, without it being an infection. Let daughter Nona know.  Godwin Kapoor MD on 3/2/2020 at 10:09 AM

## 2020-03-03 NOTE — PROGRESS NOTES
Nursing Notes:   Sandy Dean LPN  3/3/2020 11:19 AM  Signed  Chief Complaint   Patient presents with     Cough     Chest Pain         Medication Reconciliation: complete    Sandy Dean LPN      HPI:    Tenisha Cagle is a 86 year old female who presents for cough.  Patient has noticed congestion in her chest over the last 2 weeks.  She is spitting up yellow phlegm.  Having fevers off and on.  Wheezing and rattling in the chest.  Out of breath at times.  Ears were plugged previously.  Has a headache.  Runny nose at times.  Drinking fluids.  Using lactulose as needed for constipation.  No recent MiraLAX use.  Patient has been coughing a lot.  Patient has a difficult time relating her symptoms.  Per the daughter she was having chest pain in the lobby prior to the visit.  It was radiating to the back.  Had an episode of chest pain a week ago.  History of heartburn in the past.  Uses Nexium.  No current chest pain and sitting in the room.  No chest pain, palpitations, problems breathing.  Keeping food and fluids down.  No dehydration concerns.  Not lightheaded or dizzy.  Unsure if she is having increased heartburn.  No blood in the stool or urine.  Patient recently had a urinary tract infection.  They would like to complete a repeat urine sample in order to rule out recurrent infection concerns.  Has noticed some mild swelling of her lower extremities off and on.  Recently was given Lasix which helped minimize her symptoms.  Wondering about getting a refill to use occasionally.  Swelling is much better.    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)     4/14/2011     Diaphragmatic hernia without obstruction or gangrene     large, mostly intrathoracic     Diverticulosis of large intestine without perforation or abscess without  bleeding     2011     Dysthymic disorder     No Comments Provided     Essential (primary) hypertension     No Comments Provided     Female climacteric state     Menopausal age 56     Gastritis without bleeding     03,Treated with PrevPac     Nonruptured cerebral aneurysm     2011     Osteoarthritis     No Comments Provided     Other fecal abnormalities     Recurrent loose stool     Other lymphoid leukemia not having achieved remission (H)     CLL, stable WBC 30,000     Other specified bacterial intestinal infections     2003, H. pylori gastritis treated with Prevpac     Other specified enthesopathies of unspecified lower limb, excluding foot     No Comments Provided     Peptic ulcer without hemorrhage or perforation     No Comments Provided     Personal history of other diseases of the digestive system (CODE)     2011     Personal history of other medical treatment (CODE)     , vaginal deliveries     Pure hypercholesterolemia     No Comments Provided     Sepsis (H)     child,Hospitalized as a child for blood poisoning     Sleep disorder     2011     Transient cerebral ischemic attack     2011       Past Surgical History:   Procedure Laterality Date     CHOLECYSTECTOMY      No Comments Provided     COLONOSCOPY      ,Sigmoid diverticulosis     ENDOSCOPY UPPER, COLONOSCOPY, COMBINED      11,Hiatal hernia, gastric gland hyperplasia in antrum     LAPAROSCOPIC TUBAL LIGATION      No Comments Provided     OTHER SURGICAL HISTORY      ,2050,BIOPSY BREAST,Right     OTHER SURGICAL HISTORY      [id number],PREMALIG/BENIGN SKIN LESION EXCISION,R side of nose, face and chest wall/Basal Cell Cancer Excision     TONSILLECTOMY, ADENOIDECTOMY, COMBINED             Family History   Problem Relation Age of Onset     Other - See Comments Mother         Stroke,Had a CVA age 85     Cancer Father         Cancer,Brain tumor, age 56     Blood Disease Sister         Blood Disease,Leukemia  and     Cancer Sister         Cancer, kidney cancer     Blood Disease Sister         Blood Disease,Chronic lymphocytic leukemia       Social History     Tobacco Use     Smoking status: Never Smoker     Smokeless tobacco: Never Used   Substance Use Topics     Alcohol use: No     Alcohol/week: 0.0 standard drinks       Current Outpatient Medications   Medication Sig Dispense Refill     acetaminophen (TYLENOL) 325 MG tablet Take 3 tablets (975 mg) by mouth 3 times daily       aspirin-dipyridamole ER (AGGRENOX)  MG 12 hr capsule TAKE 1 CAPSULE TWICE A  capsule 3     azithromycin (ZITHROMAX) 250 MG tablet Take 2 tablets (500 mg) by mouth daily for 1 day, THEN 1 tablet (250 mg) daily for 4 days. 6 tablet 0     benzonatate (TESSALON) 200 MG capsule Take 1 capsule (200 mg) by mouth 3 times daily as needed for cough 30 capsule 1     Calcium Carbonate-Vitamin D (CALCIUM 500 + D) 500-125 MG-UNIT TABS Take 1 tablet by mouth daily       furosemide (LASIX) 20 MG tablet Take 1 tablet (20 mg) by mouth daily as needed (Lower extremity swelling) 20 tablet 0     furosemide 20 MG PO tablet Take 1 tablet by mouth daily       lactulose 20 GM/30ML SOLN Take 30 mLs (20 g) by mouth 2 times daily as needed       lactulose encephalopathy 10 GM/15ML PO SOLUTION Take 30 mLs by mouth 2 times daily as needed       losartan (COZAAR) 100 MG tablet Take 1 tablet (100 mg) by mouth daily       losartan (COZAAR) 50 MG tablet Take 1 tablet (50 mg) by mouth daily       magnesium hydroxide (MILK OF MAGNESIA) 400 MG/5ML suspension Take 15 mLs by mouth At Bedtime       metoprolol tartrate (LOPRESSOR) 25 MG tablet Take 0.5 tablets (12.5 mg) by mouth 2 times daily 180 tablet 3     Multiple Vitamins-Minerals (WOMENS MULTIVITAMIN  PLUS) TABS Take 1 tablet by mouth every morning       order for DME Equipment being ordered: pillow with a hole in the center 1 Device 0     oxyCODONE 5 MG PO tablet Take 0.5-1 tablets (2.5-5 mg) by mouth every 4 hours as  needed for moderate to severe pain 150 tablet 0     predniSONE (DELTASONE) 10 MG tablet Take 1 tablet (10 mg) by mouth every other day 45 tablet 0     QUEtiapine (SEROQUEL) 25 MG tablet Take 0.5 tablets (12.5 mg) by mouth At Bedtime       trolamine salicylate (ASPERCREME) 10 % external cream Apply topically 3 times daily Apply to hip, back, knees         Allergies   Allergen Reactions     Lansoprazole Other (See Comments) and Unknown     Patient states that medication didn't work for her.  Daughter states she got delusional, water did not taste right     Lisinopril Cough     Lactose        REVIEW OF SYSTEMS:  Refer to HPI.    EXAM:   Vitals:    BP (!) 140/60 (BP Location: Right arm, Patient Position: Sitting, Cuff Size: Adult Regular)   Pulse 65   Temp 97.1  F (36.2  C)   Resp 18   Wt 48.1 kg (106 lb)   SpO2 99%   BMI 18.78 kg/m      General Appearance: Pleasant, alert, appropriate appearance for age. No acute distress  Ear Exam: Normal TM's bilaterally, normal grey, and translucent. Normal auditory canals and external ears. Non-tender.   OroPharynx Exam: Dental hygiene adequate. Normal buccal mucosa. Normal pharynx.  Neck Exam:  Supple, no masses or nodes.  Chest/Respiratory Exam: Normal chest wall and respirations. Clear to auscultation.  Cardiovascular Exam: Regular rate and rhythm. S1, S2, no murmur, click, gallop, or rubs.  Gastrointestinal Exam: Soft, non-tender, no masses or organomegaly. Normal BS x 4.  Musculoskeletal Exam: Back is straight, full ROM of lower extremities.  1+ pedal edema bilaterally.  Skin: no rash or abnormalities  Neurologic Exam: Nonfocal, normal gross motor, tone coordination and no tremor.  Psychiatric Exam: Alert and oriented - appropriate affect.    PHQ Depression Screen  PHQ-9 SCORE 5/15/2017 8/30/2018 4/9/2019   PHQ-9 Total Score 0 0 0     Results for orders placed or performed during the hospital encounter of 03/03/20   XR Chest 2 Views     Status: None    Narrative     PROCEDURE:  XR CHEST 2 VW    HISTORY: Cough, .    COMPARISON:  2/20/2020    FINDINGS:  The cardiomediastinal contours are unchanged, including a moderate  hiatal hernia.  The trachea is midline.  There is calcific aortic  atherosclerosis.  No focal consolidation, effusion or pneumothorax.  The AP diameter of  the chest is increased. The diaphragms are not fully flattened.  Osteoporosis. Mild wedging of a few mid thoracic vertebra appears  chronic.      Impression    IMPRESSION:      Emphysema. No new focal consolidation.      SHANIQUA COLEMAN MD   Results for orders placed or performed in visit on 03/03/20   UA reflex to Microscopic     Status: None   Result Value Ref Range    Color Urine Light Yellow     Appearance Urine Clear     Glucose Urine Negative NEG^Negative mg/dL    Bilirubin Urine Negative NEG^Negative    Ketones Urine Negative NEG^Negative mg/dL    Specific Gravity Urine 1.011 1.003 - 1.035    Blood Urine Negative NEG^Negative    pH Urine 7.0 5.0 - 7.0 pH    Protein Albumin Urine Negative NEG^Negative mg/dL    Urobilinogen mg/dL Normal 0.0 - 2.0 mg/dL    Nitrite Urine Negative NEG^Negative    Leukocyte Esterase Urine Negative NEG^Negative    Source Unspecified Urine    Troponin GH     Status: None   Result Value Ref Range    Troponin 11.3 <34.0 pg/mL   Basic Metabolic Panel     Status: Abnormal   Result Value Ref Range    Sodium 132 (L) 134 - 144 mmol/L    Potassium 4.4 3.5 - 5.1 mmol/L    Chloride 98 98 - 107 mmol/L    Carbon Dioxide 29 21 - 31 mmol/L    Anion Gap 5 3 - 14 mmol/L    Glucose 90 70 - 105 mg/dL    Urea Nitrogen 30 (H) 7 - 25 mg/dL    Creatinine 1.11 0.60 - 1.20 mg/dL    GFR Estimate 47 (L) >60 mL/min/[1.73_m2]    GFR Estimate If Black 56 (L) >60 mL/min/[1.73_m2]    Calcium 9.0 8.6 - 10.3 mg/dL   CBC and Differential     Status: Abnormal   Result Value Ref Range    WBC 18.9 (H) 4.0 - 11.0 10e9/L    RBC Count 3.36 (L) 3.8 - 5.2 10e12/L    Hemoglobin 9.0 (L) 11.7 - 15.7 g/dL    Hematocrit  29.4 (L) 35.0 - 47.0 %    MCV 88 78 - 100 fl    MCH 26.8 26.5 - 33.0 pg    MCHC 30.6 (L) 31.5 - 36.5 g/dL    RDW 15.2 (H) 10.0 - 15.0 %    Platelet Count 448 150 - 450 10e9/L    Diff Method Automated Method     % Neutrophils 64.2 %    % Lymphocytes 28.0 %    % Monocytes 6.2 %    % Eosinophils 0.5 %    % Basophils 0.3 %    % Immature Granulocytes 0.8 %    Absolute Neutrophil 12.4 (H) 1.6 - 8.3 10e9/L    Absolute Lymphocytes 5.4 (H) 0.8 - 5.3 10e9/L    Absolute Monocytes 1.2 0.0 - 1.3 10e9/L    Absolute Eosinophils 0.1 0.0 - 0.7 10e9/L    Absolute Basophils 0.1 0.0 - 0.2 10e9/L    Abs Immature Granulocytes 0.2 0 - 0.4 10e9/L    Platelet Estimate       Automated count confirmed.  Platelet morphology is normal.    RBC Morphology Consistent with reported results    EKG 12-lead, tracing only     Status: None   Result Value Ref Range    Interpretation ECG Click View Image link to view waveform and result        ASSESSMENT AND PLAN:      ICD-10-CM    1. Acute bronchitis, unspecified organism  J20.9 azithromycin (ZITHROMAX) 250 MG tablet     benzonatate (TESSALON) 200 MG capsule   2. Personal history of urinary tract infection  Z87.440 UA reflex to Microscopic   3. Cough  R05 XR Chest 2 Views     benzonatate (TESSALON) 200 MG capsule   4. Chest pain, unspecified type  R07.9 CBC and Differential     Basic Metabolic Panel     EKG 12-lead, tracing only     Troponin GH     Troponin GH     Basic Metabolic Panel     CBC and Differential   5. Swelling of lower extremity  M79.89 furosemide (LASIX) 20 MG tablet     Completed chest xray.  I personally reviewed the xray. I found no concerns appreciated upon initial read of xray.  Final read pending by radiology.    EKG showed sinus bradycardia with short OR.  Final read pending by internal medicine.    Completed labs to rule out concerns.  Completed CBC, BMP and troponin which were unremarkable.  No acute chest concerns are appreciated at this time.  Symptoms likely due to acute  bronchitis.  Gave warning signs and symptoms.    Completed repeat urinalysis in order to rule out recurrent infection concerns.  Urinalysis is unremarkable.  No acute concerns appreciated at this time.    Patient was started on azithromycin and Tessalon Perles for acute bronchitis.  She was also given Lasix to use daily as needed for swelling of lower extremities.  Encourage close follow-up with her primary care provider for further refills if needed.    Antibiotic has been sent to pharmacy. Please take full course of antibiotic even if symptoms have completely resolved. This helps prevent against antibiotic resistance.     Patient prescribed antibiotics. Monitor for any fevers or chills. Return in 7-10 days if not feeling better. Please call clinic with any questions or concerns. Please take in a lot of fluids and get rest. Return with any change orworsening of symptoms.    May use symptomatic care with tylenol or ibuprofen. Sudafed or mucinex work well for congestion. May use cough syrup or cough drops.  Using a humidifier works well to break up the congestion. You can also sleep propped up on a couple pillows to decrease symptoms at night. A nettipot works well to decrease nasal congestion.    Use a Neti Pot/sinus flush (Pavan Med Sinus Rinse) 3 times daily to irrigate sinuses/mucosal tissue.     You will need to be evaluated if you start to experience:  Fever higher than 102.5 F (39.2 C)   Sudden and severe pain in the face and head   Trouble seeing or seeing double   Trouble thinking clearly   Swelling or redness around 1 or both eyes   Trouble breathing or a stiff neck    * If you are a smoker, try to quit *    Call 9-1-1 or go to the emergency room if you:  Have trouble breathing   Are drooling because you cannot swallow your saliva   Have swelling of the neck or tongue   Cannot move your neck or have trouble opening your mouth     Greater than 25 minutes were spent in counseling and coordination of care.      Patient Instructions   Antibiotic has been sent to pharmacy. Please take full course of antibiotic even if symptoms have completely resolved. This helps prevent against antibiotic resistance.     Patient prescribed antibiotics. Monitor for any fevers or chills. Return in 7-10 days if not feeling better. Please call clinic with any questions or concerns. Please take in a lot of fluids and get rest. Return with any change orworsening of symptoms.    May use symptomatic care with tylenol or ibuprofen. Sudafed or mucinex work well for congestion. May use cough syrup or cough drops.  Using a humidifier works well to break up the congestion. You can also sleep propped up on a couple pillows to decrease symptoms at night. A nettipot works well to decrease nasal congestion.    Use a Neti Pot/sinus flush (Pavan Med Sinus Rinse) 3 times daily to irrigate sinuses/mucosal tissue.     You will need to be evaluated if you start to experience:  Fever higher than 102.5 F (39.2 C)   Sudden and severe pain in the face and head   Trouble seeing or seeing double   Trouble thinking clearly   Swelling or redness around 1 or both eyes   Trouble breathing or a stiff neck    * If you are a smoker, try to quit *    Call 9-1-1 or go to the emergency room if you:  Have trouble breathing   Are drooling because you cannot swallow your saliva   Have swelling of the neck or tongue   Cannot move your neck or have trouble opening your mouth         Danna Torres PA-C PA-C..................3/3/2020 11:16 AM

## 2020-03-03 NOTE — PATIENT INSTRUCTIONS
Antibiotic has been sent to pharmacy. Please take full course of antibiotic even if symptoms have completely resolved. This helps prevent against antibiotic resistance.     Patient prescribed antibiotics. Monitor for any fevers or chills. Return in 7-10 days if not feeling better. Please call clinic with any questions or concerns. Please take in a lot of fluids and get rest. Return with any change orworsening of symptoms.    May use symptomatic care with tylenol or ibuprofen. Sudafed or mucinex work well for congestion. May use cough syrup or cough drops.  Using a humidifier works well to break up the congestion. You can also sleep propped up on a couple pillows to decrease symptoms at night. A nettipot works well to decrease nasal congestion.    Use a Neti Pot/sinus flush (Pavan Med Sinus Rinse) 3 times daily to irrigate sinuses/mucosal tissue.     You will need to be evaluated if you start to experience:  Fever higher than 102.5 F (39.2 C)   Sudden and severe pain in the face and head   Trouble seeing or seeing double   Trouble thinking clearly   Swelling or redness around 1 or both eyes   Trouble breathing or a stiff neck    * If you are a smoker, try to quit *    Call 9-1-1 or go to the emergency room if you:  Have trouble breathing   Are drooling because you cannot swallow your saliva   Have swelling of the neck or tongue   Cannot move your neck or have trouble opening your mouth

## 2020-03-03 NOTE — NURSING NOTE
Chief Complaint   Patient presents with     Cough     Chest Pain         Medication Reconciliation: complete    Sandy Dean, LPN

## 2020-03-06 NOTE — TELEPHONE ENCOUNTER
Left message for daughter to call back  ...................Yg Jimenez LPN....3/6/2020 12:50 PM

## 2020-03-06 NOTE — TELEPHONE ENCOUNTER
I need to examine her for the injections, to figure out where best to do them (what part of her back to inject).  Let them know she will need an MRI before injections too. PT ordered.  Godwin Kapoor MD on 3/6/2020 at 12:23 PM

## 2020-03-06 NOTE — TELEPHONE ENCOUNTER
Patients daughter is wanting orders for pt. Back injections upper and lower thank you   Miriam Fishman on 3/6/2020 at 10:27 AM

## 2020-03-09 NOTE — TELEPHONE ENCOUNTER
Patient's daughter states that patient gets PT at home currently. Patient has appointment to see TJP on the 3/17/2020.  Sophia Carrion LPN ....................  3/9/2020   8:20 AM

## 2020-03-19 NOTE — PROGRESS NOTES
SUBJECTIVE:   Tenisha Cagle is a 86 year old female who presents to clinic today for the following health issues: Medication refill    Patient arrives here for medication refill.  She is wishing to have medication refilled for her TIAs.  Osteoarthritis and Lasix.  She also is in need of a refill of metoprolol.  She is with her daughter.  She has a history of long-term use of oxycodone.  Is taking 5 a day.  This is controlling her pains.  Patient is not complaining of any side effects.  Recent drug screen was appropriate.  Otherwise tolerating the medications well.  No specific concerns.        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     Priority: Medium     Controlled substance agreement signed 3/8/19 03/08/2019     Priority: Medium     Class: Chronic     Arthritis of shoulder region, left, degenerative 01/07/2019     Priority: Medium     Memory loss 05/25/2018     Priority: Medium     Atrial fibrillation with RVR (H) 01/25/2018     Priority: Medium     Hiatal hernia 01/17/2018     Priority: Medium     Overview:   large, mostly intrathoracic       Essential hypertension 01/17/2018     Priority: Medium     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/27/2017     Priority: Medium     Benign skin lesion of nose 09/27/2017     Priority: Medium     AK (actinic keratosis) 07/10/2017     Priority: Medium     SK (seborrheic keratosis) 07/10/2017     Priority: Medium     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) 06/15/2017     Priority: Medium     Abnormal weight loss 04/18/2016      Priority: Medium     Anemia 04/18/2016     Priority: Medium     Visual changes 04/18/2016     Priority: Medium     Osteoarthritis of spine with radiculopathy, lumbar region 02/03/2016     Priority: Medium     History of TIA (transient ischemic attack) 02/02/2016     Priority: Medium     Numbness and tingling of right leg 02/02/2016     Priority: Medium     Radicular leg pain 02/02/2016     Priority: Medium     Basal cell carcinoma of right cheek 11/18/2015     Priority: Medium     Alvarado's cyst of knee 02/20/2014     Priority: Medium     Paresthesia of right leg 02/20/2014     Priority: Medium     Right knee DJD 02/20/2014     Priority: Medium     Cystocele 08/23/2013     Priority: Medium     Pancreatic mass 09/18/2012     Priority: Medium     Overview:   Possible, abnormal CT        Cerebral aneurysm, nonruptured 12/20/2011     Priority: Medium     Diverticulosis of colon 05/13/2011     Priority: Medium     Chronic lymphocytic leukemia (H) 04/14/2011     Priority: Medium     Overview:   WBC stable in the 30,000       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)     4/14/2011     Diaphragmatic hernia without obstruction or gangrene     large, mostly intrathoracic     Diverticulosis of large intestine without perforation or abscess without bleeding     5/13/2011     Dysthymic disorder     No Comments Provided     Essential (primary) hypertension     No Comments Provided     Female climacteric state     Menopausal age 56     Gastritis without bleeding     5/9/03,Treated with PrevPac     Nonruptured cerebral aneurysm     12/20/2011     Osteoarthritis     No Comments Provided     Other fecal abnormalities     Recurrent loose stool     Other lymphoid leukemia not having achieved remission (H)     CLL, stable WBC 30,000     Other specified bacterial  intestinal infections     2003, H. pylori gastritis treated with Prevpac     Other specified enthesopathies of unspecified lower limb, excluding foot     No Comments Provided     Peptic ulcer without hemorrhage or perforation     No Comments Provided     Personal history of other diseases of the digestive system (CODE)     2011     Personal history of other medical treatment (CODE)     , vaginal deliveries     Pure hypercholesterolemia     No Comments Provided     Sepsis (H)     child,Hospitalized as a child for blood poisoning     Sleep disorder     2011     Transient cerebral ischemic attack     2011      Past Surgical History:   Procedure Laterality Date     CHOLECYSTECTOMY      No Comments Provided     COLONOSCOPY      ,Sigmoid diverticulosis     ENDOSCOPY UPPER, COLONOSCOPY, COMBINED      11,Hiatal hernia, gastric gland hyperplasia in antrum     LAPAROSCOPIC TUBAL LIGATION      No Comments Provided     OTHER SURGICAL HISTORY      ,2050,BIOPSY BREAST,Right     OTHER SURGICAL HISTORY      [id number],PREMALIG/BENIGN SKIN LESION EXCISION,R side of nose, face and chest wall/Basal Cell Cancer Excision     TONSILLECTOMY, ADENOIDECTOMY, COMBINED           Current Outpatient Medications   Medication Sig Dispense Refill     acetaminophen (TYLENOL) 325 MG tablet Take 3 tablets (975 mg) by mouth 3 times daily       aspirin-dipyridamole ER (AGGRENOX)  MG 12 hr capsule TAKE 1 CAPSULE TWICE A  capsule 3     Calcium Carbonate-Vitamin D (CALCIUM 500 + D) 500-125 MG-UNIT TABS Take 1 tablet by mouth daily       furosemide (LASIX) 20 MG tablet Take 1 tablet (20 mg) by mouth daily as needed (Lower extremity swelling) 90 tablet 1     furosemide 20 MG PO tablet Take 1 tablet by mouth daily       lactulose 20 GM/30ML SOLN Take 30 mLs (20 g) by mouth 2 times daily as needed       magnesium hydroxide (MILK OF MAGNESIA) 400 MG/5ML suspension Take 15 mLs by mouth At Bedtime        metoprolol tartrate (LOPRESSOR) 25 MG tablet Take 0.5 tablets (12.5 mg) by mouth 2 times daily 180 tablet 3     Multiple Vitamins-Minerals (WOMENS MULTIVITAMIN  PLUS) TABS Take 1 tablet by mouth every morning       oxyCODONE (ROXICODONE) 5 MG tablet Take 0.5-1 tablets (2.5-5 mg) by mouth every 4 hours as needed for moderate to severe pain 150 tablet 0     predniSONE (DELTASONE) 10 MG tablet Take 1 tablet (10 mg) by mouth every other day 45 tablet 3     trolamine salicylate (ASPERCREME) 10 % external cream Apply topically 3 times daily Apply to hip, back, knees       Allergies   Allergen Reactions     Lansoprazole Other (See Comments) and Unknown     Patient states that medication didn't work for her.  Daughter states she got delusional, water did not taste right     Lisinopril Cough     Lactose        Review of Systems   Constitutional: Negative for chills and fever.   Eyes: Negative.    Respiratory: Negative.    Cardiovascular: Negative for chest pain.   Genitourinary: Negative.    Neurological: Negative for dizziness.   Psychiatric/Behavioral: Negative.         OBJECTIVE:     /58   Pulse 60   Temp 98.7  F (37.1  C)   Resp 20   Wt 47.6 kg (105 lb)   SpO2 98%   BMI 18.60 kg/m    Body mass index is 18.6 kg/m .  Physical Exam  Constitutional:       Appearance: Normal appearance.   HENT:      Head: Normocephalic.      Mouth/Throat:      Mouth: Mucous membranes are moist.   Cardiovascular:      Rate and Rhythm: Normal rate.      Comments: Heart is distant sounding  Pulmonary:      Breath sounds: Normal breath sounds.   Abdominal:      General: Abdomen is flat.   Skin:     General: Skin is warm.   Neurological:      Mental Status: She is alert.         Diagnostic Test Results:  none     ASSESSMENT/PLAN:         1. History of TIA (transient ischemic attack)  Medication refill  - aspirin-dipyridamole ER (AGGRENOX)  MG 12 hr capsule; TAKE 1 CAPSULE TWICE A DAY  Dispense: 180 capsule; Refill: 3    2.  Osteoarthritis of spine with radiculopathy, lumbar region  Patient is on prednisone 10 mg every other day which is controlling her osteoarthritis.  Also oxycodone.  Medications refilled with 1 refill.  - predniSONE (DELTASONE) 10 MG tablet; Take 1 tablet (10 mg) by mouth every other day  Dispense: 45 tablet; Refill: 3  - oxyCODONE (ROXICODONE) 5 MG tablet; Take 0.5-1 tablets (2.5-5 mg) by mouth every 4 hours as needed for moderate to severe pain  Dispense: 150 tablet; Refill: 0    3. Swelling of lower extremity  Refill  - furosemide (LASIX) 20 MG tablet; Take 1 tablet (20 mg) by mouth daily as needed (Lower extremity swelling)  Dispense: 90 tablet; Refill: 1    4. Paroxysmal atrial fibrillation (H)  Rate currently under good control  - metoprolol tartrate (LOPRESSOR) 25 MG tablet; Take 0.5 tablets (12.5 mg) by mouth 2 times daily  Dispense: 180 tablet; Refill: 3    5. Essential (primary) hypertension  Blood pressure currently under good control  - metoprolol tartrate (LOPRESSOR) 25 MG tablet; Take 0.5 tablets (12.5 mg) by mouth 2 times daily  Dispense: 180 tablet; Refill: 3      Thomas Adams MD  [facility] AND [facility]

## 2020-03-19 NOTE — NURSING NOTE
Patient here for medication refills, she takes oxycodone 1 tablet every 4-5 hours she takes 5 tablets a day. She also needs refills on lasix and prednisone.Medication Reconciliation: complete.    Kelli Mcgarry LPN  3/19/2020 12:59 PM

## 2020-03-26 NOTE — PROGRESS NOTES
Outpatient Physical Therapy Discharge Note     Patient: Tenisha Calge  : 1933    Beginning/End Dates of Reporting Period:  10/17/2019 to 3/26/2020    Referring Provider: Dr. Kapoor    Therapy Diagnosis: impaired mobility     Client Self Report: Pt and daughter report she got a hip injection earlier today so it's numb but Tenisha walks holding her back and left hip at times expressing pain and does not rate today. Her right knee is sore as well.     Objective Measurements:                                          Outcome Measures (most recent score):      Goals:  Goal Identifier posture   Goal Description Pt will have improved spinal mobility and core strength to promote upright standing with ambulation in clinic at least 50% of the time observed   Target Date 19   Date Met  (Improves following stretching interventions)   Progress:     Goal Identifier walking   Goal Description Pt will have improved hip strength by 1 muscle grade to allow walking 15 minutes with less than 6/10 pain in the hip.   Target Date 19   Date Met  (Minimal progress)   Progress:     Goal Identifier HEP   Goal Description Pt will be independent and consistent with a customized home exercise program for self management of symptoms.   Target Date 19   Date Met      Progress:       Progress Toward Goals:   Not assessed this period.          Plan:  Discharge from therapy.    Discharge:    Reason for Discharge: No further expectation of progress.  Patient chooses to discontinue therapy.    Equipment Issued: n/a    Discharge Plan: Patient to continue home program.

## 2020-04-03 PROBLEM — N39.0 FEBRILE URINARY TRACT INFECTION: Status: ACTIVE | Noted: 2020-01-01

## 2020-04-03 PROBLEM — E87.1 HYPONATREMIA: Status: ACTIVE | Noted: 2020-01-01

## 2020-04-03 PROBLEM — N39.0 UTI (URINARY TRACT INFECTION): Status: ACTIVE | Noted: 2020-01-01

## 2020-04-03 PROBLEM — R79.89 ELEVATED LACTIC ACID LEVEL: Status: ACTIVE | Noted: 2020-01-01

## 2020-04-03 NOTE — TELEPHONE ENCOUNTER
Daughter Nona has questions regarding ongoing symptoms of intermittent chest pain (mostly with eating/swallowing) and concerned that Pt might have Schatzki Ring.    Pt was seen 3/3/2020 for chest pain, bronchitis, cough.  Chest X-rays completed 2/20/2020 and 3/3/2020 and EKG was completed on 3/3/2020 also.     Radiology 3/3/2020   The cardiomediastinal contours are unchanged, including a moderate hiatal hernia. The trachea is midline.  There is calcific aortic Atherosclerosis. No focal consolidation, effusion or pneumothorax.  The AP diameter of the chest is increased. The diaphragms are not fully flattened. Osteoporosis. Mild wedging of a few mid thoracic vertebra appears  chronic.  IMPRESSION:    Emphysema. No new focal consolidation.     Daughter states that approx 2 weeks ago pt had new onset of dysphagia and is now only able to tolerate thin liquids. Daughter had been giving Pt warm/hot water prior to eating which seems to somewhat alleviate the chest pain/choking.  Does not believe Pt has aspirated.  Feels as though she is not able to consume enough food to achieve satiety and feels as though food gets stuck in esophagus and Pt will grab at chest and rates pain 8/10 at that time.  Chest pain continues for sometime after eating and is intermittent throughout the day but has become more constant.  Denies that chest pain is worse with lying down an/or exertion. States chest pain still radiates to Pt's back at time.         Denies stridor, drooling, cough, fever, UTI symptoms, SOB at this time.  Is currently experiencing mild chest discomfort.    Will route to Provider high priority for directions.     Shirley Claire RN  ....................  4/3/2020   11:01 AM

## 2020-04-03 NOTE — TELEPHONE ENCOUNTER
Contacted Nona and she would like a surgical referral placed (endoscopy), chart routed to Dr. Kapoor, and will monitor Pt and is aware of her options of ED and clinic.  States Pt will be living with them now for a while.  Nona states she would really like to avoid the ED but willing to if needed.     Order teed up and will route to PCP    Nona states her number is [phone]     Shirley Claire RN  ....................  4/3/2020   4:40 PM

## 2020-04-03 NOTE — TELEPHONE ENCOUNTER
If concerned for an esophageal disorder, would need to have referral placed to see general surgery at the way to diagnose this would be have an upper endoscopy. I can place a referral if they would like, however given the current COVID-19 situation, I am unsure of if this will be considered as a priority allowing for her to be seen at this time.   Yesy Mcrcay PA-C on 4/3/2020 at 11:10 AM

## 2020-04-04 PROBLEM — N39.0 FEBRILE URINARY TRACT INFECTION: Status: RESOLVED | Noted: 2020-01-01 | Resolved: 2020-01-01

## 2020-04-04 NOTE — ED TRIAGE NOTES
Per daughter:  Pt's esophogas has been bothering her for a month. Pt has seen in the clinic, and they did an EKG (one month ago, ). She has difficulty with swallowing; daughter feels that here is an appointment for and EGD made for her in the future, not sure when. Daughter said that she needs a scope or surgery.   Pt daughter, pt is forgetful, doesn't remember anything after it's been told to her. Pt gets anxious and shakes.

## 2020-04-04 NOTE — CONSULTS
[facility]    Surgical Consultation    Date of Admission:  4/3/2020    Assessment & Plan   Tenisha Cagle is a 86 year old female who was admitted on 4/3/2020. I was asked to see the patient for anemia.    Principal Problem:    UTI (urinary tract infection)  Active Problems:    Anemia    Assessment: acute on chronic    Plan: no signs of active bleeding. Advise stop antiplatelet meds and gastric irritants. Start low dose ppi and carafate - liquid. Continue to monitor for swallowing issues. Did fine with eggs and oatmeal this am.    Essential hypertension    Chronic lymphocytic leukemia (H)    Assessment: chronic    Plan: may be contributing to the anemia    Dementia with behavioral disturbance (H)    Elevated lactic acid level    Assessment: acute    Plan: improved    Hyponatremia    Febrile urinary tract infection    DVT Prophylaxis: Defer to primary service  Code Status: DNR/DNI    Yazmin Sim    Reason for Consult   Reason for consult: anemia    Primary Care Physician   Godwin Kapoor    Chief Complaint   Trouble swallowing    History is obtained from the patient and chart review    History of Present Illness   Tenisha Cagle is a 86 year old female who presented to the ed brought by her daughter with reports of problems swallowing. She has dementia and she doesn't know why she is here. She denies abdominal pain. She doesn't think she has any problems swallowing.   Found to have a UTI and is admitted for management. Hgb is down this am after hydration with IVF. She hasn't had any stools or vomiting. She had breakfast this am without swallowing problems _eggs and oatmeal.  Last EGD was in 2011-large hiatal hernia noted. It doesn't appear she has been taking a PPI-she has been taking Aggrenox.    Past Medical History    I have reviewed this patient's medical history and updated it with pertinent information if needed.   Past Medical History:   Diagnosis Date     Cardiac murmur     4/14/2011,TTE  11 mild MR and TR     Cataract     2012     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)     2011     Diaphragmatic hernia without obstruction or gangrene     large, mostly intrathoracic     Diverticulosis of large intestine without perforation or abscess without bleeding     2011     Dysthymic disorder     No Comments Provided     Essential (primary) hypertension     No Comments Provided     Female climacteric state     Menopausal age 56     Gastritis without bleeding     03,Treated with PrevPac     Nonruptured cerebral aneurysm     2011     Osteoarthritis     No Comments Provided     Other fecal abnormalities     Recurrent loose stool     Other lymphoid leukemia not having achieved remission (H)     CLL, stable WBC 30,000     Other specified bacterial intestinal infections     2003, H. pylori gastritis treated with Prevpac     Other specified enthesopathies of unspecified lower limb, excluding foot     No Comments Provided     Peptic ulcer without hemorrhage or perforation     No Comments Provided     Personal history of other diseases of the digestive system (CODE)     2011     Personal history of other medical treatment (CODE)     , vaginal deliveries     Pure hypercholesterolemia     No Comments Provided     Sepsis (H)     child,Hospitalized as a child for blood poisoning     Sleep disorder     2011     Transient cerebral ischemic attack     2011       Past Surgical History   I have reviewed this patient's surgical history and updated it with pertinent information if needed.  Past Surgical History:   Procedure Laterality Date     CHOLECYSTECTOMY      No Comments Provided     COLONOSCOPY      ,Sigmoid diverticulosis     ENDOSCOPY UPPER, COLONOSCOPY, COMBINED      11,Hiatal hernia, gastric gland hyperplasia in antrum     LAPAROSCOPIC TUBAL LIGATION      No Comments Provided     OTHER SURGICAL HISTORY       1986,[id number],BIOPSY BREAST,Right     OTHER SURGICAL HISTORY      [id number],PREMALIG/BENIGN SKIN LESION EXCISION,R side of nose, face and chest wall/Basal Cell Cancer Excision     TONSILLECTOMY, ADENOIDECTOMY, COMBINED      1954       Prior to Admission Medications   Prior to Admission Medications   Prescriptions Last Dose Informant Patient Reported? Taking?   Calcium Carbonate-Vitamin D (CALCIUM 500 + D) 500-125 MG-UNIT TABS 4/3/2020 at Unknown time Daughter Yes Yes   Sig: Take 1 tablet by mouth daily   Multiple Vitamins-Minerals (WOMENS MULTIVITAMIN  PLUS) TABS 4/3/2020 at Unknown time Daughter Yes Yes   Sig: Take 1 tablet by mouth every morning   acetaminophen (TYLENOL) 325 MG tablet 4/3/2020 at Unknown time  No Yes   Sig: Take 3 tablets (975 mg) by mouth 3 times daily   aspirin-dipyridamole ER (AGGRENOX)  MG 12 hr capsule 4/3/2020 at Unknown time  No Yes   Sig: TAKE 1 CAPSULE TWICE A DAY   furosemide (LASIX) 20 MG tablet 4/3/2020 at Unknown time  No Yes   Sig: Take 1 tablet (20 mg) by mouth daily as needed (Lower extremity swelling)   furosemide 20 MG PO tablet Unknown at Unknown time  Yes No   Sig: Take 1 tablet by mouth daily   lactulose 20 GM/30ML SOLN 4/2/2020 at Unknown time  No Yes   Sig: Take 30 mLs (20 g) by mouth 2 times daily as needed   magnesium hydroxide (MILK OF MAGNESIA) 400 MG/5ML suspension Unknown at Unknown time  No No   Sig: Take 15 mLs by mouth At Bedtime   metoprolol tartrate (LOPRESSOR) 25 MG tablet 4/3/2020 at Unknown time  No Yes   Sig: Take 0.5 tablets (12.5 mg) by mouth 2 times daily   oxyCODONE (ROXICODONE) 5 MG tablet 4/3/2020 at 1800  No Yes   Sig: Take 0.5-1 tablets (2.5-5 mg) by mouth every 4 hours as needed for moderate to severe pain   predniSONE (DELTASONE) 10 MG tablet 4/3/2020 at Unknown time  No Yes   Sig: Take 1 tablet (10 mg) by mouth every other day   trolamine salicylate (ASPERCREME) 10 % external cream Unknown at Unknown time Daughter Yes No   Sig: Apply topically 3  times daily Apply to hip, back, knees      Facility-Administered Medications: None     Allergies   Allergies   Allergen Reactions     Lansoprazole Other (See Comments) and Unknown     Patient states that medication didn't work for her.  Daughter states she got delusional, water did not taste right     Lisinopril Cough     Lactose        Social History   I have reviewed this patient's social history and updated it with pertinent information if needed. Tenisha Cagle  reports that she has never smoked. She has never used smokeless tobacco. She reports that she does not drink alcohol or use drugs.    Family History   I have reviewed this patient's family history and updated it with pertinent information if needed.   Family History   Problem Relation Age of Onset     Other - See Comments Mother         Stroke,Had a CVA age 85     Cancer Father         Cancer,Brain tumor, age 56     Blood Disease Sister         Blood Disease,Leukemia and     Cancer Sister         Cancer, kidney cancer     Blood Disease Sister         Blood Disease,Chronic lymphocytic leukemia       REVIEW OF SYSTEMS  Unable to obtain due to dementia    Physical Exam   Temp: 98  F (36.7  C) Temp src: Oral BP: (!) 179/78 Pulse: 79 Heart Rate: 68 Resp: 20 SpO2: 98 % O2 Device: None (Room air)    Vital Signs with Ranges  Temp:  [97.3  F (36.3  C)-98.2  F (36.8  C)] 98  F (36.7  C)  Pulse:  [75-81] 79  Heart Rate:  [68-85] 68  Resp:  [18-24] 20  BP: (137-179)/() 179/78  SpO2:  [96 %-100 %] 98 %  119 lbs 11.2 oz    Constitutional: NAD, pleasant and cooperative  HEENT: normocephalic, no icterus, no nasal drainage, no oral lesions, mucus membranes pink and moist  Respiratory: lungs clear to ausculation bilaterally, no respiratory distress  Cardiovascular: heart regular rate and rhythm-2 + edema, bilateral at ankles  Abdomen: bowel sounds +, soft and non-distended, no tenderness, no peritoneal signs  Lymph/Hematologic: No axillary or cervical  adenopathy  Skin: pink, warm and dry. No rash or ulceration  Musculoskeletal: calves soft      Data   -Data reviewed today: All pertinent laboratory and imaging results from this encounter were reviewed. I personally reviewed no images or EKG's today.  Recent Labs   Lab 04/04/20  0535 04/03/20 2015   WBC 15.5* 19.7*   HGB 6.8* 8.0*   MCV 87 87   * 587*   * 127*   POTASSIUM 3.6 4.3   CHLORIDE 99 93*   CO2 24 25   BUN 29* 33*   CR 0.99 1.13   ANIONGAP 7 9   MATI 7.8* 8.3*   GLC 84 122*   ALBUMIN  --  2.9*   PROTTOTAL  --  5.4*   BILITOTAL  --  0.1*   ALKPHOS  --  45   ALT  --  8   AST  --  11*       Recent Results (from the past 24 hour(s))   XR Chest 2 Views    Narrative    PROCEDURE:  XR CHEST 2 VW    HISTORY: weakness, .    COMPARISON:  3/3/2020    FINDINGS:  The cardiomediastinal contours are unchanged. A hiatal hernia is again  present. The trachea is midline.  There is calcific aortic  atherosclerosis.  The lungs are hyperexpanded. Emphysema is unchanged. No effusion or  pneumothorax is seen.    No suspicious osseous lesion or subdiaphragmatic free air.      Impression    IMPRESSION:      Emphysema.    SHANIQUA COLEMAN MD

## 2020-04-04 NOTE — ED PROVIDER NOTES
History     Chief Complaint   Patient presents with     Generalized Body Aches     HPI  Tenisha Cagle is a 86 year old female who is brought in by daughter.  There have been concerns all day that she isn't right.    Tenisha appears to have advanced dementia.  No cough, and no fever, but she does tell me that she is SOB, but I attribute this to significant anxiety.    She is not sure why she is here in the ER.  No urinary symptoms.  No chest symptoms.  No abdominal concerns,  No N/V/D.    No urinary symptoms.    Does have some aching all over.        Allergies:  Allergies   Allergen Reactions     Lansoprazole Other (See Comments) and Unknown     Patient states that medication didn't work for her.  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     Priority: Medium     Controlled substance agreement signed 3/8/19 03/08/2019     Priority: Medium     Class: Chronic     Arthritis of shoulder region, left, degenerative 01/07/2019     Priority: Medium     Memory loss 05/25/2018     Priority: Medium     Atrial fibrillation with RVR (H) 01/25/2018     Priority: Medium     Hiatal hernia 01/17/2018     Priority: Medium     Overview:   large, mostly intrathoracic       Essential hypertension 01/17/2018     Priority: Medium     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/27/2017     Priority: Medium     Benign skin lesion of nose 09/27/2017     Priority: Medium     AK (actinic keratosis) 07/10/2017      Priority: Medium     SK (seborrheic keratosis) 07/10/2017     Priority: Medium     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) 06/15/2017     Priority: Medium     Abnormal weight loss 04/18/2016     Priority: Medium     Anemia 04/18/2016     Priority: Medium     Visual changes 04/18/2016     Priority: Medium     Osteoarthritis of spine with radiculopathy, lumbar region 02/03/2016     Priority: Medium     History of TIA (transient ischemic attack) 02/02/2016     Priority: Medium     Numbness and tingling of right leg 02/02/2016     Priority: Medium     Radicular leg pain 02/02/2016     Priority: Medium     Basal cell carcinoma of right cheek 11/18/2015     Priority: Medium     Alvarado's cyst of knee 02/20/2014     Priority: Medium     Paresthesia of right leg 02/20/2014     Priority: Medium     Right knee DJD 02/20/2014     Priority: Medium     Cystocele 08/23/2013     Priority: Medium     Pancreatic mass 09/18/2012     Priority: Medium     Overview:   Possible, abnormal CT        Cerebral aneurysm, nonruptured 12/20/2011     Priority: Medium     Diverticulosis of colon 05/13/2011     Priority: Medium     Chronic lymphocytic leukemia (H) 04/14/2011     Priority: Medium     Overview:   WBC stable in the 30,000          Past Medical History:    Past Medical History:   Diagnosis Date     Cardiac murmur      Cataract      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)      Diaphragmatic hernia without obstruction or gangrene      Diverticulosis of large intestine without perforation or abscess without bleeding      Dysthymic disorder      Essential (primary) hypertension      Female climacteric state      Gastritis without bleeding      Nonruptured cerebral aneurysm      Osteoarthritis      Other fecal abnormalities      Other lymphoid leukemia not having achieved remission (H)      Other specified bacterial intestinal infections      Other specified enthesopathies  of unspecified lower limb, excluding foot      Peptic ulcer without hemorrhage or perforation      Personal history of other diseases of the digestive system (CODE)      Personal history of other medical treatment (CODE)      Pure hypercholesterolemia      Sepsis (H)      Sleep disorder      Transient cerebral ischemic attack        Past Surgical History:    Past Surgical History:   Procedure Laterality Date     CHOLECYSTECTOMY      No Comments Provided     COLONOSCOPY      2007,Sigmoid diverticulosis     ENDOSCOPY UPPER, COLONOSCOPY, COMBINED      5/5/11,Hiatal hernia, gastric gland hyperplasia in antrum     LAPAROSCOPIC TUBAL LIGATION      No Comments Provided     OTHER SURGICAL HISTORY      1986,205093,BIOPSY BREAST,Right     OTHER SURGICAL HISTORY      [id number],PREMALIG/BENIGN SKIN LESION EXCISION,R side of nose, face and chest wall/Basal Cell Cancer Excision     TONSILLECTOMY, ADENOIDECTOMY, COMBINED      1954       Family History:    Family History   Problem Relation Age of Onset     Other - See Comments Mother         Stroke,Had a CVA age 85     Cancer Father         Cancer,Brain tumor, age 56     Blood Disease Sister         Blood Disease,Leukemia and     Cancer Sister         Cancer, kidney cancer     Blood Disease Sister         Blood Disease,Chronic lymphocytic leukemia       Social History:  Marital Status:   [5]  Social History     Tobacco Use     Smoking status: Never Smoker     Smokeless tobacco: Never Used   Substance Use Topics     Alcohol use: No     Alcohol/week: 0.0 standard drinks     Drug use: No        Medications:    acetaminophen (TYLENOL) 325 MG tablet  aspirin-dipyridamole ER (AGGRENOX)  MG 12 hr capsule  Calcium Carbonate-Vitamin D (CALCIUM 500 + D) 500-125 MG-UNIT TABS  furosemide (LASIX) 20 MG tablet  furosemide 20 MG PO tablet  lactulose 20 GM/30ML SOLN  magnesium hydroxide (MILK OF MAGNESIA) 400 MG/5ML suspension  metoprolol tartrate (LOPRESSOR) 25 MG tablet  Multiple  "Vitamins-Minerals (WOMENS MULTIVITAMIN  PLUS) TABS  oxyCODONE (ROXICODONE) 5 MG tablet  predniSONE (DELTASONE) 10 MG tablet  trolamine salicylate (ASPERCREME) 10 % external cream          Review of Systems  No fevers, chills, rigors, HEENT, cough, abdominal pain, N/V/D  Physical Exam   BP: (!) 142/103  Pulse: 81  Heart Rate: 85  Temp: 98  F (36.7  C)  Resp: 24  Height: 167.6 cm (5' 6\")  Weight: 47.6 kg (105 lb)  SpO2: 100 %      Physical Exam  Well woman who appears anxious and demented.  Heart reg.  Lungs clear.  No tachypnea.  abd soft, NT, ND, NABS.  No DVT signs.    Diagnostics show a likely UTI with slightly elevated lactate.  Antibiotics and fluids started.  EKG NSR no concerning ST or T changes  CXR clear    ED Course        Procedures               Critical Care time:  none              Results for orders placed or performed during the hospital encounter of 04/03/20 (from the past 24 hour(s))   EKG 12-lead, tracing only   Result Value Ref Range    Interpretation ECG Click View Image link to view waveform and result    Influenza A and B and RSV PCR   Result Value Ref Range    Specimen Description Nasopharyngeal     Influenza A PCR Negative NEG^Negative    Influenza B PCR Negative NEG^Negative    Resp Syncytial Virus Negative NEG^Negative   CBC with platelets differential   Result Value Ref Range    WBC 19.7 (H) 4.0 - 11.0 10e9/L    RBC Count 2.91 (L) 3.8 - 5.2 10e12/L    Hemoglobin 8.0 (L) 11.7 - 15.7 g/dL    Hematocrit 25.3 (L) 35.0 - 47.0 %    MCV 87 78 - 100 fl    MCH 27.5 26.5 - 33.0 pg    MCHC 31.6 31.5 - 36.5 g/dL    RDW 14.2 10.0 - 15.0 %    Platelet Count 587 (H) 150 - 450 10e9/L    Diff Method Automated Method     % Neutrophils 67.4 %    % Lymphocytes 28.2 %    % Monocytes 3.3 %    % Eosinophils 0.0 %    % Basophils 0.2 %    % Immature Granulocytes 0.9 %    Absolute Neutrophil 13.3 (H) 1.6 - 8.3 10e9/L    Absolute Lymphocytes 5.6 (H) 0.8 - 5.3 10e9/L    Absolute Monocytes 0.7 0.0 - 1.3 10e9/L    " Absolute Eosinophils 0.0 0.0 - 0.7 10e9/L    Absolute Basophils 0.0 0.0 - 0.2 10e9/L    Abs Immature Granulocytes 0.2 0 - 0.4 10e9/L   Comprehensive metabolic panel   Result Value Ref Range    Sodium 127 (L) 134 - 144 mmol/L    Potassium 4.3 3.5 - 5.1 mmol/L    Chloride 93 (L) 98 - 107 mmol/L    Carbon Dioxide 25 21 - 31 mmol/L    Anion Gap 9 3 - 14 mmol/L    Glucose 122 (H) 70 - 105 mg/dL    Urea Nitrogen 33 (H) 7 - 25 mg/dL    Creatinine 1.13 0.60 - 1.20 mg/dL    GFR Estimate 46 (L) >60 mL/min/[1.73_m2]    GFR Estimate If Black 55 (L) >60 mL/min/[1.73_m2]    Calcium 8.3 (L) 8.6 - 10.3 mg/dL    Bilirubin Total 0.1 (L) 0.3 - 1.0 mg/dL    Albumin 2.9 (L) 3.5 - 5.7 g/dL    Protein Total 5.4 (L) 6.4 - 8.9 g/dL    Alkaline Phosphatase 45 34 - 104 U/L    ALT 8 7 - 52 U/L    AST 11 (L) 13 - 39 U/L   Troponin GH   Result Value Ref Range    Troponin 8.4 <34.0 pg/mL   Lactic acid whole blood   Result Value Ref Range    Lactic Acid 2.1 (H) 0.7 - 2.0 mmol/L   XR Chest 2 Views    Narrative    PROCEDURE:  XR CHEST 2 VW    HISTORY: weakness, .    COMPARISON:  3/3/2020    FINDINGS:  The cardiomediastinal contours are unchanged. A hiatal hernia is again  present. The trachea is midline.  There is calcific aortic  atherosclerosis.  The lungs are hyperexpanded. Emphysema is unchanged. No effusion or  pneumothorax is seen.    No suspicious osseous lesion or subdiaphragmatic free air.      Impression    IMPRESSION:      Emphysema.    SHANIQUA COLEMAN MD   UA reflex to Microscopic and Culture    Specimen: Urine catheter; Catheterized Urine   Result Value Ref Range    Color Urine Light Yellow     Appearance Urine Slightly Cloudy     Glucose Urine Negative NEG^Negative mg/dL    Bilirubin Urine Negative NEG^Negative    Ketones Urine Negative NEG^Negative mg/dL    Specific Gravity Urine 1.013 1.003 - 1.035    Blood Urine Negative NEG^Negative    pH Urine 7.0 5.0 - 7.0 pH    Protein Albumin Urine Negative NEG^Negative mg/dL     Urobilinogen mg/dL Normal 0.0 - 2.0 mg/dL    Nitrite Urine Negative NEG^Negative    Leukocyte Esterase Urine Large (A) NEG^Negative    Source Catheterized Urine     RBC Urine 1 0 - 2 /HPF    WBC Urine 112 (H) 0 - 5 /HPF    WBC Clumps Present (A) NEG^Negative /HPF    Bacteria Urine Few (A) NEG^Negative /HPF    Yeast Urine Few (A) NEG^Negative /HPF    Hyaline Casts 6 (H) 0 - 2 /LPF       Medications   0.9% sodium chloride BOLUS (250 mLs Intravenous New Bag 4/3/20 2056)   cefTRIAXone in d5w (ROCEPHIN) intermittent infusion 1 g (has no administration in time range)       Assessments & Plan (with Medical Decision Making)     I have reviewed the nursing notes.    I have reviewed the findings, diagnosis, plan and need for follow up with the patient.   patient will be admitted to 's service with a UTI, possible early urosepsis (elevated lactate) and to watch her progress.  Also, she has worsening hyponatremia and anemia.    No other acute, emergent issues.  Doubt COVID.    Rocephin given in ER, and gentle fluids will be started after a NS 250ml bolus.  Vitals have been stable here in the ER.    New Prescriptions    No medications on file       Final diagnoses:   Acute cystitis without hematuria   Other iron deficiency anemia       4/3/2020   [facility]     FarrellCesar MD  04/03/20 [id number]

## 2020-04-04 NOTE — PROGRESS NOTES
" NS ADMISSION NOTE    Patient admitted to room 306 at approximately 2300 via cart from emergency room. Patient was accompanied by transport tech.     Verbal SBAR report received from JENNIFER Costa prior to patient arrival.     Patient ambulated to bed with one assist. Patient alert and oriented X 3. Pain is controlled without any medications. 0-10 Pain Scale: 6. Admission vital signs: Blood pressure (!) 179/66, pulse 75, temperature 98.2  F (36.8  C), temperature source Tympanic, resp. rate 18, height 1.676 m (5' 6\"), weight 54.3 kg (119 lb 11.2 oz), SpO2 99 %, not currently breastfeeding. Patient was oriented to plan of care, call light, bed controls, tv, telephone, bathroom and visiting hours.     Risk Assessment    The following safety risks were identified during admission: fall. Yellow risk band applied: YES.       Flako Gramajo RN    "

## 2020-04-04 NOTE — PROGRESS NOTES
"Tenisha had an episode this evening around 1745 when she was eating. She was not choking. She said she was going to vomit. Received Zofran, did not vomit. She felt more like \"food was not going down all the way\" when talking with patient. She was anxious, but was not in any respiratory distress. The episode was similar to that of what her daughter Nona described on the phone this morning. She then was complaining of pain at the same time \"all over\" but could not localize it. Gave her oxy for first time this shift. Has had episodes of being more confused intermittently throughout the day and more clear at times.   This episode lasted only a couple minutes.     Dr. Álvarez had previously placed a general surgery consult this morning for dysphagia and anemia.     Lesa Grey RN on 4/4/2020 at 6:37 PM    "

## 2020-04-04 NOTE — ED TRIAGE NOTES
Pt comes into the ER today from home. Pt lives alone. Daughter brought her in. Pt reports that everything hurts. She has phlegm in her throat. She is having a hard time swallowing. Pt is anxious, no drooling; tolerating her secretions. Pt doesn't know how long she had felt like this.

## 2020-04-04 NOTE — PLAN OF CARE
"Patient alert, oriented to self, pleasant, confused, follows directions, disbelief of hospitalization.  Repetitive questions in regards to where she is and if her daughter knows- reassurance provided and questions answered.  Is assist of one for transfers and mobility in room.  Has been up to the bathroom x2 clear/yellow output- denies frequency/burning, is continent.  Lungs clear/equal, bowel sounds active, heart regular, edema 2+ BLE, redness to lower legs, CMS/peripheral pulses weak/intact, skin intact.  Denies pain.  BP (!) 162/60   Pulse 75   Temp 97.8  F (36.6  C) (Tympanic)   Resp 18   Ht 1.676 m (5' 6\")   Wt 54.3 kg (119 lb 11.2 oz)   SpO2 97%   BMI 19.32 kg/m    Myra Talbot RN on 4/4/2020 at 3:44 AM     Pain when up to use bathroom- generalized.  Received oxycodone 5 mg.  IV in left arm infiltrated- new IV in right arm.  Myra Talbot RN on 4/4/2020 at 4:33 AM     Call from lab with critical Hgb.  Dr. Álvarez updated and received order to type and screen.  Order entered.  Myra Talbot RN on 4/4/2020 at 6:27 AM   "

## 2020-04-04 NOTE — PROGRESS NOTES
SAFETY CHECKLIST  ID Bands and Risk clasps correct and in place (DNR, Fall risk, Allergy, Latex, Limb):  Yes  All Lines Reconciled and labeled correctly: Yes  Whiteboard updated:Yes  Environmental interventions (bed/chair alarm on, call light, side rails, restraints, sitter....): Yes  Lesa Grey RN on 4/4/2020 at 7:16 AM

## 2020-04-04 NOTE — PROGRESS NOTES
Tenisha has been receiving scheduled tylenol for her chronic pain. She has declined oxy all day, feels the tylenol is actually working for her. When asked about pain, she states it's a 4/10 and points to her left knee. She has eaten fairly well today. Is confused. She has been up in chair and ambulated in the room to restroom several times with assist x1. She has voided without difficulty 4x this shift. It has been unmeasured unfortunately due to missing the hat. VS have been stable aside from BP in 150's systolic. IV is saline locked.     Lesa Grey RN on 4/4/2020 at 5:13 PM

## 2020-04-04 NOTE — H&P
"[facility]    History and Physical  Hospitalist       Date of Admission:  4/3/2020    Assessment & Plan   Tenisha Cagle is a 86 year old female who presents with multiple symptoms, but mainly difficulty swallowing.    Principal Problem:    UTI (urinary tract infection)    Assessment: Urinalysis shows significant white cells and bacteria.  Culture is pending.  White count is elevated.    Plan: IV antibiotics, ceftriaxone.  Hydration.  Active Problems:    Anemia    Assessment: This is been chronic for several months, however hemoglobin has gradually decreased.  Patient has a history of difficulty swallowing and had been advised to have an EGD at some point.    Plan: Surgical consultation, start PPI and Carafate.  Will hold on Aggrenox.    Essential hypertension    Assessment: Blood pressures been elevated since admission.    Plan: We will continue to monitor.    Chronic lymphocytic leukemia (H)    Assessment: White count is 15,500, no differential but past differentials have shown 26% lymphocytes.    Plan: Continue to monitor    Dementia with behavioral disturbance (H)    Assessment: Patient has had no behavior problems.  She does not really know why she is here.  She does not recall anything about being told she should have an EGD.    Plan: No change-stable    Elevated lactic acid level    Assessment: Resolved    Plan: No further treatment needed    Hyponatremia    Assessment: Improved    Plan: Continue to monitor    Febrile urinary tract infection    Assessment: Asymptomatic    Plan: Await culture results, continue antibiotics    DVT Prophylaxis: Pneumatic Compression Devices  Code Status: DNR/DNI    LINDEN CANCINO    Primary Care Physician   Godwin Kapoor    Chief Complaint   \"I do not know why I am here\".  Patient was brought in by her daughter who states that she was having much trouble swallowing, seemed to have more phlegm in her throat, and became very anxious with " drooling.    History is obtained from the patient minimally, mainly her daughter, ED physician, and chart review.    History of Present Illness   Tenisha Cagle is a 86 year old female who presents with the above complaint.  Her daughter brought her in, dropped her off in the ED and talk to the nurse about her ongoing health issues mainly trouble swallowing.  He had been in the clinic and EGD had been discussed.  She has a history of a hiatal hernia.  The patient denies any problems and as noted above, does not know why she is here.  In the ED she was found to have evidence of a significant urinary tract infection.  She denied fever and chills, but her daughter stated she had a fever at home.  Patient's history was quite unreliable.  At the time of this exam she denied any significant problems at all and said she was swallowing well.    She has been noted to have a gradual decrease in her hemoglobin.  She denies any chest pain, shortness of breath, lightheadedness, but does admit to some mild epigastric abdominal pain.    She has a history of CLL, and white count has been stable in about the 19,000 range.    Past Medical History    I have reviewed this patient's medical history and updated it with pertinent information if needed.   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)     4/14/2011     Diaphragmatic hernia without obstruction or gangrene     large, mostly intrathoracic     Diverticulosis of large intestine without perforation or abscess without bleeding     5/13/2011     Dysthymic disorder     No Comments Provided     Essential (primary) hypertension     No Comments Provided     Female climacteric state     Menopausal age 56     Gastritis without bleeding     5/9/03,Treated with PrevPac     Nonruptured cerebral aneurysm     12/20/2011      Osteoarthritis     No Comments Provided     Other fecal abnormalities     Recurrent loose stool     Other lymphoid leukemia not having achieved remission (H)     CLL, stable WBC 30,000     Other specified bacterial intestinal infections     2003, H. pylori gastritis treated with Prevpac     Other specified enthesopathies of unspecified lower limb, excluding foot     No Comments Provided     Peptic ulcer without hemorrhage or perforation     No Comments Provided     Personal history of other diseases of the digestive system (CODE)     2011     Personal history of other medical treatment (CODE)     , vaginal deliveries     Pure hypercholesterolemia     No Comments Provided     Sepsis (H)     child,Hospitalized as a child for blood poisoning     Sleep disorder     2011     Transient cerebral ischemic attack     2011       Past Surgical History   I have reviewed this patient's surgical history and updated it with pertinent information if needed.  Past Surgical History:   Procedure Laterality Date     CHOLECYSTECTOMY      No Comments Provided     COLONOSCOPY      ,Sigmoid diverticulosis     ENDOSCOPY UPPER, COLONOSCOPY, COMBINED      11,Hiatal hernia, gastric gland hyperplasia in antrum     LAPAROSCOPIC TUBAL LIGATION      No Comments Provided     OTHER SURGICAL HISTORY      ,2050,BIOPSY BREAST,Right     OTHER SURGICAL HISTORY      CYD091,PREMALIG/BENIGN SKIN LESION EXCISION,R side of nose, face and chest wall/Basal Cell Cancer Excision     TONSILLECTOMY, ADENOIDECTOMY, COMBINED             Prior to Admission Medications   Prior to Admission Medications   Prescriptions Last Dose Informant Patient Reported? Taking?   Calcium Carbonate-Vitamin D (CALCIUM 500 + D) 500-125 MG-UNIT TABS 4/3/2020 at Unknown time Daughter Yes Yes   Sig: Take 1 tablet by mouth daily   Multiple Vitamins-Minerals (WOMENS MULTIVITAMIN  PLUS) TABS 4/3/2020 at Unknown time Daughter Yes Yes   Sig: Take 1  tablet by mouth every morning   acetaminophen (TYLENOL) 325 MG tablet 4/3/2020 at Unknown time  No Yes   Sig: Take 3 tablets (975 mg) by mouth 3 times daily   aspirin-dipyridamole ER (AGGRENOX)  MG 12 hr capsule 4/3/2020 at Unknown time  No Yes   Sig: TAKE 1 CAPSULE TWICE A DAY   furosemide (LASIX) 20 MG tablet 4/3/2020 at Unknown time  No Yes   Sig: Take 1 tablet (20 mg) by mouth daily as needed (Lower extremity swelling)   furosemide 20 MG PO tablet Unknown at Unknown time  Yes No   Sig: Take 1 tablet by mouth daily   lactulose 20 GM/30ML SOLN 4/2/2020 at Unknown time  No Yes   Sig: Take 30 mLs (20 g) by mouth 2 times daily as needed   magnesium hydroxide (MILK OF MAGNESIA) 400 MG/5ML suspension Unknown at Unknown time  No No   Sig: Take 15 mLs by mouth At Bedtime   metoprolol tartrate (LOPRESSOR) 25 MG tablet 4/3/2020 at Unknown time  No Yes   Sig: Take 0.5 tablets (12.5 mg) by mouth 2 times daily   oxyCODONE (ROXICODONE) 5 MG tablet 4/3/2020 at 1800  No Yes   Sig: Take 0.5-1 tablets (2.5-5 mg) by mouth every 4 hours as needed for moderate to severe pain   predniSONE (DELTASONE) 10 MG tablet 4/3/2020 at Unknown time  No Yes   Sig: Take 1 tablet (10 mg) by mouth every other day   trolamine salicylate (ASPERCREME) 10 % external cream Unknown at Unknown time Daughter Yes No   Sig: Apply topically 3 times daily Apply to hip, back, knees      Facility-Administered Medications: None     Allergies   Allergies   Allergen Reactions     Lansoprazole Other (See Comments) and Unknown     Patient states that medication didn't work for her.  Daughter states she got delusional, water did not taste right     Lisinopril Cough     Lactose        Social History   I have reviewed this patient's social history and updated it with pertinent information if needed. Tenisha Cagle  reports that she has never smoked. She has never used smokeless tobacco. She reports that she does not drink alcohol or use drugs.    Family History   I  have reviewed this patient's family history and updated it with pertinent information if needed.   Family History   Problem Relation Age of Onset     Other - See Comments Mother         Stroke,Had a CVA age 85     Cancer Father         Cancer,Brain tumor, age 56     Blood Disease Sister         Blood Disease,Leukemia and     Cancer Sister         Cancer, kidney cancer     Blood Disease Sister         Blood Disease,Chronic lymphocytic leukemia       Review of Systems     REVIEW OF SYSTEMS:    Quite unreliable due to her dementia, basically denied any symptoms now other than mild abdominal discomfort.    Additions to the above include: None    Physical Exam   Temp: 98  F (36.7  C) Temp src: Oral BP: (!) 179/78 Pulse: 79 Heart Rate: 68 Resp: 20 SpO2: 98 % O2 Device: None (Room air)    Vital Signs with Ranges  Temp:  [97.3  F (36.3  C)-98.2  F (36.8  C)] 98  F (36.7  C)  Pulse:  [75-81] 79  Heart Rate:  [68-85] 68  Resp:  [18-24] 20  BP: (137-179)/() 179/78  SpO2:  [96 %-100 %] 98 %  119 lbs 11.2 oz    Constitutional: She is awake and alert, sitting up in the chair in no apparent distress.  She is eating breakfast at the time of this exam.  Eyes: No scleral icterus.  Nipples are equal round reactive, extraocular movements intact.  HEENT: Within normal limits for age.  Mucous membranes appear moist.  No apparent difficulty swallowing noted.  Respiratory: Thoracic kyphosis.  Lungs are clear with relatively good air movement.  Cardiovascular: Regular rhythm, no murmur gallop appreciated.  Trace peripheral edema.  GI: Slightly distended but soft, mild epigastric tenderness, no organomegaly or masses, no rebound or guarding, bowel sounds normal  Lymph/Hematologic: No significant adenopathy or bruising  Genitourinary: Not examined  Skin: Warm and dry, no diaphoresis  Musculoskeletal: No muscle atrophy  Neurologic: Confused otherwise no focal findings  Psychiatric: Alert, pleasant and cooperative, mildly anxious.    Data    Data reviewed today:  I personally reviewed the EKG tracing showing Sinus rhythm, with no acute change, and the chest x-ray image(s) showing Emphysematous changes which appear mild, with no acute infiltrates..  White blood count was initially 19,700, down to 15,500.  Initial hemoglobin was 8.0, down to 6.8 this morning likely due to hydration.  Initial lactate was 2.1, down to 0.7 this morning.  Sodium is increased from 1 27-1 30.  Remainder the electrolytes, renal function, and liver enzymes are normal.  Recent Labs   Lab 04/04/20  0535 04/03/20 2015   WBC 15.5* 19.7*   HGB 6.8* 8.0*   MCV 87 87   * 587*   * 127*   POTASSIUM 3.6 4.3   CHLORIDE 99 93*   CO2 24 25   BUN 29* 33*   CR 0.99 1.13   ANIONGAP 7 9   MATI 7.8* 8.3*   GLC 84 122*   ALBUMIN  --  2.9*   PROTTOTAL  --  5.4*   BILITOTAL  --  0.1*   ALKPHOS  --  45   ALT  --  8   AST  --  11*       Recent Results (from the past 24 hour(s))   XR Chest 2 Views    Narrative    PROCEDURE:  XR CHEST 2 VW    HISTORY: weakness, .    COMPARISON:  3/3/2020    FINDINGS:  The cardiomediastinal contours are unchanged. A hiatal hernia is again  present. The trachea is midline.  There is calcific aortic  atherosclerosis.  The lungs are hyperexpanded. Emphysema is unchanged. No effusion or  pneumothorax is seen.    No suspicious osseous lesion or subdiaphragmatic free air.      Impression    IMPRESSION:      Emphysema.    SHANIQUA COLEMAN MD

## 2020-04-04 NOTE — PHARMACY-ADMISSION MEDICATION HISTORY
Pharmacy -- Admission Medication Reconciliation    Prior to admission (PTA) medications were reviewed and the patient's PTA medication list was updated.    Sources Consulted: [employer], attempted to phone patient x 2 (no answer), daughter Nona phone interview    The reliability of this Medication Reconciliation is: Reliability: Reliable    The following significant changes were made:    Removed lasix daily rx, kept rx for daily prn per daughter--she gives it to her when her ankles are swollen    Updated MOM to prn    Updated Aspercream to prn    **daughter states she takes her lactulose as 30 mls HS  **daughter states she gives her a full metoprolol tart tablet QAM (instead of 0.5 tablets bid, per daughter her BP is usually elevated in the morning)  **daughter states she has been giving her oxycodone 5 mg every 4 hours while awake    In addition, the patient's allergies were reviewed with the patient and updated as follows:   Allergies: Lansoprazole; Lisinopril; and Lactose    The pharmacist has reviewed with the patient that all personal medications should be removed from the building or locked in the belongings safe.  Patient shall only take medications ordered by the physician and administered by the nursing staff.       Medication barriers identified: yes, daughter is giving her medications different than prescribed   Medication adherence concerns: none   Understanding of emergency medications: NA-did not access, daughter helps with medications    Jaky Jackson RPH, 4/4/2020,  9:23 AM

## 2020-04-05 NOTE — PROGRESS NOTES
[facility]    Hospitalist Progress Note      Assessment & Plan   Tenisha Cagle is a 86 year old female who was admitted on 4/3/2020.     Principal Problem:    UTI (urinary tract infection)    Assessment: Asymptomatic.  Urine culture still pending.    Plan: Adjust antibiotic pending urine culture result.  Should be available later today.  Active Problems:    Anemia    Assessment: Hemoglobin has decreased to 6.2    Plan: Transfuse 1 unit today, check hemoglobin in the morning    Hiatal hernia    Assessment: Large hiatal hernia noted at her last EGD several years ago    Plan: PPI and Carafate    History of TIA (transient ischemic attack)    Assessment: Stable    Plan: Discontinue Aggrenox because of the gastric irritation    Essential hypertension    Assessment: Blood pressure has been slightly elevated    Plan: We will continue to monitor    Chronic lymphocytic leukemia (H)    Assessment: White count is coming down, differential shows only 29% lymphocytes    Plan: Continue to monitor    Dementia with behavioral disturbance (H)    Assessment: Stable, pleasantly confused, no behavioral issues noted    Plan: Continue to monitor-PT/OT eval    DVT Prophylaxis: Pneumatic Compression Devices  Code Status: DNR/DNI    LINDEN REILLY BARAK    Interval History   Denies any problems.  Says she feels okay.  No cardiopulmonary symptoms, no abdominal pain, and no urinary tract symptoms.  Denies fever or chills.  Staff is noted some difficulty with swallowing certain solid foods.    -Data reviewed today: I reviewed all new labs and imaging results over the last 24 hours. I personally reviewed no images or EKG's today.  Hemoglobin is 6.2.    Physical Exam   Temp: 99.3  F (37.4  C) Temp src: Tympanic BP: 127/52 Pulse: 86 Heart Rate: 103 Resp: 21 SpO2: 96 % O2 Device: None (Room air)    Vitals:    04/03/20 1939 04/03/20 2306   Weight: 47.6 kg (105 lb) 54.3 kg (119 lb 11.2 oz)     Vital Signs with Ranges  Temp:   [96.7  F (35.9  C)-99.7  F (37.6  C)] 99.3  F (37.4  C)  Pulse:  [] 86  Heart Rate:  [103] 103  Resp:  [18-21] 21  BP: (127-184)/(52-61) 127/52  SpO2:  [96 %-99 %] 96 %  I/O last 3 completed shifts:  In: 500 [P.O.:500]  Out: 350 [Urine:350]    Constitutional: She is awake and alert, sitting up in the chair, just finished breakfast.  She is quite confused.  No apparent distress.  She is quite pale.  Respiratory: Lungs are clear.  Cardiovascular: Regular rhythm with no murmur gallop  GI: Abdomen is soft and nontender  Skin/Integumen: Warm and dry, no diaphoresis  Other: Confused, otherwise neurologically intact.    Medications       acetaminophen  975 mg Oral TID     cefTRIAXone  1 g Intravenous Q24H     furosemide  20 mg Oral Daily     magnesium hydroxide  15 mL Oral At Bedtime     metoprolol tartrate  12.5 mg Oral BID     pantoprazole  40 mg Oral QAM AC     predniSONE  10 mg Oral Every Other Day     sodium chloride (PF)  3 mL Intracatheter Q8H     sucralfate  1 g Oral 4x Daily AC & HS       Data   Recent Labs   Lab 04/05/20  0520 04/04/20  1205 04/04/20  0535 04/03/20 2015   WBC 14.0*  --  15.5* 19.7*   HGB 6.2* 7.7* 6.8* 8.0*   MCV 88  --  87 87   *  --  515* 587*   *  --  130* 127*   POTASSIUM 3.5  --  3.6 4.3   CHLORIDE 98  --  99 93*   CO2 27  --  24 25   BUN 27*  --  29* 33*   CR 1.02  --  0.99 1.13   ANIONGAP 5  --  7 9   MATI 7.9*  --  7.8* 8.3*     --  84 122*   ALBUMIN  --   --   --  2.9*   PROTTOTAL  --   --   --  5.4*   BILITOTAL  --   --   --  0.1*   ALKPHOS  --   --   --  45   ALT  --   --   --  8   AST  --   --   --  11*       No results found for this or any previous visit (from the past 24 hour(s)).

## 2020-04-05 NOTE — PLAN OF CARE
Quite confused this morning but cooperative. Dr. Álvarez placed order for 1 unit PRBCs for hgb 6.2. bp elevated 160's systolic. Otherwise stable. Temp 99.3.     Lesa Grey RN on 4/5/2020 at 8:01 AM

## 2020-04-05 NOTE — PLAN OF CARE
"Dr. Álvarez changed abx to oral route today. IV removed after blood products due to slight bleeding and leaking at site. Ok to keep out. She received 1 unit(s) of blood, tolerated w/o reaction. Will recheck hgb in AM per MD. She was asymptomatic of hgb this morning and remains asymptomatic. No hypotension. Afebrile. She is forgetful, frequently repeats herself and disoriented x4. This has not improved changed much since this morning. Frequently asks if she can go home. Staff is keeping her company throughout the day. Still transferring via assist x1. Voiding without difficulty. Eating small amounts frequently.     MD was informed that Tenisha coughs frequently during eating and struggles more with solids. Softer foods and liquids go down much easier without coughing. She often describes \"pain\" and food getting \"stuck\" while she is eating and becomes anxious with this. Coughing up clear phlegm also at these times. New order for speech therapy received from MD. PT/OT will see patient tomorrow also as patient was receiving blood and then sleeping when they attempted to see her today.     Lesa Grey RN on 4/5/2020 at 4:44 PM    "

## 2020-04-05 NOTE — PROGRESS NOTES
Urine culture-likely Enterococcus.  Will transition to Amoxicillin.  LINDEN CANCINO MD on 4/5/2020 at 4:36 PM

## 2020-04-06 NOTE — PROGRESS NOTES
Pt has been calm and cooperative. She is alert, orientated to person. Pt coughing and trying to clear her throat when eating. Did have assessment with speech therapy.

## 2020-04-06 NOTE — PLAN OF CARE
Discharge Planner PT   Patient plan for discharge: return home  Current status: in hospital, recent video swallow exam, having swalloing issues, and UTI.  Barriers to return to prior living situation: balance, swallow  Recommendations for discharge: home with home care  Rationale for recommendations: improve gait and transfers.        Entered by: Garrison Barbosa 04/06/2020 2:57 PM

## 2020-04-06 NOTE — PLAN OF CARE
Discharge Planner OT   Patient plan for discharge: pt did not fully state at time of eval   Current status: Pt very pleasant, mildly confused, easy to re-direct to task, needed mod to max assist to wash and bathe from chair level, max assist with lower body dressing. Pt completed grooming with set up only.   Barriers to return to prior living situation: lives alone, confusion   Recommendations for discharge: pt would benefit from short term rehab, however family is wanting to care for pt at their home, will continue to assess.   Rationale for recommendations: see above        Entered by: Sabra Linda 04/06/2020 2:20 PM

## 2020-04-06 NOTE — PROGRESS NOTES
:    Met with patient in room to discus discharge planning needs.  It was mentioned that patient would benefit from going to a rehab facility at discharge.  Patient asked if I would call her daughter at home and discuss this further.  I placed called to her daughter Nona and she declined SNF  as she stated they were just at one and did not feel is was adequate.  I offered her another SNF and she still declined.  Daughter stated patient lives with her and she can take care of her better then a nursing home.  I offered daughter home care services and she also declined as well stating patient had just finished with a home care agency and we know the exercises.  Daughter will transport her at discharge.  Anticipated discharge tomorrow to home with daughter.  No further needs at this time.    GWEN Ramirez on 4/6/2020 at 10:31 AM

## 2020-04-06 NOTE — PROGRESS NOTES
SAFETY CHECKLIST  ID Bands and Risk clasps correct and in place (DNR, Fall risk, Allergy, Latex, Limb):  Yes  All Lines Reconciled and labeled correctly: Yes  Whiteboard updated:Yes  Environmental interventions (bed/chair alarm on, call light, side rails, restraints, sitter....): Yes        Sitter in room

## 2020-04-06 NOTE — PROGRESS NOTES
04/06/20 1500   General Information   Onset Date 04/03/20   Start of Care Date 04/06/20   Referring Physician Dr. Vasquez   Patient Profile Review/OT: Additional Occupational Profile Info See Profile for full history and prior level of function   Patient/Family Goals Statement To assess rational for coughing and globus sensation.    Swallowing Evaluation Videofluoroscopic evaluation   Behaviorial Observations Confused   Mode of current nutrition Oral diet   Type of oral diet Dysphagia diet level 3;Thin liquid   Clinical Swallow Evaluation   Structural Abnormalities none present   Dentition present and adequate   Mucosal Quality adequate   Mandibular Strength and Mobility intact   Velar Elevation impaired   Buccal Strength and Mobility intact   Laryngeal Function Swallow;Voicing initiated;Throat clear;Cough   Rationale for completing additional evaluation To assess safety and function of swallow for PO intake.    VFSS Evaluation   VFSS Additional Documentation Yes   VFSS Eval: Thin Liquid Texture Trial   Mode of Presentation, Thin Liquid straw;self-fed   Order of Presentation 1,7   Preparatory Phase Poor bolus control   Oral Phase, Thin Liquid Premature pharyngeal entry   Pharyngeal Phase, Thin Liquid UES dysfunction;Residue in pyriform sinus;Residue in valleculae;Delayed swallow reflex;Pharyngeal wall coating   Rosenbek's Penetration Aspiration Scale: Thin Liquid Trial Results 5 - contrast contacts vocal cords, visible residue remains (penetration)   Response to Aspiration other (see comments)  (non-productive throat clear)   Diagnostic Statement Pt presents with oropharyngeal dysphagia, as characterized by oral residue, decreased bolus control, premature pharyngeal entry, delayed onset of the pharyngeal swallow, decreased velar elevation with escape to nasopharynx, pharyngeal residue, decreased UES functon, incomplete epiglottic inversion. and decreased anterior hyoid movement and laryngeal elevation. Significant  deep penetration of residue; however, no aspiration observed. Pt may be at risk for aspiration of penetration.    VFSS Eval: Nectar Thick Liquid Texture Trial   Mode of Presentation, Nectar straw;self-fed   Order of Presentation 2   Preparatory Phase Poor bolus control   Oral Phase, Nectar Residue in oral cavity   Pharyngeal Phase, Nectar Delayed swallow reflex;Pharyngeal wall coating;Residue in valleculae;Residue in pyriform sinus   Rosenbek's Penetration Aspiration Scale: Nectar-Thick Liquid Trial Results 3 - contrast remains above the vocal cords, visible residue remains (penetration)   Diagnostic Statement Pt presents with oropharyngeal dysphagia, as characterized by WFL oral residue, decreased bolus control, premature pharyngeal entry, delayed onset of the pharyngeal swallow, decreased velar elevation with escape to nasopharynx, pharyngeal residue, decreased UES functon, incomplete epiglottic inversion. and decreased anterior hyoid movement. Significant deep penetration of residue; however, no aspiration observed. Pt may be at risk for aspiration of penetration.    VFSS Eval: Honey Thick Texture Trial   Mode of Presentation, Honey straw;self-fed   Order of Presentation 3   Preparatory Phase Poor bolus control   Oral Phase, Honey Premature pharyngeal entry   Pharyngeal Phase, Honey Delayed swallow reflex;Pharyngeal wall coating;Residue in valleculae;Residue in pyriform sinus;UES dysfunction  (Residue likely associated with previous trials)   Rosenbek's Penetration Aspiration Scale: Honey Trial Results 3 - contrast remains above the vocal cords, visible residue remains (penetration)  (Likely assocaited with previous trials)   Diagnostic Statement Pt presents with oropharyngeal dysphagia, as characterized by WFL oral residue, decreased bolus control, premature pharyngeal entry, delayed onset of the pharyngeal swallow, decreased anterior hyoid movement and laryngeal elevation, incomplete epiglottic inversion, and  decreased UES function. Penetration observed; however, it is likely associated with previous trials.    VFSS Eval: Puree Solid Texture Trial   Mode of Presentation, Puree spoon;self-fed   Order of Presentation 4   Preparatory Phase Poor bolus control   Oral Phase, Puree Premature pharyngeal entry   Pharyngeal Phase, Puree Residue in valleculae;Pharyngeal wall coating;Residue in pyriform sinus;Delayed swallow reflex   Rosenbek's Penetration Aspiration Scale: Puree Food Trial Results 3 - contrast remains above the vocal cords, visible residue remains (penetration)  (Penetration likely associated with previous trials)   Diagnostic Statement Pt presents with oropharyngeal dysphagia, as characterized by decreased bolus control, premature pharyngeal entry, delayed onset of pharyngeal swallow, pharyngeal residue, decreased anterior hyoid movement and laryngeal elevation, incomplete epiglottic inversion, and decreased UES function. Penetration likely associated with previous trials.    VFSS Eval: Semisolid Texture Trial   Mode of Presentation, Semisolid spoon;self-fed   Order of Presentation 5   Preparatory Phase Poor bolus control   Oral Phase, Semisolid Residue in oral cavity;Premature pharyngeal entry   Pharyngeal Phase, Semisolid Delayed swallow reflex;UES dysfunction;Other (see comments)  (Decreased UES function)   Rosenbek's Penetration Aspiration Scale: Semisolid Food Trial Results 3 - contrast remains above the vocal cords, visible residue remains (penetration)  (likely associated with previous trials)   Diagnostic Statement Pt presents with oropharyngeal dysphagia, as characterized by mod oral residue, decreased bolus control, premature pharyngeal entry, delayed onset of pharyngeal swallow, WFL pharyngeal residue, decreased anterior hyoid movement and laryngeal elevation, incomplete epiglottic movement, and decreased UES function. Penetration likely associated with previous trials.    VFSS Eval: Solid Food Texture  Trial   Mode of Presentation, Solid fed by clinician   Order of Presentation 6   Preparatory Phase Poor bolus control   Oral Phase, Solid Premature pharyngeal entry   Pharyngeal Phase, Solid Delayed swallow reflex;Residue in valleculae   Rosenbek's Penetration Aspiration Scale: Solid Food Trial Results 3 - contrast remains above the vocal cords, visible residue remains (penetration)   Diagnostic Statement Pt presents with oropharyngeal dysphagia, as characterized by decreased bolus control, premature pharyngeal entry, delayed onset of pharyngeal swallow, pharyngeal residue, decreased anterior hyoid movement and laryngeal elevation, incomplete epiglottic movement, and trace penetration likely associated with previous trials. Swallow of thicker consistencies cleared trace amounts of residue.    Esophageal Phase of Swallow   Patient reports or presents with symptoms of esophageal dysphagia Yes   Esophageal comments MD informed   General Therapy Interventions   Planned Therapy Interventions Dysphagia Treatment   Dysphagia treatment Modified diet education;Instruction of safe swallow strategies;Compensatory strategies for swallowing   Swallow Eval: Clinical Impressions   Skilled Criteria for Therapy Intervention Skilled criteria met.  Treatment indicated.   Functional Assessment Scale (FAS) 5   Dysphagia Outcome Severity Scale (URIEL) Level 5 - URIEL   Treatment Diagnosis Mild Oropharyngeal Dysphagia   Diet texture recommendations Dysphagia diet level 3;Thin liquids   Recommended Feeding/Eating Techniques maintain upright posture during/after eating for 30 mins;alternate between small bites and sips of food/liquid;other (see comments)  (1 sip then 1 bite, end meal with 3 bites in a row)   Therapy Frequency 1x/week   Predicted Duration of Therapy Intervention (days/wks) Duration of this hospital stay   Anticipated Discharge Disposition home   Risks and Benefits of Treatment have been explained. Yes   Patient, family and/or  staff in agreement with Plan of Care Yes   Clinical Impression Comments Pt participated in VFSS with thin, nectar, and honey thick liquids, as well as NDD1, NDD2, NDD3, and regular textures. Pt did not demonstrate aspiration; however, significant deep penetration of residue was observed. Pt at risk for aspiration with thins; however, no aspiration observed at this time to warrant change of liquid thickness at this time. Pt cleared residue with alternating drinks with bites of food and dry swallows; however, pt demonstrated difficulty producing volitional dry swallows. Pt does not have a hx of pneumonia and demonstrated adequate oral cavity quality/oral cares. Trace S/sx of thrush present. See SLP evaluation for further information. Recommendations: NDD3 textures with thin liquids, supervision with meals, alternate 1 sip with 2 bites, end meal with 3 bites of food, sit up for 30 minutes post meal, no toothpaste or loose liquid on toothbrush/swabs, sit upright for all PO intake, morning and evening oral cares.

## 2020-04-06 NOTE — PROGRESS NOTES
04/06/20 1500   General Information   Onset Date 04/03/20   Start of Care Date 04/06/20   Referring Physician Dr. aVsquez   Patient Profile Review/OT: Additional Occupational Profile Info See Profile for full history and prior level of function   Patient/Family Goals Statement To assess rational for coughing and globus sensation.    Swallowing Evaluation Videofluoroscopic evaluation   Behaviorial Observations Confused   Mode of current nutrition Oral diet   Type of oral diet Dysphagia diet level 3;Thin liquid   Clinical Swallow Evaluation   Structural Abnormalities none present   Dentition present and adequate   Mucosal Quality adequate   Mandibular Strength and Mobility intact   Velar Elevation impaired   Buccal Strength and Mobility intact   Laryngeal Function Swallow;Voicing initiated;Throat clear;Cough   Rationale for completing additional evaluation To assess safety and function of swallow for PO intake.    VFSS Evaluation   VFSS Additional Documentation Yes   VFSS Eval: Thin Liquid Texture Trial   Mode of Presentation, Thin Liquid straw;self-fed   Order of Presentation 1,7   Preparatory Phase Poor bolus control   Oral Phase, Thin Liquid Premature pharyngeal entry   Pharyngeal Phase, Thin Liquid UES dysfunction;Residue in pyriform sinus;Residue in valleculae;Delayed swallow reflex;Pharyngeal wall coating   Rosenbek's Penetration Aspiration Scale: Thin Liquid Trial Results 5 - contrast contacts vocal cords, visible residue remains (penetration)   Response to Aspiration other (see comments)  (non-productive throat clear)   Diagnostic Statement Pt presents with oropharyngeal dysphagia, as characterized by oral residue, decreased bolus control, premature pharyngeal entry, delayed onset of the pharyngeal swallow, decreased velar elevation with escape to nasopharynx, pharyngeal residue, decreased UES functon, incomplete epiglottic inversion. and decreased anterior hyoid movement and laryngeal elevation. Significant  deep penetration of residue; however, no aspiration observed. Pt may be at risk for aspiration of penetration.    VFSS Eval: Nectar Thick Liquid Texture Trial   Mode of Presentation, Nectar straw;self-fed   Order of Presentation 2   Preparatory Phase Poor bolus control   Oral Phase, Nectar Residue in oral cavity   Pharyngeal Phase, Nectar Delayed swallow reflex;Pharyngeal wall coating;Residue in valleculae;Residue in pyriform sinus   Rosenbek's Penetration Aspiration Scale: Nectar-Thick Liquid Trial Results 3 - contrast remains above the vocal cords, visible residue remains (penetration)   Diagnostic Statement Pt presents with oropharyngeal dysphagia, as characterized by WFL oral residue, decreased bolus control, premature pharyngeal entry, delayed onset of the pharyngeal swallow, decreased velar elevation with escape to nasopharynx, pharyngeal residue, decreased UES functon, incomplete epiglottic inversion. and decreased anterior hyoid movement. Significant deep penetration of residue; however, no aspiration observed. Pt may be at risk for aspiration of penetration.    VFSS Eval: Honey Thick Texture Trial   Mode of Presentation, Honey straw;self-fed   Order of Presentation 3   Preparatory Phase Poor bolus control   Oral Phase, Honey Premature pharyngeal entry   Pharyngeal Phase, Honey Delayed swallow reflex;Pharyngeal wall coating;Residue in valleculae;Residue in pyriform sinus;UES dysfunction  (Residue likely associated with previous trials)   Rosenbek's Penetration Aspiration Scale: Honey Trial Results 3 - contrast remains above the vocal cords, visible residue remains (penetration)  (Likely assocaited with previous trials)   Diagnostic Statement Pt presents with oropharyngeal dysphagia, as characterized by WFL oral residue, decreased bolus control, premature pharyngeal entry, delayed onset of the pharyngeal swallow, decreased anterior hyoid movement and laryngeal elevation, incomplete epiglottic inversion, and  decreased UES function. Penetration observed; however, it is likely associated with previous trials.    VFSS Eval: Puree Solid Texture Trial   Mode of Presentation, Puree spoon;self-fed   Order of Presentation 4   Preparatory Phase Poor bolus control   Oral Phase, Puree Premature pharyngeal entry   Pharyngeal Phase, Puree Residue in valleculae;Pharyngeal wall coating;Residue in pyriform sinus;Delayed swallow reflex   Rosenbek's Penetration Aspiration Scale: Puree Food Trial Results 3 - contrast remains above the vocal cords, visible residue remains (penetration)  (Penetration likely associated with previous trials)   Diagnostic Statement Pt presents with oropharyngeal dysphagia, as characterized by decreased bolus control, premature pharyngeal entry, delayed onset of pharyngeal swallow, pharyngeal residue, decreased anterior hyoid movement and laryngeal elevation, incomplete epiglottic inversion, and decreased UES function. Penetration likely associated with previous trials.    VFSS Eval: Semisolid Texture Trial   Mode of Presentation, Semisolid spoon;self-fed   Order of Presentation 5   Preparatory Phase Poor bolus control   Oral Phase, Semisolid Residue in oral cavity;Premature pharyngeal entry   Pharyngeal Phase, Semisolid Delayed swallow reflex;UES dysfunction;Other (see comments)  (Decreased UES function)   Rosenbek's Penetration Aspiration Scale: Semisolid Food Trial Results 3 - contrast remains above the vocal cords, visible residue remains (penetration)  (likely associated with previous trials)   Diagnostic Statement Pt presents with oropharyngeal dysphagia, as characterized by mod oral residue, decreased bolus control, premature pharyngeal entry, delayed onset of pharyngeal swallow, WFL pharyngeal residue, decreased anterior hyoid movement and laryngeal elevation, incomplete epiglottic movement, and decreased UES function. Penetration likely associated with previous trials.    VFSS Eval: Solid Food Texture  Trial   Mode of Presentation, Solid fed by clinician   Order of Presentation 6   Preparatory Phase Poor bolus control   Oral Phase, Solid Premature pharyngeal entry   Pharyngeal Phase, Solid Delayed swallow reflex;Residue in valleculae   Rosenbek's Penetration Aspiration Scale: Solid Food Trial Results 3 - contrast remains above the vocal cords, visible residue remains (penetration)   Diagnostic Statement Pt presents with oropharyngeal dysphagia, as characterized by decreased bolus control, premature pharyngeal entry, delayed onset of pharyngeal swallow, pharyngeal residue, decreased anterior hyoid movement and laryngeal elevation, incomplete epiglottic movement, and trace penetration likely associated with previous trials. Swallow of thicker consistencies cleared trace amounts of residue.    Esophageal Phase of Swallow   Patient reports or presents with symptoms of esophageal dysphagia Yes   Esophageal comments MD informed   General Therapy Interventions   Planned Therapy Interventions Dysphagia Treatment   Dysphagia treatment Modified diet education;Instruction of safe swallow strategies;Compensatory strategies for swallowing   Swallow Eval: Clinical Impressions   Skilled Criteria for Therapy Intervention Skilled criteria met.  Treatment indicated.   Functional Assessment Scale (FAS) 5   Dysphagia Outcome Severity Scale (URIEL) Level 5 - URIEL   Treatment Diagnosis Mild Oropharyngeal Dysphagia   Diet texture recommendations Dysphagia diet level 3;Thin liquids   Recommended Feeding/Eating Techniques maintain upright posture during/after eating for 30 mins;alternate between small bites and sips of food/liquid;other (see comments)  (1 sip then 1 bite, end meal with 3 bites in a row)   Therapy Frequency 1x/week   Predicted Duration of Therapy Intervention (days/wks) Duration of this hospital stay   Anticipated Discharge Disposition home   Risks and Benefits of Treatment have been explained. Yes   Patient, family and/or  staff in agreement with Plan of Care Yes   Clinical Impression Comments Pt participated in VFSS with thin, nectar, and honey thick liquids, as well as NDD1, NDD2, NDD3, and regular textures. Pt did not demonstrate aspiration; however, significant deep penetration of residue was observed. Pt at risk for aspiration with thins; however, no aspiration observed at this time to warrant change of liquid thickness at this time. Pt cleared residue with alternating drinks with bites of food and dry swallows; however, pt demonstrated difficulty producing volitional dry swallows. Pt does not have a hx of pneumonia and demonstrated adequate oral cavity quality/oral cares. Trace S/sx of thrush present. See SLP evaluation for further information. Recommendations: NDD3 textures with thin liquids, supervision with meals, alternate 1 sip with 2 bites, end meal with 3 bites of food, sit up for 30 minutes post meal, no toothpaste or loose liquid on toothbrush/swabs, sit upright for all PO intake, morning and evening oral cares.    Total Evaluation Time   Total Evaluation Time (Minutes) 45

## 2020-04-06 NOTE — PROGRESS NOTES
04/06/20 1412   Quick Adds   Type of Visit Initial Occupational Therapy Evaluation   Living Environment   Lives With alone   Living Arrangements house   Transportation Anticipated family or friend will provide   Self-Care   Usual Activity Tolerance good   Current Activity Tolerance moderate   Equipment Currently Used at Home walker, rolling;tub bench   Functional Level   Ambulation 1-->assistive equipment   Transferring 1-->assistive equipment   Toileting 1-->assistive equipment   Bathing 1-->assistive equipment   Dressing 0-->independent   Cognition 1 - attention or memory deficits   Cognitive Status Examination   Orientation person   Level of Consciousness alert   Follows Commands (Cognition) WNL   Memory impaired   Attention No deficits were identified   Organization/Problem Solving Problem solving impaired   Cognitive Comment pt very pleasantly mildly confused, follows directions well    Visual Perception   Visual Perception No deficits were identified   Pain Assessment   Patient Currently in Pain Yes, see Vital Sign flowsheet   Range of Motion (ROM)   ROM Quick Adds No deficits were identified   Coordination   Upper Extremity Coordination No deficits were identified   Transfer Skill: Bed to Chair/Chair to Bed   Level of [place]: Bed to Chair contact guard   Physical Assist/Nonphysical Assist: Bed to Chair 1 person + 1 person to manage equipment   Assistive Device - Transfer Skill Bed to Chair Chair to Bed Rehab Eval rolling walker   Transfer Skill: Sit to Stand   Level of [place]: Sit/Stand contact guard   Physical Assist/Nonphysical Assist: Sit/Stand 1 person + 1 person to manage equipment   Bathing   Level of [place] contact guard   Physical Assist/Nonphysical Assist 1 person assist  (to stand for lower body bathing )   Upper Body Dressing   Level of [place]: Dress Upper Body minimum assist (75% patients effort)   Physical Assist/Nonphysical Assist: Dress Upper Body 1 person assist    Lower Body Dressing   Level of [place]: Dress Lower Body maximum assist (25% patients effort)   Physical Assist/Nonphysical Assist: Dress Lower Body 1 person assist   Grooming   Level of [place]: Grooming stand-by assist   Physical Assist/Nonphysical Assist: Grooming supervision   Clinical Impression   Criteria for Skilled Therapeutic Interventions Met yes, treatment indicated   OT Diagnosis UTI/Weakness    Influenced by the following impairments confusion, weakness   Assessment of Occupational Performance 1-3 Performance Deficits   Identified Performance Deficits mobility/self care    Clinical Decision Making (Complexity) Low complexity   Therapy Frequency Daily   Predicted Duration of Therapy Intervention (days/wks) 2-3 days    Anticipated Equipment Needs at Discharge   (has all )   Anticipated Discharge Disposition Home with Assist;Home with Home Therapy   Risks and Benefits of Treatment have been explained. Yes   Patient, Family & other staff in agreement with plan of care Yes   Total Evaluation Time   Total Evaluation Time (Minutes) 60

## 2020-04-06 NOTE — PLAN OF CARE
Discharge Planner SLP   Patient plan for discharge: Return home with daughter.     Current status: Pt met in room for clinical bedside swallow evaluation. Pt was provided oral cares. Pt demonstrated throat clearing, wet vocal quality, and globus sensation across NDD1, NDD2, and NDD3 textures, as well as thin and nectar liquids. Pt has hx of esophageal difficulties, so throat clearing and globus sensation may be more likely related to this; however, VFSS is recommended for thorough assessment of oropharyngeal swallow.      Pt participated in VFSS with thin, nectar, and honey thick liquids, as well as NDD1, NDD2, NDD3, and regular textures. Pt did not demonstrate aspiration; however, significant deep penetration of residue was observed. Pt at risk for aspiration with thins; however, no aspiration observed at this time to warrant change of liquid thickness at this time. Pt cleared residue with alternating drinks with bites of food and dry swallows; however, pt demonstrated difficulty producing volitional dry swallows. Pt does not have a hx of pneumonia and demonstrated adequate oral cavity quality/oral cares. Trace S/sx of thrush present. See SLP evaluation for further information.    Barriers to return to prior living situation: Dysphagia    Recommendations for discharge: NDD3 textures with thin liquids, supervision with meals, alternate 1 sip with 2 bites, end meal with 3 bites of food, sit up for 30 minutes post meal, no toothpaste or loose liquid on toothbrush/swabs, sit upright for all PO intake, morning and evening oral cares.     Rationale for recommendations: Clinical Bedside swallow assessment and VFSS on 04/06/2020.       Entered by: Prema Paniagua 04/06/2020 1:11 PM

## 2020-04-06 NOTE — PLAN OF CARE
Pt calm and cooperative but remains confused. LS clear, HR regular, BS active. Speech therapy order's followed at dinner, coughing up food after dinner. Fall precautions in place. Vital signs stable.   Olga Carter RN on 4/6/2020 at 5:49 PM

## 2020-04-06 NOTE — PHARMACY-CONSULT NOTE
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)     Numbness and tingling of right leg     Osteoarthritis of spine with radiculopathy, lumbar region     Pancreatic mass     Paresthesia of right leg     Radicular leg pain     Right knee DJD     SK (seborrheic keratosis)     Benign skin lesion of nose     Visual changes     Memory loss     Atrial fibrillation with RVR (H)     Arthritis of shoulder region, left, degenerative     Controlled substance agreement signed 3/8/19     Chondrodermatitis nodularis chronica helicis, right     Dizzy     Paroxysmal A-fib (H)     Dementia with behavioral disturbance (H)     Degenerative joint disease of pelvic region     Urinary retention     Dementia (H)     UTI (urinary tract infection)     Elevated lactic acid level     Hyponatremia        Relevant Labs:  Recent Labs   Lab Test 04/06/20  0416 04/05/20  0520  04/04/20  0535   WBC  --  14.0*  --  15.5*   HGB 7.4* 6.2*   < > 6.8*   PLT  --  454*  --  515*    < > = values in this interval not displayed.        CrCl: 28 mL/min      Intake/Output Summary (Last 24 hours) at 4/6/2020 0830  Last data filed at 4/5/2020 1700  Gross per 24 hour   Intake 684 ml   Output --   Net 684 ml          Per Renal Dose Adjustment Protocol, will adjust:  Amoxicillin 500 mg po BID per renal dosing policy      Will continue to follow and make adjustments accordingly. Thank You.    Jaky Jackson [facility] ....................  4/6/2020   8:30 AM

## 2020-04-06 NOTE — PROGRESS NOTES
[facility]    Hospitalist Progress Note      Assessment & Plan   Tenisha Cagle is a 86 year old female who was admitted on 4/3/2020.     Principal Problem:    UTI (urinary tract infection)    Assessment: urine culture showing gram positive cocci. Currently on empiric coverage with amoxicillin.     Plan: Await urine culture final adults.     Active Problems:    Anemia    Assessment: presumed blood loss. Hgb stable    Plan: carafate, pantoprazole. Recheck hemoglobin in AM. Stop prednisone and aggrenox.       Hiatal hernia    Assessment: some swallow issues    Plan: video swallow study today      History of TIA (transient ischemic attack)    Assessment: chronic    Plan: holding aggrenox due to bleeding risk      Essential hypertension    Assessment: chronic    Plan: continue metoprolol and furosemide      Chronic lymphocytic leukemia (H)      Dementia with behavioral disturbance (H)      Elevated lactic acid level      Hyponatremia    Assessment: chronic    Plan: monitor    DVT Prophylaxis: Pneumatic Compression Devices  Code Status: DNR/DNI    Oliver Vasquez    Interval History   Feels better. Confused. No fevers, chills. No nausea, vomiting. No dyspnea.    -Data reviewed today: I reviewed all new labs and imaging results over the last 24 hours. I personally reviewed no images or EKG's today.    Physical Exam   Temp: 98.6  F (37  C) Temp src: Tympanic BP: (!) 179/74 Pulse: 82 Heart Rate: 73 Resp: 18 SpO2: 96 % O2 Device: None (Room air)    Vitals:    04/03/20 1939 04/03/20 2306 04/06/20 0500   Weight: 47.6 kg (105 lb) 54.3 kg (119 lb 11.2 oz) 45 kg (99 lb 3.2 oz)     Vital Signs with Ranges  Temp:  [98.6  F (37  C)-99.7  F (37.6  C)] 98.6  F (37  C)  Pulse:  [70-82] 82  Heart Rate:  [67-84] 73  Resp:  [16-18] 18  BP: (129-179)/(48-81) 179/74  SpO2:  [94 %-97 %] 96 %  I/O last 3 completed shifts:  In: 734 [P.O.:450]  Out: -     GENERAL: Comfortable, no apparent distress.  CARDIOVASCULAR: regular  rate and rhythm, no murmur. No lower extremity edema   RESPIRATORY: Clear to auscultation bilaterally, no wheezes or crackles.  GI: non-tender, non-distended, normal bowel sounds.   SKIN: warm periphery, no rashes      Medications       acetaminophen  975 mg Oral TID     amoxicillin  500 mg Oral Q12H ZAID     furosemide  20 mg Oral Daily     magnesium hydroxide  15 mL Oral At Bedtime     metoprolol tartrate  12.5 mg Oral BID     pantoprazole  40 mg Oral QAM AC     [START ON 4/7/2020] predniSONE  5 mg Oral Every Other Day     sucralfate  1 g Oral 4x Daily AC & HS       Data   Recent Labs   Lab 04/06/20  0416 04/05/20  0520 04/04/20  1205 04/04/20  0535 04/03/20 2015   WBC  --  14.0*  --  15.5* 19.7*   HGB 7.4* 6.2* 7.7* 6.8* 8.0*   MCV  --  88  --  87 87   PLT  --  454*  --  515* 587*   NA  --  130*  --  130* 127*   POTASSIUM  --  3.5  --  3.6 4.3   CHLORIDE  --  98  --  99 93*   CO2  --  27  --  24 25   BUN  --  27*  --  29* 33*   CR  --  1.02  --  0.99 1.13   ANIONGAP  --  5  --  7 9   MATI  --  7.9*  --  7.8* 8.3*   GLC  --  100  --  84 122*   ALBUMIN  --   --   --   --  2.9*   PROTTOTAL  --   --   --   --  5.4*   BILITOTAL  --   --   --   --  0.1*   ALKPHOS  --   --   --   --  45   ALT  --   --   --   --  8   AST  --   --   --   --  11*

## 2020-04-07 NOTE — PLAN OF CARE
"Pt is disorientated to time, place and situation. Ambulates assist x1 with walker and gait belt. VSS. BP (!) 158/58   Pulse 69   Temp 99.1  F (37.3  C) (Tympanic)   Resp 14   Ht 1.676 m (5' 6\")   Wt 46 kg (101 lb 6.4 oz)   SpO2 95%   BMI 16.37 kg/m    O2 is 95% on RA. LS clear. BS active. HR irregular. Complains of pain in lower back, PRN pain med given and effective. +2 edema BLE and pain on palpation. Scattered bruising. Adequate intake and output. Complains of frequency and urgency on urination. Will continue to monitor.    Janet Kothari RN on 4/7/2020 at 3:31 AM    "

## 2020-04-07 NOTE — PROGRESS NOTES
[facility] DISCHARGE NOTE    Patient discharged to home at 11:58 AM via wheel chair. Accompanied by daughter and staff. Discharge instructions reviewed with daughter, opportunity offered to ask questions. Prescriptions filled and sent with patient upon discharge. All belongings sent with patient.    Pt discharge information reviewed with daughter. Prescriptions filled and sent with Pt. Information on diet given to daughter and reviewed. All questions were answered.     Yusuf Pulido RN

## 2020-04-07 NOTE — PROGRESS NOTES
SAFETY CHECKLIST  ID Bands and Risk clasps correct and in place (DNR, Fall risk, Allergy, Latex, Limb):  Yes  All Lines Reconciled and labeled correctly: Yes  Whiteboard updated:Yes  Environmental interventions (bed/chair alarm on, call light, side rails, restraints, sitter....): Yes  Suki Pradhan RN on 4/7/2020 at 7:17 AM

## 2020-04-07 NOTE — PROGRESS NOTES
04/06/20 1300   General Information   Onset Date 04/03/20   Start of Care Date 04/06/20   Referring Physician Dr. Álvarez; Attending: Dr. Vasquez   Patient Profile Review/OT: Additional Occupational Profile Info See Profile for full history and prior level of function   Patient/Family Goals Statement To assess rational for coughing and globus sensation.    Swallowing Evaluation Bedside swallow evaluation   Behaviorial Observations Confused   Mode of current nutrition Oral diet   Type of oral diet   (Pt recommended for VFSS prior to futher PO intake)   Comments VFSS to be completed this afternoon for further recommendations.    Clinical Swallow Evaluation   Oral Musculature generally intact   Structural Abnormalities none present   Dentition present and adequate   Mucosal Quality adequate   Mandibular Strength and Mobility intact   Oral Labial Strength and Mobility impaired seal   Laryngeal Function Swallow;Voicing initiated;Throat clear   Additional Documentation Yes   Rationale for completing additional evaluation To assess safety and function of swallow for PO intake.    Clinical Swallow Eval: Thin Liquid Texture Trial   Mode of Presentation, Thin Liquids straw;self-fed   Volume of Liquid or Food Presented 1 oz   Oral Phase of Swallow WFL   Pharyngeal Phase of Swallow throat clearing;wet vocal quality after swallow;feeling of something stuck in throat   Successful Strategies Trialed During Procedure other (see comments)  (Pt unable to follow commands for repeat dry swallow. )   Diagnostic Statement Pt presents with wet vocal quality and inconsistent immediate and delayed, non-productive throat clearing. Pt may benefit from VFSS for further assessment for aspiration.    Clinical Swallow Eval: Nectar Thick Liquid Texture Trial   Mode of Presentation, Nectar self-fed;straw   Volume of Nectar Presented .5oz   Oral Phase, Nectar WFL   Pharyngeal Phase, Nectar throat clearing;wet vocal quality after swallow    Successful Strategies Trialed During Procedure, Nectar other (see comments)  (Pt unable to follow commands for repeat dry swallow. )   Diagnostic Statement Pt presents with wet vocal quality and inconsistent immediate and delayed, non-productive throat clearing. Pt may benefit from VFSS for further assessment for aspiration.    Clinical Swallow Eval: Puree Solid Texture Trial   Mode of Presentation, Puree spoon   Volume of Puree Presented 1 oz   Oral Phase, Puree WFL   Pharyngeal Phase, Puree feeling of something stuck in throat;wet vocal quality after swallow;throat clearing   Successful Strategies Trialed During Procedure, Puree other (see comments)  (Pt unable to follow commands for repeat dry swallow. )   Diagnostic Statement Pt presents with wet vocal quality and inconsistent immediate and delayed, non-productive throat clearing. Pt may benefit from VFSS for further assessment for aspiration.    Clinical Swallow Eval: Semisolid Texture Trial   Mode of Presentation, Semisolid spoon   Volume of Semisolid Food Presented 2 oz  (NDD2 and NDD3 (combined total))   Oral Phase, Semisolid WFL;Residue in oral cavity   Oral Residue, Semisolid mid posterior tongue   Pharyngeal Phase, Semisolid wet vocal quality after swallow;throat clearing;feeling of something stuck in throat   Successful Strategies Trialed During Procedure, Semisolid other (see comments)  (Pt unable to follow commands for repeat dry swallow. )   Diagnostic Statement Pt presents with wet vocal quality and inconsistent immediate and delayed, non-productive throat clearing. Pt may benefit from VFSS for further assessment for aspiration.    VFSS Eval: Thin Liquid Texture Trial   Diagnostic Statement Pt presents with wet vocal quality and inconsistent immediate and delayed, non-productive throat clearing. Pt may benefit from VFSS for further assessment for aspiration.    Esophageal Phase of Swallow   Patient reports or presents with symptoms of esophageal  dysphagia Yes   General Therapy Interventions   Planned Therapy Interventions Dysphagia Treatment   Dysphagia treatment Modified diet education;Instruction of safe swallow strategies;Compensatory strategies for swallowing   Swallow Eval: Clinical Impressions   Skilled Criteria for Therapy Intervention Skilled criteria met.  Treatment indicated.   Functional Assessment Scale (FAS)   (Pt to participate in VFSS for further dx)   Dysphagia Outcome Severity Scale (URIEL)   (Pt to participate in VFSS for further dx)   Treatment Diagnosis Dysphagia   Diet texture recommendations NPO;Nectar thick liquids  (NPO recommended until VFSS; nursing provided lunch w/o prob)   Therapy Frequency 1x/week   Predicted Duration of Therapy Intervention (days/wks) Duration of this hospital stay   Anticipated Discharge Disposition home   Risks and Benefits of Treatment have been explained. Yes   Patient, family and/or staff in agreement with Plan of Care Yes   Clinical Impression Comments Pt met in room for clinical bedside swallow evaluation. Pt was provided oral cares. Pt demonstrated throat clearing, wet vocal quality, and globus sensation across NDD1, NDD2, and NDD3 textures, as well as thin and nectar liquids. Pt has hx of esophageal difficulties, so throat clearing and globus sensation may be more likely related to this; however, VFSS is recommended for thorough assessment of oropharyngeal swallow.   Total Evaluation Time   Total Evaluation Time (Minutes) 40

## 2020-04-07 NOTE — PHARMACY - DISCHARGE MEDICATION RECONCILIATION AND EDUCATION
[facility]  Part of [facility]  [address] [place] [address]    April 7, 2020    Dear Pharmacist,    Your customer, Tenisha Cagle, born on 9/7/1933, was recently discharged from [facility].  We have updated her medication list and want to alert you to the following:       Review of your medicines      START taking      Dose / Directions   amoxicillin 500 MG capsule  Commonly known as:  AMOXIL  Indication:  Urinary Tract Infection  Used for:  Acute cystitis without hematuria      Dose:  500 mg  Take 1 capsule (500 mg) by mouth every 12 hours  Quantity:  10 capsule  Refills:  0     nystatin 296270 UNIT/ML suspension  Commonly known as:  MYCOSTATIN  Used for:  Thrush      Dose:  500,000 Units  Take 5 mLs (500,000 Units) by mouth 4 times daily for 7 days  Quantity:  140 mL  Refills:  0     pantoprazole 40 MG EC tablet  Commonly known as:  PROTONIX      Dose:  40 mg  Start taking on:  April 8, 2020  Take 1 tablet (40 mg) by mouth every morning (before breakfast)  Quantity:  30 tablet  Refills:  3     sucralfate 1 GM tablet  Commonly known as:  CARAFATE      Dose:  1 g  Take 1 tablet (1 g) by mouth 4 times daily (before meals and nightly)  Quantity:  120 tablet  Refills:  3        CONTINUE these medicines which have NOT CHANGED      Dose / Directions   acetaminophen 325 MG tablet  Commonly known as:  TYLENOL  Used for:  Osteoarthritis of spine with radiculopathy, lumbar region      Dose:  975 mg  Take 3 tablets (975 mg) by mouth 3 times daily  Refills:  0     Calcium 500 + D 500-125 MG-UNIT Tabs  Generic drug:  Calcium Carbonate-Vitamin D      Dose:  1 tablet  Take 1 tablet by mouth daily  Refills:  0     furosemide 20 MG tablet  Commonly known as:  LASIX  Used for:  Swelling of lower extremity      Dose:  20 mg  Take 1 tablet (20 mg) by mouth daily as needed (Lower extremity swelling)  Quantity:  90 tablet  Refills:  1     lactulose 20 GM/30ML Soln  Used  for:  Chronic pain syndrome      Dose:  20 g  Take 30 mLs (20 g) by mouth 2 times daily as needed  Refills:  0     magnesium hydroxide 400 MG/5ML suspension  Commonly known as:  MILK OF MAGNESIA      Dose:  15 mL  Take 15 mLs by mouth nightly as needed for constipation or heartburn  Refills:  0     metoprolol tartrate 25 MG tablet  Commonly known as:  LOPRESSOR  Used for:  Paroxysmal atrial fibrillation (H), Essential (primary) hypertension      Dose:  12.5 mg  Take 0.5 tablets (12.5 mg) by mouth 2 times daily  Quantity:  180 tablet  Refills:  3     oxyCODONE 5 MG tablet  Commonly known as:  ROXICODONE  Used for:  Osteoarthritis of spine with radiculopathy, lumbar region      Dose:  2.5-5 mg  Take 0.5-1 tablets (2.5-5 mg) by mouth every 4 hours as needed for moderate to severe pain  Quantity:  150 tablet  Refills:  0     trolamine salicylate 10 % external cream  Commonly known as:  ASPERCREME      Apply topically 3 times daily Apply to hip, back, knees as needed for pain  Refills:  0     WOMENS MULTIvitamin  PLUS Tabs      Dose:  1 tablet  Take 1 tablet by mouth every morning  Refills:  0        STOP taking    aspirin-dipyridamole ER  MG 12 hr capsule  Commonly known as:  AGGRENOX        predniSONE 10 MG tablet  Commonly known as:  DELTASONE              Where to get your medicines      These medications were sent to [facility] - [place], [place] - [address]  [address], [place] [address]    Phone:  [phone]     amoxicillin 500 MG capsule    nystatin [id number] UNIT/ML suspension    pantoprazole 40 MG EC tablet    sucralfate 1 GM tablet         We also reviewed Tenisha Cagle's allergy list and updated it as needed:  Allergies: Lansoprazole; Lisinopril; and Lactose    Thank you for continuing to care for Tenisha Cagle.  We look forward to working together with you in the future.    Sincerely,  Jaky Jackson [facility]

## 2020-04-07 NOTE — PHARMACY - DISCHARGE MEDICATION RECONCILIATION AND EDUCATION
Pharmacy:  Discharge Counseling and Medication Reconciliation    Tenisha Cagle  [address]  [place] [address]  [phone] (home)   86 year old female  PCP: Godwin Kapoor    Allergies: Lansoprazole; Lisinopril; and Lactose    Discharge Counseling:    Pharmacist met with patient (and/or family) today to review the medication portion of the After Visit Summary (with an emphasis on NEW medications) and to address patient's questions/concerns.    Summary of Education: met with patient's daughter to discuss new medications.  Discussed administration, duration and side effects of each.  Told her that patient needs to STOP prednisone and aggrenox.  No further questions.    Materials Provided:  MedCounselor sheets printed from Clinical Pharmacology on: nystatin, amoxicillin, carafate, protonix    Discharge Medication Reconciliation:    It has been determined that the patient has an adequate supply of medications available or which can be obtained from the patient's preferred pharmacy, which HE/SHE has confirmed as: [facility] [An updated medication list will be faxed to the patient's pharmacy.]    Thank you for the consult.    Jaky Jackson RPH........April 7, 2020 11:51 AM

## 2020-04-07 NOTE — PROGRESS NOTES
SAFETY CHECKLIST  ID Bands and Risk clasps correct and in place (DNR, Fall risk, Allergy, Latex, Limb):  Yes  All Lines Reconciled and labeled correctly: Yes  Whiteboard updated:Yes  Environmental interventions (bed/chair alarm on, call light, side rails, restraints, sitter....): Yes    Janet Kothari RN on 4/6/2020 at 7:24 PM

## 2020-04-07 NOTE — PLAN OF CARE
Discharge Planner SLP   Patient plan for discharge: Return home with daughter.  Current status: Pt met in room for skilled dysphagia therapy. Pt was confused throughout session and requested information about discharge. Pt demonstrated ability to produce strategies with supervision and min verbal cues on 11/12 trials. Pt demonstrated throat clearing across thin liquids, as well as NDD1 and NDD3 textures; however, this has been consistent across sessions. Pt coughing on foods last night per note; however, no change observed in status compared to evaluations on 04/07/2020. Pt not recommended for diet downgrade at this time; however, was provided handout for NDD2 textures it increases comfort. MD notified of S/sx of thrush.   Barriers to return to prior living situation: Dysphagia  Recommendations for discharge: NDD3 textures with thin liquids (can downgrade to NDD2 if difficulties persist-family provided with handouts on diet level), supervision with meals, alternate 1 sip with 2 bites, end meal with 3 bites of food, sit up for 30 minutes post meal, no toothpaste or loose liquid on toothbrush/swabs, sit upright for all PO intake, morning and evening oral cares.   Rationale for recommendations: VFSS outcomes from 04/06/2020 and session outcomes from 04/07/2020.       Entered by: Prema Paniagua 04/07/2020 9:45 AM

## 2020-04-07 NOTE — DISCHARGE SUMMARY
"[facility]    Discharge Summary  Hospitalist    Date of Admission:  4/3/2020  Date of Discharge:  4/7/2020  Discharging Provider: Oliver Vasquez  Date of Service (when I saw the patient): 04/07/20    Discharge Diagnoses   Principal Problem:    UTI (urinary tract infection) (4/3/2020)  Active Problems:    Anemia (4/18/2016)    Hiatal hernia (1/17/2018)    History of TIA (transient ischemic attack) (2/2/2016)    Essential hypertension (1/17/2018)    Chronic lymphocytic leukemia (H) (4/14/2011)    Dementia with behavioral disturbance (H) (2/6/2020)    Elevated lactic acid level (4/3/2020)    Hyponatremia (4/3/2020)      History of Present Illness   Tenisha Cagle is an 86 year old female who presented with dysphagia and failure to thrive. Per the H&P by Dr. Ata Álvarez:  \"Tenisha Cagle is a 86 year old female who presents with the above complaint.  Her daughter brought her in, dropped her off in the ED and talk to the nurse about her ongoing health issues mainly trouble swallowing.  He had been in the clinic and EGD had been discussed.  She has a history of a hiatal hernia.  The patient denies any problems and as noted above, does not know why she is here.  In the ED she was found to have evidence of a significant urinary tract infection.  She denied fever and chills, but her daughter stated she had a fever at home.  Patient's history was quite unreliable.  At the time of this exam she denied any significant problems at all and said she was swallowing well.     She has been noted to have a gradual decrease in her hemoglobin.  She denies any chest pain, shortness of breath, lightheadedness, but does admit to some mild epigastric abdominal pain.     She has a history of CLL, and white count has been stable in about the 19,000 range.\"    Hospital Course   Tenisha Cagle was admitted on 4/3/2020.  The following problems were addressed during her hospitalization:    Urinary tract infection   Urine " culture growing enterococcus sensitive to penicillins.  She will be discharged and finish out a course of amoxicillin.    Anemia, felt to be due to chronic blood loss.    The patient has been on Aggrenox and prednisone for months.  General surgery consult was obtained and PPI and Carafate were recommended.  Her hemoglobin was low enough that she was transfused 1 unit of packed red blood cells, and maintained a stable and rising hemoglobin over 48 hours.  No evidence of black tarry stools.    Thrush  Mild thrush noted in her oropharynx.  She was placed on nystatin swish and spit on discharge.    Dysphasia  Patient underwent a swallow study.  It was recommended that she have a level 3 dysphagia diet with thin liquids.  That information was provided to the patient's daughter for discharge.    Dementia  Patient had some sundowning behavior in the evenings but did well otherwise.    Oliver Vasquez MD      Code Status   DNR / DNI       Primary Care Physician   Godwin Kapoor    Physical Exam   Temp: 98.3  F (36.8  C) Temp src: Tympanic BP: (!) 163/78 Pulse: 68 Heart Rate: 78 Resp: 16 SpO2: 97 % O2 Device: None (Room air)    Vitals:    04/03/20 2306 04/06/20 0500 04/07/20 0231   Weight: 54.3 kg (119 lb 11.2 oz) 45 kg (99 lb 3.2 oz) 46 kg (101 lb 6.4 oz)     Vital Signs with Ranges  Temp:  [98.3  F (36.8  C)-99.1  F (37.3  C)] 98.3  F (36.8  C)  Pulse:  [68-73] 68  Heart Rate:  [76-78] 78  Resp:  [14-18] 16  BP: (141-175)/(54-78) 163/78  SpO2:  [95 %-98 %] 97 %  I/O last 3 completed shifts:  In: 1150 [P.O.:1150]  Out: 250 [Urine:250]    GENERAL: Comfortable, no apparent distress.  CARDIOVASCULAR: regular rate and rhythm, no murmur. No lower extremity edema   RESPIRATORY: Clear to auscultation bilaterally, no wheezes or crackles.  GI: non-tender, non-distended, normal bowel sounds.   SKIN: warm periphery, no rashes      Discharge Disposition   Discharged to home  Condition at discharge: Stable    Consultations This Hospital  Stay   SOCIAL WORK IP CONSULT  SURGERY GENERAL IP CONSULT  PHYSICAL THERAPY ADULT IP CONSULT  OCCUPATIONAL THERAPY ADULT IP CONSULT  SWALLOW EVAL SPEECH PATH AT BEDSIDE IP CONSULT    Time Spent on this Encounter   I, Oliver Vasquez MD, personally saw the patient today and spent greater than 30 minutes discharging this patient.    Discharge Orders      Reason for your hospital stay    Urinary tract infection, anemia     Activity    Your activity upon discharge: activity as tolerated     Follow-up and recommended labs and tests    Follow up with Dr. Kapoor via telephone on 4-17 at 11 o'clock am.     DNR/DNI     Diet    Follow this diet upon discharge: Orders Placed This Encounter      Dysphagia Diet Level 3 Advanced Thin Liquids (water, ice chips, juice, milk gelatin, ice cream, etc)       Discharge Medications   Current Discharge Medication List      START taking these medications    Details   amoxicillin (AMOXIL) 500 MG capsule Take 1 capsule (500 mg) by mouth every 12 hours  Qty: 10 capsule, Refills: 0    Associated Diagnoses: Acute cystitis without hematuria      nystatin (MYCOSTATIN) 434630 UNIT/ML suspension Take 5 mLs (500,000 Units) by mouth 4 times daily for 7 days  Qty: 140 mL, Refills: 0    Associated Diagnoses: Thrush      pantoprazole (PROTONIX) 40 MG EC tablet Take 1 tablet (40 mg) by mouth every morning (before breakfast)  Qty: 30 tablet, Refills: 3    Associated Diagnoses: Iron deficiency anemia due to chronic blood loss      sucralfate (CARAFATE) 1 GM tablet Take 1 tablet (1 g) by mouth 4 times daily (before meals and nightly)  Qty: 120 tablet, Refills: 3    Associated Diagnoses: Iron deficiency anemia due to chronic blood loss         CONTINUE these medications which have NOT CHANGED    Details   acetaminophen (TYLENOL) 325 MG tablet Take 3 tablets (975 mg) by mouth 3 times daily    Associated Diagnoses: Osteoarthritis of spine with radiculopathy, lumbar region      Calcium Carbonate-Vitamin D  (CALCIUM 500 + D) 500-125 MG-UNIT TABS Take 1 tablet by mouth daily      furosemide (LASIX) 20 MG tablet Take 1 tablet (20 mg) by mouth daily as needed (Lower extremity swelling)  Qty: 90 tablet, Refills: 1    Associated Diagnoses: Swelling of lower extremity      lactulose 20 GM/30ML SOLN Take 30 mLs (20 g) by mouth 2 times daily as needed    Associated Diagnoses: Chronic pain syndrome      magnesium hydroxide (MILK OF MAGNESIA) 400 MG/5ML suspension Take 15 mLs by mouth nightly as needed for constipation or heartburn      metoprolol tartrate (LOPRESSOR) 25 MG tablet Take 0.5 tablets (12.5 mg) by mouth 2 times daily  Qty: 180 tablet, Refills: 3    Associated Diagnoses: Paroxysmal atrial fibrillation (H); Essential (primary) hypertension      Multiple Vitamins-Minerals (WOMENS MULTIVITAMIN  PLUS) TABS Take 1 tablet by mouth every morning      oxyCODONE (ROXICODONE) 5 MG tablet Take 0.5-1 tablets (2.5-5 mg) by mouth every 4 hours as needed for moderate to severe pain  Qty: 150 tablet, Refills: 0    Associated Diagnoses: Osteoarthritis of spine with radiculopathy, lumbar region      trolamine salicylate (ASPERCREME) 10 % external cream Apply topically 3 times daily Apply to hip, back, knees as needed for pain         STOP taking these medications       aspirin-dipyridamole ER (AGGRENOX)  MG 12 hr capsule Comments:   Reason for Stopping:         predniSONE (DELTASONE) 10 MG tablet Comments:   Reason for Stopping:             Allergies   Allergies   Allergen Reactions     Lansoprazole Other (See Comments) and Unknown     Patient states that medication didn't work for her.  Daughter states she got delusional, water did not taste right     Lisinopril Cough     Lactose      Data   Most Recent 3 CBC's:  Recent Labs   Lab Test 04/07/20  0424 04/06/20  0416 04/05/20  0520  04/04/20  0535   WBC 11.8*  --  14.0*  --  15.5*   HGB 8.3* 7.4* 6.2*   < > 6.8*   MCV 86  --  88  --  87     --  454*  --  515*    < > =  values in this interval not displayed.      Most Recent 3 BMP's:  Recent Labs   Lab Test 04/07/20  0424 04/05/20  0520 04/04/20  0535   * 130* 130*   POTASSIUM 3.7 3.5 3.6   CHLORIDE 97* 98 99   CO2 28 27 24   BUN 22 27* 29*   CR 1.04 1.02 0.99   ANIONGAP 6 5 7   MATI 7.8* 7.9* 7.8*   GLC 94 100 84     Most Recent 2 LFT's:  Recent Labs   Lab Test 04/03/20 2015 02/06/20  0142   AST 11* 16   ALT 8 11   ALKPHOS 45 35   BILITOTAL 0.1* 0.4     Most Recent INR's and Anticoagulation Dosing History:  Anticoagulation Dose History     Recent Dosing and Labs Latest Ref Rng & Units 1/16/2019 1/31/2020 2/6/2020    INR 0 - 1.3 0.82 0.91 0.85        Most Recent 3 Troponin's:  Recent Labs   Lab Test 06/13/19  1131   TROPI <0.030     Most Recent Cholesterol Panel:  Recent Labs   Lab Test 01/04/18  1445   *   HDL 58   TRIG 112     Most Recent 6 Bacteria Isolates From Any Culture (See EPIC Reports for Culture Details):  Recent Labs   Lab Test 04/03/20  2110 04/03/20 2015 02/20/20  1313 01/31/20  1115   CULT >100,000 colonies/mL  Gram positive cocci  *  >100,000 colonies/mL  Gram positive cocci  Strain 2  * No growth after 4 days  No growth after 4 days >100,000 colonies/mL  Escherichia coli  * No growth after 6 days  No growth after 6 days     Most Recent TSH, T4 and A1c Labs:No lab results found.  Results for orders placed or performed during the hospital encounter of 04/03/20   XR Chest 2 Views    Narrative    PROCEDURE:  XR CHEST 2 VW    HISTORY: weakness, .    COMPARISON:  3/3/2020    FINDINGS:  The cardiomediastinal contours are unchanged. A hiatal hernia is again  present. The trachea is midline.  There is calcific aortic  atherosclerosis.  The lungs are hyperexpanded. Emphysema is unchanged. No effusion or  pneumothorax is seen.    No suspicious osseous lesion or subdiaphragmatic free air.      Impression    IMPRESSION:      Emphysema.    SHANIQUA COLEMAN MD   XR Video Swallow with SLP or OT    Narrative     VIDEO SWALLOWING EVALUATION   4/6/2020 2:42 PM     HISTORY: Assess for dysphagia    COMPARISON: None.    FLUOROSCOPY TIME: 0    Number of cine runs: 15    FINDINGS:  Prominent laryngeal calcification mildly limits assessment.  There is laryngeal penetration of initial thin barium swallows. Repeat  penetration is questioned with nectar thickened liquids. No aspiration  is identified. See speech assessment for further discussion.    SHANIQUA COLEMAN MD

## 2020-04-07 NOTE — PLAN OF CARE
Occupational Therapy Discharge Summary    Reason for therapy discharge:    Discharged to home.    Progress towards therapy goal(s). See goals on Care Plan in [facility] electronic health record for goal details.  Goals partially met.  Barriers to achieving goals:   discharge from facility.    Therapy recommendation(s):    No further therapy is recommended.family has declined all further services/therapy, recommend home with family with assist as needed for all self cares and functional mobility.

## 2020-04-08 NOTE — PROGRESS NOTES
Clinic Care Coordination Contact  [facility]       Clinical Data: Care Coordinator Outreach  Outreach attempted x 4.  Left message on Nona hendrickson's phone. Did reach Nona hendrickson who said she will call back later.  Sabra Townsend RN on 4/8/2020 at 12:16 PM

## 2020-04-09 NOTE — PROGRESS NOTES
Transitional Care Management Phone Call    Summary of hospitalization:  [facility] discharge summary reviewed    DISCHARGE DIAGNOSIS: UTI, anemia    DATE OF DISCHARGE: 04-    Diagnostic Tests/Treatments reviewed.  Follow up needed: none    Post Discharge Medication Reconciliation: discharge medications reconciled, continue medications without change.    Medications reviewed by: myself and daughterNona    Problems taking medications regularly:  She is taking all medications at this time, is having a stomach ache and feels it may be due to protonix or antibiotic. Also hasn't had a bowel movement though, so they are going to monitor and let us know.     Problems adhering to non-medication therapy:  None    Other Healthcare Providers Involved in Patient s Care:         None     Update since discharge: stable. Patient is doing well, swallowing foods fine now with soft consistencies. Has had some complaints of a stomach ache(also in hospital), but daughter is monitoring to see if it is better after a bowel movement. No SOB. No dysuria. Daughter will call us with any worsening of symptoms.     Plan of care communicated with patient and family    Just a friendly reminder that you appointment is   Next 5 appointments (look out 90 days)    May 05, 2020  1:00 PM CDT  (Arrive by 12:45 PM)  Return Visit with Sally Arshad DPM  [facility] ([facility] ) [address] [place] [id number] [phone]      .  We encourage you to keep this appointment.    Please remember to bring all of your pills in their bottles (including any vitamins or over the counter pills) with you to your appointment.   The patient indicates understanding of these issues and agrees with the plan of care.   Yes, plans to follow plan of care and keep the scheduled appointment.    Was the patient contacted within the 2 business days or other approved timeframe?  Yes    Was the  Medication reconciliation and management done since the patient was discharged? Yes    Sabra Townsend, RN  4/9/2020 3:07 PM

## 2020-04-15 NOTE — TELEPHONE ENCOUNTER
Was in the hospital for stomach pain and food was getting stuck in throat while it was going down.  She also had a UTI in the hospital that's why she was getting stomach pain. She is having stomach pain again, Nona thinks it's a side affect from Protonix.  Sucralfate might be causing her stomach cramps.  Nona is wondering if she is having side affects to these 2 medications?  She has a feeling of fullness all the time also.  Wondering if she should bring a urine sample in.

## 2020-04-15 NOTE — TELEPHONE ENCOUNTER
Spoke with patient's daughter.   Patient name and date of birth verified. After verification, patient was informed of information below.   Domitila Ruby LPN 4/15/2020 3:16 PM

## 2020-04-15 NOTE — TELEPHONE ENCOUNTER
Stomach pain would be unlikely from those meds, the both actually improve it and not cause it.  At her age her urine will often look infected even if it is not the cause of the symptoms.  Have her try some TUMS.  Make sure she is having a bowel movement about daily to every other day or so.  If not, then needs to get her bowels moving with Mirilax or a suppository or even a Fleets enema.  If she is severely sick, can bring her in to be seen too, or set up a phone visit.  Godwin Kapoor MD on 4/15/2020 at 3:08 PM

## 2020-04-15 NOTE — TELEPHONE ENCOUNTER
I need more info.  Is she getting worse?  Having UTI symptoms?  Is this just an FYI?  Godwin Kapoor MD on 4/15/2020 at 2:07 PM

## 2020-04-15 NOTE — TELEPHONE ENCOUNTER
Patient has concerns over abdominal pin. Patient was released from the hospital on 4/7/20 and got a UTI. Patient now having abdominal pain.     Erin Harrison on 4/15/2020 at 1:56 PM

## 2020-04-21 NOTE — PROGRESS NOTES
Nursing Notes:   Abi Cooper LPN  4/21/2020  8:16 AM  Sign at exiting of workspace  Pt presents to clinic today for bilateral knee pain x 1 month.      Medication Reconciliation: complete  Abi Cooper LPN     SUBJECTIVE:  86 year old female presents with daughter for bilateral knee arthritis and left hip pain.     She would like knee injections. She had injections in November 2019 which have worn off in the past month. Having difficulty with walking, so very limited now due to pain.  In November she also had an intra-articular hip injection on the left as well. Daughter believes the hip pain has worsened as well, but patient had dementia and so it is hard to tell sometimes.  Notes pain in both knees and has difficulty with movement. Pain in left hip, more lateral, like bursitis.      REVIEW OF SYSTEMS:    Pertinent items are noted in HPI.    Current Outpatient Medications   Medication Sig Dispense Refill     acetaminophen (TYLENOL) 325 MG tablet Take 3 tablets (975 mg) by mouth 3 times daily       Calcium Carbonate-Vitamin D (CALCIUM 500 + D) 500-125 MG-UNIT TABS Take 1 tablet by mouth daily       furosemide (LASIX) 20 MG tablet Take 1 tablet (20 mg) by mouth daily as needed (Lower extremity swelling) 90 tablet 1     lactulose 20 GM/30ML SOLN Take 30 mLs (20 g) by mouth 2 times daily as needed       magnesium hydroxide (MILK OF MAGNESIA) 400 MG/5ML suspension Take 15 mLs by mouth nightly as needed for constipation or heartburn       metoprolol tartrate (LOPRESSOR) 25 MG tablet Take 0.5 tablets (12.5 mg) by mouth 2 times daily 180 tablet 3     Multiple Vitamins-Minerals (WOMENS MULTIVITAMIN  PLUS) TABS Take 1 tablet by mouth every morning       oxyCODONE (ROXICODONE) 5 MG tablet Take 0.5-1 tablets (2.5-5 mg) by mouth every 4 hours as needed for moderate to severe pain 150 tablet 0     pantoprazole (PROTONIX) 40 MG EC tablet Take 1 tablet (40 mg) by mouth every morning (before breakfast) 30 tablet 3      sucralfate (CARAFATE) 1 GM tablet Take 1 tablet (1 g) by mouth 4 times daily (before meals and nightly) 120 tablet 3     trolamine salicylate (ASPERCREME) 10 % external cream Apply topically 3 times daily Apply to hip, back, knees as needed for pain       Allergies   Allergen Reactions     Lansoprazole Other (See Comments) and Unknown     Patient states that medication didn't work for her.  Daughter states she got delusional, water did not taste right     Lisinopril Cough     Lactose        OBJECTIVE:  BP (!) 152/62   Pulse 66   Temp 97.4  F (36.3  C) (Tympanic)   Resp 17   Wt 47.6 kg (105 lb)   SpO2 99%   BMI 16.95 kg/m      EXAM:  Musculoskeletal: Tender over bilateral knees along joint line. Pain with ROM. Obvious DJD changes in knees.  Mild left trochanter tenderness. Pain with attempted ROM of left hip, but hard to tell if more from knees or hip.    ASSESSMENT/PLAN:    ICD-10-CM    1. Arthralgia of both knees  M25.561 triamcinolone (KENALOG-40) injection 40 mg    M25.562 lidocaine (PF) (XYLOCAINE) 1 % injection 5 mL     DRAIN/INJECT LARGE JOINT/BURSA     lidocaine (PF) (XYLOCAINE) 1 % injection 5 mL     triamcinolone (KENALOG-40) injection 40 mg   2. Arthritis of left hip  M16.12 XR Joint Injection Major Left       PROCEDURE: Reviewed risks and benefits of injection. Patient gave verbal informed consent to proceed. Cleansed skin with chorhexadine. Injected 40 mg triamcinolone and 5 mL 1% lidocaine to the right knee joint using an anterior approach on Lateral side. Bandage applied.   Repeated same procedure on the left knee with good results.  She did not have immediate noticeable benefit.     Discussed with daughter potential for left hip injection as this helped previously. If the knee injections do not help enough, then they can have hip injection through radiology. Placed order.        Follow-up as needed     Rock Reyna MD    This document was prepared using a combination of typing and voice  generated software.  While every attempt was made for accuracy, spelling and grammatical errors may exist.

## 2020-04-21 NOTE — NURSING NOTE
Pt presents to clinic today for bilateral knee pain x 1 month.      Medication Reconciliation: complete  Abi Cooper LPN

## 2020-04-28 NOTE — TELEPHONE ENCOUNTER
Please call Nona, patient's daughter, regarding getting hip injection in right hip rather than left. Please call Nona prior to 2:00, which is left hip injection time. Thanks.

## 2020-05-07 NOTE — PROGRESS NOTES
"Tenisha Cagle is a 86 year old female who is being evaluated via a billable telephone visit.      The patient has been notified of following:     \"This telephone visit will be conducted via a call between you and your physician/provider. We have found that certain health care needs can be provided without the need for a physical exam.  This service lets us provide the care you need with a short phone conversation.  If a prescription is necessary we can send it directly to your pharmacy.  If lab work is needed we can place an order for that and you can then stop by our lab to have the test done at a later time.    Telephone visits are billed at different rates depending on your insurance coverage. During this emergency period, for some insurers they may be billed the same as an in-person visit.  Please reach out to your insurance provider with any questions.    If during the course of the call the physician/provider feels a telephone visit is not appropriate, you will not be charged for this service.\"    Patient has given verbal consent for Telephone visit?  Yes    What phone number would you like to be contacted at? [phone]    How would you like to obtain your AVS?     Subjective     Tenisha Cagle is a 86 year old female who presents to clinic today for the following health issues:    HPI  Medication Followup of pain    Taking Medication as prescribed: yes    Side Effects:  None    Medication Helping Symptoms:  yes      On speaker with her daughter Nona and pt herself.  Due for pain med refills. She says she has pains in her back, arms.  Daughter says she injured her shoulder somehow and has had more pain.  Knee pain, had injections recently which has helped.  Had a spine injection last done 2/6/20 which helped out a lot, wants another.  Daughter now lives with her.  Pt does not recall her recent hospitalization at all last month.          Current Outpatient Medications   Medication Sig Dispense Refill     " acetaminophen (TYLENOL) 325 MG tablet Take 3 tablets (975 mg) by mouth 3 times daily       Calcium Carbonate-Vitamin D (CALCIUM 500 + D) 500-125 MG-UNIT TABS Take 1 tablet by mouth daily       furosemide (LASIX) 20 MG tablet Take 1 tablet (20 mg) by mouth daily as needed (Lower extremity swelling) 90 tablet 1     lactulose 20 GM/30ML SOLN Take 30 mLs (20 g) by mouth 2 times daily as needed       magnesium hydroxide (MILK OF MAGNESIA) 400 MG/5ML suspension Take 15 mLs by mouth nightly as needed for constipation or heartburn       metoprolol tartrate (LOPRESSOR) 25 MG tablet Take 0.5 tablets (12.5 mg) by mouth 2 times daily 180 tablet 3     Multiple Vitamins-Minerals (WOMENS MULTIVITAMIN  PLUS) TABS Take 1 tablet by mouth every morning       oxyCODONE (ROXICODONE) 5 MG tablet Take 0.5-1 tablets (2.5-5 mg) by mouth every 4 hours as needed for moderate to severe pain 150 tablet 0     pantoprazole (PROTONIX) 40 MG EC tablet Take 1 tablet (40 mg) by mouth every morning (before breakfast) 30 tablet 3     sucralfate (CARAFATE) 1 GM tablet Take 1 tablet (1 g) by mouth 4 times daily (before meals and nightly) 120 tablet 3     trolamine salicylate (ASPERCREME) 10 % external cream Apply topically 3 times daily Apply to hip, back, knees as needed for pain       Allergies   Allergen Reactions     Lansoprazole Other (See Comments) and Unknown     Patient states that medication didn't work for her.  Daughter states she got delusional, water did not taste right     Lisinopril Cough     Lactose        Reviewed and updated as needed this visit by Provider         Review of Systems   ROS COMP:         Objective   Reported vitals:  There were no vitals taken for this visit.   healthy, alert, no distress and very poor memory  PSYCH: Alert and oriented times 3; coherent speech, normal   rate and volume, able to articulate logical thoughts, able   to abstract reason, no tangential thoughts, no hallucinations   or delusions  Her affect is  normal  RESP: No cough, no audible wheezing, able to talk in full sentences  Remainder of exam unable to be completed due to telephone visits    Diagnostic Test Results:  Labs reviewed in Epic        Assessment/Plan:  1. Osteoarthritis of spine with radiculopathy, lumbar region  Refilled meds for another month.  Will get another injection arranged as it was last done 3 months ago and helps somewhat.  Given worsening dementia, I am happy daughter is now living with her all the time.  Discussed with daughter scenarios if mom gets COVID.  Very high mortality risk for her.  Only 4 week supply meds given due to frail health, I want follow up in 4 weeks, can be phone or video  - oxyCODONE (ROXICODONE) 5 MG tablet; Take 0.5-1 tablets (2.5-5 mg) by mouth every 4 hours as needed for moderate to severe pain  Dispense: 150 tablet; Refill: 0  - XR Lumbar Epidural Injection Incl Imaging; Future    No follow-ups on file.      Phone call duration:  7 minutes    Godwin Kapoor MD

## 2020-05-14 NOTE — TELEPHONE ENCOUNTER
Patients daughter is requesting ativan  for patient for mri so she doesn't freak out if you could contact her in regards to this thank you.

## 2020-05-18 NOTE — TELEPHONE ENCOUNTER
Called the daughter and left a message that an Rx was faxed in for patient  ,Monisha Gamboa LPN on 5/18/2020 at 11:40 AM

## 2020-05-19 NOTE — TELEPHONE ENCOUNTER
Patient daughter called in regards to patient possibly having FX hip. She is unsure what to do because she doesn't think she can get her in her car. Please call her back. Hanna Durham on 5/19/2020 at 7:07 AM

## 2020-05-19 NOTE — ED NOTES
Pt's daughter updated on pt status and pending discharge, discharge instructions reviewed, she'll come pick pt up, ETA 10 min.

## 2020-05-19 NOTE — ED AVS SNAPSHOT
[facility]  [address]  [place] [address]  Phone:  [phone]  Fax:  [phone]                                    Tenisha Cagle   MRN: [id number]    Department:  [facility]   Date of Visit:  5/19/2020           After Visit Summary Signature Page    I have received my discharge instructions, and my questions have been answered. I have discussed any challenges I see with this plan with the nurse or doctor.    ..........................................................................................................................................  Patient/Patient Representative Signature      ..........................................................................................................................................  Patient Representative Print Name and Relationship to Patient    ..................................................               ................................................  Date                                   Time    ..........................................................................................................................................  Reviewed by Signature/Title    ...................................................              ..............................................  Date                                               Time          22EPIC Rev 08/18

## 2020-05-19 NOTE — TELEPHONE ENCOUNTER
"Follow up call as per record review patient has not checked into the ED as of yet. Spoke with daughter Nona. \" I know I said I was going to call 911 right then and there but she is sleeping and I didn't want to wake her up. I will call 911 now\". Will also route to PCP for review. Betty Vega RN on 5/19/2020 at 9:10 AM    "

## 2020-05-19 NOTE — TELEPHONE ENCOUNTER
"S-(situation): Per call center-Patient daughter called in regards to patient possibly having FX hip. She is unsure what to do because she doesn't think she can get her in her car. Please call her back. Hanna Durham on 5/19/2020 at 7:07 AM    A-(assessment): Triaged assessment questions provided by daughter-Nona- see below    R-(recommendations): Protocol recommends daughter call 911. Daughter verbalized understanding and intent to comply. \"OK. I will call 911 now\".     Betty Vega RN on 5/19/2020 at 7:57 AM      Reason for Disposition    Can't stand (bear weight) or walk    Additional Information    Negative: Looks like a dislocated joint (crooked or deformed)    Negative: Bullet, stabbed by knife or other serious penetrating wound    Negative: Major bleeding (actively dripping or spurting) that can't be stopped    Answer Assessment - Initial Assessment Questions  1. MECHANISM: \"How did the injury happen?\" (e.g., twisting injury, direct blow)       The pain started last night when she went to bed.  It seems deformed to me from the knee over -it angles out of the right. No known injury. No fall.  She can't walk on it. She screams and hollers. She is fine if she is laying down.   2. ONSET: \"When did the injury happen?\" (Minutes or hours ago)     I am not sure.  No fall. Maybe the twisting trying to get back into bed. Right leg  3. LOCATION: \"Where is the injury located?\"       Right leg  4. APPEARANCE of INJURY: \"What does the injury look like?\"  (e.g., deformity of leg)      See above   5. SEVERITY: \"Can you put weight on that leg?\" \"Can you walk?\"       Hurts to bear weight. 10/10  6. SIZE: For cuts, bruises, or swelling, ask: \"How large is it?\" (e.g., inches or centimeters;  entire joint)       Denies any visible injury. Not that I know of.   7. PAIN: \"Is there pain?\" If so, ask: \"How bad is the pain?\"  (e.g., Scale 1-10; or mild, moderate, severe)     Severe   8. TETANUS: For any breaks in the skin, ask: " "\"When was the last tetanus booster?\"        9. OTHER SYMPTOMS: \"Do you have any other symptoms?\"         10. PREGNANCY: \"Is there any chance you are pregnant?\" \"When was your last menstrual period?\"    Protocols used: HIP INJURY-A-OH      "

## 2020-05-19 NOTE — ED PROVIDER NOTES
History     Chief Complaint   Patient presents with     Hip Pain     HPI  Tenisha Cagle is a 86 year old female who comes into the emergency department with ongoing hip pain.  She has had recent evaluations including injections for her hip pain.  Previously documented states left hip pain but she tells me today right hip and right knee pain.  Patient does have history of dementia.  Reviewed recent medical records and past medical history.  Reviewed nurses notes and EMS notes and similar history is related to me.   ED Triage Notes  ED Nursing Triage Note (General)   ________________________________     Tenisha Cagle is a 86 year old Female that [facility]  With history of R  Hip pain that she recently had an injection for that is worse today reported by patient   Significant symptoms had onset more than a few days  Ago.  Given Fentanyl 25 mcg IV by EMS enroute       Allergies:  Allergies   Allergen Reactions     Lansoprazole Other (See Comments) and Unknown     Patient states that medication didn't work for her.  Daughter states she got delusional, water did not taste right     Lisinopril Cough     Lactose        Problem List:    Patient Active Problem List    Diagnosis Date Noted     UTI (urinary tract infection) 04/03/2020     Priority: Medium     Elevated lactic acid level 04/03/2020     Priority: Medium     Hyponatremia 04/03/2020     Priority: Medium     Dementia with behavioral disturbance (H) 02/06/2020     Priority: Medium     Degenerative joint disease of pelvic region 02/06/2020     Priority: Medium     Urinary retention 02/06/2020     Priority: Medium     Dementia (H) 02/06/2020     Priority: Medium     Dizzy 01/31/2020     Priority: Medium     Paroxysmal A-fib (H) 01/31/2020     Priority: Medium     Chondrodermatitis nodularis chronica helicis, 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     Priority: Medium     Controlled substance agreement signed 3/8/19 03/08/2019     Priority: Medium     Class:  Chronic     Arthritis of shoulder region, left, degenerative 01/07/2019     Priority: Medium     Memory loss 05/25/2018     Priority: Medium     Atrial fibrillation with RVR (H) 01/25/2018     Priority: Medium     Hiatal hernia 01/17/2018     Priority: Medium     Overview:   large, mostly intrathoracic       Essential hypertension 01/17/2018     Priority: Medium     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/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     Priority: Medium     Benign skin lesion of nose 09/27/2017     Priority: Medium     AK (actinic keratosis) 07/10/2017     Priority: Medium     SK (seborrheic keratosis) 07/10/2017     Priority: Medium     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) 06/15/2017     Priority: Medium     Abnormal weight loss 04/18/2016     Priority: Medium     Anemia 04/18/2016     Priority: Medium     Visual changes 04/18/2016     Priority: Medium     Osteoarthritis of spine with radiculopathy, lumbar region 02/03/2016     Priority: Medium     History of TIA (transient ischemic attack) 02/02/2016     Priority: Medium     Numbness and tingling of right leg 02/02/2016     Priority: Medium     Radicular leg pain 02/02/2016     Priority: Medium     Basal cell carcinoma of right cheek 11/18/2015     Priority: Medium     Alvarado's cyst of knee 02/20/2014     Priority: Medium     Paresthesia of right leg 02/20/2014     Priority: Medium     Right knee DJD 02/20/2014     Priority: Medium     Cystocele 08/23/2013     Priority: Medium     Pancreatic mass 09/18/2012     Priority: Medium     Overview:   Possible, abnormal CT        Cerebral aneurysm, nonruptured 12/20/2011     Priority: Medium     Diverticulosis of colon 05/13/2011     Priority: Medium     Chronic lymphocytic leukemia (H) 04/14/2011     Priority: Medium     Overview:   WBC stable in the 30,000          Past Medical History:    Past Medical History:   Diagnosis Date     Cardiac murmur      Cataract      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)      Diaphragmatic hernia without obstruction or gangrene      Diverticulosis of large intestine without perforation or abscess without bleeding      Dysthymic disorder      Essential (primary) hypertension      Female climacteric state      Gastritis without bleeding      Nonruptured cerebral aneurysm      Osteoarthritis      Other fecal abnormalities      Other lymphoid leukemia not having achieved remission (H)      Other specified bacterial intestinal infections      Other specified enthesopathies of unspecified lower limb, excluding foot      Peptic ulcer without hemorrhage or perforation      Personal history of other diseases of the digestive system (CODE)      Personal history of other medical treatment (CODE)      Pure hypercholesterolemia      Sepsis (H)      Sleep disorder      Transient cerebral ischemic attack        Past Surgical History:    Past Surgical History:   Procedure Laterality Date     CHOLECYSTECTOMY      No Comments Provided     COLONOSCOPY      2007,Sigmoid diverticulosis     ENDOSCOPY UPPER, COLONOSCOPY, COMBINED      5/5/11,Hiatal hernia, gastric gland hyperplasia in antrum     LAPAROSCOPIC TUBAL LIGATION      No Comments Provided     OTHER SURGICAL HISTORY      1986,205093,BIOPSY BREAST,Right     OTHER SURGICAL HISTORY      ICH221,PREMALIG/BENIGN SKIN LESION EXCISION,R side of nose, face and chest wall/Basal Cell Cancer Excision     TONSILLECTOMY, ADENOIDECTOMY, COMBINED      1954       Family History:    Family History   Problem Relation Age of Onset     Other - See Comments Mother         Stroke,Had a CVA age 85     Cancer Father         Cancer,Brain tumor, age 56     Blood Disease Sister         Blood Disease,Leukemia and     Cancer Sister         Cancer, kidney cancer     Blood Disease Sister         Blood Disease,Chronic lymphocytic leukemia       Social History:  Marital Status:   [5]  Social History     Tobacco Use     Smoking status: Never  "Smoker     Smokeless tobacco: Never Used   Substance Use Topics     Alcohol use: No     Alcohol/week: 0.0 standard drinks     Drug use: No        Medications:    acetaminophen (TYLENOL) 325 MG tablet  furosemide (LASIX) 20 MG tablet  oxyCODONE (ROXICODONE) 5 MG tablet  Calcium Carbonate-Vitamin D (CALCIUM 500 + D) 500-125 MG-UNIT TABS  lactulose 20 GM/30ML SOLN  LORazepam (ATIVAN) 0.5 MG tablet  magnesium hydroxide (MILK OF MAGNESIA) 400 MG/5ML suspension  metoprolol tartrate (LOPRESSOR) 25 MG tablet  Multiple Vitamins-Minerals (WOMENS MULTIVITAMIN  PLUS) TABS  pantoprazole (PROTONIX) 40 MG EC tablet  sucralfate (CARAFATE) 1 GM tablet  trolamine salicylate (ASPERCREME) 10 % external cream          Review of Systems   Constitutional: Negative for fever.   Respiratory: Negative for shortness of breath.    Cardiovascular: Negative for chest pain.   Gastrointestinal: Negative for abdominal pain.   Musculoskeletal: Negative for back pain and myalgias.   All other systems reviewed and are negative.      Physical Exam   BP: (!) 164/102  Pulse: 69  Heart Rate: 74  Temp: 97.6  F (36.4  C)  Resp: 19  Height: 167.6 cm (5' 6\")  Weight: 44.5 kg (98 lb)  SpO2: 99 %      Physical Exam  Vitals signs and nursing note reviewed.   Constitutional:       General: She is not in acute distress.     Appearance: She is not diaphoretic.   HENT:      Head: Atraumatic.      Mouth/Throat:      Pharynx: No oropharyngeal exudate.   Eyes:      General: No scleral icterus.     Pupils: Pupils are equal, round, and reactive to light.   Cardiovascular:      Heart sounds: Normal heart sounds.   Pulmonary:      Effort: No respiratory distress.      Breath sounds: Normal breath sounds.   Abdominal:      General: Bowel sounds are normal.      Palpations: Abdomen is soft.      Tenderness: There is no abdominal tenderness.   Musculoskeletal:         General: No tenderness.   Skin:     General: Skin is warm.      Findings: No rash.         ED Course      "   Procedures    Results for orders placed or performed during the hospital encounter of 05/19/20 (from the past 24 hour(s))   XR Hip Right 2-3 Views    Narrative    PROCEDURE:  XR HIP RIGHT 2-3 VIEWS    HISTORY: hip pain    COMPARISON:  None.    TECHNIQUE:  Pelvis one view. 2 views  of the right hip were obtained.    FINDINGS:  The pelvis is intact. The sacrum and sacroiliac joints  appear normal. Articular spaces are normal in height at both hips.  Both proximal femurs are intact. Scoliosis and advanced degenerative  changes are noted in the lower lumbar spine.       Impression    IMPRESSION: Lumbar degenerative changes. No pelvic or hip abnormality.       CALLY BARRIENTOS MD   XR Knee Port Right 1/2 Views    Narrative    PROCEDURE:  XR KNEE PORT RT 1/2 VW    HISTORY: pain.    COMPARISON:  2/5/2019    TECHNIQUE:  2 views right knee.    FINDINGS:  There is severe osteoarthritis of the right knee with  complete loss of the lateral joint space. No acute fracture is  identified. No suprapatellar effusion is seen. There is muscle  atrophy. The bones are osteoporotic.       Impression    IMPRESSION: No acute fracture.      SHANIQUA COLEMAN MD       Medications   fentaNYL (PF) (SUBLIMAZE) injection 50 mcg (50 mcg Intravenous Given 5/19/20 1105)       Assessments & Plan (with Medical Decision Making)     I have reviewed the nursing notes.    I have reviewed the findings, diagnosis, plan and need for follow up with the patient.      Discharge Medication List as of 5/19/2020 12:06 PM          Final diagnoses:   Osteoarthritis of multiple joints, unspecified osteoarthritis type     I did x-ray her hips and knee to look for occult fracture despite absence of mechanism of injury.  No occult injuries were obvious, benign clinical exam.  Pain controlled here in the emergency department with parenteral medications.  We will increase her home pain control regimen as well to 1 tablet every 6 hours instead of 1/2 tablet every 6  hours to see if this helps.  Patient does have dementia and I do have the impression that some increasing dementia and/or delirium type symptoms are contributing to her calling out in pain.  Patient will be discharged back to the care of her adult daughter.  5/19/2020   [facility] MD  05/19/20 4301

## 2020-05-19 NOTE — ED TRIAGE NOTES
"ED Nursing Triage Note (General)   ________________________________    Tenisha Cagle is a 86 year old Female that presents [facility]  With history of R  Hip pain that she recently had an injection for that is worse today reported by patient   Significant symptoms had onset more than a few days  Ago.  Given Fentanyl 25 mcg IV by EMS enroute  Temp 97.6  F (36.4  C) (Tympanic)   Resp 19   Ht 1.676 m (5' 6\")   Wt 44.5 kg (98 lb)   SpO2 99%   Breastfeeding No   BMI 15.82 kg/m  t  Patient appears alert , in moderate distress., and cooperative behavior.    GCS Total = 15  Airway: intact  Breathing noted as Normal.  Circulation Normal  Skin normal  Action taken:  To room 908      PRE HOSPITAL PRIOR LIVING SITUATION Alone  "

## 2020-05-21 NOTE — NURSING NOTE
"Coming in with right hip pain and has started to vomit in the room. Was not given her blood pressure medication this morning    Chief Complaint   Patient presents with     Musculoskeletal Problem     right       Initial Temp 98.6  F (37  C) (Tympanic)  Estimated body mass index is 15.82 kg/m  as calculated from the following:    Height as of 5/19/20: 1.676 m (5' 6\").    Weight as of 5/19/20: 44.5 kg (98 lb).  Medication Reconciliation: complete    Monisha Gamboa LPN  "

## 2020-05-21 NOTE — PROGRESS NOTES
"  SUBJECTIVE:   Tenisha Cagle is a 86 year old female who presents to clinic today for the following health issues:    HPI  Emergency department follow up and ongoing pain.  Here with daughter who says her pain has been severe.  Was seen in the emergency department for this 2 days ago.  Has been \"screaming in pain\" over the right greater trochanter area.  Pain is waking her from sleep, for at least the last 3 nights.  Daughter is now living with her.  She cannot flex the right hip without severe pain.  No recent falls.  Had a hip and knee x-ray done showing only severe degenerative changes, but no fractures.  She had a hip injection 3 weeks ago or so.  helped only a little.      Patient Active Problem List    Diagnosis Date Noted     UTI (urinary tract infection) 04/03/2020     Priority: Medium     Elevated lactic acid level 04/03/2020     Priority: Medium     Hyponatremia 04/03/2020     Priority: Medium     Dementia with behavioral disturbance (H) 02/06/2020     Priority: Medium     Degenerative joint disease of pelvic region 02/06/2020     Priority: Medium     Urinary retention 02/06/2020     Priority: Medium     Dementia (H) 02/06/2020     Priority: Medium     Dizzy 01/31/2020     Priority: Medium     Paroxysmal A-fib (H) 01/31/2020     Priority: Medium     Chondrodermatitis nodularis chronica helicis, 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     Priority: Medium     Controlled substance agreement signed 3/8/19 03/08/2019     Priority: Medium     Class: Chronic     Arthritis of shoulder region, left, degenerative 01/07/2019     Priority: Medium     Memory loss 05/25/2018     Priority: Medium     Atrial fibrillation with RVR (H) 01/25/2018     Priority: Medium     Hiatal hernia 01/17/2018     Priority: Medium     Overview:   large, mostly intrathoracic       Essential hypertension 01/17/2018     Priority: Medium     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/27/2017     Priority: Medium     Benign skin lesion of nose 09/27/2017     Priority: " Medium     AK (actinic keratosis) 07/10/2017     Priority: Medium     SK (seborrheic keratosis) 07/10/2017     Priority: Medium     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) 06/15/2017     Priority: Medium     Abnormal weight loss 04/18/2016     Priority: Medium     Anemia 04/18/2016     Priority: Medium     Visual changes 04/18/2016     Priority: Medium     Osteoarthritis of spine with radiculopathy, lumbar region 02/03/2016     Priority: Medium     History of TIA (transient ischemic attack) 02/02/2016     Priority: Medium     Numbness and tingling of right leg 02/02/2016     Priority: Medium     Radicular leg pain 02/02/2016     Priority: Medium     Basal cell carcinoma of right cheek 11/18/2015     Priority: Medium     Alvarado's cyst of knee 02/20/2014     Priority: Medium     Paresthesia of right leg 02/20/2014     Priority: Medium     Right knee DJD 02/20/2014     Priority: Medium     Cystocele 08/23/2013     Priority: Medium     Pancreatic mass 09/18/2012     Priority: Medium     Overview:   Possible, abnormal CT        Cerebral aneurysm, nonruptured 12/20/2011     Priority: Medium     Diverticulosis of colon 05/13/2011     Priority: Medium     Chronic lymphocytic leukemia (H) 04/14/2011     Priority: Medium     Overview:   WBC stable in the 30,000       Past Surgical History:   Procedure Laterality Date     CHOLECYSTECTOMY      No Comments Provided     COLONOSCOPY      2007,Sigmoid diverticulosis     ENDOSCOPY UPPER, COLONOSCOPY, COMBINED      5/5/11,Hiatal hernia, gastric gland hyperplasia in antrum     LAPAROSCOPIC TUBAL LIGATION      No Comments Provided     OTHER SURGICAL HISTORY      1986,[id number],BIOPSY BREAST,Right     OTHER SURGICAL HISTORY      ASD091,PREMALIG/BENIGN SKIN LESION EXCISION,R side of nose, face and chest wall/Basal Cell Cancer Excision     TONSILLECTOMY, ADENOIDECTOMY, COMBINED      1954     Social History     Tobacco Use     Smoking status: Never Smoker      Smokeless tobacco: Never Used   Substance Use Topics     Alcohol use: No     Alcohol/week: 0.0 standard drinks     Current Outpatient Medications   Medication Sig Dispense Refill     acetaminophen (TYLENOL) 325 MG tablet Take 3 tablets (975 mg) by mouth 3 times daily       Calcium Carbonate-Vitamin D (CALCIUM 500 + D) 500-125 MG-UNIT TABS Take 1 tablet by mouth daily       furosemide (LASIX) 20 MG tablet Take 1 tablet (20 mg) by mouth daily as needed (Lower extremity swelling) 90 tablet 1     lactulose 20 GM/30ML SOLN Take 30 mLs (20 g) by mouth 2 times daily as needed       LORazepam (ATIVAN) 0.5 MG tablet Take 1 hour before MRI 1 tablet 0     magnesium hydroxide (MILK OF MAGNESIA) 400 MG/5ML suspension Take 15 mLs by mouth nightly as needed for constipation or heartburn       metoprolol tartrate (LOPRESSOR) 25 MG tablet Take 0.5 tablets (12.5 mg) by mouth 2 times daily 180 tablet 3     Multiple Vitamins-Minerals (WOMENS MULTIVITAMIN  PLUS) TABS Take 1 tablet by mouth every morning       oxyCODONE (ROXICODONE) 5 MG tablet Take 1 tablet (5 mg) by mouth every 6 hours as needed for moderate to severe pain 150 tablet 0     pantoprazole (PROTONIX) 40 MG EC tablet Take 1 tablet (40 mg) by mouth every morning (before breakfast) 30 tablet 3     sucralfate (CARAFATE) 1 GM tablet Take 1 tablet (1 g) by mouth 4 times daily (before meals and nightly) 120 tablet 3     trolamine salicylate (ASPERCREME) 10 % external cream Apply topically 3 times daily Apply to hip, back, knees as needed for pain       Allergies   Allergen Reactions     Lansoprazole Other (See Comments) and Unknown     Patient states that medication didn't work for her.  Daughter states she got delusional, water did not taste right     Lisinopril Cough     Lactose        Review of Systems   Constitutional: Positive for fatigue.   Musculoskeletal: Positive for back pain and gait problem.   Psychiatric/Behavioral: Positive for sleep disturbance. Negative for  hallucinations.        OBJECTIVE:     Temp 98.6  F (37  C) (Tympanic)   There is no height or weight on file to calculate BMI.  Physical Exam  Constitutional:       Comments: Moderately sedated. In moderate distress with an emesis bag.  Not speaking much.   Musculoskeletal:      Comments: No pain on palpation along right femur or lumbar spine.  Negative straight leg raise.  Moderate pain over right greater trochanter.  Discussed with them risks of injection and agreed, prepped this area and infiltrated with 4 ml 1% lidocaine and 80 milligram depotmedrol     Neurological:      Motor: Weakness present.      Gait: Gait normal.         Diagnostic Test Results:  none     ASSESSMENT/PLAN:     25 minutes with them and over half of this in counseling with them.      (F03.91) Dementia with behavioral disturbance, unspecified dementia type (H)  (primary encounter diagnosis)  Comment: she is really declining. I suspect her daughter does not fully understand the implications and the way dementia will decline.  Will need more cares and assistance.  HomeCare ordered today.  I want daughter to assist with transfers, and all walking.    Plan: HOME CARE NURSING REFERRAL             (M70.61) Trochanteric bursitis of right hip  Comment: this is only a small part of the pain picture.  Will trial another bursitis injection.    Plan: methylPREDNISolone (DEPO-MEDROL) injection 80         mg, Large Joint/Bursa injection and/or drainage        (Shoulder, Knee), HOME CARE NURSING REFERRAL             (M16.11) Primary osteoarthritis of right hip  Comment: recent injection with only slight help  Plan: HOME CARE NURSING REFERRAL             (M47.26) Osteoarthritis of spine with radiculopathy, lumbar region  Comment: pending injection in this area too.  Plan: HOME CARE NURSING REFERRAL                 Godwin Kapoor MD  [facility]

## 2020-05-24 PROBLEM — M25.551 HIP PAIN, RIGHT: Status: ACTIVE | Noted: 2020-01-01

## 2020-05-24 PROBLEM — M54.16 LUMBAR RADICULOPATHY: Status: ACTIVE | Noted: 2020-01-01

## 2020-05-24 PROBLEM — M41.9 SCOLIOSIS OF THORACOLUMBAR SPINE: Status: ACTIVE | Noted: 2020-01-01

## 2020-05-24 NOTE — PROGRESS NOTES
Admission Note    Data:  Tenisha Cagle admitted to [id number] from emergency room at 1845.      Action:  Dr. Álvarez has been notified of admission. Pt oriented to unit, call light in reach.     Response:  Patient verbalizes understanding of orientation and education to room, routine, plan of care, and reason for admission.     Florecita Ring RN on 5/24/2020 at 6:53 PM

## 2020-05-24 NOTE — ED NOTES
S/p right hemicraniectomy 1/15 for right supraclinoid ICA rupture during pipeline placement.   q1h neuro checks  Goal MAP  mmHg per NSG  NSG following, appreciate recs   Total 2U pRBC and 4U platelets in past 24 hours   Poor prognosis per NSG note   Goal Na 150-160, q4h checks. Given 23.4% hypertonic saline bullet, will start on hypertonic saline drip.     covid sent to lab

## 2020-05-24 NOTE — ED NOTES
2 nurses attempted to place new IV line and unable.  Provider notified and does not wish to have CRNA contacted at this time.  States will likely go home.

## 2020-05-24 NOTE — ED PROVIDER NOTES
[facility]  Emergency Department Visit Note    No chief complaint on file.      History of Present Illness     Tenisha Cagle is an 86 year old female presenting with chest pain. This pain started approximately one hour prior to arrival and the onset was after feeling nauseated and she vomited. The patient is unable to characterize the pain and currently complains of pain in her right hip but according to EMS she had grabbed he r chest at home and her daughter called 911. The pain apparently radiated into her jaw, and was not associated with shortness of breath, diaphoresis, change with respiration. No cough or fevers. The patient has not had similar symptoms in the past. No falls or other recent injuries. EMS called a STEMI alert prior to arrival. Their ECG reviewed upon arrival and one was done her immediatly upon arrival and STEMI downgraded    Medications:  Prior to Admission medications    Medication Sig Last Dose Taking? Auth Provider   acetaminophen (TYLENOL) 325 MG tablet Take 3 tablets (975 mg) by mouth 3 times daily   Pop Adler MD   Calcium Carbonate-Vitamin D (CALCIUM 500 + D) 500-125 MG-UNIT TABS Take 1 tablet by mouth daily   Reported, Patient   furosemide (LASIX) 20 MG tablet Take 1 tablet (20 mg) by mouth daily as needed (Lower extremity swelling)   Thomas Adams MD   lactulose 20 GM/30ML SOLN Take 30 mLs (20 g) by mouth 2 times daily as needed   Pop Adler MD   LORazepam (ATIVAN) 0.5 MG tablet Take 1 hour before MRI   Godwin Kapoor MD   magnesium hydroxide (MILK OF MAGNESIA) 400 MG/5ML suspension Take 15 mLs by mouth nightly as needed for constipation or heartburn   Unknown, Entered By History   metoprolol tartrate (LOPRESSOR) 25 MG tablet Take 0.5 tablets (12.5 mg) by mouth 2 times daily   Thomas Adams MD   Multiple Vitamins-Minerals (WOMENS MULTIVITAMIN  PLUS) TABS Take 1 tablet by mouth every morning   Unknown, Entered By History   oxyCODONE  (ROXICODONE) 5 MG tablet Take 1 tablet (5 mg) by mouth every 6 hours as needed for moderate to severe pain   Trung Lomas MD   pantoprazole (PROTONIX) 40 MG EC tablet Take 1 tablet (40 mg) by mouth every morning (before breakfast)   Oliver Vasquez MD   sucralfate (CARAFATE) 1 GM tablet Take 1 tablet (1 g) by mouth 4 times daily (before meals and nightly)   Oliver Vasquez MD   trolamine salicylate (ASPERCREME) 10 % external cream Apply topically 3 times daily Apply to hip, back, knees as needed for pain   Unknown, Entered By History     Allergies:  Allergies   Allergen Reactions     Lansoprazole Other (See Comments) and Unknown     Patient states that medication didn't work for her.  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)     Numbness and tingling of right leg     Osteoarthritis of spine with radiculopathy, lumbar region     Pancreatic mass     Paresthesia of right leg     Radicular leg pain     Right knee DJD     SK (seborrheic keratosis)     Benign skin lesion of nose     Visual changes     Memory loss     Atrial fibrillation with RVR (H)     Arthritis of shoulder region, left, degenerative     Controlled substance agreement signed 3/8/19     Chondrodermatitis nodularis chronica helicis, right     Dizzy     Paroxysmal A-fib (H)     Dementia with behavioral disturbance (H)     Degenerative joint disease of pelvic region     Urinary retention     Dementia (H)      UTI (urinary tract infection)     Elevated lactic acid level     Hyponatremia     Past Medical History:  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)     2011     Diaphragmatic hernia without obstruction or gangrene     large, mostly intrathoracic     Diverticulosis of large intestine without perforation or abscess without bleeding     2011     Dysthymic disorder     No Comments Provided     Essential (primary) hypertension     No Comments Provided     Female climacteric state     Menopausal age 56     Gastritis without bleeding     03,Treated with PrevPac     Nonruptured cerebral aneurysm     2011     Osteoarthritis     No Comments Provided     Other fecal abnormalities     Recurrent loose stool     Other lymphoid leukemia not having achieved remission (H)     CLL, stable WBC 30,000     Other specified bacterial intestinal infections     2003, H. pylori gastritis treated with Prevpac     Other specified enthesopathies of unspecified lower limb, excluding foot     No Comments Provided     Peptic ulcer without hemorrhage or perforation     No Comments Provided     Personal history of other diseases of the digestive system (CODE)     2011     Personal history of other medical treatment (CODE)     , vaginal deliveries     Pure hypercholesterolemia     No Comments Provided     Sepsis (H)     child,Hospitalized as a child for blood poisoning     Sleep disorder     2011     Transient cerebral ischemic attack     2011       Past Surgical History:  Past Surgical History:   Procedure Laterality Date     CHOLECYSTECTOMY      No Comments Provided     COLONOSCOPY      ,Sigmoid diverticulosis     ENDOSCOPY UPPER, COLONOSCOPY, COMBINED      11,Hiatal hernia, gastric gland hyperplasia in antrum     LAPAROSCOPIC  TUBAL LIGATION      No Comments Provided     OTHER SURGICAL HISTORY      1986,[id number],BIOPSY BREAST,Right     OTHER SURGICAL HISTORY      LOY709,PREMALIG/BENIGN SKIN LESION EXCISION,R side of nose, face and chest wall/Basal Cell Cancer Excision     TONSILLECTOMY, ADENOIDECTOMY, COMBINED      1954       Social History:  Social History     Tobacco Use     Smoking status: Never Smoker     Smokeless tobacco: Never Used   Substance Use Topics     Alcohol use: No     Alcohol/week: 0.0 standard drinks     Drug use: No       Review of Systems:  10 point review of systems obtained and pertinent positive and negative findings noted in HPI. Review of systems otherwise negative.      Physical Exam     Vital signs: BP (!) 178/74   Temp 96.6  F (35.9  C) (Tympanic)   Resp 18   Wt 43.1 kg (95 lb)   SpO2 99%   BMI 15.33 kg/m      Physical Exam:    General: awake and alert, uncomfortable.  She cries out in pain whenever she is touched on any part of her body.  HEENT: atraumatic, no scleral injection, no nasal discharge, neck supple  Chest wall: palpation of the chest wall doesreproduce symptoms  Chest: clear to auscultation bilaterally without wheezes or crackles, non labored respirations  Cardiovascular: regular rate and rhythm, no murmurs or gallops  Abdomen: soft, nontender, no rebound or guarding, nondistended  Extremities: no deformities, edema, or tenderness  Skin: warm, dry, no rashes  Neuro: alert and oriented x 3, moving extremities x 4, ambulates without difficulty    Medical Decision Making & ED Course     Tenisha Cagle is an 86 year old female presenting with chest pain. Differential includes acute coronary syndrome, pulmonary embolism, aortic dissection, pneumonia, esophageal spasm, gastroesophageal reflux, reactive airway disease, chest wall pain, stress or anxiety. Concern for a life threatening etiology of symptoms prompts EKG, CXR, and laboratory evaluation. EKG reveals no acute ischemia. History and exam  suggest a low likelihood of pulmonary embolism, aortic dissection, or pulmonary pathology as the etiology of this patient's pain. Given the patient's HEART score, age, risk factors, and history of present illness including onset of current symptoms, a single initial troponin is insufficient to rule out acute coronary syndrome and she requires a serial troponin to make acute coronary syndrome sufficiently unlikely to allow discharge home.     ED Course as of May 24 1537   Sun May 24, 2020   1108 I spoke with her daughter about this morning. The brenda is in sever pain constantly and was admitted for her righthip 1 week ago and had an injection 2 days ago. Today she got up and had severe pain in her hip and felt nauseated and vomited. Was crying and grabbing her chest. Her daughter gae her aspirin and called 911      1114 I did talk to the daughter about whether she felt safe having her mom come home.  She states her mother had previously been in [facility] nursing home but was not happy with the amount of care she was giving.  She feels she can take care of her and manage her pain better.  She does feel that her pain is getting more out of control.  Dr. Paulson did order in-home physical therapy for her.  I told her that we would look at her lab results and repeat a troponin in 3 hours and talk again about best disposition for her mom.      1447 Serial troponin is negative and the patient is stable for outpatient management       1509 I spoke with Nona on the phone and she did come to the emergency department so that we can discuss her mother.  We went over the results of her labs and EKG and chest x-ray.  Patient is very comfortable after having received 50 mcg of fentanyl.  She sat up and was eating lunch and was calm and in no distress.  We did get her to the commode and daughter unable to assist in her transfer. Based on this we feel she would benefit from observation, PT/OT and SW for planning a safe disposition       1534 Case discussed with Dr Álvarez who agrees with plan          I reviewed the patient's medical record, ECG. labs, CXR    Diagnosis & Disposition     Diagnosis:  1. Hip pain, right    2. Dementia without behavioral disturbance, unspecified dementia type (H)          Disposition:  Transfer to Observation     Kalina Saenz MD  05/24/20 1537       Kalina Saenz MD  05/24/20 153

## 2020-05-24 NOTE — ED NOTES
Pt had 1 ASA at home was given three more by EMS. Astrid Montoya RN .............. 5/24/2020  11:23 AM'

## 2020-05-24 NOTE — ED TRIAGE NOTES
Arrived by EMS Stemi called in field. 1 spray of nitrog given. Pt took ASA this am. EKG done upon arrival, stemi called off by EMS. Bety Davison RN on 5/24/2020 at 11:00 AM

## 2020-05-25 NOTE — PROGRESS NOTES
[facility]    Medicine Progress Note - Hospitalist Service       Date of Admission:  5/24/2020  Assessment & Plan   Tenisha Cagle is a 86 year old female who presents with severe right leg pain.  Likely related to lumbar radiculopathy.    Active Problems:   Osteoarthritis of spine with radiculopathy, lumbar region    Assessment: This is likely the source of her pain.    Plan: PT/OT, monitor, pain control, continue Medrol.      Anemia    Assessment: Anemia is chronic, stable.    Plan: Continue on Carafate and PPI.      Essential hypertension    Assessment: Blood pressure has been borderline adequate control.    Plan: Continue current meds and monitor.      Chronic lymphocytic leukemia (H)    Assessment: Stable    Plan: Monitor       Hyponatremia    Assessment: Chronic, improved to 130 from 126.    Plan: IV fluids, monitor      Hip pain, right    Assessment: CT was negative.  Pain is likely coming from the lumbar spine.    Plan: Pain control as noted above      Scoliosis of thoracolumbar spine    Assessment: Severe with evidence of nerve compression on x-ray    Plan: Proceed as above       Diet: Regular Diet Adult    DVT Prophylaxis: Pneumatic Compression Devices  Ledezma Catheter: in place, indication:    Code Status: DNR/DNI           Disposition Plan   Expected discharge: Tomorrow, recommended to prior living arrangement once adequate pain management/ tolerating PO medications.  Entered: Oliver Vasquez MD 05/25/2020, 9:46 AM       The patient's care was discussed with the Patient and daughter.    Oliver Vasquez MD  Hospitalist Service  [facility]    ______________________________________________________________________    Interval History   Drowsy, but feels better, less pain. No nausea, vomiting.     Data reviewed today: I reviewed all medications, new labs and imaging results over the last 24 hours. I personally reviewed no images or EKG's today.    Physical Exam    Vital Signs: Temp: 96.9  F (36.1  C) Temp src: Tympanic BP: 130/48 Pulse: 95 Heart Rate: 66 Resp: 20 SpO2: 96 % O2 Device: None (Room air)    Weight: 101 lbs 0 oz  GENERAL: Comfortable, no apparent distress.  CARDIOVASCULAR: regular rate and rhythm, no murmur. No lower extremity edema   RESPIRATORY: Clear to auscultation bilaterally, no wheezes or crackles.  GI: non-tender, non-distended, normal bowel sounds.   SKIN: warm periphery, no rashes      Data   Recent Labs   Lab 05/25/20  0404 05/24/20  1551 05/24/20  1125   WBC 14.4* 12.9*  --    HGB 8.0* 8.8*  --    MCV 82 84  --     374  --    INR  --   --  0.83   *  --  126*   POTASSIUM 4.4  --  4.2   CHLORIDE 97*  --  92*   CO2 23  --  26   BUN 32*  --  32*   CR 0.95  --  1.15   ANIONGAP 10  --  8   MATI 8.9  --  9.2   *  --  108*     Recent Results (from the past 24 hour(s))   XR Chest Port 1 View    Narrative    XR CHEST PORT 1 VW    HISTORY: 86 yearsFemale CP    TECHNIQUE: A single view of the chest was performed    COMPARISON: 4/3/2020    FINDINGS: Heart size and pulmonary vascularity are within normal  limits. Lungs are clear. No consolidating airspace opacities are  present.        Impression    IMPRESSION: Clear chest    WILL LAW MD   CT Hip Right w/o Contrast    Narrative    CT HIP RIGHT W/O CONTRAST    HISTORY: 86 years Female persistent pain right hip depsite negative  xrays    COMPARISON: Plain films dated 5/19/2020    TECHNIQUE: Axial CT imaging of the pelvis and right hip was performed.  Coronal sagittal reconstructions were obtained.    FINDINGS: There is osteopenia. There is mild-to-moderate joint space  narrowing the right hip.    The sacroiliac joints and pubic symphysis are congruent. There is no  evidence of pelvic or right hip fracture.    There is degenerative change of the lower lumbar spine. There is a  disc protrusion versus herniation on the right at L5-S1 protruding  into the right neuroforamen and causing  moderate to severe right  neuroforaminal narrowing. See series 6 image 86. It is uncertain if  this has relevance to the patient's symptoms.      Impression    IMPRESSION: No evidence of right hip fracture. There is no evidence of  pelvic or sacral fracture.    Degenerative disc disease with moderate to severe right neuroforaminal  narrowing at L5-S1    WILL LAW MD     Medications     sodium chloride 75 mL/hr at 05/24/20 2230       acetaminophen  975 mg Oral TID     methylPREDNISolone  4 mg Oral TID     metoprolol tartrate  12.5 mg Oral BID     pantoprazole  40 mg Oral QAM AC     sodium chloride (PF)  3 mL Intracatheter Q8H     sucralfate  1 g Oral 4x Daily AC & HS

## 2020-05-25 NOTE — PROGRESS NOTES
MD notified of increased BP.  Watching overnight and address in AM.  Will call MD with systolic 220.  Continue to monitor.  Ni Steele RN.............................5/24/2020 9:04 PM

## 2020-05-25 NOTE — PLAN OF CARE
"Patient did finally wake, ambulated with 2A and unsteady gait to bathroom after crying to \"pee right now\" with only drops, was scanned for greater than 250, updated Dr. Vasquez. Was later able to void a much larger amount with no further concerns of retention since then. Has been able to bare weight to transfer, sit up to eat, watched a small amount of TV but continues to experience pain. See MAR for medication administration. Over all pain status and mobility has improved as well as mood, agitation, and alertness today. Continues to have periodic panic episodes and is redirectable for short periods of time.    BP (!) 146/53   Pulse 95   Temp 98.2  F (36.8  C) (Tympanic)   Resp 18   Ht 1.676 m (5' 6\")   Wt 45.8 kg (101 lb)   SpO2 97%   BMI 16.30 kg/m      Florecita Ring RN on 5/25/2020 at 3:36 PM    "

## 2020-05-25 NOTE — ANESTHESIA POSTPROCEDURE EVALUATION
Patient: Tenisha REILLY Stone    * No procedures listed *    Diagnosis:* No pre-op diagnosis entered *  Diagnosis Additional Information: No value filed.    Anesthesia Type:  No value filed.    Note:  Anesthesia Post Evaluation    Patient location during evaluation: Bedside  Patient participation: Able to fully participate in evaluation  Level of consciousness: awake and alert  Pain management: adequate  Airway patency: patent  Cardiovascular status: acceptable  Respiratory status: acceptable  Hydration status: acceptable             Last vitals:  Vitals:    05/24/20 1851 05/24/20 2003 05/24/20 2011   BP: (!) 208/83 (!) 204/75 (!) 192/81   Pulse:  95    Resp: 16 16    Temp: 97.7  F (36.5  C) 98.9  F (37.2  C)    SpO2: 98% 98%          Electronically Signed By: David Kellerman, APRN CRNA  May 24, 2020  10:40 PM

## 2020-05-25 NOTE — PLAN OF CARE
BP (!) 192/81   Pulse 95   Temp 98.9  F (37.2  C) (Tympanic)   Resp 16   Wt 43.1 kg (95 lb)   SpO2 98%   BMI 15.33 kg/m    Pt anxious.  Very sensitive to loud noises and pain.  Will continue to monitor.  Ni Steele RN.............................5/24/2020 10:46 PM

## 2020-05-25 NOTE — PLAN OF CARE
BP (!) 190/81   Pulse 95   Temp 98.3  F (36.8  C) (Tympanic)   Resp 16   Wt 43.1 kg (95 lb)   SpO2 97%   BMI 15.33 kg/m      Pt continues to yell out for help and be in pain despite prn pain medication administered.

## 2020-05-25 NOTE — PROGRESS NOTES
:     will continue to follow for discharge planning needs.     GAY Price on 5/25/2020 at 1:11 PM

## 2020-05-25 NOTE — PLAN OF CARE
"BP (!) 190/81   Pulse 95   Temp 98.3  F (36.8  C) (Tympanic)   Resp 16   Wt 43.1 kg (95 lb)   SpO2 97%   BMI 15.33 kg/m      Pt continues to yell out for help and appears to be in pain but confused as of where  the pain is when asked mostly around hip area.  Pt confused and asked nurse to \"Stop letting the devil in\".  Pt was assured nurse was not allowing the devil in.  Ni Steele RN.............................5/25/2020 5:27 AM    "

## 2020-05-25 NOTE — PHARMACY-ADMISSION MEDICATION HISTORY
Pharmacy -- Admission Medication Reconciliation    Prior to admission (PTA) medications were reviewed and the patient's PTA medication list was updated.    Sources Consulted: [facility], daughter Nona ([phone]), chart review    The reliability of this Medication Reconciliation is: Reliability: Reliable    The following significant changes were made:  Lorazepam REMOVED - this was a one-time order prior to MRI - complete  Folic acid ADDED  Docusate ADDED  Prednisone ADDED back to med list - patient's daughter had been giving this medication after it was discontinued at the last hospital stay (4/7/20).  She stopped giving this on 5/15 due to anticipation of steroid injections which have subsequently been rescheduled for 6/1/20.  Daughter is wondering if patient can have some prednisone while she is here.  [employer]age ADDED    Note: did verify that patient has stopped taking her Aggrenox after last hospitalization  Note: patient has not been taking protonix and sucralfate that was prescribed at last hospitalization due to GI upset.  Daughter has been giving patient a probiotic which she thinks is helping her more than the Rx's were  Note: patient has been taking oxycodone 5mg - 5 per day  Note: patient has been receiving metoprolol 25 mg once daily instead of 12.5 mg BID per daughter    In addition, the patient's allergies were reviewed with the patient and updated as follows:   Allergies: Lansoprazole; Lisinopril; and Lactose    The pharmacist has reviewed with the patient that all personal medications should be removed from the building or locked in the belongings safe.  Patient shall only take medications ordered by the physician and administered by the nursing staff.       Medication barriers identified: daughter not giving medications as prescribed due to side effects and giving prednisone after it was discontinued because she thinks it helps the patient, patient with dementia   Medication  adherence concerns: see above   Understanding of emergency medications: n/a    Kalina Sweeney [facility], 5/25/2020,  9:42 AM

## 2020-05-25 NOTE — PROGRESS NOTES
SAFETY CHECKLIST  ID Bands and Risk clasps correct and in place (DNR, Fall risk, Allergy, Latex, Limb):  Yes  All Lines Reconciled and labeled correctly: Yes  Whiteboard updated:Yes  Environmental interventions (bed/chair alarm on, call light, side rails, restraints, sitter....): Yes    Florecita Ring RN on 5/25/2020 at 7:19 AM

## 2020-05-25 NOTE — ANESTHESIA CARE TRANSFER NOTE
Patient: Tenisha REILLY Stone    * No procedures listed *    Diagnosis: * No pre-op diagnosis entered *  Diagnosis Additional Information: No value filed.    Anesthesia Type:   No value filed.     Note:    Patient transferred to:Medical/Surgical Unit  Handoff Report: Identifed the Patient, Identified the Reponsible Provider, Allowed opportunity for questions and acknowledgement of understanding and Reviewed the pertinent medical history      Vitals: (Last set prior to Anesthesia Care Transfer)              Electronically Signed By: David Kellerman, APRN CRNA  May 24, 2020  10:39 PM

## 2020-05-25 NOTE — PLAN OF CARE
"Patient has been sleeping all morning, unable to wake this AM with loud voice and sternal rub, VSS, does move and attempts to open eyes. Did update Dr. Vasquez at 0800 recommended allow to sleep and for a while due to no sleep last night. Will continue to check, change, and reposition Q2h, with attempts to wake.     /48   Pulse 95   Temp 96.9  F (36.1  C) (Tympanic)   Resp 20   Ht 1.676 m (5' 6\")   Wt 45.8 kg (101 lb)   SpO2 96%   BMI 16.30 kg/m      Florecita Ring RN on 5/25/2020 at 9:29 AM    "

## 2020-05-25 NOTE — PLAN OF CARE
"Restless, says \"ouch ouch ouch, it hurts so bad, please help me\" was given Fentanyl PRN, BP elevated and Dr. Álvarez updated at 1900 with no new orders, is believed to be from pain at this time.     BP (!) 208/83   Pulse 79   Temp 97.7  F (36.5  C) (Tympanic)   Resp 16   Wt 43.1 kg (95 lb)   SpO2 98%   BMI 15.33 kg/m      Florecita Ring RN on 5/24/2020 at 7:10 PM    "

## 2020-05-25 NOTE — H&P
[facility]    History and Physical  Hospitalist       Date of Admission:  5/24/2020    Assessment & Plan   Tenisha Cagle is a 86 year old female who presents with severe right leg pain.  Likely related to lumbar radiculopathy.    Active Problems:    Anemia    Assessment: Anemia is chronic.  It is improved.  Felt to be related to gastritis from previous use of Aggrenox and possibly long-term prednisone.    Plan: Continue on Carafate and PPI.    Essential hypertension    Assessment: Blood pressure has been borderline adequate control.    Plan: Continue current meds and monitor.    Chronic lymphocytic leukemia (H)    Assessment: Stable    Plan: Monitor    Osteoarthritis of spine with radiculopathy, lumbar region    Assessment: This is likely the source of her pain.    Plan: PT/OT, monitor, pain control, trial of Medrol.    Hyponatremia    Assessment: Chronic-126 now.  Previous sodium was 131.    Plan: IV fluids, monitor    Hip pain, right    Assessment: CT was negative.  Pain is likely coming from the lumbar spine.    Plan: Pain control as noted above    Scoliosis of thoracolumbar spine    Assessment: Severe with evidence of nerve compression on x-ray    Plan: Proceed as above    DVT Prophylaxis: Pneumatic Compression Devices  Code Status: DNR/DNI    LINDEN CANCINO    Primary Care Physician   Godwin Kapoor    Chief Complaint   Right hip and leg pain    History is obtained from the patient, her daughter, ED physician, and chart review.    History of Present Illness   Tenisha Cagle is a 86 year old female who presents with right hip and leg pain.  Patient initially presented as a possible STEMI, because of severe pain and an episode of vomiting and clutching her chest.  This is detailed in Dr. Saenz's ED note.  Subsequently it was found that her symptoms were caused by severe pain when she attempted to move at home.  Her daughter states that she started having severe pain about 6 days ago  but has a long history of chronic back pain with radiculopathy.  He was seen several days ago and underwent x-rays which did not demonstrate any hip fracture.  Her daughter states that she has been unable to bear weight and unable to move on her own.  The pain is been excruciating on the right side.  She was seen in the clinic recently and received an injection in the right greater trochanter.  This is been of no benefit.  The pain is been excruciating and she cries out in pain.    She has had previous lumbar spine x-rays which show significant degenerative disease, and thoracolumbar scoliosis.  She had been scheduled for a low back MRI and possibly a steroid injection.    She denies chest pain, shortness of breath, but her answers are quite unreliable due to dementia.  Her daughter states that she is not had any of this previously and no GI symptoms.  She has not had a recent fall.    She was evaluated in the ED for a possible STEMI which was ruled out.  Subsequently work-up was done and she was found to have significant hip pain.  She had previous negative x-rays and so a CT scan was done which was negative for fracture.  He does demonstrate a disc protrusion and possible herniation at L5-S1 on the right.    Past Medical History    I have reviewed this patient's medical history and updated it with pertinent information if needed.   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)     4/14/2011     Diaphragmatic hernia without obstruction or gangrene     large, mostly intrathoracic     Diverticulosis of large intestine without perforation or abscess without bleeding     5/13/2011     Dysthymic disorder     No Comments Provided     Essential (primary) hypertension     No Comments Provided     Female climacteric state     Menopausal age 56     Gastritis  without bleeding     03,Treated with PrevPac     Nonruptured cerebral aneurysm     2011     Osteoarthritis     No Comments Provided     Other fecal abnormalities     Recurrent loose stool     Other lymphoid leukemia not having achieved remission (H)     CLL, stable WBC 30,000     Other specified bacterial intestinal infections     2003, H. pylori gastritis treated with Prevpac     Other specified enthesopathies of unspecified lower limb, excluding foot     No Comments Provided     Peptic ulcer without hemorrhage or perforation     No Comments Provided     Personal history of other diseases of the digestive system (CODE)     2011     Personal history of other medical treatment (CODE)     , vaginal deliveries     Pure hypercholesterolemia     No Comments Provided     Sepsis (H)     child,Hospitalized as a child for blood poisoning     Sleep disorder     2011     Transient cerebral ischemic attack     2011       Past Surgical History   I have reviewed this patient's surgical history and updated it with pertinent information if needed.  Past Surgical History:   Procedure Laterality Date     CHOLECYSTECTOMY      No Comments Provided     COLONOSCOPY      ,Sigmoid diverticulosis     ENDOSCOPY UPPER, COLONOSCOPY, COMBINED      11,Hiatal hernia, gastric gland hyperplasia in antrum     LAPAROSCOPIC TUBAL LIGATION      No Comments Provided     OTHER SURGICAL HISTORY      ,2050,BIOPSY BREAST,Right     OTHER SURGICAL HISTORY      [id number],PREMALIG/BENIGN SKIN LESION EXCISION,R side of nose, face and chest wall/Basal Cell Cancer Excision     TONSILLECTOMY, ADENOIDECTOMY, COMBINED             Prior to Admission Medications   Prior to Admission Medications   Prescriptions Last Dose Informant Patient Reported? Taking?   Calcium Carbonate-Vitamin D (CALCIUM 500 + D) 500-125 MG-UNIT TABS  Daughter Yes No   Sig: Take 1 tablet by mouth daily   LORazepam (ATIVAN) 0.5 MG tablet   No No    Sig: Take 1 hour before MRI   Multiple Vitamins-Minerals (WOMENS MULTIVITAMIN  PLUS) TABS  Daughter Yes No   Sig: Take 1 tablet by mouth every morning   acetaminophen (TYLENOL) 325 MG tablet  Daughter No No   Sig: Take 3 tablets (975 mg) by mouth 3 times daily   furosemide (LASIX) 20 MG tablet  Daughter No No   Sig: Take 1 tablet (20 mg) by mouth daily as needed (Lower extremity swelling)   lactulose 20 GM/30ML SOLN  Daughter No No   Sig: Take 30 mLs (20 g) by mouth 2 times daily as needed   magnesium hydroxide (MILK OF MAGNESIA) 400 MG/5ML suspension  Daughter Yes No   Sig: Take 15 mLs by mouth nightly as needed for constipation or heartburn   metoprolol tartrate (LOPRESSOR) 25 MG tablet  Daughter No No   Sig: Take 0.5 tablets (12.5 mg) by mouth 2 times daily   oxyCODONE (ROXICODONE) 5 MG tablet   No No   Sig: Take 1 tablet (5 mg) by mouth every 6 hours as needed for moderate to severe pain   pantoprazole (PROTONIX) 40 MG EC tablet   No No   Sig: Take 1 tablet (40 mg) by mouth every morning (before breakfast)   sucralfate (CARAFATE) 1 GM tablet   No No   Sig: Take 1 tablet (1 g) by mouth 4 times daily (before meals and nightly)   trolamine salicylate (ASPERCREME) 10 % external cream  Daughter Yes No   Sig: Apply topically 3 times daily Apply to hip, back, knees as needed for pain      Facility-Administered Medications: None     Allergies   Allergies   Allergen Reactions     Lansoprazole Other (See Comments) and Unknown     Patient states that medication didn't work for her.  Daughter states she got delusional, water did not taste right     Lisinopril Cough     Lactose        Social History   I have reviewed this patient's social history and updated it with pertinent information if needed. Tenisha Cagle  reports that she has never smoked. She has never used smokeless tobacco. She reports that she does not drink alcohol or use drugs.    Family History   I have reviewed this patient's family history and updated it  with pertinent information if needed.   Family History   Problem Relation Age of Onset     Other - See Comments Mother         Stroke,Had a CVA age 85     Cancer Father         Cancer,Brain tumor, age 56     Blood Disease Sister         Blood Disease,Leukemia and     Cancer Sister         Cancer, kidney cancer     Blood Disease Sister         Blood Disease,Chronic lymphocytic leukemia       Review of Systems     REVIEW OF SYSTEMS:    Review of systems was unreliable as the patient was not able to answer questions.    Physical Exam   Temp: 97.7  F (36.5  C) Temp src: Tympanic BP: (!) 208/83 Pulse: 79 Heart Rate: 86 Resp: 16 SpO2: 98 % O2 Device: None (Room air)    Vital Signs with Ranges  Temp:  [96.6  F (35.9  C)-97.7  F (36.5  C)] 97.7  F (36.5  C)  Pulse:  [68-79] 79  Heart Rate:  [69-86] 86  Resp:  [14-51] 16  BP: (178-208)/(74-83) 208/83  SpO2:  [97 %-99 %] 98 %  95 lbs 0 oz    Constitutional: She is lying in the ED cart, comfortable as long as she does not move, but cries out in pain with minimal movement.  She is not having any chest pain.  Eyes: No scleral icterus.  Pupils are equal round and reactive  HEENT: No evidence of trauma.  Normal for age.  Neck is supple with no tenderness.  Respiratory: Chest wall is slightly tender diffusely.  Lungs are clear to auscultation  Cardiovascular: Regular rhythm, no murmur gallop appreciated.  Peripheral pulses are palpable.  GI: Soft, nontender, no organomegaly or masses, bowel sounds normal.  Lymph/Hematologic: No bruising noted  Genitourinary: Not examined  Skin: Warm and dry, no diaphoresis  Musculoskeletal: Marked pain with attempts at internally and externally rotating right hip.  Positive straight leg raising on the right.  Reflexes are present at the knees, decreased at the right ankle.  Neurologic: Disoriented, otherwise no focal findings other than what is noted above.  Psychiatric: Awake    Data   Data reviewed today:  I personally reviewed the EKG tracing  showing No acute change,, the chest x-ray image(s) showing No abnormalities, and the CT image(s) showing No hip fracture, but rather quite severe narrowing of the L5-S1 foraminal area on the right.  The CT actual image was not reviewed, however the report was reviewed..  Laboratory studies are remarkable for a hemoglobin of 8.8 but this is increased from 8.3 almost 2 months ago.  Sodium is 126.  The remainder of the labs are unremarkable.  Recent Labs   Lab 05/24/20  1551 05/24/20  1125   WBC 12.9*  --    HGB 8.8*  --    MCV 84  --      --    INR  --  0.83   NA  --  126*   POTASSIUM  --  4.2   CHLORIDE  --  92*   CO2  --  26   BUN  --  32*   CR  --  1.15   ANIONGAP  --  8   MATI  --  9.2   GLC  --  108*       Recent Results (from the past 24 hour(s))   XR Chest Port 1 View    Narrative    XR CHEST PORT 1 VW    HISTORY: 86 yearsFemale CP    TECHNIQUE: A single view of the chest was performed    COMPARISON: 4/3/2020    FINDINGS: Heart size and pulmonary vascularity are within normal  limits. Lungs are clear. No consolidating airspace opacities are  present.        Impression    IMPRESSION: Clear chest    WILL LAW MD   CT Hip Right w/o Contrast    Narrative    CT HIP RIGHT W/O CONTRAST    HISTORY: 86 years Female persistent pain right hip depsite negative  xrays    COMPARISON: Plain films dated 5/19/2020    TECHNIQUE: Axial CT imaging of the pelvis and right hip was performed.  Coronal sagittal reconstructions were obtained.    FINDINGS: There is osteopenia. There is mild-to-moderate joint space  narrowing the right hip.    The sacroiliac joints and pubic symphysis are congruent. There is no  evidence of pelvic or right hip fracture.    There is degenerative change of the lower lumbar spine. There is a  disc protrusion versus herniation on the right at L5-S1 protruding  into the right neuroforamen and causing moderate to severe right  neuroforaminal narrowing. See series 6 image 86. It is uncertain  if  this has relevance to the patient's symptoms.      Impression    IMPRESSION: No evidence of right hip fracture. There is no evidence of  pelvic or sacral fracture.    Degenerative disc disease with moderate to severe right neuroforaminal  narrowing at L5-S1    WILL LAW MD

## 2020-05-26 NOTE — PLAN OF CARE
Discharge Planner PT   Patient plan for discharge: return home with family  Current status: minimal assist for mobility using a Fww for transfers and ambulation  Barriers to return to prior living situation: fatigue with activity  Recommendations for discharge: patient may not require any additional PT  Rationale for recommendations: anticipate that patient may be near/at baseline mobility.       Entered by: Gelacio Woods 05/26/2020 3:56 PM

## 2020-05-26 NOTE — PROGRESS NOTES
"C/o pain in L great toe. No redness noted,not painful to touch. Removed blanket from foot, pt states \"it feels better like that.\"  "

## 2020-05-26 NOTE — PROGRESS NOTES
05/26/20 1425   Quick Adds   Type of Visit Initial Occupational Therapy Evaluation   Living Environment   Lives With child(lenny), adult   Living Arrangements house   Transportation Anticipated family or friend will provide   Self-Care   Usual Activity Tolerance fair   Current Activity Tolerance fair   Equipment Currently Used at Home walker, rolling;shower chair   Functional Level   Ambulation 1-->assistive equipment   Transferring 1-->assistive equipment   Toileting 0-->independent   Bathing 1-->assistive equipment   Dressing 0-->independent   Eating 0-->independent   Communication 2-->difficulty understanding and speaking (not related to language barrier)   Swallowing 0-->swallows foods/liquids without difficulty   Cognition 1 - attention or memory deficits   Fall history within last six months yes   Number of times patient has fallen within last six months 1   Cognitive Status Examination   Orientation person   Level of Consciousness alert   Follows Commands (Cognition) WNL   Memory impaired   Attention Distractible during evaluation   Organization/Problem Solving Problem solving impaired   Visual Perception   Visual Perception No deficits were identified   Pain Assessment   Patient Currently in Pain Yes, see Vital Sign flowsheet   Range of Motion (ROM)   ROM Quick Adds No deficits were identified   Coordination   Upper Extremity Coordination No deficits were identified   Transfer Skill: Bed to Chair/Chair to Bed   Level of [place]: Bed to Chair minimum assist (75% patients effort)   Physical Assist/Nonphysical Assist: Bed to Chair 2 persons   Transfer Skill: Sit to Stand   Level of [place]: Sit/Stand minimum assist (75% patients effort)   Physical Assist/Nonphysical Assist: Sit/Stand 2 persons   Bathing   Level of [place] stand-by assist   Physical Assist/Nonphysical Assist supervision;set-up required;verbal cues   Upper Body Dressing   Level of [place]: Dress Upper Body minimum assist (75%  patients effort)   Physical Assist/Nonphysical Assist: Dress Upper Body 1 person assist   Grooming   Level of [place]: Grooming stand-by assist   Physical Assist/Nonphysical Assist: Grooming supervision   Clinical Impression   Criteria for Skilled Therapeutic Interventions Met yes, treatment indicated   OT Diagnosis Anemia    Influenced by the following impairments cognition, pain    Assessment of Occupational Performance 1-3 Performance Deficits   Identified Performance Deficits mobility and self care    Clinical Decision Making (Complexity) Low complexity   Therapy Frequency Daily   Predicted Duration of Therapy Intervention (days/wks) 1-2 days    Anticipated Equipment Needs at Discharge shower chair;wheelchair   Anticipated Discharge Disposition Home with Assist   Risks and Benefits of Treatment have been explained. Yes   Patient, Family & other staff in agreement with plan of care Yes   Total Evaluation Time   Total Evaluation Time (Minutes) 15

## 2020-05-26 NOTE — PLAN OF CARE
Pt has c/o low back pain, lidocaine patch applied, oxy has been given X 2, some relief stated. Bowel sounds present X 4 , LS clear. Pt is orientated to self only, confused to date, place and situation. VS: T 97.9, R 16, P 71, /67, O2 97% on RA.  Pt has been up to the bathroom with assist of 1 with gait belt and walker. Pt is continent of bladder, no BM this shift.     Problem: Adult Inpatient Plan of Care  Goal: Plan of Care Review  5/26/2020 1722 by Maritza Gonzalez RN  Outcome: No Change  Flowsheets (Taken 5/26/2020 1722)  Plan of Care Reviewed With:   daughter   patient  Progress: no change  5/26/2020 0901 by Maritza Gonzalez RN  Outcome: No Change  Goal: Patient-Specific Goal (Individualization)  5/26/2020 1722 by Maritza Gonzalez RN  Outcome: No Change  5/26/2020 0901 by Maritza Gonzalez RN  Outcome: No Change  Goal: Absence of Hospital-Acquired Illness or Injury  5/26/2020 1722 by Maritza Gonzalez RN  Outcome: No Change  5/26/2020 0901 by Maritza Gonzalez RN  Outcome: No Change  Goal: Optimal Comfort and Wellbeing  5/26/2020 1722 by Maritza Gonzalez RN  Outcome: No Change  5/26/2020 0901 by Maritza Gonzalez RN  Outcome: No Change  Goal: Readiness for Transition of Care  5/26/2020 1722 by Maritza Gonzalez RN  Outcome: No Change  5/26/2020 0901 by Maritza Gonzalez RN  Outcome: No Change  Goal: Rounds/Family Conference  5/26/2020 1722 by Maritza Gonzalez RN  Outcome: No Change  5/26/2020 0901 by Maritza Gonzalez RN  Outcome: No Change     Problem: Memory Impairment Comorbidity  Goal: Memory Impairment Comorbidity  Description: Patient comorbidity will be monitored for signs and symptoms of Memory Impairment condition.  Problems will be absent, minimized or managed by discharge/transition of care.  5/26/2020 1722 by Maritza Gonzalez RN  Outcome: No Change  5/26/2020 0901 by Hoopman, Maritza J, RN  Outcome: No Change     Problem: Fall Injury Risk  Goal: Absence of Fall and Fall-Related  Injury  Outcome: No Change     Problem: OT General Care Plan  Goal: Transfer (OT)  Description: Transfer (OT) SBA    Outcome: No Change  Goal: Toilet Transfer/Toileting (OT)  Description: Toilet Transfer/Toileting (OT) SBA  Outcome: No Change

## 2020-05-26 NOTE — PROGRESS NOTES
05/26/20 1500   Quick Adds   Type of Visit Initial PT Evaluation   Living Environment   Lives With child(lenny), adult   Living Arrangements house   Home Accessibility no concerns   Transportation Anticipated family or friend will provide   Self-Care   Usual Activity Tolerance fair   Current Activity Tolerance fair   Equipment Currently Used at Home walker, rolling;shower chair   Functional Level Prior   Ambulation 1-->assistive equipment   Transferring 1-->assistive equipment   Toileting 0-->independent   Bathing 1-->assistive equipment   Communication 2-->difficulty understanding and speaking (not related to language barrier)   Swallowing 0-->swallows foods/liquids without difficulty   Cognition 1 - attention or memory deficits   Fall history within last six months yes   Number of times patient has fallen within last six months 1   Which of the above functional risks had a recent onset or change? none   General Information   Referring Physician Pedro   Patient/Family Goals Statement return home with family   Precautions/Limitations fall precautions   Weight-Bearing Status - LLE full weight-bearing   Weight-Bearing Status - RLE full weight-bearing   Cognitive Status Examination   Level of Consciousness alert   Follows Commands and Answers Questions 100% of the time   Pain Assessment   Patient Currently in Pain Yes, see Vital Sign flowsheet   Integumentary/Edema   Integumentary/Edema Comments appears intact without notable LE edema   Posture    Posture Forward head position;Protracted shoulders;Kyphosis   Range of Motion (ROM)   ROM Comment WFL LEs   Strength   Strength Comments WFl LEs, however, patient fatgiues with increased activity   Bed Mobility   Bed Mobility Comments minimal assist   Transfer Skills   Transfer Comments minimal assist to CGA   Gait   Gait Comments CGA with use of Fww   Balance   Balance Comments fair with Fww   Sensory Examination   Sensory Perception Comments appears intact to light touch  in LEs   Coordination   Coordination no deficits were identified   General Therapy Interventions   Planned Therapy Interventions bed mobility training;gait training;transfer training   Clinical Impression   Criteria for Skilled Therapeutic Intervention yes, treatment indicated   PT Diagnosis impaired mobility   Influenced by the following impairments fatigue   Functional limitations due to impairments activity/gait tolerance   Clinical Presentation Stable/Uncomplicated   Clinical Decision Making (Complexity) Low complexity   Therapy Frequency Daily   Predicted Duration of Therapy Intervention (days/wks) 1-2 days   Anticipated Equipment Needs at Discharge front wheeled walker   Anticipated Discharge Disposition Home with Assist   Risk & Benefits of therapy have been explained Yes   Patient, Family & other staff in agreement with plan of care Yes   Total Evaluation Time   Total Evaluation Time (Minutes) 15

## 2020-05-26 NOTE — PLAN OF CARE
Discharge Planner OT   Patient plan for discharge: to return home as previous   Current status: Pt was pleasant, mildly confused but able to follow simple directions for ambulation in hallway with min assist of 2 for safety, pt had mild complaints of pain but tolerated walking well. Pt able to complete washing face and upper body with set up and cues while seated   Barriers to return to prior living situation: level of assist needed with cares and supervision for cognition   Recommendations for discharge: home with daughters assist   Rationale for recommendations: see above        Entered by: Sabra Linda 05/26/2020 2:45 PM

## 2020-05-26 NOTE — PROGRESS NOTES
"[facility]    Medicine Progress Note - Hospitalist Service       Date of Admission:  5/24/2020  Assessment & Plan   Tenisha Cagle is a 86 year old female who presents with severe right leg pain.  Likely related to lumbar radiculopathy.    Active Problems:   Osteoarthritis of spine with radiculopathy, lumbar region    Assessment: This is likely the source of her pain.     Plan: PT/OT, monitor, pain control, continue Medrol. Start lidoderm patch      Dementia    Assessment: chronic, stable. patient sundowns in evening but is appropriate during the day.     Plan: melatonin      Anemia    Assessment: Anemia is chronic, stable.    Plan: Continue on Carafate and PPI.      Essential hypertension    Assessment: Blood pressure has been borderline adequate control.    Plan: Continue current meds and monitor.      Chronic lymphocytic leukemia (H)    Assessment: Stable    Plan: Monitor       Hyponatremia    Assessment: Chronic, improved to 130 from 126.    Plan: IV fluids, monitor      Hip pain, right    Assessment: CT was negative.  Pain is likely coming from the lumbar spine.    Plan: Pain control as noted above      Scoliosis of thoracolumbar spine    Assessment: Severe with evidence of nerve compression on x-ray    Plan: Proceed as above         Diet: Regular Diet Adult    DVT Prophylaxis: Enoxaparin (Lovenox) SQ  Ledezma Catheter: in place, indication:    Code Status: DNR/DNI           Disposition Plan   Expected discharge: Tomorrow, recommended to prior living arrangement once adequate pain management/ tolerating PO medications.  Entered: Oliver Vasquez MD 05/26/2020, 1:33 PM       The patient's care was discussed with the Patient.    Oliver Vasquez MD  Hospitalist Service  [facility]    ______________________________________________________________________    Interval History   Confused, complains of \"pain all over.\"    Data reviewed today: I reviewed all medications, new " labs and imaging results over the last 24 hours. I personally reviewed no images or EKG's today.    Physical Exam   Vital Signs: Temp: 98.4  F (36.9  C) Temp src: Tympanic BP: (!) 164/64 Pulse: 74 Heart Rate: 72 Resp: 15 SpO2: 98 % O2 Device: None (Room air)    Weight: 101 lbs 0 oz  GENERAL: Comfortable, no apparent distress.  CARDIOVASCULAR: regular rate and rhythm, no murmur. No lower extremity edema   RESPIRATORY: Clear to auscultation bilaterally, no wheezes or crackles.  GI: non-tender, non-distended, normal bowel sounds.   SKIN: warm periphery, no rashes      Data   Medications     sodium chloride 75 mL/hr at 05/25/20 1301       acetaminophen  975 mg Oral 4x Daily     lidocaine  1 patch Transdermal Q24H     lidocaine   Transdermal Q8H     methylPREDNISolone  4 mg Oral TID     metoprolol tartrate  12.5 mg Oral BID     pantoprazole  40 mg Oral QAM AC     sodium chloride (PF)  3 mL Intracatheter Q8H     sucralfate  1 g Oral 4x Daily AC & HS

## 2020-05-26 NOTE — PLAN OF CARE
Patients anxiety increasing. Calling out. Was walked to the bathroom, back to chair for supper. Continued to yell out, anxious. Call placed to Dr. Vasquez. New order received.

## 2020-05-26 NOTE — PROGRESS NOTES
Patient unable to sign Medicare Outpatient Observation Notice.  Zabrina Grey on 5/26/2020 at 8:27 AM

## 2020-05-26 NOTE — PROGRESS NOTES
Pt's daughter, Nona, called for update. Daughter states that she is not comfortable with her mom coming home unless her pain is controlled. Daughter will talk with Dr mccloud: pain management. Daughter states that pt  can be called for her to talk with, Mustapha, [phone]. This nurse will assist pt with call.

## 2020-05-26 NOTE — PROGRESS NOTES
Pt confused, aware of self, not aware of time, day or situation. Pt continues to call out for help, states she hurts all over, warm blanket and repositioning provided.

## 2020-05-26 NOTE — PLAN OF CARE
Patient is alert, has confusion and makes needs known.  Is assist of one with walker and gait belt for transfers and mobility.  Has been reporting pain- congruent with observed s/s- in back and knees.  Received oxycodone and fentanyl and heat packs to manage.  Is repositioning independently in bed.  Has some bruising- skin otherwise intact.  Lungs clear, heart regular, bowel sounds active, voiding with no difficulty using bedside commode, has edema in feet/ankles 1-2+, CMS/pulses intact.  Spoke to patient's daughter with update on condition and pain management plan.  Myra Talbot RN on 5/25/2020 at 11:16 PM     Patient rested for approximately an hour and half at start of shift; has otherwise been restless in bed, asking for help, reporting that she is having pain in knees, ribs, right hip, and lower back.  Has had fentanyl, oxycodone, IBU, and hot packs with slight decrease in restlessness/pain.  Voiding frequently through night- clear/yellow, abdomen soft/nontender.  Blood pressures elevated.  Music, dim lights, quiet environment, and verbal reassurance with minimal effect on restlessness.  IV in right arm infiltrated and removed- warm pack for comfort.  24 g IV inserted in left upper arm- site is patent and flushes with no problems, continual 'distal occlusion' when pump running- disconnected pump in attempt to promote calm environment/rest/comfort- has been drinking water through the night.  Myra Talbot RN on 5/26/2020 at 3:19 AM     Patient removed wrist band, socks, SCDs, and brief multiple times while restless in bed.  Received oxycodone and more warm packs to manage pain.  Was up to bedside commode again, complained of dry mouth through the night- drinking water with each encounter.  Reminded multiple times through the night that she fell and is in the hospital in response to patient continued requests to 'help'.  Myra Talbot RN on 5/26/2020 at 3:50 AM

## 2020-05-26 NOTE — PROGRESS NOTES
:    Patient lives with her daughter Nona.  I called Nona at home and she stated she would like patient to come home with her and not go to an SNF at discharge.  She needs to have her pain controlled before taking her home.  She stated patient saw her primary doctor on Thursday and he ordered home care services for her, but was unsure which agency. Will follow up with home care agencies to see which one he referred to.  Daughter will transport at discharge.  Anticipated discharge in 1-2 days to home with daughter.   I called JENNIFER Leger at [facility] and she stated they had received referral, but would need new orders and a face to face at discharge.  will continue to follow.    GWEN Ramirez on 5/26/2020 at 9:20 AM    :     Received a call again from patients daughter again and we discussed again patient going to [facility] and she declined due to the possibility of her getting COVID-19.  Plan will be to go home with daughter and [facility] care      GWEN Ramirez on 5/26/2020 at 11:36 AM

## 2020-05-26 NOTE — PROGRESS NOTES
SAFETY CHECKLIST  ID Bands and Risk clasps correct and in place (DNR, Fall risk, Allergy, Latex, Limb):  Yes  All Lines Reconciled and labeled correctly: Yes  Whiteboard updated:Yes  Environmental interventions (bed/chair alarm on, call light, side rails, restraints, sitter....): Yes  Verify Tele #: n/a

## 2020-05-26 NOTE — PLAN OF CARE
Pt alert and orientated to person, confused to time, place and situation. V: T 98.4, resp 15, p 84, /76. O2 98% RA. Pt has c/o generalized pain, warm blanket, pillow support, and repositioning done, states relief.  Pt up to commode with 250 out, assist of 1 with gait belt and walker.       Problem: Adult Inpatient Plan of Care  Goal: Plan of Care Review  5/26/2020 0901 by Maritza Gonzalez RN  Outcome: No Change  5/25/2020 2309 by Myra Talbot RN  Outcome: No Change  Flowsheets (Taken 5/25/2020 2309)  Plan of Care Reviewed With:   patient   daughter  5/25/2020 2308 by Myra Talbot RN  Outcome: No Change  Flowsheets (Taken 5/25/2020 2308)  Plan of Care Reviewed With:   patient   daughter  Goal: Patient-Specific Goal (Individualization)  5/26/2020 0901 by Maritza Gonzalez RN  Outcome: No Change  5/25/2020 2309 by Myra Talbot RN  Outcome: No Change  5/25/2020 2308 by Myra Talbot RN  Outcome: No Change  Goal: Absence of Hospital-Acquired Illness or Injury  5/26/2020 0901 by Maritza Gonzalez RN  Outcome: No Change  5/25/2020 2309 by Myra Talbot RN  Outcome: No Change  5/25/2020 2308 by Myra Talobt RN  Outcome: No Change  Goal: Optimal Comfort and Wellbeing  5/26/2020 0901 by Maritza Gonzalez RN  Outcome: No Change  5/25/2020 2309 by Myra Talbot RN  Outcome: No Change  5/25/2020 2308 by Myra Talbot RN  Outcome: No Change  Goal: Readiness for Transition of Care  5/26/2020 0901 by Maritza Gonzalez RN  Outcome: No Change  5/25/2020 2309 by Myra Talbot RN  Outcome: No Change  5/25/2020 2308 by Myra Talbot RN  Outcome: No Change  Goal: Rounds/Family Conference  5/26/2020 0901 by Maritza Gonzalez RN  Outcome: No Change  5/25/2020 2309 by Myra Talbot RN  Outcome: No Change  5/25/2020 2308 by Myra Talbot RN  Outcome: No Change     Problem: Memory Impairment Comorbidity  Goal: Memory Impairment Comorbidity  Description: Patient comorbidity will be monitored for signs and symptoms of  Memory Impairment condition.  Problems will be absent, minimized or managed by discharge/transition of care.  5/26/2020 0901 by Maritza Gonzalez, RN  Outcome: No Change  5/25/2020 2309 by Myra Talbot, JENNIFER  Outcome: No Change  5/25/2020 2308 by Myra Talbot, RN  Outcome: No Change

## 2020-05-27 NOTE — PLAN OF CARE
Discharge Planner PT   Patient plan for discharge: home with daughter  Current status: ambulated to bathroom with PT using FWW and min assist x 1. Pt complained of hip and back pain during session that she states is worse today. Previous session rehab pt ambulated 225 feet vs 30 feet this AM.  Barriers to return to prior living situation: ambulation distance, safe mobility  Recommendations for discharge: continued skilled rehab services to improve functional mobility, likely homecare  Rationale for recommendations: see above       Entered by: Frankie Ariza 05/27/2020 11:45 AM

## 2020-05-27 NOTE — PLAN OF CARE
Pt c/o pain, PRN pain medication given, effective, pt resting comfortable. Pt orientated to self, not to place, day and situation. VS: T 96.3, R 18, P 74, /75, O2 97% on RA.      Problem: Adult Inpatient Plan of Care  Goal: Plan of Care Review  5/27/2020 0933 by Maritza Gonzalez RN  Outcome: No Change  Flowsheets  Taken 5/27/2020 0727  Plan of Care Reviewed With: patient  Taken 5/27/2020 0933  Progress: improving  5/27/2020 0546 by Lesa Grey RN  Outcome: Improving  5/27/2020 0218 by Lesa Grey RN  Outcome: Improving  Goal: Patient-Specific Goal (Individualization)  5/27/2020 0933 by Maritza Gonzalez RN  Outcome: No Change  5/27/2020 0546 by Lesa Grey RN  Outcome: Improving  5/27/2020 0218 by Lesa Grey RN  Outcome: Improving  Goal: Absence of Hospital-Acquired Illness or Injury  5/27/2020 0933 by Maritza Gonzalez RN  Outcome: No Change  5/27/2020 0546 by Lesa Grey RN  Outcome: Improving  5/27/2020 0218 by Lesa Grey RN  Outcome: Improving  Goal: Readiness for Transition of Care  5/27/2020 0933 by Maritza Gonzalez RN  Outcome: No Change  5/27/2020 0546 by Lesa Grey RN  Outcome: Improving  5/27/2020 0218 by Lesa Grey RN  Outcome: Improving  Goal: Rounds/Family Conference  5/27/2020 0933 by Maritza Gonzalez RN  Outcome: No Change  5/27/2020 0546 by Lesa Grey RN  Outcome: Improving  5/27/2020 0218 by Lesa Grey RN  Outcome: Improving     Problem: Memory Impairment Comorbidity  Goal: Memory Impairment Comorbidity  Description: Patient comorbidity will be monitored for signs and symptoms of Memory Impairment condition.  Problems will be absent, minimized or managed by discharge/transition of care.  5/27/2020 0933 by Maritza Gonzalez RN  Outcome: No Change  5/27/2020 0546 by Lesa Grey RN  Outcome: Improving  5/27/2020 0218 by Grey, Lesa C, RN  Outcome: Improving     Problem: Fall Injury Risk  Goal: Absence of Fall and Fall-Related  Injury  5/27/2020 0933 by Maritza Gonzalez RN  Outcome: No Change  5/27/2020 0546 by Lesa Grey RN  Outcome: Improving  5/27/2020 0218 by Lesa Grey RN  Outcome: Improving     Problem: OT General Care Plan  Goal: Transfer (OT)  Description: Transfer (OT) SBA    Outcome: No Change  Goal: Toilet Transfer/Toileting (OT)  Description: Toilet Transfer/Toileting (OT) SBA  Outcome: No Change     Problem: Adult Inpatient Plan of Care  Goal: Optimal Comfort and Wellbeing  5/27/2020 0933 by Maritza Gonzalez, RN  Outcome: Improving  5/27/2020 0546 by Lesa Grey RN  Outcome: Improving  5/27/2020 0218 by Lesa Grey RN  Outcome: Improving

## 2020-05-27 NOTE — PLAN OF CARE
Tenisha slept much of the night, in fact was too sleepy to take her 2200 meds. At 0000 tried again and she spit them out. She did take metoprolol when she woke up enough at 0330, was hypertensive 187/98 and had missed the HS dose. Also received 5 mg oxy for back pain around that time. Prior, had minimal complaints of pain - had lidocaine patch on. Voiding without difficulty-continent. Pivoting to BSC for safety. Afebrile. Sound asleep again, will attempt to administer AM pills. LS clear but dim.     Lesa Grey RN on 5/27/2020 at 5:53 AM

## 2020-05-27 NOTE — PROGRESS NOTES
:    I called daughter Nona at home and gave her discharge update.  Daughter would like to transport around 1100 if discharged tomorrow.  Anticipated  discharge tomorrow to home with [facility] home care.   will continue to follow.    GWEN Ramirez on 5/27/2020 at 10:38 AM

## 2020-05-27 NOTE — PROGRESS NOTES
[facility]    Medicine Progress Note - Hospitalist Service       Date of Admission:  5/24/2020  Assessment & Plan   Tenisha Cagle is a 86 year old female who presents with severe right leg pain.  Likely related to lumbar radiculopathy.    Active Problems:   Osteoarthritis of spine with radiculopathy, lumbar region    Assessment: This is likely the source of her pain.     Plan: PT/OT, monitor, pain control, continue oxycodone, Medrol and lidoderm patch      Dementia    Assessment: chronic, stable. patient sundowns in evening but is appropriate during the day.     Plan: melatonin, started seroquel yersterday for sundowning behavior.      Anemia    Assessment: Anemia is chronic, stable.    Plan: Continue on Carafate and PPI.      Essential hypertension    Assessment: Blood pressure has been borderline adequate control.    Plan: Continue current meds and monitor.      Chronic lymphocytic leukemia (H)    Assessment: Stable    Plan: Monitor       Hyponatremia    Assessment: Chronic, improved to 130 from 126.        Hip pain, right    Assessment: CT was negative for fracture.  Pain is likely coming from the lumbar spine.    Plan: Pain control as noted above      Scoliosis of thoracolumbar spine    Assessment: Severe with evidence of nerve compression on x-ray    Plan: Proceed as above         Diet: Regular Diet Adult    DVT Prophylaxis: Enoxaparin (Lovenox) SQ  Ledezma Catheter: in place, indication:    Code Status: DNR/DNI           Disposition Plan   Expected discharge: Tomorrow, recommended to prior living arrangement once adequate pain management/ tolerating PO medications.  Entered: Oliver Vasquez MD 05/27/2020, 10:47 AM       The patient's care was discussed with the Patient.    Oliver Vasquez MD  Hospitalist Service  [facility]    ______________________________________________________________________    Interval History   Denies pain, no nausea. No dyspnea. No chills.      Data reviewed today: I reviewed all medications, new labs and imaging results over the last 24 hours. I personally reviewed no images or EKG's today.    Physical Exam   Vital Signs: Temp: 96.3  F (35.7  C) Temp src: Oral BP: (!) 189/74 Pulse: 74 Heart Rate: 74 Resp: 16 SpO2: 97 % O2 Device: None (Room air)    Weight: 101 lbs 0 oz  GENERAL: Comfortable, no apparent distress.  CARDIOVASCULAR: regular rate and rhythm, no murmur. No lower extremity edema   RESPIRATORY: Clear to auscultation bilaterally, no wheezes or crackles.  GI: non-tender, non-distended, normal bowel sounds.   SKIN: warm periphery, no rashes      Data   Medications       acetaminophen  975 mg Oral 4x Daily     lidocaine  1 patch Transdermal Q24H     lidocaine   Transdermal Q8H     methylPREDNISolone  4 mg Oral TID     metoprolol tartrate  12.5 mg Oral BID     pantoprazole  40 mg Oral QAM AC     QUEtiapine  25 mg Oral Daily     sucralfate  1 g Oral 4x Daily AC & HS

## 2020-05-27 NOTE — PLAN OF CARE
Pt's c/o pain have been less frequent. Pt states that she feels like she needs to go to the bathroom, she has had no results, bladder scan shows 0mL, order for UA obtained.       Problem: Adult Inpatient Plan of Care  Goal: Plan of Care Review  5/27/2020 1759 by Maritza Gonzalez RN  Outcome: No Change  5/27/2020 0933 by Maritza Gonzalez RN  Outcome: No Change  Flowsheets  Taken 5/27/2020 0727  Plan of Care Reviewed With: patient  Taken 5/27/2020 0933  Progress: improving  5/27/2020 0546 by Lesa Grey RN  Outcome: Improving  Goal: Patient-Specific Goal (Individualization)  5/27/2020 1759 by Maritza Gonzalez RN  Outcome: No Change  5/27/2020 0933 by Maritza Gonzalez RN  Outcome: No Change  5/27/2020 0546 by Lesa Grey RN  Outcome: Improving  Goal: Absence of Hospital-Acquired Illness or Injury  5/27/2020 1759 by Maritza Gonzalez RN  Outcome: No Change  5/27/2020 0933 by Maritza Gonzalez RN  Outcome: No Change  5/27/2020 0546 by Lesa Grey RN  Outcome: Improving  Goal: Readiness for Transition of Care  5/27/2020 1759 by Maritza Gonzalez RN  Outcome: No Change  5/27/2020 0933 by Maritza Gonzalez RN  Outcome: No Change  5/27/2020 0546 by Lesa Grey RN  Outcome: Improving  Goal: Rounds/Family Conference  5/27/2020 1759 by Maritza Gonzalez RN  Outcome: No Change  5/27/2020 0933 by Maritza Gonzalez RN  Outcome: No Change  5/27/2020 0546 by Lesa Grey RN  Outcome: Improving     Problem: Memory Impairment Comorbidity  Goal: Memory Impairment Comorbidity  Description: Patient comorbidity will be monitored for signs and symptoms of Memory Impairment condition.  Problems will be absent, minimized or managed by discharge/transition of care.  5/27/2020 1759 by Maritza Gonzalez RN  Outcome: No Change  5/27/2020 0933 by Maritza Gonzalez RN  Outcome: No Change  5/27/2020 0546 by Lesa Grey, RN  Outcome: Improving     Problem: Fall Injury Risk  Goal: Absence of Fall and Fall-Related  Injury  5/27/2020 1759 by Maritza Gonzalez RN  Outcome: No Change  5/27/2020 0933 by Maritza Gonzalez RN  Outcome: No Change  5/27/2020 0546 by Lesa Grey RN  Outcome: Improving     Problem: OT General Care Plan  Goal: Transfer (OT)  Description: Transfer (OT) SBA    5/27/2020 1759 by Maritza Gonzalez RN  Outcome: No Change  5/27/2020 0933 by Maritza Gonzalez RN  Outcome: No Change  Goal: Toilet Transfer/Toileting (OT)  Description: Toilet Transfer/Toileting (OT) SBA  5/27/2020 1759 by Maritza Gonzalez RN  Outcome: No Change  5/27/2020 0933 by Maritza Gonzalez RN  Outcome: No Change     Problem: Adult Inpatient Plan of Care  Goal: Optimal Comfort and Wellbeing  5/27/2020 1759 by Maritza Gonzalez RN  Outcome: Improving  5/27/2020 0933 by Maritza Gonzalez RN  Outcome: Improving  5/27/2020 0546 by Lesa Grey RN  Outcome: Improving

## 2020-05-28 NOTE — PHARMACY - DISCHARGE MEDICATION RECONCILIATION AND EDUCATION
[facility]  Part of [facility]  [address] [place] [id number]    May 28, 2020    Dear Pharmacist,    Your customer, Tenisha Cagle, born on 9/7/1933, was recently discharged from [facility].  We have updated her medication list and want to alert you to the following:       Review of your medicines      START taking      Dose / Directions   amoxicillin 500 MG capsule  Commonly known as:  AMOXIL  Used for:  Acute cystitis without hematuria      Dose:  500 mg  Take 1 capsule (500 mg) by mouth 2 times daily for 5 days  Quantity:  10 capsule  Refills:  0     lidocaine 5 % patch  Commonly known as:  LIDODERM  Used for:  Osteoarthritis of spine with radiculopathy, lumbar region      Dose:  1 patch  Place 1 patch onto the skin every 24 hours To prevent lidocaine toxicity, patient should be patch free for 12 hrs daily.  Quantity:  30 patch  Refills:  3     methylPREDNISolone 2 MG tablet  Commonly known as:  MEDROL  Used for:  Osteoarthritis of spine with radiculopathy, lumbar region      Dose:  2 mg  Take 1 tablet (2 mg) by mouth 3 times daily for 5 days  Quantity:  15 tablet  Refills:  0     QUEtiapine 50 MG tablet  Commonly known as:  SEROquel  Used for:  Osteoarthritis of spine with radiculopathy, lumbar region      Dose:  50 mg  Take 1 tablet (50 mg) by mouth daily At suppertime  Quantity:  30 tablet  Refills:  3        CONTINUE these medicines which may have CHANGED, or have new prescriptions. If we are uncertain of the size of tablets/capsules you have at home, strength may be listed as something that might have changed.      Dose / Directions   acetaminophen 500 MG tablet  Commonly known as:  TYLENOL  This may have changed:      when to take this    reasons to take this  Used for:  Osteoarthritis of spine with radiculopathy, lumbar region      Dose:  1,000 mg  Take 2 tablets (1,000 mg) by mouth 3 times daily  Refills:  0     oxyCODONE 5 MG tablet  Commonly  known as:  ROXICODONE  This may have changed:  how much to take  Used for:  Osteoarthritis of spine with radiculopathy, lumbar region      Dose:  5-10 mg  Take 1-2 tablets (5-10 mg) by mouth every 6 hours as needed for moderate to severe pain  Quantity:  100 tablet  Refills:  0     predniSONE 10 MG tablet  Commonly known as:  DELTASONE  This may have changed:  additional instructions      Dose:  10 mg  Take 1 tablet (10 mg) by mouth every other day Resume when done with the 5 days of 2 mg medrol  Quantity:     Refills:  0        CONTINUE these medicines which have NOT CHANGED      Dose / Directions   Calcium 500 + D 500-125 MG-UNIT Tabs  Generic drug:  Calcium Carbonate-Vitamin D      Dose:  1 tablet  Take 1 tablet by mouth daily  Refills:  0     DIGESTIVE ADVANTAGE PO      Dose:  1 tablet  Take 1 tablet by mouth every morning  Refills:  0     docusate sodium 100 MG capsule  Commonly known as:  COLACE      Dose:  100 mg  Take 100 mg by mouth every morning  Refills:  0     folic acid 400 MCG tablet  Commonly known as:  FOLVITE      Dose:  400 mcg  Take 400 mcg by mouth daily  Refills:  0     lactulose 20 GM/30ML Soln  Used for:  Chronic pain syndrome      Dose:  20 g  Take 30 mLs (20 g) by mouth 2 times daily as needed  Refills:  0     magnesium hydroxide 400 MG/5ML suspension  Commonly known as:  MILK OF MAGNESIA      Dose:  15 mL  Take 15 mLs by mouth nightly as needed for constipation or heartburn  Refills:  0     metoprolol tartrate 25 MG tablet  Commonly known as:  LOPRESSOR  Used for:  Paroxysmal atrial fibrillation (H), Essential (primary) hypertension      Dose:  12.5 mg  Take 0.5 tablets (12.5 mg) by mouth 2 times daily  Quantity:  180 tablet  Refills:  3     pantoprazole 40 MG EC tablet  Commonly known as:  PROTONIX  Used for:  Iron deficiency anemia due to chronic blood loss      Dose:  40 mg  Take 1 tablet (40 mg) by mouth every morning (before breakfast)  Quantity:  30 tablet  Refills:  3     sucralfate  1 GM tablet  Commonly known as:  CARAFATE  Used for:  Iron deficiency anemia due to chronic blood loss      Dose:  1 g  Take 1 tablet (1 g) by mouth 4 times daily (before meals and nightly)  Quantity:  120 tablet  Refills:  3     WOMENS MULTIvitamin  PLUS Tabs      Dose:  1 tablet  Take 1 tablet by mouth every morning  Refills:  0        STOP taking    furosemide 20 MG tablet  Commonly known as:  LASIX        trolamine salicylate 10 % external cream  Commonly known as:  ASPERCREME              Where to get your medicines      These medications were sent to [facility] - [place], [place] - [address]  [address], [place] [address]    Phone:  [phone]     amoxicillin 500 MG capsule    lidocaine 5 % patch    methylPREDNISolone 2 MG tablet    oxyCODONE 5 MG tablet    QUEtiapine 50 MG tablet     Some of these will need a paper prescription and others can be bought over the counter. Ask your nurse if you have questions.    You don't need a prescription for these medications    acetaminophen 500 MG tablet         We also reviewed Tenisha Cagle's allergy list and updated it as needed:  Allergies: Lactose; Lansoprazole; and Lisinopril    Thank you for continuing to care for Tenishahimanshu Cagle.  We look forward to working together with you in the future.    Sincerely,  Dorothy Sosa, [facility]

## 2020-05-28 NOTE — PLAN OF CARE
"PT confused, disoriented to situation, afebrile, elevated B/P throughout shift. BP (!) 190/80 (BP Location: Right arm)   Pulse 72   Temp 96  F (35.6  C) (Tympanic)   Resp 16   Ht 1.676 m (5' 6\")   Wt 47 kg (103 lb 9.6 oz)   SpO2 98%   BMI 16.72 kg/m    Lung sounds clear, denies sob, O2 saturations 99% on RA. Bowel sounds active, no BM in 4 days. Urine cloudy yellow, pt having increased urgency and frequency to urinated, sample sent to lab, will update MD in morning.   Pt up to bedside commode with 1 assist and walker,reports back pain, PRN Roxicodone given x 2, will continue to monitor.  Flako Gramajo RN on 5/28/2020 at 4:55 AM    "

## 2020-05-28 NOTE — PLAN OF CARE
Pt pain has been under control this shift. Pt has been pleasant and cooperative. VS: T 97.1, R 17, P 72, /78, O2 97% RA. Pt had lg BM this shift. Pt's daughter, Nona, will be picking her up at 1300 today.    Problem: Adult Inpatient Plan of Care  Goal: Plan of Care Review  5/28/2020 1146 by Maritza Gonzalez RN  Outcome: Adequate for Discharge  Flowsheets (Taken 5/28/2020 0444 by Flako Gramajo RN)  Plan of Care Reviewed With: patient  5/28/2020 0444 by Flako Gramajo RN  Outcome: No Change  Flowsheets (Taken 5/28/2020 0444)  Plan of Care Reviewed With: patient  5/28/2020 0325 by Flako Gramajo RN  Outcome: No Change  Flowsheets (Taken 5/28/2020 0230)  Plan of Care Reviewed With: patient  Goal: Patient-Specific Goal (Individualization)  5/28/2020 1146 by Maritza Gonzalez RN  Outcome: Adequate for Discharge  5/28/2020 0325 by Flako Gramajo RN  Outcome: No Change  Goal: Absence of Hospital-Acquired Illness or Injury  5/28/2020 1146 by Maritza Gonzalez RN  Outcome: Adequate for Discharge  5/28/2020 0325 by Flako Gramajo RN  Outcome: No Change  Goal: Optimal Comfort and Wellbeing  5/28/2020 1146 by Maritza Gonzalez RN  Outcome: Adequate for Discharge  5/28/2020 0325 by Flako Gramajo RN  Outcome: No Change  Goal: Readiness for Transition of Care  5/28/2020 1146 by Maritza Gonzalez RN  Outcome: Adequate for Discharge  5/28/2020 0325 by Flako Gramajo RN  Outcome: No Change  Goal: Rounds/Family Conference  5/28/2020 1146 by Maritza Gonzalez RN  Outcome: Adequate for Discharge  5/28/2020 0325 by Flako Gramajo RN  Outcome: No Change     Problem: Memory Impairment Comorbidity  Goal: Memory Impairment Comorbidity  Description: Patient comorbidity will be monitored for signs and symptoms of Memory Impairment condition.  Problems will be absent, minimized or managed by discharge/transition of care.  5/28/2020 1146 by Hoopman, Maritza J, RN  Outcome: Adequate for Discharge  5/28/2020 0325 by  Flako Gramajo RN  Outcome: No Change     Problem: Fall Injury Risk  Goal: Absence of Fall and Fall-Related Injury  5/28/2020 1146 by Maritza Gonzalez RN  Outcome: Adequate for Discharge  5/28/2020 0325 by Flako Gramajo RN  Outcome: No Change     Problem: OT General Care Plan  Goal: Transfer (OT)  Description: Transfer (OT) SBA    Outcome: Adequate for Discharge  Goal: Toilet Transfer/Toileting (OT)  Description: Toilet Transfer/Toileting (OT) SBA  Outcome: Adequate for Discharge

## 2020-05-28 NOTE — PHARMACY - DISCHARGE MEDICATION RECONCILIATION AND EDUCATION
Pharmacy: Discharge Counseling and Medication Reconciliation    Tenisha Cagle  [address] [place] [id number] [phone] (home)   86 year old female  PCP:Godwin Kapoor    Allergies   Allergen Reactions     Lactose      Lansoprazole Other (See Comments) and Unknown     Patient states that medication didn't work for her.  Daughter states she got delusional, water did not taste right     Lisinopril Cough       Discharge Counseling:    Pharmacist met with patient (and/or family) today to review the medication portion of the After Visit Summary (with an emphasis on NEW medications) and to address patient's questions/concerns.     Summary of Education:   Met with patient's daughter via phone call at time of discharge to review all changes in medications including new amoxicillin, lidocaine patch, methylprednisolone, quetiapine and discontinued furosemide and Aspercreme. Also discussed scheduled acetaminophen, change in oxycodone and new prescription, and change in prednisone dose. Discussed indications, directions for use, and possible side effects. Patient's daughter asked a few questions and all concerns were addressed.     Materials Provided:   MedCounselor sheets printed from Clinical Pharmacology on: sent with RN to give to daughter at time of patient  - amoxicillin, lidocaine patch, methylprednisolone, and quetiapine    Discharge Medication Reconciliation:    Dorothy Sosa [facility] has reviewed the patient's discharge medication orders and has compared them to the inpatient medication administration record and to what the patient was taking prior to admission- any discrepancies have been resolved.     It has been determined that the patient has an adequate supply of medications available or which can be obtained from the patient's preferred pharmacy, which has been confirmed as: [facility]. [An updated medication list will be faxed to the patient's pharmacy.]     Thank you for the  consult.     Dorothy Sosa, [facility] ....................  5/28/2020   1:56 PM

## 2020-05-28 NOTE — PROGRESS NOTES
SAFETY CHECKLIST  ID Bands and Risk clasps correct and in place (DNR, Fall risk, Allergy, Latex, Limb):  Yes  All Lines Reconciled and labeled correctly: Yes  Whiteboard updated:Yes  Environmental interventions (bed/chair alarm on, call light, side rails, restraints, sitter....): Yes    Verify Tele #: na

## 2020-05-28 NOTE — PLAN OF CARE
Occupational Therapy Discharge Summary    Reason for therapy discharge:    Discharged to home with home therapy.    Progress towards therapy goal(s). See goals on Care Plan in [facility] electronic health record for goal details.  Goals partially met.  Barriers to achieving goals:   discharge from facility.    Therapy recommendation(s):    Continued therapy is recommended.  Rationale/Recommendations:  on going OT to address functional mobility in the home and safety for ADL's.

## 2020-05-28 NOTE — PROGRESS NOTES
:    Discharge today to home with daughter and [facility] home care.  I called daughter at home and she will be here at 1300 to transport.  I faxed over orders to [facility] and spoke with JENNIFER Leger.  Home care plans to start services tomorrow. No further needs at this time.    GWEN Ramirez on 5/28/2020 at 11:24 AM

## 2020-05-28 NOTE — PLAN OF CARE
Physical Therapy Discharge Summary    Reason for therapy discharge:    Discharged to home with home therapy.    Progress towards therapy goal(s). See goals on Care Plan in [facility] electronic health record for goal details.  Goals partially met.  Barriers to achieving goals:   discharge from facility.    Therapy recommendation(s):    Continued therapy is recommended.  Rationale/Recommendations:  to promote patient's highest level of functional mobility.

## 2020-05-28 NOTE — PLAN OF CARE
Discharge Planner OT   Patient plan for discharge: to return home with her daughter   Current status: Pt agreeable to ambulate in hallway and complete self cares, pt still mildly confused and complaining of pain that she in unable to fully localize, ambulated with min assist of 1-2 with FWW with cues, min assist with cues for upper body sponge bathing   Barriers to return to prior living situation: level of assist needed for self cares and functional mobility   Recommendations for discharge: home with family assist and home care   Rationale for recommendations: see above        Entered by: Sabra Linda 05/28/2020 2:31 PM

## 2020-05-29 NOTE — PROGRESS NOTES
Clinic Care Coordination Contact    Chart review for care coordination opportunities after recent hospitalization. Patient home with [facility] services. Does not meet criteria.   Will touch base with [facility] home to determine if care coordination is appropriate upon discharge of home care services.     GWEN Garibay on 5/29/2020 at 8:17 AM

## 2020-06-01 NOTE — TELEPHONE ENCOUNTER
URGENT: per CMS best practice, response is requested within 24 hours.    Home Care regulation mandates that you are notified about drug discrepancies, interactions & contraindications. Response within a 24 hour timeframe is established by CMS as  best practice  for the delivery of home health care. Home Care is required to report if the 24 hour timeframe was met. The home health clinician will contact you again if this timeframe is not met or if the response does not address all concerns.       Situation:        The patient was admitted to [facility], after being discharged from [facility] on 05/28/20. Upon admission, a med reconciliation was completed to identify any drug discrepancies, interactions or contraindications. Home Care's drug regime review has revealed significant medication issues.     Background:      You are being contacted for clarifying orders related to the medication issues.   Assessment:       Medication Interactions  Lidocaine/Metoprolol  Calcium/Protonix  MVI/Protonix    Recommendations:    Please evaluate this information and indicate below whether or not changes are required. A copy of the patient's drug interaction/contraindications report is available upon request.       CLINIC NURSE - If changes are made, please update the Epic medication profile.     Please sign this encounter with your electronic signature.   Yazmin Gonzalez RN on 6/1/2020 at 7:07 AM

## 2020-06-01 NOTE — NURSING NOTE
"Coming in for a f/u from a hospital stay for sore hips    Chief Complaint   Patient presents with     RECHECK     was in the hospital for hip pain       Initial BP (!) 142/76   Pulse 78   Temp 98.6  F (37  C)   Resp 16   SpO2 99%  Estimated body mass index is 16.72 kg/m  as calculated from the following:    Height as of 5/24/20: 1.676 m (5' 6\").    Weight as of 5/28/20: 47 kg (103 lb 9.6 oz).  Medication Reconciliation: complete    Monisha Gamboa LPN  "

## 2020-06-01 NOTE — PROGRESS NOTES
"SUBJECTIVE:                                                      Patient presents for Hospital Followup Visit:    Hospital:  [facility]      Date of Admission: 5/24  Date of Discharge: 5/28  Transitional Care Management Phone Call: 5/29  Reason(s) for Admission: SPine osteoarthritis and dementia    Here with daughter who says she is up at night about every 1-2 hours.  Lots of urinary symptoms.  Daughter wants her to \"get a net in her bladder to stop this\".  She wants to stop the home PT, and would rather bring her in here to work with the therapist she has had before.  Had a home care visit already.  She is unable to flex the right leg at the hip.  Daughter says she too has back issues from lifting her mom.  Was given oxycodone and seroquel.  Lots of sundowning.  Daughter is very worn out, says she cannot keep up with her every 1-2 hours at night.  She has looked into assisted living, [facility] Care at [facility].  Pt seems to have a bit less pain now.  They both are sleeping in the daytime now.    She had a pessary in the past ,but it fell out.  Has to put her fingers in her vagina to void.          Problems taking medications regularly:  None       Medication changes since discharge: None       Problems adhering to non-medication therapy:  None     Summary of hospitalization:  [facility] discharge summary reviewed  Diagnostic Tests/Treatments reviewed.  Follow up needed: none  Other Healthcare Providers Involved in Patient s Care:         Homecare and Physical Therapy  Update since discharge: improved.      ROS:  Unreliable.  POositive for tiredness, knee pain and confusion.  Otherwise 7 system is negative.      OBJECTIVE:                                                      GENERAL APPEARANCE: healthy, alert, no distress, smiling and intermittently sleeping  RESP: lungs clear to auscultation - no rales, rhonchi or wheezes  ABDOMEN: soft, nontender, without hepatosplenomegaly or masses and " bowel sounds normal  MS: pitting 2-3+ lower extremity edema bilaterally and normal range of motion right hip with significant weakness on flexion  ORTHO: Lumber/Thoracic Spine Exam: Tender:  In lumbar spine area    Hip Exam: Hip ROM decreased    PSYCH: disoriented and fatigued    ASSESSMENT/PLAN:                                                    (F03.91) Dementia with behavioral disturbance, unspecified dementia type (H)  (primary encounter diagnosis)  Comment: Strongly encouraged daughter to get her in AL  Some of the bladder issues can be from the dementia as well as the uterine prolapse.   Plan: follow up with me as needed.  seroquel makes her too sleepy, so can stop it.      (M47.26) Osteoarthritis of spine with radiculopathy, lumbar region  Comment: end stage along with significant hip pain and weakness.  I want her to get the home PT to begin with.    Plan:          Post Discharge Medication Reconciliation: discharge medications reconciled, continue medications without change.  Issues to address: Issues identified were:   caregiver fatigue.  Plan of care communicated with patient and family      Type of Medical Decision Making Face-to-Face Visit within 7 Days Face-to-Face Visit within 14 days   Moderate Complexity [id number]   High Complexity [id number]     Godwin Kapoor MD on 6/1/2020 at 11:02 AM

## 2020-06-01 NOTE — TELEPHONE ENCOUNTER
Request for Home Care Skilled Nursing orders as follows:    SN eval and treat date: 05/29/20    Continuation frequency =  ___1____ X week X ____3___ weeks       + 2PRN's      Effective date=05/29/20    Code status = Full    Interventions include:    Assessment  Medication management  O2 sat monitoring (all disciplines as needed)  Patient/caregiver education - [facility] placement  Fall risk: instruct on fall prevention strategies, home safety, assess environment   Instruct on pain relief measures and assess pain with each visit, if has pain. Assess effectiveness of medications.  IPlease respond with .HOMECAREAGREE, if you are in agreement. Please sign with your comments and signature, if you are not in agreement.    Yazmin Gonzalez RN on 6/1/2020 at 7:10 AM

## 2020-06-04 NOTE — PROGRESS NOTES
"Tenisha Cagle is a 86 year old female who is being evaluated via a billable telephone visit.      The patient has been notified of following:     \"This telephone visit will be conducted via a call between you and your physician/provider. We have found that certain health care needs can be provided without the need for a physical exam.  This service lets us provide the care you need with a short phone conversation.  If a prescription is necessary we can send it directly to your pharmacy.  If lab work is needed we can place an order for that and you can then stop by our lab to have the test done at a later time.    Telephone visits are billed at different rates depending on your insurance coverage. During this emergency period, for some insurers they may be billed the same as an in-person visit.  Please reach out to your insurance provider with any questions.    If during the course of the call the physician/provider feels a telephone visit is not appropriate, you will not be charged for this service.\"    Patient has given verbal consent for Telephone visit?  Yes    What phone number would you like to be contacted at? [phone]    How would you like to obtain your AVS? Mail a copy    Subjective     Tenisha Cagle is a 86 year old female who presents via phone visit today for the following health issues:    HPI    Spoke with her daughter.  She says her mom is in \"terrible pain\" over the right hip area.  The physical therapist is there currently.  I spoke with the pt as well- \"I am feeling awfully bad\".  Pain is all the time, worse with moving.  Has pain with just sitting in her chair.  Headache been getting 2 oxycodone every 6 hours.  Will be out of oxycodone next Tuesday.  I will be gone then.  Is now off seroquel, is less sedated.    Was given 5 days of antibiotics for a UTI.  Daughter says her urine symptoms are back again, with polyuria.  Was e coli.  Pan sensitive.  Daughter wants amoxicillin.       "         Current Outpatient Medications   Medication Sig Dispense Refill     acetaminophen (TYLENOL) 500 MG tablet Take 2 tablets (1,000 mg) by mouth 3 times daily       amoxicillin (AMOXIL) 875 MG tablet Take 1 tablet (875 mg) by mouth 2 times daily for 10 days 20 tablet 0     Calcium Carbonate-Vitamin D (CALCIUM 500 + D) 500-125 MG-UNIT TABS Take 1 tablet by mouth daily       docusate sodium (COLACE) 100 MG capsule Take 100 mg by mouth every morning       folic acid (FOLVITE) 400 MCG tablet Take 400 mcg by mouth daily       lactulose 20 GM/30ML SOLN Take 30 mLs (20 g) by mouth 2 times daily as needed       lidocaine (LIDODERM) 5 % patch Place 1 patch onto the skin every 24 hours To prevent lidocaine toxicity, patient should be patch free for 12 hrs daily. 30 patch 3     magnesium hydroxide (MILK OF MAGNESIA) 400 MG/5ML suspension Take 15 mLs by mouth nightly as needed for constipation or heartburn       metoprolol tartrate (LOPRESSOR) 25 MG tablet Take 0.5 tablets (12.5 mg) by mouth 2 times daily 180 tablet 3     Multiple Vitamins-Minerals (WOMENS MULTIVITAMIN  PLUS) TABS Take 1 tablet by mouth every morning       oxyCODONE (ROXICODONE) 5 MG tablet Take 1-2 tablets (5-10 mg) by mouth every 6 hours as needed for moderate to severe pain 100 tablet 0     pantoprazole (PROTONIX) 40 MG EC tablet Take 1 tablet (40 mg) by mouth every morning (before breakfast) 30 tablet 3     Probiotic Product (DIGESTIVE ADVANTAGE PO) Take 1 tablet by mouth every morning       sucralfate (CARAFATE) 1 GM tablet Take 1 tablet (1 g) by mouth 4 times daily (before meals and nightly) 120 tablet 3     predniSONE (DELTASONE) 10 MG tablet Take 1 tablet (10 mg) by mouth every other day Resume when done with the 5 days of 2 mg medrol       Allergies   Allergen Reactions     Lactose      Lansoprazole Other (See Comments) and Unknown     Patient states that medication didn't work for her.  Daughter states she got delusional, water did not taste right      Lisinopril Cough       Reviewed and updated as needed this visit by Provider         Review of Systems           Objective   Reported vitals:  Breastfeeding No    frail, alert, severe distress and moaning in pain  PSYCH: Alert and oriented times 3; coherent speech, normal   rate and volume, able to articulate logical thoughts, able   to abstract reason, no tangential thoughts, no hallucinations   or delusions  Her affect is anxious and fearful  RESP: No cough, no audible wheezing, able to talk in full sentences  Remainder of exam unable to be completed due to telephone visits    Diagnostic Test Results:  Labs reviewed in Epic        Assessment/Plan:  1. Osteoarthritis of spine with radiculopathy, lumbar region  Has a pending injection in lumbar spine on Iris 10.  It appears this is L5/S1 impingement on the CT scan.  No fracture noted but severe DJD of spine. Refilled the oxycodone (is 5 days early but I will be gone) and follow up with me with phone visit in 2 weeks or so.   - oxyCODONE (ROXICODONE) 5 MG tablet; Take 1-2 tablets (5-10 mg) by mouth every 6 hours as needed for moderate to severe pain  Dispense: 100 tablet; Refill: 0    2. Acute cystitis without hematuria  uc was sensitive to ampicillin so amox should also work.  - amoxicillin (AMOXIL) 875 MG tablet; Take 1 tablet (875 mg) by mouth 2 times daily for 10 days  Dispense: 20 tablet; Refill: 0    Return in about 2 weeks (around 6/18/2020).      Phone call duration:  10 minutes    Godwin Kapoor MD

## 2020-06-04 NOTE — NURSING NOTE
Chief Complaint   Patient presents with     Refill Request     Patient unable to talk- she is in too much pain with her hip. Talked with daughter Nona. She has several things she wanted to talk about but did not go into detail with nurse.     Medication Reconciliation: complete    Lesa Calvin, LPN

## 2020-06-05 NOTE — TELEPHONE ENCOUNTER
I know of the interaction but her suffering seems severe.  Benefits outweigh risks.  Godwin Kapoor MD on 6/5/2020 at 4:01 PM

## 2020-06-05 NOTE — TELEPHONE ENCOUNTER
URGENT: per CMS best practice, response is requested within 24 hours.    Home Care regulation mandates that you are notified about drug discrepancies, interactions & contraindications. Response within a 24 hour timeframe is established by CMS as  best practice  for the delivery of home health care. Home Care is required to report if the 24 hour timeframe was met. The home health clinician will contact you again if this timeframe is not met or if the response does not address all concerns.       Situation:        The patient was admitted to [facility], after being discharged from [facility] on xx/xx/xx. Upon admission, a med reconciliation was completed to identify any drug discrepancies, interactions or contraindications. Home Care's drug regime review has revealed significant medication issues.     You are being contacted for clarifying orders related to the medication issues.     Background:    Ativan 0.5 mg ordered today every  6 hours as needed for anxiety.    Assessment:   1  moderate drug interaction noted between Ativan/oxycodone     Recommendations:    Please evaluate this information and indicate below whether or not changes are required. A copy of the patient's drug interaction/contraindications report is available upon request.       CLINIC NURSE - If changes are made, please update the Epic medication profile.     Please sign this encounter with your electronic signature.       Thanks,    Florecita Cantu LPN

## 2020-06-05 NOTE — TELEPHONE ENCOUNTER
Patient's daughter called and she is wanting to know if TJP can prescribe ativan for patient to take at night.   States she had one when she was suppose to have her MRI that was canceled and she slept great and was able to get up and go to the bathroom.    Please call patients daughter Nona [phone]    Adwoa Grey on 6/5/2020 at 8:17 AM

## 2020-06-05 NOTE — TELEPHONE ENCOUNTER
Please sign this encounter with your electronic signature.     Request for Home Care  orders as follows:    Frequency = ___1____ X month X ____1____ month  Effective: ___[phone]______________________  Interventions include:      Education on community resources  Financial support/assistance  Vulnerable adult concerns  Health Care Directives  Placement assistance  Other: ___      Please respond with .HOMECAREAGREE, if you are in agreement. Please sign with your comments and signature, if you are not in agreement.      Thank You,     Florecita Rizzo LPN

## 2020-06-05 NOTE — TELEPHONE ENCOUNTER
Patient name and date of birth verified by pt's daughter, Nona. She stated that she gave her mom a 0.5mg Ativan tablet for her MRI. She stated her mom slept well through the night and was still able to get up and go to the bathroom as well.    She is asking if ptcould get a prescription for this to be able to sleep because she states pt does not normally sleep much normally.   Medication mey'carlos.     Domitila Ruby LPN 6/5/2020 8:22 AM

## 2020-06-08 NOTE — PROGRESS NOTES
"Chief complaint: Patient presents with:  Toenail: Trimming      History of Present Illness: This 86 year old female is seen with her daughter for follow-up management of elongated toenails and painful bilateral hallux bilateral border ingrown toenails. The patient has pain when the ingrown borders are trimmed too short so she prefers the hallux toenails be cut more straight across.    She is still complaining of edema in her feet, but this is being managed by her PCP. She still does not wear compression socks. She has a recliner chair and she tries to elevate her feet in her recliner throughout the day.    She relates to having burning, tingling, and numbness in her feet. She also has increased edema to the bilateral foot, especially the RIGHT foot. This causes pain. Her daughter says she was told not to give the patient Lasix because of her sodium levels, but she says the edema is causing so much pain that she thinks she will give it to her when they get home.    Patient does not use tobacco products.    No further pedal complaints today.       /70 (BP Location: Left arm, Patient Position: Chair, Cuff Size: Adult Regular)   Pulse 82   Temp 98.5  F (36.9  C) (Tympanic)   Ht 1.6 m (5' 3\")   Wt 47.2 kg (104 lb)   SpO2 95%   BMI 18.42 kg/m      Patient Active Problem List   Diagnosis     Abnormal weight loss     AK (actinic keratosis)     Anemia     Alvarado's cyst of knee     Basal cell carcinoma of right cheek     Cerebral aneurysm, nonruptured     Cystocele     Diverticulosis of colon     Squamous cell carcinoma of face     Hiatal hernia     History of TIA (transient ischemic attack)     Essential hypertension     Chronic lymphocytic leukemia (H)     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)     Numbness and tingling of right leg     Osteoarthritis of spine with radiculopathy, lumbar region     Pancreatic mass     Paresthesia of right leg     Radicular leg pain     Right knee DJD     " SK (seborrheic keratosis)     Benign skin lesion of nose     Visual changes     Memory loss     Atrial fibrillation with RVR (H)     Arthritis of shoulder region, left, degenerative     Controlled substance agreement signed 3/8/19     Chondrodermatitis nodularis chronica helicis, right     Dizzy     Paroxysmal A-fib (H)     Dementia with behavioral disturbance (H)     Degenerative joint disease of pelvic region     Urinary retention     Dementia (H)     UTI (urinary tract infection)     Elevated lactic acid level     Hyponatremia     Hip pain, right     Scoliosis of thoracolumbar spine     Lumbar radiculopathy       Past Surgical History:   Procedure Laterality Date     CHOLECYSTECTOMY      No Comments Provided     COLONOSCOPY      2007,Sigmoid diverticulosis     ENDOSCOPY UPPER, COLONOSCOPY, COMBINED      5/5/11,Hiatal hernia, gastric gland hyperplasia in antrum     LAPAROSCOPIC TUBAL LIGATION      No Comments Provided     OTHER SURGICAL HISTORY      1986,205093,BIOPSY BREAST,Right     OTHER SURGICAL HISTORY      ESB108,PREMALIG/BENIGN SKIN LESION EXCISION,R side of nose, face and chest wall/Basal Cell Cancer Excision     TONSILLECTOMY, ADENOIDECTOMY, COMBINED      1954       Current Outpatient Medications   Medication     acetaminophen (TYLENOL) 500 MG tablet     amoxicillin (AMOXIL) 875 MG tablet     Calcium Carbonate-Vitamin D (CALCIUM 500 + D) 500-125 MG-UNIT TABS     docusate sodium (COLACE) 100 MG capsule     folic acid (FOLVITE) 400 MCG tablet     lactulose 20 GM/30ML SOLN     lidocaine (LIDODERM) 5 % patch     LORazepam (ATIVAN) 0.5 MG tablet     magnesium hydroxide (MILK OF MAGNESIA) 400 MG/5ML suspension     metoprolol tartrate (LOPRESSOR) 25 MG tablet     Multiple Vitamins-Minerals (WOMENS MULTIVITAMIN  PLUS) TABS     oxyCODONE (ROXICODONE) 5 MG tablet     pantoprazole (PROTONIX) 40 MG EC tablet     predniSONE (DELTASONE) 10 MG tablet     Probiotic Product (DIGESTIVE ADVANTAGE PO)     sucralfate  (CARAFATE) 1 GM tablet     No current facility-administered medications for this visit.           Allergies   Allergen Reactions     Lactose      Lansoprazole Other (See Comments) and Unknown     Patient states that medication didn't work for her.  Daughter states she got delusional, water did not taste right     Lisinopril Cough       Family History   Problem Relation Age of Onset     Other - See Comments Mother         Stroke,Had a CVA age 85     Cancer Father         Cancer,Brain tumor, age 56     Blood Disease Sister         Blood Disease,Leukemia and     Cancer Sister         Cancer, kidney cancer     Blood Disease Sister         Blood Disease,Chronic lymphocytic leukemia       Social History     Socioeconomic History     Marital status:      Spouse name: None     Number of children: None     Years of education: None     Highest education level: None   Occupational History     None   Social Needs     Financial resource strain: None     Food insecurity:     Worry: None     Inability: None     Transportation needs:     Medical: None     Non-medical: None   Tobacco Use     Smoking status: Never Smoker     Smokeless tobacco: Never Used   Substance and Sexual Activity     Alcohol use: No     Alcohol/week: 0.0 oz     Drug use: No     Sexual activity: Not Currently   Lifestyle     Physical activity:     Days per week: None     Minutes per session: None     Stress: None   Relationships     Social connections:     Talks on phone: None     Gets together: None     Attends Jewish service: None     Active member of club or organization: None     Attends meetings of clubs or organizations: None     Relationship status: None     Intimate partner violence:     Fear of current or ex partner: None     Emotionally abused: None     Physically abused: None     Forced sexual activity: None   Other Topics Concern     Parent/sibling w/ CABG, MI or angioplasty before 65F 55M? Not Asked   Social History Narrative    Nonsmoker.  .  Lives alone in a house.   Continues to drive.   2 grown kids, one in [place]       ROS: 10 point ROS neg other than the symptoms noted above in the HPI.  EXAM  Constitutional: healthy, alert and no distress    Psychiatric: mentation appears normal and affect normal/bright    VASCULAR:  -Dorsalis pedis pulse +1/4 b/l (biphasic on doppler bilaterally on 04/22/2019)  -Posterior tibial pulse +1/4 b/l (biphasic on doppler bilaterally on 04/22/2019)  -Hair growth Absent to b/l anterior legs and ankles  -Moderate 3+ to 4+ pitting edema to bilateral legs with buffalo hump to bilateral dorsal foot  -Telangiectasias to bilateral feet, ankles and legs  NEURO:  -Light touch sensation intact to b/l plantar forefoot  DERM:  -Skin temperature cool to bilateral legs and feet  -Skin thin, shiny, atrophic to bilateral feet and legs    -Toenails elongated, dystrophic and discolored x 10  ---Incurvation to the bilateral border of the bilateral hallux  ---Mild erythema and edema to the nail borders  ---No open wounds and no drainage  ---No severe erythema, no ascending erythema, no calor, no purulence no openings of skin, no malodor, no other SOI.    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 Treatment options discussed with no educational barriers noted.  -Nails debrided x 10 without incident  ---Mild slant back to bilateral borders of bilateral hallux with less slant back per patient request  ---Patient still had tenderness to the bilateral borders of the bilateral hallux post debridement but improved from pre debridement    ---Patient instructed to continue to elevate her legs as much as possible     -Foot Care Education provided. This included checking the feet daily looking for new new blisters or wounds, wearing shoes at all times when walking including around the house (or a slipper). Any sign of infection in the foot warrant's the patient presenting to the ED as soon as possible.    Patient does not use tobacco products.     -Advised patient not to give Lasix to the patient until she has spoken with her PCP. If patient has low sodium and her daughter gives her Lasix, this could cause a heart attack. The daughter says the patient's PCP is on vacation -- she should still call the office because there will still be a provider that is covering for his patients that call in. She is to discuss Lasix with a PCP before administering it. She is in agreement with this plan.    -Patient in agreement with the above treatment plan and all of patient's questions were answered.      RTC 63+ days for bilateral hallux bilateral border slant back and toenail debridement        Sally Arshad DPM

## 2020-06-09 NOTE — NURSING NOTE
"Chief Complaint   Patient presents with     Toenail     Trimming       Initial /70 (BP Location: Left arm, Patient Position: Chair, Cuff Size: Adult Regular)   Pulse 82   Temp 98.5  F (36.9  C) (Tympanic)   Ht 1.6 m (5' 3\")   Wt 47.2 kg (104 lb)   SpO2 95%   BMI 18.42 kg/m   Estimated body mass index is 18.42 kg/m  as calculated from the following:    Height as of this encounter: 1.6 m (5' 3\").    Weight as of this encounter: 47.2 kg (104 lb).  Medication Reconciliation: complete  ISELA ANDERSEN LPN    "

## 2020-06-12 NOTE — PROGRESS NOTES
Clinic Care Coordination Contact    Writer referred to care coordination from Kristine Nguyen.   Writer met with patient and daughter on this date. Reviewed home care options, specifically Hospice.   Patient and dtr in agreement to Hospice Consult through [facility].   Writer reached out to [facility], gave them daughters phone number and address:  [place]  [address] [address]  Hospice plans to come out and provide educational information to patient and family tomorrow 06/13/2020.   Hospice will reach out to Nona tomorrow to set up a time that works best for Nona and Tenisha.   Writer will plan to follow up with patient next week to review and discuss.     Writer will remain involved and supportive as able.    GWEN Garibay on 6/12/2020 at 3:57 PM

## 2020-06-12 NOTE — PROGRESS NOTES
Nursing Notes:   Natalia Doty, LPN  6/12/2020  2:44 PM  Signed  Patient presents to the clinic for swelling of both ankles/lower legs and increase in back pain.  Patient was in the hospital several weeks ago and was told to hold Lasix-daughter gave patient Lasix 20 mg daily for the past 3 days with no effect.  Current Oxy prescription has been ineffective for pain management.     Nursing note reviewed with patient.  Accuracy and completeness verified.    SUBJECTIVE:                                                      Tenisha Cagle is a 86 year old year old female patient who presents to the clinic today accompanied by her daughter who is her caretaker for increased lower extremity edema.  Daughter reports that the patient's lower extremities have steadily increased in size since she was told to stop taking her furosemide. Patient was on furosemide 20 mg daily prior but this had been discontinued due to declining kidney function.  She now has 2 areas of discoloration on her right lower extremity and daughter is concerned that the skin will break open and potential for infection.  Daughter reports that the patient elevates her legs often throughout the day however her advanced dementia makes her restless and she moves quite often.  In addition she has had increasing urges to void which causes additional activity.  Daughter has since injured her back caring for her mother and they are considering placement in a  memory care.    Patient verbalizes she just wants to die at home.  Patient verbalizes she is having increasing pain, she has been taking oxycodone 5 mg to 10 mg every evening per daughter. Daughter states she has given patient three days worth of furosemide to see if her leg swelling would go down. It has not worked.    Problem List/PMH: Reviewed in EMR, and made relevant updates today.  Medications: Reviewed in EMR, and made relevant updates today.  Allergies: Reviewed in EMR, and made relevant updates  today.    Current Code Status:  DNR/DNI    REVIEW OF SYSTEMS:    Review of Systems   Constitutional: Positive for activity change and unexpected weight change (5 pound weight gain in 3 days).   HENT: Positive for dental problem (Has a rotten tooth on the left lower jaw).    Cardiovascular: Positive for leg swelling (bilateral lower extremities which has been increasing).   Genitourinary: Positive for difficulty urinating, frequency and urgency.   Musculoskeletal: Positive for arthralgias, back pain and myalgias.   Skin: Positive for color change.        2 areas of concern on her right lower extremity   Neurological:        History of TIAs.  Dementia.   Psychiatric/Behavioral: Positive for agitation, behavioral problems, confusion and sleep disturbance.        Daughter reports increased agitation at home, confusion, requiring increased amount of cares.   All other systems reviewed and are negative.    OBJECTIVE:                                                      EXAM:     /80 (BP Location: Right arm, Patient Position: Sitting, Cuff Size: Adult Small)   Pulse 72   Temp 97.8  F (36.6  C) (Tympanic)   Resp 22   Wt 49.6 kg (109 lb 4 oz)   SpO2 96%   BMI 19.35 kg/m      Current Pain Score: Extreme Pain (8)     Physical Exam  Vitals signs reviewed.   HENT:      Head: Normocephalic and atraumatic.      Nose: Nose normal.      Mouth/Throat:      Mouth: Mucous membranes are moist.      Pharynx: Oropharynx is clear.   Eyes:      Extraocular Movements: Extraocular movements intact.      Conjunctiva/sclera: Conjunctivae normal.      Pupils: Pupils are equal, round, and reactive to light.      Comments: Wearing glasses for vision correction   Neck:      Musculoskeletal: Normal range of motion and neck supple. No muscular tenderness.   Cardiovascular:      Rate and Rhythm: Normal rate and regular rhythm.      Heart sounds: Normal heart sounds.   Pulmonary:      Effort: Pulmonary effort is normal.      Breath sounds:  Normal breath sounds.   Abdominal:      General: Bowel sounds are normal.   Musculoskeletal:      Right lower leg: Edema present.      Left lower leg: Edema present.   Lymphadenopathy:      Cervical: No cervical adenopathy.   Skin:     General: Skin is warm.      Findings: Bruising (See images under media) present.   Neurological:      Mental Status: She is alert. She is disoriented.   Psychiatric:         Mood and Affect: Mood is anxious.         Behavior: Behavior is agitated.         Cognition and Memory: Cognition is impaired. Memory is impaired.       Diagnostics Completed at this Visit:  Results for orders placed or performed in visit on 06/12/20   UA with Microscopic reflex to Culture     Status: Abnormal    Specimen: Midstream Urine   Result Value Ref Range    Color Urine Yellow     Appearance Urine Clear     Glucose Urine Negative NEG^Negative mg/dL    Bilirubin Urine Negative NEG^Negative    Ketones Urine Negative NEG^Negative mg/dL    Specific Gravity Urine 1.006 1.003 - 1.035    Blood Urine Negative NEG^Negative    pH Urine 7.0 5.0 - 7.0 pH    Protein Albumin Urine Negative NEG^Negative mg/dL    Urobilinogen mg/dL Normal 0.0 - 2.0 mg/dL    Nitrite Urine Negative NEG^Negative    Leukocyte Esterase Urine Negative NEG^Negative    Source Midstream Urine     WBC Urine 4 0 - 5 /HPF    RBC Urine 1 0 - 2 /HPF    Mucous Urine Present (A) NEG^Negative /LPF    Hyaline Casts 1 0 - 2 /LPF   Comprehensive metabolic panel     Status: Abnormal   Result Value Ref Range    Sodium 124 (L) 134 - 144 mmol/L    Potassium 3.6 3.5 - 5.1 mmol/L    Chloride 86 (L) 98 - 107 mmol/L    Carbon Dioxide 29 21 - 31 mmol/L    Anion Gap 9 3 - 14 mmol/L    Glucose 147 (H) 70 - 105 mg/dL    Urea Nitrogen 15 7 - 25 mg/dL    Creatinine 0.85 0.60 - 1.20 mg/dL    GFR Estimate 63 >60 mL/min/[1.73_m2]    GFR Estimate If Black 77 >60 mL/min/[1.73_m2]    Calcium 8.7 8.6 - 10.3 mg/dL    Bilirubin Total 0.3 0.3 - 1.0 mg/dL    Albumin 3.4 (L) 3.5 -  "5.7 g/dL    Protein Total 6.1 (L) 6.4 - 8.9 g/dL    Alkaline Phosphatase 47 34 - 104 U/L    ALT 12 7 - 52 U/L    AST 16 13 - 39 U/L   CBC with platelets differential     Status: Abnormal   Result Value Ref Range    WBC 19.8 (H) 4.0 - 11.0 10e9/L    RBC Count 3.52 (L) 3.8 - 5.2 10e12/L    Hemoglobin 8.7 (L) 11.7 - 15.7 g/dL    Hematocrit 27.9 (L) 35.0 - 47.0 %    MCV 79 78 - 100 fl    MCH 24.7 (L) 26.5 - 33.0 pg    MCHC 31.2 (L) 31.5 - 36.5 g/dL    RDW 14.6 10.0 - 15.0 %    Platelet Count 526 (H) 150 - 450 10e9/L    Diff Method Automated Method     % Neutrophils 69.1 %    % Lymphocytes 28.3 %    % Monocytes 1.6 %    % Eosinophils 0.0 %    % Basophils 0.2 %    % Immature Granulocytes 0.8 %    Absolute Neutrophil 13.7 (H) 1.6 - 8.3 10e9/L    Absolute Lymphocytes 5.6 (H) 0.8 - 5.3 10e9/L    Absolute Monocytes 0.3 0.0 - 1.3 10e9/L    Absolute Eosinophils 0.0 0.0 - 0.7 10e9/L    Absolute Basophils 0.0 0.0 - 0.2 10e9/L    Abs Immature Granulocytes 0.2 0 - 0.4 10e9/L        ASSESSMENT/PLAN:                                                      1. Urinary symptom or sign  Negative for UTI.  - UA with Microscopic reflex to Culture; unremarkable with exception for mucus  - CBC with platelets differential; elevated WBC at 19.8, RBC low at 3.52, hemoglobin declining at 8.7, hematocrit low at 27.9, no signs of infection as urine is negative, no cough, SOB. Most likely her CLL.  - Comprehensive metabolic panel; Sodium low at 124, chloride low at 86, glucose elevated at 147, albumin low at 3.4 and total protein low at 6.1.    2. Lethargy  Discussed extensively the option of hospice versus placement in dementia unit. Daughter was unaware of this option and would like more information and to discuss with her brother. I contacted the LSW at clinic and she was able to meet with the daughter and patient about the possibility of hospice evaluation. Tenisha verbalizes she wishes to die at home but asks the daughter \"is this okay\".  Unsure of " "her comprehension.  I did ask her if she is aware of what hospice is and she replied \"I think so\".  - CBC with platelets differential; elevated WBC at 19.8, RBC low at 3.52, hemoglobin declining at 8.7, hematocrit low at 27.9,   - Comprehensive metabolic panel; Sodium low at 124, chloride low at 86, glucose elevated at 147, albumin low at 3.4 and total protein low at 6.1.    3. Bilateral lower extremity edema  Consider acute on chronic CHF exacerbation with worsening heart function.  Last ECHO was 6/8/17 which indicated Grade II diastolic dysfunction, severe left atrial enlargement, mild right atrial enlargement, EF 60-65%.  Due to advanced dementia, I do not feel she would tolerate an updated ECHO at this time without sedation.  - CBC with platelets differential; elevated WBC at 19.8, RBC low at 3.52, hemoglobin declining at 8.7, hematocrit low at 27.9,   - Comprehensive metabolic panel; Sodium low at 124, chloride low at 86, glucose elevated at 147, albumin low at 3.4 and total protein low at 6.1. Kidney function within appropriate range so I feel she can restart her furosemide 20 mg daily. Rx sent to pharmacy.    4. Dementia with behavioral disturbance, unspecified dementia type (H)  Chronic.  Discussed placement in the dementia unit versus hospice.   in to talk with patient and daughter to offer them more information and answer questions.  - HOSPICE REFERRAL    5. Hyponatremia  We will check electrolytes today as daughter wishes her to continue to take furosemide to help with the lower extremity edema. Low at 124. Recheck in one week.  - HOSPICE REFERRAL    6. Paroxysmal A-fib (H)  In a regular rhythm at this time.  - HOSPICE REFERRAL    7. Chronic lymphocytic leukemia (H)  Chronic.  - HOSPICE REFERRAL    Patient verbalizes understanding and is agreeable to plan of care.    One week follow up.      LUCIA Saez, AGNP-C  Internal Medicine  [facility]    06/13/2020 " 6:38 PM    Portions of this note were dictated using speech recognition software. The note has been proofread but errors in the text may have been overlooked. Please contact me if there are any concerns regarding the accuracy of the dictation.

## 2020-06-12 NOTE — NURSING NOTE
Patient presents to the clinic for swelling of both ankles/lower legs and increase in back pain.  Patient was in the hospital several weeks ago and was told to hold Lasix-daughter gave patient Lasix 20 mg daily for the past 3 days with no effect.  Current Oxy prescription has been ineffective for pain management.

## 2020-06-15 NOTE — PROGRESS NOTES
Clinic Care Coordination Contact    Writer reached out to patient/dtr Nona on this date.   Nona approved writer to speak with daughter Nona at last clinic appt on 6/12/2020.   Patient had Hospice consult through [facility] on 6/13/2020.   Writer follow up with patient and daughter on this date to answer any questions.   They would like to move forward with admitting to Hospice.   Nona is currently receive in home therapies through [facility]. Patient aware therapies will need to end prior to admitting to Hospice per guidelines.   Patient updated Hospice, Hospice will be reaching out to patient after reviewing chart to determine eligibly for admission is met.   Writer will remain involved and supportive to patient and family as able.     Writer will assist with coordination of care as needed.     GWEN Garibay on 6/15/2020 at 12:10 PM

## 2020-06-16 NOTE — PROGRESS NOTES
Labs to be drawn by homecare for reevaluation.  LUCIA Saez, AGNP-C  Internal Medicine  06/16/2020 2:09 PM

## 2020-06-16 NOTE — TELEPHONE ENCOUNTER
After proper verification, patients daughter relayed how the patient has been doing as per providers request. She is having a hard time swallowing pills, hard time eating food, her hips are loose, knees are bad, her stomach hurts in the am but gets better as the day goes by. She cannot sleep, has to urinate every 20 minutes at night and her feet and ankles are swollen, Made an appointment for a telephone visit with provider on Thursday at 1:20.  Shaila Carter LPN on 6/16/2020 at 1:41 PM

## 2020-06-16 NOTE — PROGRESS NOTES
Clinic Care Coordination Contact    F/U: Per [facility], patient does not meet criteria for admission at this time.   Family was hoping to admit to Hospice this week.   F/U: [facility] will continue with PT/OT/SLP at this time as patient progressing.   Family requesting review from therapy for w/c need for accessibility.     Due to difficulties transporting patient, [facility] home care will attempt a lab draw on Wednesday, 6/17/2020 in patients home.   Appt will be set up on Thursday to review lab results with patient and family.     Writer till continue to coordinate care once home care discharge's in home services.   Family and patient both in agreement to services at this time to meet patient's daily needs and provide dtr, Nona with care giving relief.     Writer will remain involved and supportive to patient and family as able.     GWEN Garibay on 6/16/2020 at 1:38 PM

## 2020-06-16 NOTE — TELEPHONE ENCOUNTER
Please call patient's daughter and get update.     Patient should stop sodium tablets.    If worsening, I want to see her in the clinic before Friday. Thanks.

## 2020-06-16 NOTE — PROGRESS NOTES
Clinic Care Coordination Contact      Nona, daughter of patient reached out to  on this date. They would like to move forward with Hospice. [facility] has a call out to Hospice to determine if patient meets eligibility requirements.    has also updated [facility] with patient's permission.   Awaiting return call from [facility] at this time.   Will assist with coordination as able. Writer will remain involved and supportive.     GWEN Garibay on 6/16/2020 at 8:41 AM

## 2020-06-17 NOTE — TELEPHONE ENCOUNTER
Dear Kristine CORONA NP,    Just wanted to let you know that Tenisha Gurjit's lab results are ready for your viewing.     Thank you,     Julia Taylor RN on 6/17/2020 at 12:31 PM

## 2020-06-18 NOTE — PROGRESS NOTES
"Subjective     Tenisha Cagle is a 86 year old female who is being evaluated via a billable telephone visit.      The patient has been notified of following:    \"This telephone visit will be conducted via a call between you and your physician/provider. We have found that certain health care needs can be provided without the need for a physical exam.  This service lets us provide the care you need with a short phone conversation.  If a prescription is necessary we can send it directly to your pharmacy.  If lab work is needed we can place an order for that and you can then stop by our lab to have the test done at a later time. Telephone visits are billed at different rates depending on your insurance coverage. During this emergency period, for some insurers they may be billed the same as an in-person visit.  Please reach out to your insurance provider with any questions. If during the course of the call the physician/provider feels a telephone visit is not appropriate, you will not be charged for this service.\"    Patient has given verbal consent for Telephone visit?  Yes    How would you like to obtain your AVS? 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)     Numbness and tingling of right leg     Osteoarthritis of spine with radiculopathy, lumbar region     Pancreatic mass     Paresthesia of right leg     Radicular leg " pain     Right knee DJD     SK (seborrheic keratosis)     Benign skin lesion of nose     Visual changes     Memory loss     Atrial fibrillation with RVR (H)     Arthritis of shoulder region, left, degenerative     Controlled substance agreement signed 3/8/19     Chondrodermatitis nodularis chronica helicis, right     Dizzy     Paroxysmal A-fib (H)     Dementia with behavioral disturbance (H)     Degenerative joint disease of pelvic region     Urinary retention     Dementia (H)     UTI (urinary tract infection)     Elevated lactic acid level     Hyponatremia     Hip pain, right     Scoliosis of thoracolumbar spine     Lumbar radiculopathy     Past Surgical History:   Procedure Laterality Date     CHOLECYSTECTOMY      No Comments Provided     COLONOSCOPY      2007,Sigmoid diverticulosis     ENDOSCOPY UPPER, COLONOSCOPY, COMBINED      5/5/11,Hiatal hernia, gastric gland hyperplasia in antrum     LAPAROSCOPIC TUBAL LIGATION      No Comments Provided     OTHER SURGICAL HISTORY      1986,205093,BIOPSY BREAST,Right     OTHER SURGICAL HISTORY      [id number],PREMALIG/BENIGN SKIN LESION EXCISION,R side of nose, face and chest wall/Basal Cell Cancer Excision     TONSILLECTOMY, ADENOIDECTOMY, COMBINED      1954       Social History     Tobacco Use     Smoking status: Never Smoker     Smokeless tobacco: Never Used   Substance Use Topics     Alcohol use: No     Alcohol/week: 0.0 standard drinks     Family History   Problem Relation Age of Onset     Other - See Comments Mother         Stroke,Had a CVA age 85     Cancer Father         Cancer,Brain tumor, age 56     Blood Disease Sister         Blood Disease,Leukemia and     Cancer Sister         Cancer, kidney cancer     Blood Disease Sister         Blood Disease,Chronic lymphocytic leukemia         Current Outpatient Medications   Medication Sig Dispense Refill     acetaminophen (TYLENOL) 500 MG tablet Take 2 tablets (1,000 mg) by mouth 3 times daily       Calcium Carbonate-Vitamin D  (CALCIUM 500 + D) 500-125 MG-UNIT TABS Take 1 tablet by mouth daily       docusate sodium (COLACE) 100 MG capsule Take 100 mg by mouth every morning       folic acid (FOLVITE) 400 MCG tablet Take 400 mcg by mouth daily       furosemide (LASIX) 20 MG tablet Take 1 tablet (20 mg) by mouth daily 30 tablet 0     lactulose 20 GM/30ML SOLN Take 30 mLs (20 g) by mouth 2 times daily as needed       lidocaine (LIDODERM) 5 % patch Place 1 patch onto the skin every 24 hours To prevent lidocaine toxicity, patient should be patch free for 12 hrs daily. 30 patch 3     LORazepam (ATIVAN) 0.5 MG tablet Take 1 tablet (0.5 mg) by mouth every 6 hours as needed for anxiety 30 tablet 5     magnesium hydroxide (MILK OF MAGNESIA) 400 MG/5ML suspension Take 15 mLs by mouth nightly as needed for constipation or heartburn       metoprolol tartrate (LOPRESSOR) 25 MG tablet Take 0.5 tablets (12.5 mg) by mouth 2 times daily 180 tablet 3     Multiple Vitamins-Minerals (WOMENS MULTIVITAMIN  PLUS) TABS Take 1 tablet by mouth every morning       oxyCODONE (ROXICODONE) 5 MG tablet Take 1-2 tablets (5-10 mg) by mouth every 6 hours as needed for moderate to severe pain 100 tablet 0     pantoprazole (PROTONIX) 40 MG EC tablet Take 1 tablet (40 mg) by mouth every morning (before breakfast) 30 tablet 3     predniSONE (DELTASONE) 10 MG tablet Take 1 tablet (10 mg) by mouth every other day Resume when done with the 5 days of 2 mg medrol       Probiotic Product (DIGESTIVE ADVANTAGE PO) Take 1 tablet by mouth every morning       sucralfate (CARAFATE) 1 GM tablet Take 1 tablet (1 g) by mouth 4 times daily (before meals and nightly) 120 tablet 3     Allergies   Allergen Reactions     Lactose      Lansoprazole Other (See Comments) and Unknown     Patient states that medication didn't work for her.  Daughter states she got delusional, water did not taste right     Lisinopril Cough     BP Readings from Last 3 Encounters:   06/12/20 130/80   06/09/20 130/70    06/01/20 (!) 142/76    Wt Readings from Last 3 Encounters:   06/12/20 49.6 kg (109 lb 4 oz)   06/09/20 47.2 kg (104 lb)   05/28/20 47 kg (103 lb 9.6 oz)         Reviewed and updated as needed this visit by Provider.    HPI:   Phone call today placed to patient's daughter Nona, and lab results from yesterday's lab draw were reviewed.  Patient was slightly concerned on the hemoglobin which was low at 7.2 stating that she was transfused for hemoglobin like this in May 2020 and also her Aggrenox was stopped at that time.  She does report patient is drinking quite a bit of water because the pain medication makes her have a dry mouth so we question whether this reading is delusional or a true reading.  She feels she drinks 5 or 6 cups of water ranging in size from 8 to 16 ounces.  Patient has decreased appetite, daughter reports she does not eat a whole lot of meat which could be why her protein is low as she chokes on it.  She reports protein drinks caused the patient to have excess phlegm.  I did instruct her that she can use protein drinks to make milkshakes.  She daughter reports that the patient's edema in her bilateral lower extremities went down a little bit with the addition of 20 mg of furosemide daily.  She reports they are still quite swollen.  Patient's daughter reports that the patient is having increased difficulty getting around, stating she ambulates when she has to go to the bathroom and she is having much more pain and difficulty doing this.  She is requesting if we can get her a wheelchair to use for transportation to and from appointments as it is very difficult to get her into the car due to decreased and painful mobility.    ROS:  Denies any fever, chills, chest pain, SOB, changes in bowel or bladder.    Objective   Reported vitals:  LMP  (LMP Unknown)    Spoke with patient's daughter as patient has cognitive impairment due to her dementia   unable to complete examination due to telephone  visit.    Diagnostic Test Results:    Component      Latest Ref Rng & Units 5/25/2020 6/12/2020 6/17/2020   WBC      4.0 - 11.0 10e9/L 14.4 (H) 19.8 (H) 9.3   RBC Count      3.8 - 5.2 10e12/L 3.10 (L) 3.52 (L) 2.95 (L)   Hemoglobin      11.7 - 15.7 g/dL 8.0 (L) 8.7 (L) 7.2 (L)   Hematocrit      35.0 - 47.0 % 25.3 (L) 27.9 (L) 23.0 (L)   MCV      78 - 100 fl 82 79 78   MCH      26.5 - 33.0 pg 25.8 (L) 24.7 (L) 24.4 (L)   MCHC      31.5 - 36.5 g/dL 31.6 31.2 (L) 31.3 (L)   RDW      10.0 - 15.0 % 15.9 (H) 14.6 14.6   Platelet Count      150 - 450 10e9/L 370 526 (H) 391   Diff Method        Automated Method    % Neutrophils      %  69.1    % Lymphocytes      %  28.3    % Monocytes      %  1.6    % Eosinophils      %  0.0    % Basophils      %  0.2    % Immature Granulocytes      %  0.8    Absolute Neutrophil      1.6 - 8.3 10e9/L  13.7 (H)    Absolute Lymphocytes      0.8 - 5.3 10e9/L  5.6 (H)    Absolute Monocytes      0.0 - 1.3 10e9/L  0.3    Absolute Eosinophils      0.0 - 0.7 10e9/L  0.0    Absolute Basophils      0.0 - 0.2 10e9/L  0.0    Abs Immature Granulocytes      0 - 0.4 10e9/L  0.2    Sodium      134 - 144 mmol/L 130 (L) 124 (L) 128 (L)   Potassium      3.5 - 5.1 mmol/L 4.4 3.6 3.9   Chloride      98 - 107 mmol/L 97 (L) 86 (L) 91 (L)   Carbon Dioxide      21 - 31 mmol/L 23 29 31   Anion Gap      3 - 14 mmol/L 10 9 6   Glucose      70 - 105 mg/dL 111 (H) 147 (H) 98   Urea Nitrogen      7 - 25 mg/dL 32 (H) 15 18   Creatinine      0.60 - 1.20 mg/dL 0.95 0.85 0.75   GFR Estimate      >60 mL/min/1.73:m2 56 (L) 63 73   GFR Estimate If Black      >60 mL/min/1.73:m2 68 77 89   Calcium      8.6 - 10.3 mg/dL 8.9 8.7 7.8 (L)   Bilirubin Total      0.3 - 1.0 mg/dL  0.3 0.3   Albumin      3.5 - 5.7 g/dL  3.4 (L) 2.8 (L)   Protein Total      6.4 - 8.9 g/dL  6.1 (L) 4.4 (L)   Alkaline Phosphatase      34 - 104 U/L  47 39   ALT      7 - 52 U/L  12 11   AST      13 - 39 U/L  16 14   N-Terminal Pro Bnp      0 - 100 pg/mL    1,012 (H)     Labs reviewed in Epic    Assessment/Plan:    1. Paroxysmal A-fib (H)  Chronic    2. Essential hypertension  - Comprehensive metabolic panel; Future - Labs to be drawn by home care nurse.  - CBC with platelets differential; Future - Labs to be drawn by home care nurse.    3. Osteoarthritis of spine with radiculopathy, lumbar region  - oxyCODONE (ROXICODONE) 5 MG tablet; Take 1-2 tablets (5-10 mg) by mouth every 6 hours as needed for moderate to severe pain  Dispense: 100 tablet; Refill: 0  - Wheelchair Order for DME - ONLY FOR DME    4. Hyponatremia  - improved at last check, still lower than normal  - Comprehensive metabolic panel; Future - Labs to be drawn by home care nurse.    5. Chronic lymphocytic leukemia (H)  - Chronic    6. Anemia in neoplastic disease  - Chronic  - Hemoglobin low at 7.2; questionable whether it is dilutional or true reading  - CBC with platelets differential; Future - Labs to be drawn by home care nurse.    7. Dementia with behavioral disturbance, unspecified dementia type (H)  - Chronic, progressing  - Wheelchair Order for DME - ONLY FOR DME    8. Bilateral lower extremity edema  - furosemide (LASIX) 20 MG tablet; Take 1 tablet (20 mg) by mouth 2 times daily for one week and reassess labs.  - Wheelchair Order for DME - ONLY FOR DME  - Comprehensive metabolic panel; Future - Labs to be drawn by home care nurse.  - Brain Natriuretic Peptide (BNP); Future - Labs to be drawn by home care nurse.    9. Acute on chronic diastolic heart failure (H)  - Will increased furosemide to 20 mg BID for one week and reassess labs. Labs to be drawn by home care nurse.  - Comprehensive metabolic panel; Future - Labs to be drawn by home care nurse.  - Brain Natriuretic Peptide (BNP); Future - Labs to be drawn by home care nurse.    Return in about 1 week (around 6/25/2020) for Telephone Visit, Lab Work.  By home care nurse    Patient's daughter verbalizes understanding and is agreeable to plan of  care.    Phone call duration: 15 minutes    LUCIA Saez, AGNP-C  Internal Medicine  06/18/2020 2:02 PM

## 2020-06-18 NOTE — TELEPHONE ENCOUNTER
Request for Home Care Speech Therapy orders as follows:    Frequency =  1 x week x _3___ weeks    Effective date = 6/11/20    Interventions include:  6/11/20      Swallow evaluation and treat         Please respond with .[email or web address], if you are in agreement. Please sign with your comments and signature, if you are not in agreement.

## 2020-06-19 NOTE — PROGRESS NOTES
Clinic Care Coordination Contact    Writer reached out to Prema from [facility] on this date to request patient review for potential admission to hospice due to recent visit and changes in diagnosis with CHF and little appetite and pain management.  Hospice plans to review chart on Monday, 6/22/2020.  Writer will update [facility] home care with this information, as they are currently seeing patient in home for INRs and therapies.  Writer will also update Nona, daughter of patient.  Writer will remain involved and supportive as able to patient and family.    GWEN Garibay on 6/19/2020 at 9:41 AM

## 2020-06-23 NOTE — PROGRESS NOTES
Clinic Care Coordination Contact    Spoke with Nona on this date, at this time Nona would like to end [facility] home care orders effective 6/22/2020, begin  OP PT at [facility]  effective 6/23/2020 due to back pain/allignment, look at admitting to hospice with  next week.   Writer will f/u with patient/daughter throughout the week to confirm this remains their francisco as it has changed numerous times over the past week.   Writer will remain involved and supportive with goal to coordinate care of choice for patient.     GWEN Garibay on 6/23/2020 at 12:20 PM    Clinic Care Coordination Contact    Writer has been in contact with daughter Nona regarding potential admission to hospice.  Hospice did confirm patient meets criteria for admission.  Reviewed information with Nona, she has gone back and forth at this time if she like to pursue or continue with therapeutic measures due to back pain for patient.  Writer is working with Nona, hospice and therapy and attempt to coordinate patient's wishes.  Writer will remain involved in supportive as able, awaiting callback for Nona to confirm which direction she would like to go.  Hospice and home care are aware this will need to be coordinated.    GWEN Garibay on 6/23/2020 at 11:44 AM

## 2020-06-23 NOTE — TELEPHONE ENCOUNTER
Dr Kapoor reviewed and completed the following home care or hospice form(s) for patient on 6/23/2020   This covers the certification period effective 5/29/2020 to 7/27/2020.  Monisha Gamboa LPN on 6/23/2020 at 1:23 PM

## 2020-06-24 NOTE — PROGRESS NOTES
Clinic Care Coordination Contact    Per Hospice, they will be admitting  patient on Monday 6/29/2020 per patient request as they wish to have labs on Friday.     GWEN Garibay on 6/24/2020 at 1:25 PM    Clinic Care Coordination Contact    Writer placed f/u call out to Nona, patients daughter and caregiver.   They are hoping to move forward with Hospice at this time and declined OP therapies at this time.   Writer reach out out to [facility], they will be calling patient on this date to discuss admission date.   Writer will remain involved and supportive to patient and family as able.    GWEN Garibay on 6/24/2020 at 11:31 AM

## 2020-06-25 NOTE — PROGRESS NOTES
Clinic Care Coordination Contact    Conference Call:  Due to numerous changes of wanting home therapies, home labs, clinic appointments and Hospice admission,  Writer conference called in Hospice and Nona to all converse and be on the same page to implement patients wishes.   At this time, Home therapies and nursing ended this week on 6/22/2020. Clinic appt cancelled for 6/26/2020 to review labs and Hospice admitting patient effective this week either today or tomorrow per patient/family request.   Labs no longer necessary d/t implementing Hospice services in home per patients request.     Writer will remain involved and supportive to patient and family as able.     GWEN Garibay on 6/25/2020 at 8:10 AM

## 2020-06-26 NOTE — PROGRESS NOTES
"Clinic Care Coordination Contact    Writer reached out to Nona via telephone on this date.  Call placed to check and see how everyone is doing, hospice admitted patient affective 6/25/2020.  Per Nona, patient had a \" slow motion fall\" off of the commode last night, Nona states no injury other than a sore butt.  Hospital bed will be delivered today, along with a wheelchair.  States no other equipment is needed at this time.  Writer will remain involved in supportive to patient and family in able.   Nona did have some questions about the pain medication, directed her to JENNIFER Lloyd from hospice.  They can look at education and adjusting if and when needed.    GWEN Garibay on 6/26/2020 at 8:48 AM    "

## 2020-07-21 ENCOUNTER — PATIENT OUTREACH (OUTPATIENT)
Dept: CARE COORDINATION | Facility: CLINIC | Age: 85
End: 2020-07-21

## 2020-07-21 NOTE — PROGRESS NOTES
Clinic Care Coordination Contact    Writer reached out to Nona on this date, daughter of patient who recently passed away.    States she cries every morning and a little bit throughout the day but has a great support system of [employer] members and is going to be seeing a psychiatrist this week to openly discuss life-changing events of death of her mother.    Nona states she was grateful to have hospice in-house to help support her mother's wishes during her passing.    Writer offered support to Nona and milly.    GWEN Garibay on 7/21/2020 at 11:55 AM

## 2022-05-20 NOTE — TELEPHONE ENCOUNTER
Request for Home Care Physical Therapy orders as follows:    PT eval and treat date:  5/29/20    1 x 1 week then     Continuation frequency =  2 x week x _4___ weeks              Effective date = 5/29/20    Interventions include:    Therapeutic Exercise  Gait Training  Gait/Balance/Dysfunction  Home Exercise Program  Home Safety Assessment  Manual Therapy  Ultrasound      Therapist: Shaunna Fishman PT  Please respond with .HOMECAREAGREE, if you are in agreement. Please sign with your comments and signature, if you are not in agreement.     PED PROGRESS NOTE    Perla Chu [id number]  [id number]    2021  5 m.o.  female      Chief Complaint: vomiting and diarrhea    Assessment:   Active Problems:    Diarrhea (5/19/2022)      Vomiting (2/11/2719)      Metabolic acidosis (9/58/8180)      Moderate dehydration (5/19/2022)      Severe protein-calorie malnutrition ([address] (5/19/2022)      Overview: Weight less than 1%      Height 2%      This is Hospital Day: 2 for 5 m. o.female admitted for vomiting and diarrhea leading to dehydration in the setting of known positive rhino/enterovirus. Pt has received fluid resuscitation. Also noted to be around 3rd percentile in weight for length since birth - continues to grow on this curve, however weight today is in the 0.66th percentile with a Z score of 2.48. There is concern for possible underfeeding as pt's mother reported pt took 4 oz on the day of admission, therefore nutrition has been consulted for assistance with nutritional management. Plan:     FEN/GI:  - Decreased mIVF to half maintenance rate  - Strict I&Os  - Nutrition consulted, recommending:    - Continue ad katrin breast feeding with EBM so that we can better assess Nusrat's intake   - Goal is for daily total of 24-30oz per day - or about 7-8 feeds ranging between 3.5-4oz per feed. - Encourage feeding every 3-4h, do not allow greater than 4 hours between feeds   - Lactation consulted, appreciate recs    ID: Positive for rhino/entero on RVP  - supportive care    Resp: JOANNA    CV: HDS    Pain Management[de-identified]  - Tylenol prn    Dispo Planning:  - Home once able to tolerate PO, weight improving. Subjective:   Events over last 24 hours:   No acute events overnight. Stooling closer to her baseline now - more firm than when pt was admitted yesterday. No episodes of emesis reported. More alert today per mother.     Objective:   Extended Vitals:  Visit Vitals  BP 95/61 (BP 1 Location: Right leg, BP Patient Position: At rest;Lying)   Pulse 124   Temp 97.4 °F (36.3 °C)   Resp 32   Ht 0.6 m   Wt 5.25 kg   HC 39 cm   SpO2 100%   BMI 14.58 kg/m²       Oxygen Therapy  O2 Sat (%): 100 % (22)  O2 Device: None (Room air) (22)   Temp (24hrs), Av.3 °F (36.8 °C), Min:97.3 °F (36.3 °C), Max:100.4 °F (38 °C)      Intake and Output:      Intake/Output Summary (Last 24 hours) at 2022 1224  Last data filed at 2022 1118  Gross per 24 hour   Intake 134.4 ml   Output 249 ml   Net -114.6 ml      Physical Exam:   General  no distress, well developed, well nourished  HEENT  normocephalic/ atraumatic, anterior fontanelle open, soft and flat and moist mucous membranes  Eyes  EOMI and Conjunctivae Clear Bilaterally  Respiratory  Clear Breath Sounds Bilaterally, No Increased Effort and Good Air Movement Bilaterally  Cardiovascular   RRR, S1S2, No murmur, No rub and No gallop  Abdomen  soft, non tender, non distended, active bowel sounds and no masses  Skin  No Rash and Cap Refill less than 3 sec  Musculoskeletal full range of motion in all Joints  Neurology  More alert and interactive this AM    Reviewed: Medications, allergies, clinical lab test results and imaging results have been reviewed. Any abnormal findings have been addressed.      Labs:  Recent Results (from the past 24 hour(s))   GLUCOSE, POC    Collection Time: 22 12:48 PM   Result Value Ref Range    Glucose (POC) 75 54 - 117 mg/dL    Performed by Jemma patel RN    METABOLIC PANEL, COMPREHENSIVE    Collection Time: 22  1:43 PM   Result Value Ref Range    Sodium 138 132 - 140 mmol/L    Potassium 4.1 3.5 - 5.1 mmol/L    Chloride 108 97 - 108 mmol/L    CO2 14 (LL) 16 - 27 mmol/L    Anion gap 16 (H) 5 - 15 mmol/L    Glucose 84 54 - 117 mg/dL    BUN 9 6 - 20 MG/DL    Creatinine 0.27 0.20 - 0.50 MG/DL    BUN/Creatinine ratio 33 (H) 12 - 20      GFR est AA Cannot be calculated >60 ml/min/1.73m2    GFR est non-AA Cannot be calculated >60 ml/min/1.73m2    Calcium 9.7 8.8 - 10.8 MG/DL    Bilirubin, total 0.3 0.2 - 1.0 MG/DL    ALT (SGPT) 35 12 - 78 U/L    AST (SGOT) 50 20 - 60 U/L    Alk. phosphatase 271 110 - 460 U/L    Protein, total 7.4 (H) 5.0 - 7.0 g/dL    Albumin 3.9 2.7 - 4.3 g/dL    Globulin 3.5 2.0 - 4.0 g/dL    A-G Ratio 1.1 1.1 - 2.2     CBC WITH AUTOMATED DIFF    Collection Time: 05/19/22  1:43 PM   Result Value Ref Range    WBC 12.1 6.0 - 13.3 K/uL    RBC 4.64 3.45 - 4.75 M/uL    HGB 12.3 9.9 - 12.4 g/dL    HCT 39.9 (H) 29.5 - 37.1 %    MCV 86.0 74.8 - 88.3 FL    MCH 26.5 24.4 - 29.5 PG    MCHC 30.8 (L) 32.1 - 34.4 g/dL    RDW 13.2 12.2 - 14.3 %    PLATELET 992 867 - 519 K/uL    MPV 8.6 (L) 9.0 - 10.9 FL    NRBC 0.0 0  WBC    ABSOLUTE NRBC 0.00 (L) 0.03 - 0.13 K/uL    NEUTROPHILS 41 14 - 76 %    LYMPHOCYTES 52 30 - 86 %    MONOCYTES 6 4 - 13 %    EOSINOPHILS 0 0 - 4 %    BASOPHILS 1 0 - 1 %    IMMATURE GRANULOCYTES 0 %    ABS. NEUTROPHILS 5.0 1.0 - 7.2 K/UL    ABS. LYMPHOCYTES 6.3 2.1 - 9.0 K/UL    ABS. MONOCYTES 0.7 0.2 - 1.2 K/UL    ABS. EOSINOPHILS 0.0 0.0 - 0.7 K/UL    ABS. BASOPHILS 0.1 0.0 - 0.1 K/UL    ABS. IMM. GRANS. 0.0 K/UL    DF MANUAL      RBC COMMENTS NORMOCYTIC, NORMOCHROMIC      WBC COMMENTS REACTIVE LYMPHS     SAMPLES BEING HELD    Collection Time: 05/19/22  1:43 PM   Result Value Ref Range    SAMPLES BEING HELD 1RED 1MPST 1BC(SILV)     COMMENT        Add-on orders for these samples will be processed based on acceptable specimen integrity and analyte stability, which may vary by analyte.    RESPIRATORY VIRUS PANEL W/COVID-19, PCR    Collection Time: 05/19/22  3:29 PM    Specimen: Nasopharyngeal   Result Value Ref Range    Adenovirus Not detected NOTD      Coronavirus 229E Not detected NOTD      Coronavirus HKU1 Not detected NOTD      Coronavirus CVNL63 Not detected NOTD      Coronavirus OC43 Not detected NOTD      SARS-CoV-2, PCR Not detected NOTD      Metapneumovirus Not detected NOTD      Rhinovirus and Enterovirus Detected (A) NOTD      Influenza A Not detected NOTD      Influenza A, subtype H1 Not detected NOTD      Influenza A, subtype H3 Not detected NOTD      INFLUENZA A H1N1 PCR Not detected NOTD      Influenza B Not detected NOTD      Parainfluenza 1 Not detected NOTD      Parainfluenza 2 Not detected NOTD      Parainfluenza 3 Not detected NOTD      Parainfluenza virus 4 Not detected NOTD      RSV by PCR Not detected NOTD      B. parapertussis, PCR Not detected NOTD      Bordetella pertussis - PCR Not detected NOTD      Chlamydophila pneumoniae DNA, QL, PCR Not detected NOTD      Mycoplasma pneumoniae DNA, QL, PCR Not detected NOTD          Medications:  Current Facility-Administered Medications   Medication Dose Route Frequency    lidocaine (XYLOCAINE) 4 % cream 1 Each  1 Each Topical Q30MIN PRN    acetaminophen (TYLENOL) solution 73.6 mg  73.6 mg Oral Q6H PRN    sodium chloride (NS) flush 1-3 mL  1-3 mL IntraVENous Q8H    sodium chloride (NS) flush 1-3 mL  1-3 mL IntraVENous PRN    cholecalciferol (vitamin D3) 10 mcg/mL (400 unit/mL) oral liquid 20 mcg  20 mcg Oral DAILY    dextrose 5% and 0.9% NaCl infusion  10 mL/hr IntraVENous CONTINUOUS     Patient examined and discussed with Dr. Reji Stover MD   5/20/2022  [address] [place] [address] Family Medicine Residency
